# Patient Record
Sex: FEMALE | Race: WHITE | Employment: OTHER | URBAN - METROPOLITAN AREA
[De-identification: names, ages, dates, MRNs, and addresses within clinical notes are randomized per-mention and may not be internally consistent; named-entity substitution may affect disease eponyms.]

---

## 2017-06-26 ENCOUNTER — HOSPITAL ENCOUNTER (OUTPATIENT)
Dept: INTERVENTIONAL RADIOLOGY/VASCULAR | Age: 72
Discharge: HOME | End: 2017-06-26
Attending: ORTHOPAEDIC SURGERY | Admitting: ORTHOPAEDIC SURGERY
Payer: MEDICARE

## 2017-06-26 ENCOUNTER — ANESTHESIA EVENT (OUTPATIENT)
Dept: INTERVENTIONAL RADIOLOGY/VASCULAR | Age: 72
End: 2017-06-26
Payer: MEDICARE

## 2017-06-26 ENCOUNTER — ANESTHESIA (OUTPATIENT)
Dept: INTERVENTIONAL RADIOLOGY/VASCULAR | Age: 72
End: 2017-06-26
Payer: MEDICARE

## 2017-06-26 VITALS
HEIGHT: 64 IN | RESPIRATION RATE: 21 BRPM | BODY MASS INDEX: 23.05 KG/M2 | SYSTOLIC BLOOD PRESSURE: 134 MMHG | OXYGEN SATURATION: 96 % | HEART RATE: 57 BPM | DIASTOLIC BLOOD PRESSURE: 60 MMHG | WEIGHT: 135 LBS | TEMPERATURE: 97.9 F

## 2017-06-26 DIAGNOSIS — S32.020A COMPRESSION FRACTURE OF L2 LUMBAR VERTEBRA (HCC): ICD-10-CM

## 2017-06-26 DIAGNOSIS — M54.50 LOW BACK PAIN: ICD-10-CM

## 2017-06-26 PROCEDURE — 88307 TISSUE EXAM BY PATHOLOGIST: CPT | Performed by: ORTHOPAEDIC SURGERY

## 2017-06-26 PROCEDURE — 76060000033 HC ANESTHESIA 1 TO 1.5 HR: Performed by: NURSE ANESTHETIST, CERTIFIED REGISTERED

## 2017-06-26 PROCEDURE — 77030034843 HC TAMP SPN BN INFL EXP II KYPH -H

## 2017-06-26 PROCEDURE — 74011000250 HC RX REV CODE- 250: Performed by: RADIOLOGY

## 2017-06-26 PROCEDURE — 77030011218 HC DEV BIOP BN KYPH -C

## 2017-06-26 PROCEDURE — 74011250636 HC RX REV CODE- 250/636

## 2017-06-26 PROCEDURE — 74011250636 HC RX REV CODE- 250/636: Performed by: RADIOLOGY

## 2017-06-26 PROCEDURE — 74011636320 HC RX REV CODE- 636/320: Performed by: RADIOLOGY

## 2017-06-26 PROCEDURE — 77030003666 HC NDL SPINAL BD -A

## 2017-06-26 PROCEDURE — 88311 DECALCIFY TISSUE: CPT | Performed by: ORTHOPAEDIC SURGERY

## 2017-06-26 PROCEDURE — 77030034842 HC TAMP SPN BN INFL EXP II KYPH -I

## 2017-06-26 PROCEDURE — 77030030399

## 2017-06-26 PROCEDURE — 22514 PERQ VERTEBRAL AUGMENTATION: CPT

## 2017-06-26 PROCEDURE — 74011000250 HC RX REV CODE- 250

## 2017-06-26 PROCEDURE — C1713 ANCHOR/SCREW BN/BN,TIS/BN: HCPCS

## 2017-06-26 RX ORDER — SIMVASTATIN 10 MG/1
10 TABLET, FILM COATED ORAL
COMMUNITY
End: 2019-08-20 | Stop reason: ALTCHOICE

## 2017-06-26 RX ORDER — SODIUM CHLORIDE, SODIUM LACTATE, POTASSIUM CHLORIDE, CALCIUM CHLORIDE 600; 310; 30; 20 MG/100ML; MG/100ML; MG/100ML; MG/100ML
INJECTION, SOLUTION INTRAVENOUS
Status: DISCONTINUED | OUTPATIENT
Start: 2017-06-26 | End: 2017-06-26 | Stop reason: HOSPADM

## 2017-06-26 RX ORDER — FENTANYL CITRATE 50 UG/ML
INJECTION, SOLUTION INTRAMUSCULAR; INTRAVENOUS
Status: COMPLETED
Start: 2017-06-26 | End: 2017-06-26

## 2017-06-26 RX ORDER — KETOROLAC TROMETHAMINE 30 MG/ML
30 INJECTION, SOLUTION INTRAMUSCULAR; INTRAVENOUS
Status: DISCONTINUED | OUTPATIENT
Start: 2017-06-26 | End: 2017-06-26 | Stop reason: ALTCHOICE

## 2017-06-26 RX ORDER — CEFAZOLIN SODIUM IN 0.9 % NACL 2 G/50 ML
2 INTRAVENOUS SOLUTION, PIGGYBACK (ML) INTRAVENOUS ONCE
Status: COMPLETED | OUTPATIENT
Start: 2017-06-26 | End: 2017-06-26

## 2017-06-26 RX ORDER — MELOXICAM 15 MG/1
15 TABLET ORAL DAILY
COMMUNITY
End: 2019-07-26

## 2017-06-26 RX ORDER — PROPOFOL 10 MG/ML
INJECTION, EMULSION INTRAVENOUS AS NEEDED
Status: DISCONTINUED | OUTPATIENT
Start: 2017-06-26 | End: 2017-06-26 | Stop reason: HOSPADM

## 2017-06-26 RX ORDER — SODIUM CHLORIDE 0.9 % (FLUSH) 0.9 %
SYRINGE (ML) INJECTION
Status: DISCONTINUED
Start: 2017-06-26 | End: 2017-06-26 | Stop reason: ALTCHOICE

## 2017-06-26 RX ORDER — FENTANYL CITRATE 50 UG/ML
INJECTION, SOLUTION INTRAMUSCULAR; INTRAVENOUS AS NEEDED
Status: DISCONTINUED | OUTPATIENT
Start: 2017-06-26 | End: 2017-06-26 | Stop reason: HOSPADM

## 2017-06-26 RX ORDER — PROPOFOL 10 MG/ML
INJECTION, EMULSION INTRAVENOUS
Status: DISCONTINUED | OUTPATIENT
Start: 2017-06-26 | End: 2017-06-26 | Stop reason: HOSPADM

## 2017-06-26 RX ORDER — LIDOCAINE HYDROCHLORIDE 20 MG/ML
INJECTION, SOLUTION EPIDURAL; INFILTRATION; INTRACAUDAL; PERINEURAL AS NEEDED
Status: DISCONTINUED | OUTPATIENT
Start: 2017-06-26 | End: 2017-06-26 | Stop reason: HOSPADM

## 2017-06-26 RX ORDER — MIDAZOLAM HYDROCHLORIDE 1 MG/ML
INJECTION, SOLUTION INTRAMUSCULAR; INTRAVENOUS AS NEEDED
Status: DISCONTINUED | OUTPATIENT
Start: 2017-06-26 | End: 2017-06-26 | Stop reason: HOSPADM

## 2017-06-26 RX ORDER — FENTANYL CITRATE 50 UG/ML
100 INJECTION, SOLUTION INTRAMUSCULAR; INTRAVENOUS
Status: DISCONTINUED | OUTPATIENT
Start: 2017-06-26 | End: 2017-06-26 | Stop reason: ALTCHOICE

## 2017-06-26 RX ORDER — MIDAZOLAM HYDROCHLORIDE 1 MG/ML
INJECTION, SOLUTION INTRAMUSCULAR; INTRAVENOUS
Status: DISCONTINUED
Start: 2017-06-26 | End: 2017-06-26 | Stop reason: ALTCHOICE

## 2017-06-26 RX ORDER — LOSARTAN POTASSIUM 50 MG/1
100 TABLET ORAL DAILY
COMMUNITY
End: 2018-11-16

## 2017-06-26 RX ORDER — BUPIVACAINE HYDROCHLORIDE 5 MG/ML
30 INJECTION, SOLUTION EPIDURAL; INTRACAUDAL
Status: COMPLETED | OUTPATIENT
Start: 2017-06-26 | End: 2017-06-26

## 2017-06-26 RX ORDER — LIDOCAINE HYDROCHLORIDE 20 MG/ML
20 INJECTION, SOLUTION INFILTRATION; PERINEURAL
Status: COMPLETED | OUTPATIENT
Start: 2017-06-26 | End: 2017-06-26

## 2017-06-26 RX ORDER — DEXMEDETOMIDINE HYDROCHLORIDE 4 UG/ML
INJECTION, SOLUTION INTRAVENOUS AS NEEDED
Status: DISCONTINUED | OUTPATIENT
Start: 2017-06-26 | End: 2017-06-26 | Stop reason: HOSPADM

## 2017-06-26 RX ORDER — SERTRALINE HYDROCHLORIDE 50 MG/1
50 TABLET, FILM COATED ORAL DAILY
COMMUNITY
End: 2019-07-18

## 2017-06-26 RX ADMIN — PROPOFOL 50 MG: 10 INJECTION, EMULSION INTRAVENOUS at 11:11

## 2017-06-26 RX ADMIN — DEXMEDETOMIDINE HYDROCHLORIDE 4 MCG: 4 INJECTION, SOLUTION INTRAVENOUS at 10:49

## 2017-06-26 RX ADMIN — DEXMEDETOMIDINE HYDROCHLORIDE 4 MCG: 4 INJECTION, SOLUTION INTRAVENOUS at 11:00

## 2017-06-26 RX ADMIN — MIDAZOLAM HYDROCHLORIDE 2 MG: 1 INJECTION, SOLUTION INTRAMUSCULAR; INTRAVENOUS at 10:45

## 2017-06-26 RX ADMIN — MIDAZOLAM HYDROCHLORIDE 1 MG: 1 INJECTION, SOLUTION INTRAMUSCULAR; INTRAVENOUS at 11:11

## 2017-06-26 RX ADMIN — FENTANYL CITRATE 25 MCG: 50 INJECTION, SOLUTION INTRAMUSCULAR; INTRAVENOUS at 10:49

## 2017-06-26 RX ADMIN — FENTANYL CITRATE 50 MCG: 50 INJECTION, SOLUTION INTRAMUSCULAR; INTRAVENOUS at 12:42

## 2017-06-26 RX ADMIN — LIDOCAINE HYDROCHLORIDE 40 MG: 20 INJECTION, SOLUTION EPIDURAL; INFILTRATION; INTRACAUDAL; PERINEURAL at 10:49

## 2017-06-26 RX ADMIN — FENTANYL CITRATE 25 MCG: 50 INJECTION, SOLUTION INTRAMUSCULAR; INTRAVENOUS at 11:14

## 2017-06-26 RX ADMIN — BUPIVACAINE HYDROCHLORIDE 10 ML: 5 INJECTION, SOLUTION EPIDURAL; INTRACAUDAL at 11:12

## 2017-06-26 RX ADMIN — LIDOCAINE HYDROCHLORIDE 20 ML: 20 INJECTION, SOLUTION INFILTRATION; PERINEURAL at 11:11

## 2017-06-26 RX ADMIN — SODIUM CHLORIDE, SODIUM LACTATE, POTASSIUM CHLORIDE, CALCIUM CHLORIDE: 600; 310; 30; 20 INJECTION, SOLUTION INTRAVENOUS at 11:46

## 2017-06-26 RX ADMIN — FENTANYL CITRATE 25 MCG: 50 INJECTION, SOLUTION INTRAMUSCULAR; INTRAVENOUS at 11:45

## 2017-06-26 RX ADMIN — KETOROLAC TROMETHAMINE 30 MG: 30 INJECTION, SOLUTION INTRAMUSCULAR at 12:43

## 2017-06-26 RX ADMIN — DEXMEDETOMIDINE HYDROCHLORIDE 4 MCG: 4 INJECTION, SOLUTION INTRAVENOUS at 10:54

## 2017-06-26 RX ADMIN — IOHEXOL 15 ML: 240 INJECTION, SOLUTION INTRATHECAL; INTRAVASCULAR; INTRAVENOUS; ORAL at 11:50

## 2017-06-26 RX ADMIN — CEFAZOLIN 2 G: 1 INJECTION, POWDER, FOR SOLUTION INTRAMUSCULAR; INTRAVENOUS; PARENTERAL at 10:59

## 2017-06-26 RX ADMIN — PROPOFOL 50 MG: 10 INJECTION, EMULSION INTRAVENOUS at 10:49

## 2017-06-26 RX ADMIN — PROPOFOL 30 MCG/KG/MIN: 10 INJECTION, EMULSION INTRAVENOUS at 10:49

## 2017-06-26 RX ADMIN — SODIUM CHLORIDE, SODIUM LACTATE, POTASSIUM CHLORIDE, CALCIUM CHLORIDE: 600; 310; 30; 20 INJECTION, SOLUTION INTRAVENOUS at 10:44

## 2017-06-26 NOTE — ANESTHESIA PREPROCEDURE EVALUATION
Anesthetic History   No history of anesthetic complications            Review of Systems / Medical History  Patient summary reviewed, nursing notes reviewed and pertinent labs reviewed    Pulmonary          Smoker         Neuro/Psych   Within defined limits           Cardiovascular    Hypertension                   GI/Hepatic/Renal  Within defined limits              Endo/Other  Within defined limits           Other Findings              Physical Exam    Airway  Mallampati: II  TM Distance: > 6 cm  Neck ROM: normal range of motion   Mouth opening: Normal     Cardiovascular  Regular rate and rhythm,  S1 and S2 normal,  no murmur, click, rub, or gallop             Dental  No notable dental hx    Comments: Lower veneers center one is cracked   Pulmonary  Breath sounds clear to auscultation               Abdominal  GI exam deferred       Other Findings            Anesthetic Plan    ASA: 3  Anesthesia type: general          Induction: Intravenous  Anesthetic plan and risks discussed with: Patient

## 2017-06-26 NOTE — DISCHARGE INSTRUCTIONS
[address] Procedures/Radiology Department      Radiologist: Dr. Mendel Roch    Date:   June 26, 2017   Kyphoplasty Discharge Instructions    Restrict your activity the next 24 hours. Resume your previous diet and follow medication reconciliation form. You may take Tylenol, as directed on the label, for pain or discomfort. Avoid ibuprofen (Advil, Motrin) and aspirin as they may cause bleeding, or continue your prescription pain medications as directed. Avoid lifting anything heavier than 5 pounds. Avoid excessive bending and lifting for one week    Be sure to follow up with your physician, and let him know how you are progressing. If you have severe pain, numbness, tingling, or weakness in your legs go to the nearest emergency department, and tell them you have had a kyphoplasty.

## 2017-06-26 NOTE — IP AVS SNAPSHOT
Current Discharge Medication List  
  
ASK your doctor about these medications Dose & Instructions Dispensing Information Comments Morning Noon Evening Bedtime  
 amLODIPine 5 mg tablet Commonly known as:  Asya Ritter Your last dose was: Your next dose is:    
   
   
 Dose:  5 mg Take 5 mg by mouth nightly. Refills:  0  
     
   
   
   
  
 losartan 50 mg tablet Commonly known as:  COZAAR Your last dose was: Your next dose is:    
   
   
 Dose:  50 mg Take 50 mg by mouth daily. Refills:  0  
     
   
   
   
  
 meloxicam 15 mg tablet Commonly known as:  MOBIC Your last dose was: Your next dose is:    
   
   
 Dose:  15 mg Take 15 mg by mouth daily. Refills:  0  
     
   
   
   
  
 metoprolol tartrate 100 mg IR tablet Commonly known as:  LOPRESSOR Your last dose was: Your next dose is:    
   
   
 Dose:  100 mg Take 100 mg by mouth nightly. Refills:  0  
     
   
   
   
  
 oxyCODONE IR 5 mg immediate release tablet Commonly known as:  Faina Flowers Your last dose was: Your next dose is:    
   
   
 Dose:  5 mg Take 1 Tab by mouth every four (4) hours as needed. Max Daily Amount: 30 mg. Indications: PAIN Quantity:  40 Tab Refills:  0  
     
   
   
   
  
 sertraline 50 mg tablet Commonly known as:  ZOLOFT Your last dose was: Your next dose is:    
   
   
 Dose:  50 mg Take 50 mg by mouth daily. Refills:  0  
     
   
   
   
  
 simvastatin 10 mg tablet Commonly known as:  ZOCOR Your last dose was: Your next dose is:    
   
   
 Dose:  10 mg Take 10 mg by mouth nightly. Refills:  0

## 2017-06-26 NOTE — PROCEDURES
PROCEDURE:Kyphoplasty L1 and L2 vertebra. Biopsy L2 vertebra. INDICATION:severe refractory LBP. ANESTHESIA:MAC and local.  COMPLICATION:NONE. SPECIMENS REMOVED:core to pathology. BLOOD LOSS:NONE. /ASSISTANT:MARK Lucero RECOMMENDATIONS:none. CONSENT OBTAINED:YES.  NOTES:none.

## 2017-06-26 NOTE — ANESTHESIA POSTPROCEDURE EVALUATION
Post-Anesthesia Evaluation and Assessment    Patient: Vinita Gómez MRN: [id number]  SSN: [id number]    YOB: 1945  Age: 81 y.o. Sex: female       Cardiovascular Function/Vital Signs  Visit Vitals    /81 (BP 1 Location: Left arm, BP Patient Position: At rest)    Pulse 64    Temp 36.6 °C (97.9 °F)    Resp 20    Ht 5' 4\" (1.626 m)    Wt 61.2 kg (135 lb)    SpO2 99%    BMI 23.17 kg/m2       Patient is status post MAC anesthesia for * No procedures listed *. Nausea/Vomiting: None    Postoperative hydration reviewed and adequate. Pain:  Pain Scale 1: Numeric (0 - 10) (06/26/17 1325)  Pain Intensity 1: 4 (06/26/17 1325)   Managed    Neurological Status: At baseline    Mental Status and Level of Consciousness: Arousable    Pulmonary Status:   O2 Device: Room air (06/26/17 1325)   Adequate oxygenation and airway patent    Complications related to anesthesia: None    Post-anesthesia assessment completed.  No concerns    Signed By: Shane Christian MD     June 26, 2017

## 2017-06-26 NOTE — H&P
Radiology History and Physical    Patient: Leanna Roman 81 y.o. female     Chief Complaint: No chief complaint on file. History of Present Illness: Severe refractory LBP. History:    Past Medical History:   Diagnosis Date    Hypertension      History reviewed. No pertinent family history. Social History     Social History    Marital status:      Spouse name: N/A    Number of children: N/A    Years of education: N/A     Occupational History    Not on file. Social History Main Topics    Smoking status: Current Every Day Smoker     Packs/day: 0.50    Smokeless tobacco: Not on file    Alcohol use 3.6 oz/week     6 Glasses of wine per week    Drug use: Not on file    Sexual activity: Not on file     Other Topics Concern    Not on file     Social History Narrative       Allergies: No Known Allergies    Current Medications:  No current facility-administered medications for this encounter. Physical Exam:  Blood pressure 134/60, pulse (!) 57, temperature 97.9 °F (36.6 °C), resp. rate 21, height 5' 4\" (1.626 m), weight 61.2 kg (135 lb), SpO2 96 %. GENERAL: alert, cooperative, mild distress, appears stated age, LUNG: clear to auscultation bilaterally, HEART: regular rate and rhythm      Alerts:    Hospital Problems  Date Reviewed: 6/26/2017    None          Laboratory:    No results for input(s): HGB, HCT, WBC, PLT, INR, BUN, CREA, K, CRCLT, HGBEXT, HCTEXT, PLTEXT in the last 72 hours. No lab exists for component: PTT, PT, INREXT      Plan of Care/Planned Procedure:  Risks, benefits, and alternatives reviewed with patient and she agrees to proceed with the procedure.

## 2017-06-26 NOTE — IP AVS SNAPSHOT
[address] 
[phone] Patient: Opal Mckeon MRN: [id number] [id number] You are allergic to the following No active allergies Recent Documentation Height Weight BMI OB Status Smoking Status 1.626 m 61.2 kg 23.17 kg/m2 Hysterectomy Current Every Day Smoker Emergency Contacts Name Discharge Info Relation Home Work Mobile [place] DISCHARGE CAREGIVER [3] Friend [5] [phone] About your hospitalization You were admitted on:  June 26, 2017 You last received care in the:  [facility] RAD ANGIO IR You were discharged on:  June 26, 2017 Unit phone number:  [phone] Why you were hospitalized Your primary diagnosis was:  Not on File Providers Seen During Your Hospitalizations Provider Role Specialty Primary office phone Mari Hansen MD Attending Provider Orthopedic Surgery [phone] Your Primary Care Physician (PCP) Primary Care Physician Office Phone Office Fax Jona Dawson [phone] Follow-up Information None Current Discharge Medication List  
  
ASK your doctor about these medications Dose & Instructions Dispensing Information Comments Morning Noon Evening Bedtime  
 amLODIPine 5 mg tablet Commonly known as:  Karyle Neisha Your last dose was: Your next dose is:    
   
   
 Dose:  5 mg Take 5 mg by mouth nightly. Refills:  0  
     
   
   
   
  
 losartan 50 mg tablet Commonly known as:  COZAAR Your last dose was: Your next dose is:    
   
   
 Dose:  50 mg Take 50 mg by mouth daily. Refills:  0  
     
   
   
   
  
 meloxicam 15 mg tablet Commonly known as:  MOBIC Your last dose was: Your next dose is:    
   
   
 Dose:  15 mg Take 15 mg by mouth daily. Refills:  0 metoprolol tartrate 100 mg IR tablet Commonly known as:  LOPRESSOR Your last dose was: Your next dose is:    
   
   
 Dose:  100 mg Take 100 mg by mouth nightly. Refills:  0  
     
   
   
   
  
 oxyCODONE IR 5 mg immediate release tablet Commonly known as:  Frankie Méndez Your last dose was: Your next dose is:    
   
   
 Dose:  5 mg Take 1 Tab by mouth every four (4) hours as needed. Max Daily Amount: 30 mg. Indications: PAIN Quantity:  40 Tab Refills:  0  
     
   
   
   
  
 sertraline 50 mg tablet Commonly known as:  ZOLOFT Your last dose was: Your next dose is:    
   
   
 Dose:  50 mg Take 50 mg by mouth daily. Refills:  0  
     
   
   
   
  
 simvastatin 10 mg tablet Commonly known as:  ZOCOR Your last dose was: Your next dose is:    
   
   
 Dose:  10 mg Take 10 mg by mouth nightly. Refills:  0 Discharge Instructions None Discharge Orders None Introducing hospitals & HEALTH SERVICES! [facility] introduces [employer] patient portal. Now you can access parts of your medical record, email your doctor's office, and request medication refills online. 1. In your internet browser, go to [email or web address] 2. Click on the First Time User? Click Here link in the Sign In box. You will see the New Member Sign Up page. 3. Enter your [employer] Access Code exactly as it appears below. You will not need to use this code after youve completed the sign-up process. If you do not sign up before the expiration date, you must request a new code. · [employer] Access Code: [email or web address] Expires: 9/18/2017 11:48 AM 
 
4. Enter the last four digits of your Social Security Number (xxxx) and Date of Birth (mm/dd/yyyy) as indicated and click Submit. You will be taken to the next sign-up page. 5. Create a [employer] ID.  This will be your [employer] login ID and cannot be changed, so think of one that is secure and easy to remember. 6. Create a Buffer password. You can change your password at any time. 7. Enter your Password Reset Question and Answer. This can be used at a later time if you forget your password. 8. Enter your e-mail address. You will receive e-mail notification when new information is available in [address] 9. Click Sign Up. You can now view and download portions of your medical record. 10. Click the Download Summary menu link to download a portable copy of your medical information. If you have questions, please visit the Frequently Asked Questions section of the Buffer website. Remember, Buffer is NOT to be used for urgent needs. For medical emergencies, dial 911. Now available from your iPhone and Android! General Information Please provide this summary of care documentation to your next provider. Patient Signature:  ____________________________________________________________ Date:  ____________________________________________________________  
  
Jessa Cleveland Provider Signature:  ____________________________________________________________ Date:  ____________________________________________________________

## 2018-09-30 ENCOUNTER — HOSPITAL ENCOUNTER (EMERGENCY)
Age: 73
Discharge: HOME | End: 2018-09-30
Attending: EMERGENCY MEDICINE | Admitting: EMERGENCY MEDICINE
Payer: MEDICARE

## 2018-09-30 VITALS
TEMPERATURE: 98 F | SYSTOLIC BLOOD PRESSURE: 168 MMHG | OXYGEN SATURATION: 97 % | HEIGHT: 63 IN | WEIGHT: 138 LBS | HEART RATE: 58 BPM | BODY MASS INDEX: 24.45 KG/M2 | RESPIRATION RATE: 19 BRPM | DIASTOLIC BLOOD PRESSURE: 75 MMHG

## 2018-09-30 DIAGNOSIS — I10 ESSENTIAL HYPERTENSION: Primary | ICD-10-CM

## 2018-09-30 LAB
ALBUMIN SERPL-MCNC: 3.9 G/DL (ref 3.5–5)
ALBUMIN/GLOB SERPL: 1.1 {RATIO} (ref 1.1–2.2)
ALP SERPL-CCNC: 76 U/L (ref 45–117)
ALT SERPL-CCNC: 28 U/L (ref 12–78)
ANION GAP SERPL CALC-SCNC: 10 MMOL/L (ref 5–15)
AST SERPL-CCNC: 27 U/L (ref 15–37)
BASOPHILS # BLD: 0 K/UL (ref 0–0.1)
BASOPHILS NFR BLD: 0 % (ref 0–1)
BILIRUB SERPL-MCNC: 0.6 MG/DL (ref 0.2–1)
BUN SERPL-MCNC: 23 MG/DL (ref 6–20)
BUN/CREAT SERPL: 23 (ref 12–20)
CALCIUM SERPL-MCNC: 9 MG/DL (ref 8.5–10.1)
CHLORIDE SERPL-SCNC: 106 MMOL/L (ref 97–108)
CO2 SERPL-SCNC: 22 MMOL/L (ref 21–32)
COMMENT, HOLDF: NORMAL
CREAT SERPL-MCNC: 1 MG/DL (ref 0.55–1.02)
DIFFERENTIAL METHOD BLD: ABNORMAL
EOSINOPHIL # BLD: 0.1 K/UL (ref 0–0.4)
EOSINOPHIL NFR BLD: 2 % (ref 0–7)
ERYTHROCYTE [DISTWIDTH] IN BLOOD BY AUTOMATED COUNT: 12.3 % (ref 11.5–14.5)
GLOBULIN SER CALC-MCNC: 3.5 G/DL (ref 2–4)
GLUCOSE SERPL-MCNC: 126 MG/DL (ref 65–100)
HCT VFR BLD AUTO: 33.8 % (ref 35–47)
HGB BLD-MCNC: 11.8 G/DL (ref 11.5–16)
IMM GRANULOCYTES # BLD: 0 K/UL (ref 0–0.04)
IMM GRANULOCYTES NFR BLD AUTO: 0 % (ref 0–0.5)
LYMPHOCYTES # BLD: 2.1 K/UL (ref 0.8–3.5)
LYMPHOCYTES NFR BLD: 30 % (ref 12–49)
MCH RBC QN AUTO: 35.9 PG (ref 26–34)
MCHC RBC AUTO-ENTMCNC: 34.9 G/DL (ref 30–36.5)
MCV RBC AUTO: 102.7 FL (ref 80–99)
MONOCYTES # BLD: 0.6 K/UL (ref 0–1)
MONOCYTES NFR BLD: 8 % (ref 5–13)
NEUTS SEG # BLD: 4.2 K/UL (ref 1.8–8)
NEUTS SEG NFR BLD: 59 % (ref 32–75)
NRBC # BLD: 0 K/UL (ref 0–0.01)
NRBC BLD-RTO: 0 PER 100 WBC
PLATELET # BLD AUTO: 188 K/UL (ref 150–400)
PMV BLD AUTO: 9.4 FL (ref 8.9–12.9)
POTASSIUM SERPL-SCNC: 4.4 MMOL/L (ref 3.5–5.1)
PROT SERPL-MCNC: 7.4 G/DL (ref 6.4–8.2)
RBC # BLD AUTO: 3.29 M/UL (ref 3.8–5.2)
SAMPLES BEING HELD,HOLD: NORMAL
SODIUM SERPL-SCNC: 138 MMOL/L (ref 136–145)
WBC # BLD AUTO: 7.1 K/UL (ref 3.6–11)

## 2018-09-30 PROCEDURE — 99284 EMERGENCY DEPT VISIT MOD MDM: CPT

## 2018-09-30 PROCEDURE — 80053 COMPREHEN METABOLIC PANEL: CPT | Performed by: EMERGENCY MEDICINE

## 2018-09-30 PROCEDURE — 93005 ELECTROCARDIOGRAM TRACING: CPT

## 2018-09-30 PROCEDURE — 85025 COMPLETE CBC W/AUTO DIFF WBC: CPT | Performed by: EMERGENCY MEDICINE

## 2018-09-30 PROCEDURE — 36415 COLL VENOUS BLD VENIPUNCTURE: CPT | Performed by: EMERGENCY MEDICINE

## 2018-09-30 NOTE — ED TRIAGE NOTES
Patient presents from home with complaints of high blood pressure. Per EMS BP en route was 260/110.   Patient I having \"tremors\" which she has not had in the past.  Patient has been taking her BP medications

## 2018-09-30 NOTE — DISCHARGE INSTRUCTIONS

## 2018-09-30 NOTE — ED PROVIDER NOTES
HPI Comments: 72 y.o. female with past medical history significant for hypertension who presents from home via EMS with chief complaint of high blood pressure. Per EMS, pt's blood pressure en route was 260/110. Pt reports she has had episodes of high blood pressure over the last year. Pt reports her blood pressure is normally about 170, but sometimes increases. For example, pt reports her blood pressure this morning was 210/97 after waking up. Pt states she \"can just tell\" when her blood pressure and is high when she checks it. Pt reports she has a PCP and is currently taking medications. Pt is currently on Losartan 100 mg and Metoprolol 100 mg. Pt states she is frustrated that her blood pressure is not lower and that her medication regimens are not working. Furthermore, pt requests options for different physicians. Pt denies any fever or chills. There are no other acute medical concerns at this time. Social hx - Tobacco use: current smoker, Alcohol Use: yes    PCP: Milka Rawls MD    Note written by Adore Henry, as dictated by Raji Wade MD 4:10 PM.        The history is provided by the patient. No  was used. Past Medical History:   Diagnosis Date    Hypertension        Past Surgical History:   Procedure Laterality Date    HX APPENDECTOMY      HX BACK SURGERY      LUMBAR FUSION X2 SURGERIES    HX BACK SURGERY      CERVICAL X2    HX HYSTERECTOMY      HX TONSILLECTOMY           No family history on file. Social History     Social History    Marital status:      Spouse name: N/A    Number of children: N/A    Years of education: N/A     Occupational History    Not on file.      Social History Main Topics    Smoking status: Current Every Day Smoker     Packs/day: 0.50    Smokeless tobacco: Not on file    Alcohol use 3.6 oz/week     6 Glasses of wine per week    Drug use: Not on file    Sexual activity: Not on file     Other Topics Concern  Not on file     Social History Narrative         ALLERGIES: Review of patient's allergies indicates no known allergies. Review of Systems   All other systems reviewed and are negative. Vitals:    09/30/18 1529 09/30/18 1531 09/30/18 1545   BP: (!) 217/90 (!) 217/90 195/80   Pulse:  71 65   Resp:  16 24   Temp:  98 °F (36.7 °C)    SpO2:  99% 98%   Weight:  62.6 kg (138 lb)    Height:  5' 3\" (1.6 m)             Physical Exam   Constitutional: She is oriented to person, place, and time. She appears well-developed and well-nourished. No distress. Anxious. HENT:   Head: Normocephalic and atraumatic. Eyes: Conjunctivae are normal.   Neck: Neck supple. Cardiovascular: Normal rate, regular rhythm, normal heart sounds and intact distal pulses. Pulmonary/Chest: Effort normal and breath sounds normal. No stridor. No respiratory distress. Abdominal: She exhibits no distension. Musculoskeletal: Normal range of motion. Neurological: She is alert and oriented to person, place, and time. No cranial nerve deficit. Coordination normal.   Skin: Skin is warm and dry. Psychiatric: She has a normal mood and affect. Nursing note and vitals reviewed. Note written by Adore Romano, as dictated by Misha Cardoza MD 4:10 PM.    MDM     72 y.o. female presents with asymptomatic hypertension. No signs or symptoms of end-organ damage. She was nervous earlier and tremulous but this has resolved. Discussed outpatient follow up for medical management and return precautions discussed for new or concerning symptoms. ED Course       Procedures   ED EKG interpretation:  Rhythm: normal sinus rhythm; and regular . Rate (approx.): 67 bpm; Axis: normal; ST/T wave: normal  Note written by Netta Nixon, as dictated by Misha Cardoza MD 10:41 PM.      4:31 PM  Patient's results have been reviewed with them.   Patient and/or family have verbally conveyed their understanding and agreement of the patient's signs, symptoms, diagnosis, treatment and prognosis and additionally agree to follow up as recommended or return to the Emergency Room should their condition change prior to follow-up. Discharge instructions have also been provided to the patient with some educational information regarding their diagnosis as well a list of reasons why they would want to return to the ER prior to their follow-up appointment should their condition change.

## 2018-10-01 LAB
ATRIAL RATE: 67 BPM
CALCULATED P AXIS, ECG09: 86 DEGREES
CALCULATED R AXIS, ECG10: 51 DEGREES
CALCULATED T AXIS, ECG11: 57 DEGREES
DIAGNOSIS, 93000: NORMAL
P-R INTERVAL, ECG05: 148 MS
Q-T INTERVAL, ECG07: 392 MS
QRS DURATION, ECG06: 64 MS
QTC CALCULATION (BEZET), ECG08: 414 MS
VENTRICULAR RATE, ECG03: 67 BPM

## 2018-11-15 ENCOUNTER — TELEPHONE (OUTPATIENT)
Dept: CARDIOLOGY CLINIC | Age: 73
End: 2018-11-15

## 2018-11-15 NOTE — TELEPHONE ENCOUNTER
Faxed records request to [facility] office.     Future Appointments   Date Time Provider Andi Slater   11/16/2018 10:00 AM Alfredo Apple  [address]

## 2018-11-16 ENCOUNTER — OFFICE VISIT (OUTPATIENT)
Dept: CARDIOLOGY CLINIC | Age: 73
End: 2018-11-16

## 2018-11-16 VITALS
HEART RATE: 68 BPM | DIASTOLIC BLOOD PRESSURE: 90 MMHG | HEIGHT: 63 IN | RESPIRATION RATE: 16 BRPM | WEIGHT: 135 LBS | SYSTOLIC BLOOD PRESSURE: 140 MMHG | BODY MASS INDEX: 23.92 KG/M2 | OXYGEN SATURATION: 96 %

## 2018-11-16 DIAGNOSIS — E78.2 MIXED HYPERLIPIDEMIA: ICD-10-CM

## 2018-11-16 DIAGNOSIS — Z82.49 FAMILY HISTORY OF PREMATURE CAD: ICD-10-CM

## 2018-11-16 DIAGNOSIS — R60.0 BILATERAL LEG EDEMA: ICD-10-CM

## 2018-11-16 DIAGNOSIS — R06.02 SOB (SHORTNESS OF BREATH): ICD-10-CM

## 2018-11-16 DIAGNOSIS — I10 ESSENTIAL HYPERTENSION: Primary | ICD-10-CM

## 2018-11-16 RX ORDER — CLONIDINE HYDROCHLORIDE 0.1 MG/1
0.1 TABLET ORAL 2 TIMES DAILY
COMMUNITY
Start: 2018-10-17 | End: 2018-11-16 | Stop reason: SINTOL

## 2018-11-16 RX ORDER — TELMISARTAN 40 MG/1
40 TABLET ORAL DAILY
COMMUNITY
End: 2018-11-16 | Stop reason: SDUPTHER

## 2018-11-16 RX ORDER — TELMISARTAN 40 MG/1
40 TABLET ORAL DAILY
Qty: 90 TAB | Refills: 2 | Status: SHIPPED | OUTPATIENT
Start: 2018-11-16 | End: 2019-07-18

## 2018-11-16 RX ORDER — METOPROLOL SUCCINATE 100 MG/1
TABLET, EXTENDED RELEASE ORAL DAILY
COMMUNITY
End: 2019-07-26

## 2018-11-16 NOTE — PROGRESS NOTES
HISTORY OF PRESENT ILLNESS  Mark Venegas is a 72 y.o. female     SUMMARY:   Problem List  Date Reviewed: 11/15/2018          Codes Class Noted    Mixed hyperlipidemia ICD-10-CM: E78.2  ICD-9-CM: 272.2  11/16/2018        Essential hypertension ICD-10-CM: I10  ICD-9-CM: 401.9  11/16/2018        SOB (shortness of breath) ICD-10-CM: R06.02  ICD-9-CM: 786.05  11/16/2018        Bilateral leg edema ICD-10-CM: R60.0  ICD-9-CM: 782.3  11/16/2018        Family history of premature CAD ICD-10-CM: Z82.49  ICD-9-CM: V17.3  11/16/2018        HNP (herniated nucleus pulposus), cervical ICD-10-CM: M50.20  ICD-9-CM: 722.0  12/27/2016              Current Outpatient Medications on File Prior to Visit   Medication Sig    metoprolol succinate (TOPROL-XL) 100 mg tablet Take  by mouth daily.  simvastatin (ZOCOR) 10 mg tablet Take 10 mg by mouth nightly.  meloxicam (MOBIC) 15 mg tablet Take 15 mg by mouth daily.  sertraline (ZOLOFT) 50 mg tablet Take 50 mg by mouth daily. No current facility-administered medications on file prior to visit. CARDIOLOGY STUDIES TO DATE:  Fall of 2018, echo and stress test at [facility], details unknown      Chief Complaint   Patient presents with    Hypertension     HPI :  Ms. Neisha Santiago is a 72year-old referred by her primary care physician for evaluation of hypertension. She has had high blood pressure for years and has been on various medications with recent intolerance to Clonidine and Amlodipine, details unknown. She does not exercise, though she is active and she limits her salt intake. There is no history or symptoms of sleep apnea. She was on Hydrochlorothiazide in the past, but this caused orthostasis with a syncopal episode, so she was switched to just plain Losartan.   For the last several months, she has had intermittent chest pain and shortness of breath and this all led to her having an evaluation at [facility] with an echocardiogram and stress test, which by her history are normal, though we have not yet received those results. She has some dependent lower extremity edema. She has difficulty sleeping, which is getting worse and she has some mild generalized arthritis. She ended up in the ER at [facility] a month or so ago where she had a negative workup and a normal EKG. CARDIAC ROS:   negative for palpitations, orthopnea, paroxysmal nocturnal dyspnea, exertional chest pressure/discomfort, claudication    Family History   Problem Relation Age of Onset    Heart Attack Father 47       Past Medical History:   Diagnosis Date    Hypertension        GENERAL ROS:  A comprehensive review of systems was negative except for that written in the HPI. Visit Vitals  /90 (BP 1 Location: Left arm, BP Patient Position: Sitting)   Pulse 68   Resp 16   Ht 5' 3\" (1.6 m)   Wt 135 lb (61.2 kg)   SpO2 96%   BMI 23.91 kg/m²       Wt Readings from Last 3 Encounters:   11/16/18 135 lb (61.2 kg)   09/30/18 138 lb (62.6 kg)   06/26/17 135 lb (61.2 kg)            BP Readings from Last 3 Encounters:   11/16/18 140/90   09/30/18 168/75   06/26/17 134/60       PHYSICAL EXAM  General appearance: alert, cooperative, no distress, appears stated age  Neurologic: Alert and oriented X 3  Neck: supple, symmetrical, trachea midline, no adenopathy, no carotid bruit and no JVD  Lungs: clear to auscultation bilaterally  Heart: regular rate and rhythm, S1, S2 normal, no murmur, click, rub or gallop  Abdomen: soft, non-tender. Bowel sounds normal. No masses,  no organomegaly  Extremities: extremities normal, atraumatic, no cyanosis or edema  Pulses: 2+ and symmetric    No results found for: CHOL, CHOLX, CHLST, CHOLV, 730811, HDL, LDL, LDLC, DLDLP, TGLX, TRIGL, TRIGP, CHHD, CHHDX  ASSESSMENT  Ms. Fidencio Arthur appears to have essential hypertension, which recently has not been as well controlled as she would like.   It is interesting that she has a history of syncope and orthostasis with the addition of a diuretic, so I am wondering and I explained to her that we may come to a point where we cannot completely control her blood pressure when she is sitting or lying down, but we will be able to avoid recurrent syncope. For right now, I want to get the records of prior medications from her primary care physician, as well as her recent cardiac imaging results. We are going to switch her from Losartan to Micardis 40 mg and I want her to take that in the morning and her Toprol at night. We may need to add a calcium channel blocker, so I want to see a little bit more about what the problem with Amlodipine was going forward and at this point she needs no specific cardiac testing. current treatment plan is effective, no change in therapy  lab results and schedule of future lab studies reviewed with patient  reviewed diet, exercise and weight control    Encounter Diagnoses   Name Primary?  Essential hypertension Yes    Mixed hyperlipidemia     SOB (shortness of breath)     Bilateral leg edema     Family history of premature CAD      Orders Placed This Encounter    DISCONTD: cloNIDine HCl (CATAPRES) 0.1 mg tablet    metoprolol succinate (TOPROL-XL) 100 mg tablet    DISCONTD: telmisartan (MICARDIS) 40 mg tablet    telmisartan (MICARDIS) 40 mg tablet       Follow-up Disposition:  Return in about 4 weeks (around 12/14/2018).     Jose Alberto Baltazar MD  11/16/2018

## 2018-12-11 ENCOUNTER — TELEPHONE (OUTPATIENT)
Dept: CARDIOLOGY CLINIC | Age: 73
End: 2018-12-11

## 2018-12-11 ENCOUNTER — OFFICE VISIT (OUTPATIENT)
Dept: CARDIOLOGY CLINIC | Age: 73
End: 2018-12-11

## 2018-12-11 VITALS
WEIGHT: 141 LBS | OXYGEN SATURATION: 97 % | BODY MASS INDEX: 24.98 KG/M2 | HEIGHT: 63 IN | DIASTOLIC BLOOD PRESSURE: 88 MMHG | RESPIRATION RATE: 18 BRPM | SYSTOLIC BLOOD PRESSURE: 144 MMHG | HEART RATE: 57 BPM

## 2018-12-11 DIAGNOSIS — I10 ESSENTIAL HYPERTENSION: Primary | ICD-10-CM

## 2018-12-11 DIAGNOSIS — E78.2 MIXED HYPERLIPIDEMIA: ICD-10-CM

## 2018-12-11 DIAGNOSIS — Z82.49 FAMILY HISTORY OF PREMATURE CAD: ICD-10-CM

## 2018-12-11 RX ORDER — ZOLPIDEM TARTRATE 10 MG/1
TABLET ORAL
COMMUNITY

## 2018-12-11 NOTE — TELEPHONE ENCOUNTER
Called Dr. Estrada Left office. Requested last office note and cardiac imaging. They will fax records. Records received and given to Dr. Jannet Landon.

## 2018-12-11 NOTE — PROGRESS NOTES
HISTORY OF PRESENT ILLNESS  Lee Louis is a 72 y.o. female     SUMMARY:   Problem List  Date Reviewed: 12/11/2018          Codes Class Noted    Mixed hyperlipidemia ICD-10-CM: E78.2  ICD-9-CM: 272.2  11/16/2018        Essential hypertension ICD-10-CM: I10  ICD-9-CM: 401.9  11/16/2018        SOB (shortness of breath) ICD-10-CM: R06.02  ICD-9-CM: 786.05  11/16/2018        Bilateral leg edema ICD-10-CM: R60.0  ICD-9-CM: 782.3  11/16/2018        Family history of premature CAD ICD-10-CM: Z82.49  ICD-9-CM: V17.3  11/16/2018        HNP (herniated nucleus pulposus), cervical ICD-10-CM: M50.20  ICD-9-CM: 722.0  12/27/2016              Current Outpatient Medications on File Prior to Visit   Medication Sig    zolpidem (AMBIEN) 10 mg tablet Take  by mouth nightly as needed for Sleep.  metoprolol succinate (TOPROL-XL) 100 mg tablet Take  by mouth daily.  telmisartan (MICARDIS) 40 mg tablet Take 1 Tab by mouth daily.  simvastatin (ZOCOR) 10 mg tablet Take 10 mg by mouth nightly.  meloxicam (MOBIC) 15 mg tablet Take 15 mg by mouth daily.  sertraline (ZOLOFT) 50 mg tablet Take 50 mg by mouth daily. No current facility-administered medications on file prior to visit. CARDIOLOGY STUDIES TO DATE:  10/18 normal stress echo, did not reach target heart rate  10/18 normal lexiscan cardiolyte    Chief Complaint   Patient presents with    Hypertension     HPI :  Ms. Nedra Hill is doing well. When she was last here we made some changes in her medications and her blood pressure has been doing much better. She still gets symptomatic in terms of lightheadedness if it drops much below the level we recorded here today. She remains active without worrisome symptoms. No further chest pain like she had a few months ago.          CARDIAC ROS:   negative for dyspnea, palpitations, syncope, orthopnea, paroxysmal nocturnal dyspnea, exertional chest pressure/discomfort, claudication, lower extremity edema    Family History   Problem Relation Age of Onset    Heart Attack Father 47       Past Medical History:   Diagnosis Date    Hypertension        GENERAL ROS:  A comprehensive review of systems was negative except for that written in the HPI. Visit Vitals  /88 (BP 1 Location: Left arm, BP Patient Position: Sitting)   Pulse (!) 57   Resp 18   Ht 5' 3\" (1.6 m)   Wt 141 lb (64 kg)   SpO2 97%   BMI 24.98 kg/m²       Wt Readings from Last 3 Encounters:   12/11/18 141 lb (64 kg)   11/16/18 135 lb (61.2 kg)   09/30/18 138 lb (62.6 kg)            BP Readings from Last 3 Encounters:   12/11/18 144/88   11/16/18 140/90   09/30/18 168/75       PHYSICAL EXAM  General appearance: alert, cooperative, no distress, appears stated age  Neurologic: Alert and oriented X 3  Neck: supple, symmetrical, trachea midline, no adenopathy, no carotid bruit and no JVD  Lungs: clear to auscultation bilaterally  Heart: regular rate and rhythm, S1, S2 normal, no murmur, click, rub or gallop  Extremities: extremities normal, atraumatic, no cyanosis or edema      ASSESSMENT  Ms. Maryana Quinones is stable and asymptomatic and well compensated at this point and I do not think we should make any further changes at this time. I do not think we are going to ever end up with an ideal blood pressure in her given her symptoms of low blood pressure and we talked about that. current treatment plan is effective, no change in therapy  lab results and schedule of future lab studies reviewed with patient  reviewed diet, exercise and weight control    Encounter Diagnoses   Name Primary?  Essential hypertension Yes    Mixed hyperlipidemia     Family history of premature CAD      Orders Placed This Encounter    zolpidem (AMBIEN) 10 mg tablet       Follow-up Disposition:  Return in about 3 months (around 3/11/2019).     Elias Mcfadden MD  12/11/2018

## 2019-01-26 ENCOUNTER — HOSPITAL ENCOUNTER (EMERGENCY)
Age: 74
Discharge: HOME | End: 2019-01-26
Attending: STUDENT IN AN ORGANIZED HEALTH CARE EDUCATION/TRAINING PROGRAM
Payer: MEDICARE

## 2019-01-26 ENCOUNTER — APPOINTMENT (OUTPATIENT)
Dept: GENERAL RADIOLOGY | Age: 74
End: 2019-01-26
Attending: STUDENT IN AN ORGANIZED HEALTH CARE EDUCATION/TRAINING PROGRAM
Payer: MEDICARE

## 2019-01-26 VITALS
WEIGHT: 144.62 LBS | TEMPERATURE: 97.6 F | SYSTOLIC BLOOD PRESSURE: 206 MMHG | BODY MASS INDEX: 25.62 KG/M2 | DIASTOLIC BLOOD PRESSURE: 78 MMHG | OXYGEN SATURATION: 98 % | HEART RATE: 64 BPM | RESPIRATION RATE: 18 BRPM

## 2019-01-26 DIAGNOSIS — R03.0 ELEVATED BLOOD PRESSURE READING: ICD-10-CM
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
## 2019-01-26 LAB
ALBUMIN SERPL-MCNC: 4.2 G/DL (ref 3.5–5)
ALBUMIN/GLOB SERPL: 1.2 {RATIO} (ref 1.1–2.2)
ALP SERPL-CCNC: 73 U/L (ref 45–117)
ALT SERPL-CCNC: 32 U/L (ref 12–78)
ANION GAP SERPL CALC-SCNC: 14 MMOL/L (ref 5–15)
AST SERPL-CCNC: 31 U/L (ref 15–37)
BASOPHILS # BLD: 0 K/UL (ref 0–0.1)
BASOPHILS NFR BLD: 0 % (ref 0–1)
BILIRUB SERPL-MCNC: 0.5 MG/DL (ref 0.2–1)
BUN SERPL-MCNC: 26 MG/DL (ref 6–20)
BUN/CREAT SERPL: 22 (ref 12–20)
CALCIUM SERPL-MCNC: 9.2 MG/DL (ref 8.5–10.1)
CHLORIDE SERPL-SCNC: 102 MMOL/L (ref 97–108)
CO2 SERPL-SCNC: 21 MMOL/L (ref 21–32)
COMMENT, HOLDF: NORMAL
CREAT SERPL-MCNC: 1.2 MG/DL (ref 0.55–1.02)
DIFFERENTIAL METHOD BLD: ABNORMAL
EOSINOPHIL # BLD: 0.2 K/UL (ref 0–0.4)
EOSINOPHIL NFR BLD: 2 % (ref 0–7)
ERYTHROCYTE [DISTWIDTH] IN BLOOD BY AUTOMATED COUNT: 12.1 % (ref 11.5–14.5)
GLOBULIN SER CALC-MCNC: 3.4 G/DL (ref 2–4)
GLUCOSE SERPL-MCNC: 104 MG/DL (ref 65–100)
HCT VFR BLD AUTO: 32.3 % (ref 35–47)
HGB BLD-MCNC: 11.4 G/DL (ref 11.5–16)
IMM GRANULOCYTES # BLD AUTO: 0 K/UL (ref 0–0.04)
IMM GRANULOCYTES NFR BLD AUTO: 0 % (ref 0–0.5)
LYMPHOCYTES # BLD: 1.9 K/UL (ref 0.8–3.5)
LYMPHOCYTES NFR BLD: 21 % (ref 12–49)
MCH RBC QN AUTO: 34.9 PG (ref 26–34)
MCHC RBC AUTO-ENTMCNC: 35.3 G/DL (ref 30–36.5)
MCV RBC AUTO: 98.8 FL (ref 80–99)
MONOCYTES # BLD: 0.7 K/UL (ref 0–1)
MONOCYTES NFR BLD: 8 % (ref 5–13)
NEUTS SEG # BLD: 6.1 K/UL (ref 1.8–8)
NEUTS SEG NFR BLD: 68 % (ref 32–75)
NRBC # BLD: 0 K/UL (ref 0–0.01)
NRBC BLD-RTO: 0 PER 100 WBC
PLATELET # BLD AUTO: 177 K/UL (ref 150–400)
PMV BLD AUTO: 10 FL (ref 8.9–12.9)
POTASSIUM SERPL-SCNC: 4.2 MMOL/L (ref 3.5–5.1)
PROT SERPL-MCNC: 7.6 G/DL (ref 6.4–8.2)
RBC # BLD AUTO: 3.27 M/UL (ref 3.8–5.2)
SAMPLES BEING HELD,HOLD: NORMAL
SODIUM SERPL-SCNC: 137 MMOL/L (ref 136–145)
WBC # BLD AUTO: 8.9 K/UL (ref 3.6–11)

## 2019-01-26 PROCEDURE — 36415 COLL VENOUS BLD VENIPUNCTURE: CPT

## 2019-01-26 PROCEDURE — 73130 X-RAY EXAM OF HAND: CPT

## 2019-01-26 PROCEDURE — 74011250637 HC RX REV CODE- 250/637: Performed by: STUDENT IN AN ORGANIZED HEALTH CARE EDUCATION/TRAINING PROGRAM

## 2019-01-26 PROCEDURE — 85025 COMPLETE CBC W/AUTO DIFF WBC: CPT

## 2019-01-26 PROCEDURE — 80053 COMPREHEN METABOLIC PANEL: CPT

## 2019-01-26 PROCEDURE — 99283 EMERGENCY DEPT VISIT LOW MDM: CPT

## 2019-01-26 RX ORDER — AMOXICILLIN AND CLAVULANATE POTASSIUM 875; 125 MG/1; MG/1
TABLET, FILM COATED ORAL
Status: DISCONTINUED
Start: 2019-01-26 | End: 2019-01-26 | Stop reason: HOSPADM

## 2019-01-26 RX ORDER — HYDROCODONE BITARTRATE AND ACETAMINOPHEN 5; 325 MG/1; MG/1
1 TABLET ORAL
Qty: 8 TAB | Refills: 0 | Status: SHIPPED | OUTPATIENT
Start: 2019-01-26 | End: 2019-07-18

## 2019-01-26 RX ORDER — HYDROCODONE BITARTRATE AND ACETAMINOPHEN 5; 325 MG/1; MG/1
TABLET ORAL
Status: DISCONTINUED
Start: 2019-01-26 | End: 2019-01-26 | Stop reason: HOSPADM

## 2019-01-26 RX ORDER — AMOXICILLIN AND CLAVULANATE POTASSIUM 875; 125 MG/1; MG/1
1 TABLET, FILM COATED ORAL
Status: COMPLETED | OUTPATIENT
Start: 2019-01-26 | End: 2019-01-26

## 2019-01-26 RX ORDER — HYDROCODONE BITARTRATE AND ACETAMINOPHEN 5; 325 MG/1; MG/1
1 TABLET ORAL ONCE
Status: COMPLETED | OUTPATIENT
Start: 2019-01-26 | End: 2019-01-26

## 2019-01-26 RX ORDER — AMOXICILLIN AND CLAVULANATE POTASSIUM 875; 125 MG/1; MG/1
1 TABLET, FILM COATED ORAL 2 TIMES DAILY
Qty: 14 TAB | Refills: 0 | Status: SHIPPED | OUTPATIENT
Start: 2019-01-26 | End: 2019-02-02

## 2019-01-26 RX ADMIN — HYDROCODONE BITARTRATE AND ACETAMINOPHEN 1 TABLET: 5; 325 TABLET ORAL at 01:14

## 2019-01-26 RX ADMIN — AMOXICILLIN AND CLAVULANATE POTASSIUM 1 TABLET: 875; 125 TABLET, FILM COATED ORAL at 01:14

## 2019-01-26 NOTE — ED TRIAGE NOTES
Swelling of right wrist started this morning. Painful to move.   Daughter reports that she has a rescue kitten and is concerned about cat scratches

## 2019-01-26 NOTE — DISCHARGE INSTRUCTIONS
Patient Education        Animal Bites: Care Instructions  Your Care Instructions  After an animal bite, the biggest concern is infection. The chance of infection depends on the type of animal that bit you, where on your body you were bitten, and your general health. Many animal bites are not closed with stitches, because this can increase the chance of infection. Your bite may take as little as 7 days or as long as several months to heal, depending on how bad it is. Taking good care of your wound at home will help it heal and reduce your chance of infection. The doctor has checked you carefully, but problems can develop later. If you notice any problems or new symptoms, get medical treatment right away. Follow-up care is a key part of your treatment and safety. Be sure to make and go to all appointments, and call your doctor if you are having problems. It's also a good idea to know your test results and keep a list of the medicines you take. How can you care for yourself at home? · If your doctor told you how to care for your wound, follow your doctor's instructions. If you did not get instructions, follow this general advice:  ? After 24 to 48 hours, gently wash the wound with clean water 2 times a day. Do not scrub or soak the wound. Don't use hydrogen peroxide or alcohol, which can slow healing. ? You may cover the wound with a thin layer of petroleum jelly, such as Vaseline, and a nonstick bandage. ? Apply more petroleum jelly and replace the bandage as needed. · After you shower, gently dry the wound with a clean towel. · If your doctor has closed the wound, cover the bandage with a plastic bag before you take a shower. · A small amount of skin redness and swelling around the wound edges and the stitches or staples is normal. Your wound may itch or feel irritated. Do not scratch or rub the wound.   · Ask your doctor if you can take an over-the-counter pain medicine, such as acetaminophen (Tylenol), ibuprofen (Advil, Motrin), or naproxen (Aleve). Read and follow all instructions on the label. · Do not take two or more pain medicines at the same time unless the doctor told you to. Many pain medicines have acetaminophen, which is Tylenol. Too much acetaminophen (Tylenol) can be harmful. · If your bite puts you at risk for rabies, you will get a series of shots over the next few weeks to prevent rabies. Your doctor will tell you when to get the shots. It is very important that you get the full cycle of shots. Follow your doctor's instructions exactly. · You may need a tetanus shot if you have not received one in the last 5 years. · If your doctor prescribed antibiotics, take them as directed. Do not stop taking them just because you feel better. You need to take the full course of antibiotics. When should you call for help? Call your doctor now or seek immediate medical care if:    · The skin near the bite turns cold or pale or it changes color.     · You lose feeling in the area near the bite, or it feels numb or tingly.     · You have trouble moving a limb near the bite.     · You have symptoms of infection, such as:  ? Increased pain, swelling, warmth, or redness near the wound. ? Red streaks leading from the wound. ? Pus draining from the wound. ? A fever.     · Blood soaks through the bandage. Oozing small amounts of blood is normal.     · Your pain is getting worse.    Watch closely for changes in your health, and be sure to contact your doctor if you are not getting better as expected. Where can you learn more? Go to http://cody-akanksha.info/. Enter F942 in the search box to learn more about \"Animal Bites: Care Instructions. \"  Current as of: September 23, 2018  Content Version: 11.9  © 8557-8963 [employer]. Care instructions adapted under license by [employer] (which disclaims liability or warranty for this information).  If you have questions about a medical condition or this instruction, always ask your healthcare professional. [place] [address] any warranty or liability for your use of this information.

## 2019-01-26 NOTE — ED PROVIDER NOTES
73 yo F with PMH of HTN presents with CC of R hand pain. Started yesterday evening, progressively worse during night. Bit several times two days ago by her rescue kitten who is 7 mo old and vaccinated, per pt. No abnormal behavior of kitten. Pain in located on top of R hand where she was bit and it has become mildly swollen with warmth. Pt reports this is began to spread toward wrist and forearm. She denies f/c, n/v, redness. Pt's tetanus is UTD, per pt, last given 4 years ago. The history is provided by the patient. Past Medical History:   Diagnosis Date    Hypertension        Past Surgical History:   Procedure Laterality Date    HX APPENDECTOMY      HX BACK SURGERY      LUMBAR FUSION X2 SURGERIES    HX BACK SURGERY      CERVICAL X2    HX HYSTERECTOMY      HX TONSILLECTOMY           Family History:   Problem Relation Age of Onset    Heart Attack Father 47       Social History     Socioeconomic History    Marital status:      Spouse name: Not on file    Number of children: Not on file    Years of education: Not on file    Highest education level: Not on file   Social Needs    Financial resource strain: Not on file    Food insecurity - worry: Not on file    Food insecurity - inability: Not on file   [employer] needs - medical: Not on file   [facility] needs - non-medical: Not on file   Occupational History    Not on file   Tobacco Use    Smoking status: Former Smoker     Packs/day: 0.50    Smokeless tobacco: Never Used   Substance and Sexual Activity    Alcohol use: Yes     Alcohol/week: 3.6 oz     Types: 6 Glasses of wine per week    Drug use: Not on file    Sexual activity: Not on file   Other Topics Concern    Not on file   Social History Narrative    Not on file         ALLERGIES: Amlodipine and Clonidine    Review of Systems   Constitutional: Negative for chills and fever. Respiratory: Negative for cough and shortness of breath.     Cardiovascular: Negative for chest pain. Gastrointestinal: Negative for abdominal pain and vomiting. Musculoskeletal: Positive for joint swelling (R  hand). Skin: Positive for wound (cat bites over R hand and wrist). Negative for rash. Neurological: Negative for headaches. All other systems reviewed and are negative. Vitals:    01/26/19 0048   BP: (!) 206/78   Pulse: 64   Resp: 18   Temp: 97.6 °F (36.4 °C)   SpO2: 98%   Weight: 65.6 kg (144 lb 10 oz)            Physical Exam   Constitutional: She is oriented to person, place, and time. She appears well-developed. No distress. HENT:   Head: Normocephalic and atraumatic. Eyes: Conjunctivae and EOM are normal.   Neck: Normal range of motion. Neck supple. Cardiovascular: Normal rate, regular rhythm and normal heart sounds. Pulmonary/Chest: Effort normal and breath sounds normal. No respiratory distress. Abdominal: Soft. There is no tenderness. There is no guarding. Musculoskeletal: Normal range of motion. She exhibits no edema. Neurological: She is alert and oriented to person, place, and time. She exhibits normal muscle tone. Skin: Skin is warm and dry. No erythema. Several scatter small puncture wounds to R hand and wrist, no active bleeding, no obvious FBs, moderate TTP to area of mild swelling over dorsal R hand, no crepitus, no overlying erythema, no obvious abnormal warmth. MDM       Procedures      First dose of abx given here in ED. Wound re-check in 2 days with PCP or here. RTER sooner if symptoms worsen or change. Pt agrees with and understands this plan. All questions answered.

## 2019-05-22 ENCOUNTER — HOSPITAL ENCOUNTER (OUTPATIENT)
Dept: ULTRASOUND IMAGING | Age: 74
Discharge: HOME | End: 2019-05-22
Attending: INTERNAL MEDICINE
Payer: MEDICARE

## 2019-05-22 DIAGNOSIS — N18.2 CKD (CHRONIC KIDNEY DISEASE) STAGE 2, GFR 60-89 ML/MIN: ICD-10-CM

## 2019-05-22 DIAGNOSIS — I10 HTN, GOAL BELOW 130/80: ICD-10-CM

## 2019-05-22 DIAGNOSIS — E78.2 MIXED HYPERLIPIDEMIA: ICD-10-CM

## 2019-05-22 PROCEDURE — 76770 US EXAM ABDO BACK WALL COMP: CPT

## 2019-06-24 ENCOUNTER — APPOINTMENT (OUTPATIENT)
Dept: GENERAL RADIOLOGY | Age: 74
End: 2019-06-24
Attending: STUDENT IN AN ORGANIZED HEALTH CARE EDUCATION/TRAINING PROGRAM
Payer: MEDICARE

## 2019-06-24 ENCOUNTER — APPOINTMENT (OUTPATIENT)
Dept: CT IMAGING | Age: 74
End: 2019-06-24
Attending: STUDENT IN AN ORGANIZED HEALTH CARE EDUCATION/TRAINING PROGRAM
Payer: MEDICARE

## 2019-06-24 ENCOUNTER — HOSPITAL ENCOUNTER (EMERGENCY)
Age: 74
Discharge: HOME | End: 2019-06-24
Attending: STUDENT IN AN ORGANIZED HEALTH CARE EDUCATION/TRAINING PROGRAM
Payer: MEDICARE

## 2019-06-24 VITALS
RESPIRATION RATE: 17 BRPM | DIASTOLIC BLOOD PRESSURE: 78 MMHG | TEMPERATURE: 98 F | OXYGEN SATURATION: 98 % | SYSTOLIC BLOOD PRESSURE: 191 MMHG | WEIGHT: 148.15 LBS | HEIGHT: 63 IN | BODY MASS INDEX: 26.25 KG/M2 | HEART RATE: 49 BPM

## 2019-06-24 DIAGNOSIS — R06.02 SHORTNESS OF BREATH: ICD-10-CM

## 2019-06-24 DIAGNOSIS — I10 ESSENTIAL HYPERTENSION: Primary | ICD-10-CM

## 2019-06-24 LAB
ALBUMIN SERPL-MCNC: 3.9 G/DL (ref 3.5–5)
ALBUMIN/GLOB SERPL: 1.2 {RATIO} (ref 1.1–2.2)
ALP SERPL-CCNC: 58 U/L (ref 45–117)
ALT SERPL-CCNC: 21 U/L (ref 12–78)
ANION GAP SERPL CALC-SCNC: 11 MMOL/L (ref 5–15)
APPEARANCE UR: CLEAR
APTT PPP: 25.4 SEC (ref 22.1–32)
AST SERPL-CCNC: 20 U/L (ref 15–37)
ATRIAL RATE: 52 BPM
BACTERIA URNS QL MICRO: NEGATIVE /HPF
BASOPHILS # BLD: 0 K/UL (ref 0–0.1)
BASOPHILS NFR BLD: 1 % (ref 0–1)
BILIRUB SERPL-MCNC: 0.7 MG/DL (ref 0.2–1)
BILIRUB UR QL: NEGATIVE
BNP SERPL-MCNC: 1815 PG/ML (ref 0–125)
BUN SERPL-MCNC: 25 MG/DL (ref 6–20)
BUN/CREAT SERPL: 20 (ref 12–20)
CALCIUM SERPL-MCNC: 9 MG/DL (ref 8.5–10.1)
CALCULATED P AXIS, ECG09: 31 DEGREES
CALCULATED R AXIS, ECG10: 36 DEGREES
CALCULATED T AXIS, ECG11: 50 DEGREES
CHLORIDE SERPL-SCNC: 101 MMOL/L (ref 97–108)
CO2 SERPL-SCNC: 23 MMOL/L (ref 21–32)
COLOR UR: NORMAL
COMMENT, HOLDF: NORMAL
CREAT SERPL-MCNC: 1.23 MG/DL (ref 0.55–1.02)
DIAGNOSIS, 93000: NORMAL
DIFFERENTIAL METHOD BLD: ABNORMAL
EOSINOPHIL # BLD: 0.1 K/UL (ref 0–0.4)
EOSINOPHIL NFR BLD: 3 % (ref 0–7)
EPITH CASTS URNS QL MICRO: NORMAL /LPF
ERYTHROCYTE [DISTWIDTH] IN BLOOD BY AUTOMATED COUNT: 12.6 % (ref 11.5–14.5)
GLOBULIN SER CALC-MCNC: 3.2 G/DL (ref 2–4)
GLUCOSE SERPL-MCNC: 129 MG/DL (ref 65–100)
GLUCOSE UR STRIP.AUTO-MCNC: NEGATIVE MG/DL
HCT VFR BLD AUTO: 31.2 % (ref 35–47)
HGB BLD-MCNC: 11.1 G/DL (ref 11.5–16)
HGB UR QL STRIP: NEGATIVE
IMM GRANULOCYTES # BLD AUTO: 0 K/UL (ref 0–0.04)
IMM GRANULOCYTES NFR BLD AUTO: 0 % (ref 0–0.5)
INR PPP: 1 (ref 0.9–1.1)
KETONES UR QL STRIP.AUTO: NEGATIVE MG/DL
LEUKOCYTE ESTERASE UR QL STRIP.AUTO: NEGATIVE
LYMPHOCYTES # BLD: 1.3 K/UL (ref 0.8–3.5)
LYMPHOCYTES NFR BLD: 29 % (ref 12–49)
MCH RBC QN AUTO: 35.1 PG (ref 26–34)
MCHC RBC AUTO-ENTMCNC: 35.6 G/DL (ref 30–36.5)
MCV RBC AUTO: 98.7 FL (ref 80–99)
MONOCYTES # BLD: 0.4 K/UL (ref 0–1)
MONOCYTES NFR BLD: 9 % (ref 5–13)
NEUTS SEG # BLD: 2.6 K/UL (ref 1.8–8)
NEUTS SEG NFR BLD: 58 % (ref 32–75)
NITRITE UR QL STRIP.AUTO: NEGATIVE
NRBC # BLD: 0 K/UL (ref 0–0.01)
NRBC BLD-RTO: 0 PER 100 WBC
P-R INTERVAL, ECG05: 158 MS
PH UR STRIP: 6 [PH] (ref 5–8)
PLATELET # BLD AUTO: 144 K/UL (ref 150–400)
PMV BLD AUTO: 8.9 FL (ref 8.9–12.9)
POTASSIUM SERPL-SCNC: 4.2 MMOL/L (ref 3.5–5.1)
PROT SERPL-MCNC: 7.1 G/DL (ref 6.4–8.2)
PROT UR STRIP-MCNC: NEGATIVE MG/DL
PROTHROMBIN TIME: 9.8 SEC (ref 9–11.1)
Q-T INTERVAL, ECG07: 444 MS
QRS DURATION, ECG06: 68 MS
QTC CALCULATION (BEZET), ECG08: 412 MS
RBC # BLD AUTO: 3.16 M/UL (ref 3.8–5.2)
RBC #/AREA URNS HPF: NORMAL /HPF (ref 0–5)
SAMPLES BEING HELD,HOLD: NORMAL
SODIUM SERPL-SCNC: 135 MMOL/L (ref 136–145)
SP GR UR REFRACTOMETRY: 1.01 (ref 1–1.03)
THERAPEUTIC RANGE,PTTT: NORMAL SECS (ref 58–77)
TROPONIN I SERPL-MCNC: <0.05 NG/ML
UR CULT HOLD, URHOLD: NORMAL
UROBILINOGEN UR QL STRIP.AUTO: 0.2 EU/DL (ref 0.2–1)
VENTRICULAR RATE, ECG03: 52 BPM
WBC # BLD AUTO: 4.5 K/UL (ref 3.6–11)
WBC URNS QL MICRO: NORMAL /HPF (ref 0–4)

## 2019-06-24 PROCEDURE — 83880 ASSAY OF NATRIURETIC PEPTIDE: CPT

## 2019-06-24 PROCEDURE — 96376 TX/PRO/DX INJ SAME DRUG ADON: CPT

## 2019-06-24 PROCEDURE — 85025 COMPLETE CBC W/AUTO DIFF WBC: CPT

## 2019-06-24 PROCEDURE — 80053 COMPREHEN METABOLIC PANEL: CPT

## 2019-06-24 PROCEDURE — 36415 COLL VENOUS BLD VENIPUNCTURE: CPT

## 2019-06-24 PROCEDURE — 74011250636 HC RX REV CODE- 250/636: Performed by: STUDENT IN AN ORGANIZED HEALTH CARE EDUCATION/TRAINING PROGRAM

## 2019-06-24 PROCEDURE — 85730 THROMBOPLASTIN TIME PARTIAL: CPT

## 2019-06-24 PROCEDURE — 93005 ELECTROCARDIOGRAM TRACING: CPT

## 2019-06-24 PROCEDURE — 96374 THER/PROPH/DIAG INJ IV PUSH: CPT

## 2019-06-24 PROCEDURE — 70450 CT HEAD/BRAIN W/O DYE: CPT

## 2019-06-24 PROCEDURE — 84484 ASSAY OF TROPONIN QUANT: CPT

## 2019-06-24 PROCEDURE — 99285 EMERGENCY DEPT VISIT HI MDM: CPT

## 2019-06-24 PROCEDURE — 71045 X-RAY EXAM CHEST 1 VIEW: CPT

## 2019-06-24 PROCEDURE — 85610 PROTHROMBIN TIME: CPT

## 2019-06-24 PROCEDURE — 81001 URINALYSIS AUTO W/SCOPE: CPT

## 2019-06-24 PROCEDURE — 96375 TX/PRO/DX INJ NEW DRUG ADDON: CPT

## 2019-06-24 RX ORDER — HYDRALAZINE HYDROCHLORIDE 20 MG/ML
10 INJECTION INTRAMUSCULAR; INTRAVENOUS
Status: COMPLETED | OUTPATIENT
Start: 2019-06-24 | End: 2019-06-24

## 2019-06-24 RX ORDER — CARVEDILOL 6.25 MG/1
6.25 TABLET ORAL 2 TIMES DAILY WITH MEALS
COMMUNITY
End: 2019-07-18

## 2019-06-24 RX ORDER — LOSARTAN POTASSIUM 100 MG/1
100 TABLET ORAL DAILY
COMMUNITY
End: 2019-07-26

## 2019-06-24 RX ORDER — FUROSEMIDE 10 MG/ML
40 INJECTION INTRAMUSCULAR; INTRAVENOUS
Status: COMPLETED | OUTPATIENT
Start: 2019-06-24 | End: 2019-06-24

## 2019-06-24 RX ORDER — CLONIDINE HYDROCHLORIDE 0.1 MG/1
0.05 TABLET ORAL AS NEEDED
COMMUNITY
End: 2019-07-26

## 2019-06-24 RX ADMIN — HYDRALAZINE HYDROCHLORIDE 10 MG: 20 INJECTION INTRAMUSCULAR; INTRAVENOUS at 14:05

## 2019-06-24 RX ADMIN — FUROSEMIDE 40 MG: 10 INJECTION, SOLUTION INTRAMUSCULAR; INTRAVENOUS at 15:45

## 2019-06-24 RX ADMIN — HYDRALAZINE HYDROCHLORIDE 10 MG: 20 INJECTION INTRAMUSCULAR; INTRAVENOUS at 15:45

## 2019-06-24 NOTE — DISCHARGE INSTRUCTIONS
Patient Education        Acute High Blood Pressure: Care Instructions  Your Care Instructions    Acute high blood pressure is very high blood pressure. It's a serious problem. Very high blood pressure can damage your brain, heart, eyes, and kidneys. You may have been given medicines to lower your blood pressure. You may have gotten them as pills or through a needle in one of your veins. This is called an IV. And maybe you were given other medicines too. These can be needed when high blood pressure causes other problems. To keep your blood pressure at a lower level, you may need to make healthy lifestyle changes. And you will probably need to take medicines. Be sure to follow up with your doctor about your blood pressure and what you can do about it. Follow-up care is a key part of your treatment and safety. Be sure to make and go to all appointments, and call your doctor if you are having problems. It's also a good idea to know your test results and keep a list of the medicines you take. How can you care for yourself at home? · See your doctor as often as he or she recommends. This is to make sure your blood pressure is under control. You will probably go at least 2 times a year. But it may be more often at first.  · Take your blood pressure medicine exactly as prescribed. You may take one or more types. They include diuretics, beta-blockers, ACE inhibitors, calcium channel blockers, and angiotensin II receptor blockers. Call your doctor if you think you are having a problem with your medicine. · If you take blood pressure medicine, talk to your doctor before you take decongestants or anti-inflammatory medicine, such as ibuprofen. These can raise blood pressure. · Learn how to check your blood pressure at home. Check it often. · Ask your doctor if it's okay to drink alcohol. · Talk to your doctor about lifestyle changes that can help blood pressure. These include being active and not smoking.   When should you call for help? Call 911 anytime you think you may need emergency care. This may mean having symptoms that suggest that your blood pressure is causing a serious heart or blood vessel problem. Your blood pressure may be over 180/120.   For example, call 911 if:    · You have symptoms of a heart attack. These may include:  ? Chest pain or pressure, or a strange feeling in the chest.  ? Sweating. ? Shortness of breath. ? Nausea or vomiting. ? Pain, pressure, or a strange feeling in the back, neck, jaw, or upper belly or in one or both shoulders or arms. ? Lightheadedness or sudden weakness. ? A fast or irregular heartbeat.     · You have symptoms of a stroke. These may include:  ? Sudden numbness, tingling, weakness, or loss of movement in your face, arm, or leg, especially on only one side of your body. ? Sudden vision changes. ? Sudden trouble speaking. ? Sudden confusion or trouble understanding simple statements. ? Sudden problems with walking or balance. ? A sudden, severe headache that is different from past headaches.     · You have severe back or belly pain.    Do not wait until your blood pressure comes down on its own. Get help right away.   Call your doctor now or seek immediate care if:    · Your blood pressure is much higher than normal (such as 180/120 or higher), but you don't have symptoms.     · You think high blood pressure is causing symptoms, such as:  ? Severe headache.  ? Blurry vision.    Watch closely for changes in your health, and be sure to contact your doctor if:    · Your blood pressure measures higher than your doctor recommends at least 2 times. That means the top number is higher or the bottom number is higher, or both.     · You think you may be having side effects from your blood pressure medicine. Where can you learn more? Go to http://cody-akanksha.info/.   Enter F814 in the search box to learn more about \"Acute High Blood Pressure: Care Instructions. \"  Current as of: July 22, 2018  Content Version: 11.9  © 2038-7596 [employer]. Care instructions adapted under license by [employer] (which disclaims liability or warranty for this information). If you have questions about a medical condition or this instruction, always ask your healthcare professional. [address] any warranty or liability for your use of this information. Patient Education        Shortness of Breath: Care Instructions  Your Care Instructions  Shortness of breath has many causes. Sometimes conditions such as anxiety can lead to shortness of breath. Some people get mild shortness of breath when they exercise. Trouble breathing also can be a symptom of a serious problem, such as asthma, lung disease, emphysema, heart problems, and pneumonia. If your shortness of breath continues, you may need tests and treatment. Watch for any changes in your breathing and other symptoms. Follow-up care is a key part of your treatment and safety. Be sure to make and go to all appointments, and call your doctor if you are having problems. It's also a good idea to know your test results and keep a list of the medicines you take. How can you care for yourself at home? · Do not smoke or allow others to smoke around you. If you need help quitting, talk to your doctor about stop-smoking programs and medicines. These can increase your chances of quitting for good. · Get plenty of rest and sleep. · Take your medicines exactly as prescribed. Call your doctor if you think you are having a problem with your medicine. · Find healthy ways to deal with stress. ? Exercise daily. ? Get plenty of sleep. ? Eat regularly and well. When should you call for help? Call 911 anytime you think you may need emergency care. For example, call if:    · You have severe shortness of breath.     · You have symptoms of a heart attack. These may include:  ?  Chest pain or pressure, or a strange feeling in the chest.  ? Sweating. ? Shortness of breath. ? Nausea or vomiting. ? Pain, pressure, or a strange feeling in the back, neck, jaw, or upper belly or in one or both shoulders or arms. ? Lightheadedness or sudden weakness. ? A fast or irregular heartbeat. After you call 911, the  may tell you to chew 1 adult-strength or 2 to 4 low-dose aspirin. Wait for an ambulance. Do not try to drive yourself.    Call your doctor now or seek immediate medical care if:    · Your shortness of breath gets worse or you start to wheeze. Wheezing is a high-pitched sound when you breathe.     · You wake up at night out of breath or have to prop your head up on several pillows to breathe.     · You are short of breath after only light activity or while at rest.    Watch closely for changes in your health, and be sure to contact your doctor if:    · You do not get better over the next 1 to 2 days. Where can you learn more? Go to http://cody-akanksha.info/. Enter S780 in the search box to learn more about \"Shortness of Breath: Care Instructions. \"  Current as of: September 5, 2018  Content Version: 11.9  © 9716-5180 [employer]. Care instructions adapted under license by [employer] (which disclaims liability or warranty for this information). If you have questions about a medical condition or this instruction, always ask your healthcare professional. [place] [address] any warranty or liability for your use of this information.

## 2019-06-24 NOTE — ED PROVIDER NOTES
Hypertension    This is a chronic problem. The current episode started more than 1 week ago. The problem has been rapidly worsening (was >200 at home, very labile pressures with multiple medication adjustments, recently saw nephrology. ). Associated symptoms include chest pain, malaise/fatigue, headaches, dizziness, shortness of breath and nausea. Pertinent negatives include no palpitations, no confusion, no blurred vision and no vomiting. Associated agents: unknown. Risk factors include hypertension. Past Medical History:   Diagnosis Date    Hypertension        Past Surgical History:   Procedure Laterality Date    HX APPENDECTOMY      HX BACK SURGERY      LUMBAR FUSION X2 SURGERIES    HX BACK SURGERY      CERVICAL X2    HX HYSTERECTOMY      HX TONSILLECTOMY           Family History:   Problem Relation Age of Onset    Heart Attack Father 47       Social History     Socioeconomic History    Marital status:      Spouse name: Not on file    Number of children: Not on file    Years of education: Not on file    Highest education level: Not on file   Occupational History    Not on file   Social Needs    Financial resource strain: Not on file    Food insecurity:     Worry: Not on file     Inability: Not on file    Transportation needs:     Medical: Not on file     Non-medical: Not on file   Tobacco Use    Smoking status: Former Smoker     Packs/day: 0.50    Smokeless tobacco: Never Used   Substance and Sexual Activity    Alcohol use:  Yes     Alcohol/week: 3.6 oz     Types: 6 Glasses of wine per week    Drug use: Not on file    Sexual activity: Not on file   Lifestyle    Physical activity:     Days per week: Not on file     Minutes per session: Not on file    Stress: Not on file   Relationships    Social connections:     Talks on phone: Not on file     Gets together: Not on file     Attends Confucianism service: Not on file     Active member of club or organization: Not on file Attends meetings of clubs or organizations: Not on file     Relationship status: Not on file    Intimate partner violence:     Fear of current or ex partner: Not on file     Emotionally abused: Not on file     Physically abused: Not on file     Forced sexual activity: Not on file   Other Topics Concern    Not on file   Social History Narrative    Not on file         ALLERGIES: Amlodipine and Clonidine    Review of Systems   Constitutional: Positive for fatigue and malaise/fatigue. Negative for chills and fever. Eyes: Negative for blurred vision. Respiratory: Positive for shortness of breath. Negative for cough. Cardiovascular: Positive for chest pain. Negative for palpitations. Gastrointestinal: Positive for nausea. Negative for abdominal pain and vomiting. Genitourinary: Negative for dysuria and hematuria. Musculoskeletal: Positive for myalgias. Negative for back pain. Neurological: Positive for dizziness and headaches. Negative for syncope, speech difficulty and weakness. Psychiatric/Behavioral: Negative for confusion. All other systems reviewed and are negative. Vitals:    06/24/19 1343 06/24/19 1405 06/24/19 1422   BP: (!) 222/88 185/70 158/75   Pulse: (!) 55 (!) 52 (!) 55   Resp: 21     Temp: 98.4 °F (36.9 °C)     SpO2: 97%     Weight: 67.2 kg (148 lb 2.4 oz)     Height: 5' 3\" (1.6 m)              Physical Exam   Constitutional: She is oriented to person, place, and time. She appears well-developed. No distress. HENT:   Head: Normocephalic and atraumatic. Eyes: Conjunctivae and EOM are normal.   Neck: Normal range of motion. Neck supple. Cardiovascular: Regular rhythm and normal heart sounds. bradycardic   Pulmonary/Chest: Effort normal and breath sounds normal. She has no wheezes. She has no rales. Mild tachypnea     Abdominal: Soft. There is no tenderness. There is no guarding. Musculoskeletal: Normal range of motion. She exhibits no edema.    Neurological: She is alert and oriented to person, place, and time. She exhibits normal muscle tone. Skin: Skin is warm and dry. MDM       Procedures    EKG interpretation:   Rhythm: sinus bradycardia; and regular . Rate (approx.): 52; Axis: normal; Intervals: normal ; ST/T wave: normal - no STEMI; EKG documented and interpreted by Sultana Paredes MD, ED MD.      This patient presented with acute BP elevation. The patient is now resting comfortably and feels better, is alert, and is in no distress. The repeat examination is unremarkable and benign. The patient is neurologically intact, has a normal mental status, and is ambulatory in the ED. The electrocardiogram shows no signs of acute ischemia, and the history, exam, diagnostic testing and current condition do not suggest that this patient is having any end-organ dysfunction such as acute myocardial infarction, significant arrhythmia, unstable angina, a stroke/TIA, a significant vascular event, ARF, encephalopathy or other significant pathology that would warrant further testing, continued ED treatment, admission, or cardiology or other specialist consultation at this point. The vital signs have been stable. The patient's condition is stable and appropriate for discharge. The patient will pursue further outpatient evaluation with the primary care physician, other designated physician or cardiologist. The patient and/or caregivers have expressed a clear and thorough understanding and agree to follow up as instructed.

## 2019-06-24 NOTE — ED TRIAGE NOTES
TRIAGE NOTE: Patient arrived from home with c/o chest pain, high blood pressure, syncope and shortness of breath for days. Been seen by PCP and nephrology for BP medications.  +headache +left arm weakness

## 2019-07-18 ENCOUNTER — HOSPITAL ENCOUNTER (INPATIENT)
Age: 74
LOS: 7 days | Discharge: HOME | DRG: 253 | End: 2019-07-26
Attending: EMERGENCY MEDICINE | Admitting: HOSPITALIST
Payer: MEDICARE

## 2019-07-18 ENCOUNTER — APPOINTMENT (OUTPATIENT)
Dept: CT IMAGING | Age: 74
DRG: 253 | End: 2019-07-18
Attending: EMERGENCY MEDICINE
Payer: MEDICARE

## 2019-07-18 ENCOUNTER — APPOINTMENT (OUTPATIENT)
Dept: GENERAL RADIOLOGY | Age: 74
DRG: 253 | End: 2019-07-18
Attending: EMERGENCY MEDICINE
Payer: MEDICARE

## 2019-07-18 DIAGNOSIS — Z98.890 HISTORY OF STENT INSERTION OF RENAL ARTERY: ICD-10-CM

## 2019-07-18 DIAGNOSIS — I70.1 RENAL ARTERY ATHEROSCLEROSIS, BILATERAL (HCC): ICD-10-CM

## 2019-07-18 DIAGNOSIS — G44.1 OTHER VASCULAR HEADACHE: ICD-10-CM

## 2019-07-18 DIAGNOSIS — R07.9 ACUTE CHEST PAIN: ICD-10-CM

## 2019-07-18 DIAGNOSIS — I16.0 HYPERTENSIVE URGENCY: Primary | ICD-10-CM

## 2019-07-18 LAB
ALBUMIN SERPL-MCNC: 3.9 G/DL (ref 3.5–5)
ALBUMIN/GLOB SERPL: 1.2 {RATIO} (ref 1.1–2.2)
ALP SERPL-CCNC: 56 U/L (ref 45–117)
ALT SERPL-CCNC: 27 U/L (ref 12–78)
ANION GAP SERPL CALC-SCNC: 12 MMOL/L (ref 5–15)
AST SERPL-CCNC: 28 U/L (ref 15–37)
BASOPHILS # BLD: 0 K/UL (ref 0–0.1)
BASOPHILS NFR BLD: 1 % (ref 0–1)
BILIRUB SERPL-MCNC: 0.7 MG/DL (ref 0.2–1)
BNP SERPL-MCNC: 920 PG/ML (ref 0–125)
BUN SERPL-MCNC: 24 MG/DL (ref 6–20)
BUN/CREAT SERPL: 21 (ref 12–20)
CALCIUM SERPL-MCNC: 9 MG/DL (ref 8.5–10.1)
CHLORIDE SERPL-SCNC: 104 MMOL/L (ref 97–108)
CO2 SERPL-SCNC: 23 MMOL/L (ref 21–32)
COMMENT, HOLDF: NORMAL
CREAT SERPL-MCNC: 1.17 MG/DL (ref 0.55–1.02)
CRP SERPL-MCNC: <0.29 MG/DL
DIFFERENTIAL METHOD BLD: ABNORMAL
EOSINOPHIL # BLD: 0.2 K/UL (ref 0–0.4)
EOSINOPHIL NFR BLD: 4 % (ref 0–7)
ERYTHROCYTE [DISTWIDTH] IN BLOOD BY AUTOMATED COUNT: 12.8 % (ref 11.5–14.5)
ERYTHROCYTE [SEDIMENTATION RATE] IN BLOOD: 33 MM/HR (ref 0–30)
GLOBULIN SER CALC-MCNC: 3.2 G/DL (ref 2–4)
GLUCOSE SERPL-MCNC: 119 MG/DL (ref 65–100)
HCT VFR BLD AUTO: 30.7 % (ref 35–47)
HGB BLD-MCNC: 10.8 G/DL (ref 11.5–16)
IMM GRANULOCYTES # BLD AUTO: 0 K/UL (ref 0–0.04)
IMM GRANULOCYTES NFR BLD AUTO: 1 % (ref 0–0.5)
LYMPHOCYTES # BLD: 1.3 K/UL (ref 0.8–3.5)
LYMPHOCYTES NFR BLD: 34 % (ref 12–49)
MCH RBC QN AUTO: 34.6 PG (ref 26–34)
MCHC RBC AUTO-ENTMCNC: 35.2 G/DL (ref 30–36.5)
MCV RBC AUTO: 98.4 FL (ref 80–99)
MONOCYTES # BLD: 0.4 K/UL (ref 0–1)
MONOCYTES NFR BLD: 10 % (ref 5–13)
NEUTS SEG # BLD: 2.1 K/UL (ref 1.8–8)
NEUTS SEG NFR BLD: 50 % (ref 32–75)
NRBC # BLD: 0 K/UL (ref 0–0.01)
NRBC BLD-RTO: 0 PER 100 WBC
PLATELET # BLD AUTO: 163 K/UL (ref 150–400)
PMV BLD AUTO: 9.9 FL (ref 8.9–12.9)
POTASSIUM SERPL-SCNC: 5 MMOL/L (ref 3.5–5.1)
PROT SERPL-MCNC: 7.1 G/DL (ref 6.4–8.2)
RBC # BLD AUTO: 3.12 M/UL (ref 3.8–5.2)
SAMPLES BEING HELD,HOLD: NORMAL
SODIUM SERPL-SCNC: 139 MMOL/L (ref 136–145)
TROPONIN I SERPL-MCNC: <0.05 NG/ML
WBC # BLD AUTO: 4 K/UL (ref 3.6–11)

## 2019-07-18 PROCEDURE — 71046 X-RAY EXAM CHEST 2 VIEWS: CPT

## 2019-07-18 PROCEDURE — 99218 HC RM OBSERVATION: CPT

## 2019-07-18 PROCEDURE — 74011250636 HC RX REV CODE- 250/636: Performed by: EMERGENCY MEDICINE

## 2019-07-18 PROCEDURE — 84484 ASSAY OF TROPONIN QUANT: CPT

## 2019-07-18 PROCEDURE — 99285 EMERGENCY DEPT VISIT HI MDM: CPT

## 2019-07-18 PROCEDURE — 36415 COLL VENOUS BLD VENIPUNCTURE: CPT

## 2019-07-18 PROCEDURE — 74011250636 HC RX REV CODE- 250/636: Performed by: HOSPITALIST

## 2019-07-18 PROCEDURE — 86140 C-REACTIVE PROTEIN: CPT

## 2019-07-18 PROCEDURE — 70450 CT HEAD/BRAIN W/O DYE: CPT

## 2019-07-18 PROCEDURE — 85652 RBC SED RATE AUTOMATED: CPT

## 2019-07-18 PROCEDURE — 83880 ASSAY OF NATRIURETIC PEPTIDE: CPT

## 2019-07-18 PROCEDURE — 80053 COMPREHEN METABOLIC PANEL: CPT

## 2019-07-18 PROCEDURE — 74011250637 HC RX REV CODE- 250/637: Performed by: HOSPITALIST

## 2019-07-18 PROCEDURE — 85025 COMPLETE CBC W/AUTO DIFF WBC: CPT

## 2019-07-18 PROCEDURE — 96374 THER/PROPH/DIAG INJ IV PUSH: CPT

## 2019-07-18 PROCEDURE — 93005 ELECTROCARDIOGRAM TRACING: CPT

## 2019-07-18 RX ORDER — SODIUM CHLORIDE 0.9 % (FLUSH) 0.9 %
5-40 SYRINGE (ML) INJECTION AS NEEDED
Status: DISCONTINUED | OUTPATIENT
Start: 2019-07-18 | End: 2019-07-26 | Stop reason: HOSPADM

## 2019-07-18 RX ORDER — LABETALOL HCL 20 MG/4 ML
10 SYRINGE (ML) INTRAVENOUS
Status: DISCONTINUED | OUTPATIENT
Start: 2019-07-18 | End: 2019-07-24

## 2019-07-18 RX ORDER — HYDRALAZINE HYDROCHLORIDE 20 MG/ML
10 INJECTION INTRAMUSCULAR; INTRAVENOUS
Status: COMPLETED | OUTPATIENT
Start: 2019-07-18 | End: 2019-07-18

## 2019-07-18 RX ORDER — ACETAMINOPHEN 325 MG/1
650 TABLET ORAL
Status: DISCONTINUED | OUTPATIENT
Start: 2019-07-18 | End: 2019-07-26 | Stop reason: HOSPADM

## 2019-07-18 RX ORDER — SODIUM CHLORIDE 0.9 % (FLUSH) 0.9 %
5-40 SYRINGE (ML) INJECTION EVERY 8 HOURS
Status: DISCONTINUED | OUTPATIENT
Start: 2019-07-18 | End: 2019-07-26 | Stop reason: HOSPADM

## 2019-07-18 RX ORDER — ZOLPIDEM TARTRATE 5 MG/1
5 TABLET ORAL
Status: DISCONTINUED | OUTPATIENT
Start: 2019-07-18 | End: 2019-07-19

## 2019-07-18 RX ORDER — ONDANSETRON 2 MG/ML
4 INJECTION INTRAMUSCULAR; INTRAVENOUS
Status: DISCONTINUED | OUTPATIENT
Start: 2019-07-18 | End: 2019-07-26 | Stop reason: HOSPADM

## 2019-07-18 RX ORDER — SIMVASTATIN 10 MG/1
10 TABLET, FILM COATED ORAL
Status: DISCONTINUED | OUTPATIENT
Start: 2019-07-18 | End: 2019-07-26 | Stop reason: HOSPADM

## 2019-07-18 RX ORDER — ENOXAPARIN SODIUM 100 MG/ML
40 INJECTION SUBCUTANEOUS EVERY 24 HOURS
Status: DISCONTINUED | OUTPATIENT
Start: 2019-07-18 | End: 2019-07-25

## 2019-07-18 RX ORDER — AMLODIPINE BESYLATE 2.5 MG/1
2.5 TABLET ORAL DAILY
COMMUNITY
End: 2019-07-26

## 2019-07-18 RX ADMIN — ENOXAPARIN SODIUM 40 MG: 40 INJECTION SUBCUTANEOUS at 23:21

## 2019-07-18 RX ADMIN — ZOLPIDEM TARTRATE 5 MG: 5 TABLET ORAL at 23:21

## 2019-07-18 RX ADMIN — HYDRALAZINE HYDROCHLORIDE 10 MG: 20 INJECTION INTRAMUSCULAR; INTRAVENOUS at 17:31

## 2019-07-18 RX ADMIN — Medication 10 ML: at 23:24

## 2019-07-18 RX ADMIN — SIMVASTATIN 10 MG: 10 TABLET, FILM COATED ORAL at 23:20

## 2019-07-18 NOTE — ED NOTES
[facility] called. ETA one hour. Pt's family had brought them food, pt sitting up eating onion rings and a hamburger. Discussed the need to maintain a low sodium diet.

## 2019-07-18 NOTE — Clinical Note
Peripheral Lesion 1. balloon-expanding stent Lesion 1 Alexx = 13 psi, Duration = 39 sec. POST DILATATION WITH STENT BALLOON.

## 2019-07-18 NOTE — ED TRIAGE NOTES
Pt arrives to ED reporting high blood pressure and SOB x 3 months. Pt is trying to control BP without success. This morning, pt's systolic was 779 and she called her nephrologist, Dr Evette Castañeda who said to come to the ED. Pt took a clonidine at 2pm. Pt reports fluctuations with her BP and sometimes it drops as low as 90 systolic and she feels dizzy.

## 2019-07-18 NOTE — ED PROVIDER NOTES
53-year-old female with history of refractory hypertension, on for her antihypertensives with poor control presenting to the ED with multiple complaints. Patient reports her blood pressure has been elevated consistently last reading was 234/103. She is complaining of moderate generalized headache  Worse in the left temple with associated  Blurry vision She is also complaining of substernal chest tightness and heaviness. She reports compliance with her home medications. She has a nephrologist, Dr. Jeremy Rebolledo who has been working with her to achieve better BP control. She is a strong family history of uncontrolled hypertension. She has normal renal function. No history of coronary artery disease. She is having no fevers, no neck stiffness. No confusion or alteration of mental status. Past Medical History:   Diagnosis Date    Hypertension        Past Surgical History:   Procedure Laterality Date    HX APPENDECTOMY      HX BACK SURGERY      LUMBAR FUSION X2 SURGERIES    HX BACK SURGERY      CERVICAL X2    HX HYSTERECTOMY      HX TONSILLECTOMY           Family History:   Problem Relation Age of Onset    Heart Attack Father 47       Social History     Socioeconomic History    Marital status:      Spouse name: Not on file    Number of children: Not on file    Years of education: Not on file    Highest education level: Not on file   Occupational History    Not on file   Social Needs    Financial resource strain: Not on file    Food insecurity:     Worry: Not on file     Inability: Not on file    Transportation needs:     Medical: Not on file     Non-medical: Not on file   Tobacco Use    Smoking status: Former Smoker     Packs/day: 0.50    Smokeless tobacco: Never Used   Substance and Sexual Activity    Alcohol use:  Yes     Alcohol/week: 6.0 standard drinks     Types: 6 Glasses of wine per week    Drug use: Not on file    Sexual activity: Not on file   Lifestyle    Physical activity: Days per week: Not on file     Minutes per session: Not on file    Stress: Not on file   Relationships    Social connections:     Talks on phone: Not on file     Gets together: Not on file     Attends Confucianism service: Not on file     Active member of club or organization: Not on file     Attends meetings of clubs or organizations: Not on file     Relationship status: Not on file    Intimate partner violence:     Fear of current or ex partner: Not on file     Emotionally abused: Not on file     Physically abused: Not on file     Forced sexual activity: Not on file   Other Topics Concern    Not on file   Social History Narrative    Not on file         ALLERGIES: Amlodipine and Clonidine    Review of Systems    Vitals:    07/18/19 1645 07/18/19 1650 07/18/19 1731   BP: (!) 220/66 176/83 163/73   Pulse: (!) 59  (!) 52   Resp: 18     Temp: 98.1 °F (36.7 °C)     SpO2: 98%     Weight: 65 kg (143 lb 4.8 oz)              Physical Exam     MDM       Procedures      5:59 PM  Per nursing, they were called to the bedside as the patient felt presyncopal. This was 30 minutes after receiving hydralazine. Her initial blood pressure was systolic in the 096H. Her friend who was in the room with her flat at which point the patient felt slightly improved. Repeat blood pressure after the event was back up to the 723Z systolic. Patient still has a headache, but still complaining of chest discomfort and blurred vision. I attempted to reach her nephrologist at the request of the patient, Dr. Mer Scott of [facility]. There unfortunately close to the evening. We'll pursue admission to [address] [place] [address] for hypertensive urgency. No emergency reason for consult nephrology at this time. Hospitalist Corby for Admission  6:00 PM    ED Room Number: SER01/01  Patient Name and age:  Anastasia School 69 y.o.  female  Working Diagnosis:   1. Hypertensive urgency    2. Other vascular headache    3.  Acute chest pain      Readmission: no  Isolation Requirements:  no  Recommended Level of Care:  telemetry  Code Status:  Full Code  Other:  Labile BPs, refractor to medications, admit per her Nephrologist Dr. Jennifer Bianchi. Department:[facility] - [phone]      6:32 PM  Dr. Dillan Clarke accepts admission.       Signed By: Caity Dubon MD     July 18, 2019

## 2019-07-18 NOTE — ED NOTES
Notified by Ramon Leo RN, that she was called into room by pt's family for pt feeling like she was going to faint and not feeling well. Family had laid pt's head back, which helped the feeling. Bp was in 120s at the time. BP rechecked and pt repositioned for comfort, and bp in the 150s.

## 2019-07-19 ENCOUNTER — APPOINTMENT (OUTPATIENT)
Dept: NON INVASIVE DIAGNOSTICS | Age: 74
DRG: 253 | End: 2019-07-19
Attending: INTERNAL MEDICINE
Payer: MEDICARE

## 2019-07-19 ENCOUNTER — APPOINTMENT (OUTPATIENT)
Dept: CT IMAGING | Age: 74
DRG: 253 | End: 2019-07-19
Attending: INTERNAL MEDICINE
Payer: MEDICARE

## 2019-07-19 PROBLEM — I16.0 HYPERTENSIVE URGENCY: Status: ACTIVE | Noted: 2019-07-19

## 2019-07-19 LAB
ATRIAL RATE: 55 BPM
CALCULATED P AXIS, ECG09: 37 DEGREES
CALCULATED R AXIS, ECG10: 39 DEGREES
CALCULATED T AXIS, ECG11: 49 DEGREES
DIAGNOSIS, 93000: NORMAL
ECHO AO ROOT DIAM: 3.17 CM
ECHO AV PEAK GRADIENT: 6.9 MMHG
ECHO AV PEAK VELOCITY: 131.55 CM/S
ECHO LA MAJOR AXIS: 4.37 CM
ECHO LA TO AORTIC ROOT RATIO: 1.38
ECHO LV E' LATERAL VELOCITY: 8.74 CM/S
ECHO LV E' SEPTAL VELOCITY: 6.33 CM/S
ECHO LV INTERNAL DIMENSION DIASTOLIC: 4.78 CM (ref 3.9–5.3)
ECHO LV INTERNAL DIMENSION SYSTOLIC: 2.95 CM
ECHO LV IVSD: 0.79 CM (ref 0.6–0.9)
ECHO LV MASS 2D: 142.9 G (ref 67–162)
ECHO LV MASS INDEX 2D: 84.5 G/M2 (ref 43–95)
ECHO LV POSTERIOR WALL DIASTOLIC: 0.81 CM (ref 0.6–0.9)
ECHO MV A VELOCITY: 94.19 CM/S
ECHO MV AREA PHT: 3.9 CM2
ECHO MV E DECELERATION TIME (DT): 196.3 MS
ECHO MV E VELOCITY: 77.39 CM/S
ECHO MV E/A RATIO: 0.82
ECHO MV E/E' LATERAL: 8.85
ECHO MV E/E' RATIO (AVERAGED): 10.54
ECHO MV E/E' SEPTAL: 12.23
ECHO MV PRESSURE HALF TIME (PHT): 56.9 MS
ECHO PV MAX VELOCITY: 94.16 CM/S
ECHO PV PEAK GRADIENT: 3.5 MMHG
ECHO RV INTERNAL DIMENSION: 3.38 CM
ECHO TV REGURGITANT MAX VELOCITY: 307.47 CM/S
ECHO TV REGURGITANT PEAK GRADIENT: 37.8 MMHG
P-R INTERVAL, ECG05: 146 MS
Q-T INTERVAL, ECG07: 440 MS
QRS DURATION, ECG06: 68 MS
QTC CALCULATION (BEZET), ECG08: 420 MS
VENTRICULAR RATE, ECG03: 55 BPM

## 2019-07-19 PROCEDURE — 74011636320 HC RX REV CODE- 636/320: Performed by: RADIOLOGY

## 2019-07-19 PROCEDURE — 74011250637 HC RX REV CODE- 250/637: Performed by: HOSPITALIST

## 2019-07-19 PROCEDURE — 65660000001 HC RM ICU INTERMED STEPDOWN

## 2019-07-19 PROCEDURE — 74011250636 HC RX REV CODE- 250/636: Performed by: INTERNAL MEDICINE

## 2019-07-19 PROCEDURE — 74011250636 HC RX REV CODE- 250/636: Performed by: HOSPITALIST

## 2019-07-19 PROCEDURE — 74011000258 HC RX REV CODE- 258: Performed by: RADIOLOGY

## 2019-07-19 PROCEDURE — 0399T ECHO ADULT COMPLETE: CPT

## 2019-07-19 PROCEDURE — 83835 ASSAY OF METANEPHRINES: CPT

## 2019-07-19 PROCEDURE — 65270000029 HC RM PRIVATE

## 2019-07-19 PROCEDURE — 36415 COLL VENOUS BLD VENIPUNCTURE: CPT

## 2019-07-19 PROCEDURE — 99218 HC RM OBSERVATION: CPT

## 2019-07-19 PROCEDURE — 74175 CTA ABDOMEN W/CONTRAST: CPT

## 2019-07-19 PROCEDURE — 74011250637 HC RX REV CODE- 250/637: Performed by: INTERNAL MEDICINE

## 2019-07-19 PROCEDURE — 74011000250 HC RX REV CODE- 250: Performed by: INTERNAL MEDICINE

## 2019-07-19 PROCEDURE — 74011250637 HC RX REV CODE- 250/637: Performed by: NURSE PRACTITIONER

## 2019-07-19 RX ORDER — SODIUM CHLORIDE 9 MG/ML
75 INJECTION, SOLUTION INTRAVENOUS CONTINUOUS
Status: DISPENSED | OUTPATIENT
Start: 2019-07-19 | End: 2019-07-19

## 2019-07-19 RX ORDER — SODIUM CHLORIDE 0.9 % (FLUSH) 0.9 %
10 SYRINGE (ML) INJECTION
Status: COMPLETED | OUTPATIENT
Start: 2019-07-19 | End: 2019-07-19

## 2019-07-19 RX ORDER — ZOLPIDEM TARTRATE 5 MG/1
10 TABLET ORAL
Status: DISCONTINUED | OUTPATIENT
Start: 2019-07-19 | End: 2019-07-26 | Stop reason: HOSPADM

## 2019-07-19 RX ORDER — LOSARTAN POTASSIUM 50 MG/1
100 TABLET ORAL DAILY
Status: DISCONTINUED | OUTPATIENT
Start: 2019-07-19 | End: 2019-07-25

## 2019-07-19 RX ORDER — ALPRAZOLAM 0.25 MG/1
0.25 TABLET ORAL
Status: DISCONTINUED | OUTPATIENT
Start: 2019-07-19 | End: 2019-07-26 | Stop reason: HOSPADM

## 2019-07-19 RX ADMIN — Medication 10 ML: at 10:45

## 2019-07-19 RX ADMIN — LOSARTAN POTASSIUM 100 MG: 50 TABLET ORAL at 08:26

## 2019-07-19 RX ADMIN — ALPRAZOLAM 0.25 MG: 0.25 TABLET ORAL at 16:47

## 2019-07-19 RX ADMIN — IOPAMIDOL 100 ML: 755 INJECTION, SOLUTION INTRAVENOUS at 10:45

## 2019-07-19 RX ADMIN — ACETAMINOPHEN 650 MG: 325 TABLET ORAL at 08:26

## 2019-07-19 RX ADMIN — Medication 10 ML: at 21:06

## 2019-07-19 RX ADMIN — SODIUM CHLORIDE 75 ML/HR: 900 INJECTION, SOLUTION INTRAVENOUS at 09:26

## 2019-07-19 RX ADMIN — SODIUM CHLORIDE 100 ML: 900 INJECTION, SOLUTION INTRAVENOUS at 10:45

## 2019-07-19 RX ADMIN — LABETALOL 20 MG/4 ML (5 MG/ML) INTRAVENOUS SYRINGE 10 MG: at 08:27

## 2019-07-19 RX ADMIN — SODIUM CHLORIDE 2.5 MG/HR: 900 INJECTION, SOLUTION INTRAVENOUS at 18:59

## 2019-07-19 RX ADMIN — ALPRAZOLAM 0.25 MG: 0.25 TABLET ORAL at 10:04

## 2019-07-19 RX ADMIN — Medication 10 ML: at 07:32

## 2019-07-19 RX ADMIN — ZOLPIDEM TARTRATE 10 MG: 5 TABLET ORAL at 21:47

## 2019-07-19 RX ADMIN — SIMVASTATIN 10 MG: 10 TABLET, FILM COATED ORAL at 21:05

## 2019-07-19 NOTE — CONSULTS
[facility]   [address] [facility], [address] [place] [address], [facility]  Phone: [phone] NOTE     Patient: Bri June MRN: [id number]  PCP: Armida Schumacher MD   :     1945  Age:   69 y.o. Sex:  female      Referring physician: Troy Guerra MD  Reason for consultation: 69 y.o. female with Chest pain [E82.9] complicated by CASSY   Admission Date: 2019  4:38 PM  LOS: 0 days      ASSESSMENT and PLAN :   Malignant HTN   - essential vs secondary  - rebound HTN after abruptly stopping clonidine   - Normal TSH, Renin/ Bran ratio, No Proteinuria as out pt. 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 Subjective:   HPI: Bri June is a 69 y.o. Nohemy Rudolph woman who has been admitted to the hospital for uncontrolled HTN   She has been a pt of Dr Rico fritz last 3.5 yrs since she moved to [address] [place] [address] from [place], [place]   Over the last 2 years had labile and uncontrolled HTN and was referred to Dr Mary Hernandez in our practice recently   Amlodipine was added and clonidine was stopped 4-5 days ago and her she had rebound HTN and presented to ER   We were asked to see her for BP management and CKD   She is  and   of massive MI.  Father had difficult to control HTN and Heart disease   She had prior hx of smoking and dyslipidemia   She reports having a negative stress test a year ago   Denies any recreational drug use   Reports extreme fatigue, headacahes, tremors, palpitations, Nausea and jitteriness   She had splinter hemorrhages in eyes     Past Medical Hx:   Past Medical History:   Diagnosis Date    Hypertension         Past Surgical Hx:     Past Surgical History:   Procedure Laterality Date    HX APPENDECTOMY      HX BACK SURGERY      LUMBAR FUSION X2 SURGERIES    HX BACK SURGERY      CERVICAL X2    HX HYSTERECTOMY      HX TONSILLECTOMY           Allergies   Allergen Reactions    Amlodipine Other (comments)     intolerant    Clonidine Other (comments)     fatigue       Social Hx:  reports that she has quit smoking. She smoked 0.50 packs per day. She has never used smokeless tobacco. She reports that she drinks about 6.0 standard drinks of alcohol per week. Family History   Problem Relation Age of Onset    Heart Attack Father 47       Review of Systems:  A thorough twelve point review of system was performed today. Pertinent positives and negatives are mentioned in the HPI. The reminder of the ROS is negative and noncontributory. Objective:    Vitals:    Vitals:    07/19/19 0400 07/19/19 0500 07/19/19 0600 07/19/19 0743   BP: 173/65 165/72 178/65 199/76   Pulse: (!) 53 (!) 56 (!) 53 (!) 53   Resp:    16   Temp:    97.6 °F (36.4 °C)   SpO2:    100%   Weight:   66.1 kg (145 lb 11.6 oz)      I&O's:  07/18 0701 - 07/19 0700  In: 300 [P.O.:300]  Out: -   Visit Vitals  /76 (BP 1 Location: Left arm, BP Patient Position: At rest)   Pulse (!) 53   Temp 97.6 °F (36.4 °C)   Resp 16   Wt 66.1 kg (145 lb 11.6 oz)   SpO2 100%   Breastfeeding? No   BMI 25.81 kg/m²       Physical Exam:  General:  No apparent Distress  HEENT: PERRL,  No Pallor , No Icterus  Neck: Supple,no mass palpable  Lungs : CTA  CVS: RRR, S1 S2 normal, No murmur   Abdomen: Soft, NT, BS +  Extremities: Edema  Skin: No rash or lesions.   MS: No joint swelling, erythema, warmth  Neurologic: non focal, AAO x 3  Psych: normal affect  Laboratory Results:    Recent Labs     07/18/19  1658      K 5.0      CO2 23   *   BUN 24*   CREA 1.17* CA 9.0   ALB 3.9   SGOT 28   ALT 27     Recent Labs     07/18/19  1658   WBC 4.0   HGB 10.8*   HCT 30.7*        No results found for: SDES  No results found for: CULT  Recent Results (from the past 24 hour(s))   EKG, 12 LEAD, INITIAL    Collection Time: 07/18/19  4:49 PM   Result Value Ref Range    Ventricular Rate 55 BPM    Atrial Rate 55 BPM    P-R Interval 146 ms    QRS Duration 68 ms    Q-T Interval 440 ms    QTC Calculation (Bezet) 420 ms    Calculated P Axis 37 degrees    Calculated R Axis 39 degrees    Calculated T Axis 49 degrees    Diagnosis       Sinus bradycardia    When compared with ECG of 24-JUN-2019 13:41,  No significant change was found  Confirmed by Harlan Loredo M.D., Robert Barboza ([id number]) on 7/19/2019 5:34:53 AM     SAMPLES BEING HELD    Collection Time: 07/18/19  4:58 PM   Result Value Ref Range    SAMPLES BEING HELD 1RED 1BLUE 1LAV 1GRN     COMMENT        Add-on orders for these samples will be processed based on acceptable specimen integrity and analyte stability, which may vary by analyte. C REACTIVE PROTEIN, QT    Collection Time: 07/18/19  4:58 PM   Result Value Ref Range    C-Reactive protein <0.29 <0.60 mg/dL   CBC WITH AUTOMATED DIFF    Collection Time: 07/18/19  4:58 PM   Result Value Ref Range    WBC 4.0 3.6 - 11.0 K/uL    RBC 3.12 (L) 3.80 - 5.20 M/uL    HGB 10.8 (L) 11.5 - 16.0 g/dL    HCT 30.7 (L) 35.0 - 47.0 %    MCV 98.4 80.0 - 99.0 FL    MCH 34.6 (H) 26.0 - 34.0 PG    MCHC 35.2 30.0 - 36.5 g/dL    RDW 12.8 11.5 - 14.5 %    PLATELET 156 892 - 045 K/uL    MPV 9.9 8.9 - 12.9 FL    NRBC 0.0 0  WBC    ABSOLUTE NRBC 0.00 0.00 - 0.01 K/uL    NEUTROPHILS 50 32 - 75 %    LYMPHOCYTES 34 12 - 49 %    MONOCYTES 10 5 - 13 %    EOSINOPHILS 4 0 - 7 %    BASOPHILS 1 0 - 1 %    IMMATURE GRANULOCYTES 1 (H) 0.0 - 0.5 %    ABS. NEUTROPHILS 2.1 1.8 - 8.0 K/UL    ABS. LYMPHOCYTES 1.3 0.8 - 3.5 K/UL    ABS. MONOCYTES 0.4 0.0 - 1.0 K/UL    ABS. EOSINOPHILS 0.2 0.0 - 0.4 K/UL    ABS.  BASOPHILS 0.0 0.0 - 0.1 K/UL    ABS. IMM. GRANS. 0.0 0.00 - 0.04 K/UL    DF AUTOMATED     METABOLIC PANEL, COMPREHENSIVE    Collection Time: 07/18/19  4:58 PM   Result Value Ref Range    Sodium 139 136 - 145 mmol/L    Potassium 5.0 3.5 - 5.1 mmol/L    Chloride 104 97 - 108 mmol/L    CO2 23 21 - 32 mmol/L    Anion gap 12 5 - 15 mmol/L    Glucose 119 (H) 65 - 100 mg/dL    BUN 24 (H) 6 - 20 MG/DL    Creatinine 1.17 (H) 0.55 - 1.02 MG/DL    BUN/Creatinine ratio 21 (H) 12 - 20      GFR est AA 55 (L) >60 ml/min/1.73m2    GFR est non-AA 45 (L) >60 ml/min/1.73m2    Calcium 9.0 8.5 - 10.1 MG/DL    Bilirubin, total 0.7 0.2 - 1.0 MG/DL    ALT (SGPT) 27 12 - 78 U/L    AST (SGOT) 28 15 - 37 U/L    Alk. phosphatase 56 45 - 117 U/L    Protein, total 7.1 6.4 - 8.2 g/dL    Albumin 3.9 3.5 - 5.0 g/dL    Globulin 3.2 2.0 - 4.0 g/dL    A-G Ratio 1.2 1.1 - 2.2     NT-PRO BNP    Collection Time: 07/18/19  4:58 PM   Result Value Ref Range    NT pro- (H) 0 - 125 PG/ML   SED RATE (ESR)    Collection Time: 07/18/19  4:58 PM   Result Value Ref Range    Sed rate, automated 33 (H) 0 - 30 mm/hr   TROPONIN I    Collection Time: 07/18/19  4:58 PM   Result Value Ref Range    Troponin-I, Qt. <0.05 <0.05 ng/mL         Urine dipstick:   Lab Results   Component Value Date/Time    Color YELLOW/STRAW 06/24/2019 02:39 PM    Appearance CLEAR 06/24/2019 02:39 PM    Specific gravity 1.010 06/24/2019 02:39 PM    pH (UA) 6.0 06/24/2019 02:39 PM    Protein NEGATIVE  06/24/2019 02:39 PM    Glucose NEGATIVE  06/24/2019 02:39 PM    Ketone NEGATIVE  06/24/2019 02:39 PM    Bilirubin NEGATIVE  06/24/2019 02:39 PM    Urobilinogen 0.2 06/24/2019 02:39 PM    Nitrites NEGATIVE  06/24/2019 02:39 PM    Leukocyte Esterase NEGATIVE  06/24/2019 02:39 PM    Epithelial cells FEW 06/24/2019 02:39 PM    Bacteria NEGATIVE  06/24/2019 02:39 PM    WBC 0-4 06/24/2019 02:39 PM    RBC 0-5 06/24/2019 02:39 PM       I have reviewed the following:    All pertinent labs, microbiology data, radiology imaging for my assessment     Medications list Personally Reviewed   [x]      Yes     []               No       Medications:  Prior to Admission medications    Medication Sig Start Date End Date Taking? Authorizing Provider   amLODIPine (NORVASC) 2.5 mg tablet Take 2.5 mg by mouth daily. Yes Provider, Historical   losartan (COZAAR) 100 mg tablet Take 100 mg by mouth daily. Yes Other, MD Blair   cloNIDine HCl (CATAPRES) 0.1 mg tablet Take 0.05 mg by mouth as needed. Yes Other, MD Blair   zolpidem (AMBIEN) 10 mg tablet Take  by mouth nightly as needed for Sleep. Yes Provider, Historical   metoprolol succinate (TOPROL-XL) 100 mg tablet Take  by mouth daily. Yes Provider, Historical   simvastatin (ZOCOR) 10 mg tablet Take 10 mg by mouth nightly. Yes Provider, Historical   meloxicam (MOBIC) 15 mg tablet Take 15 mg by mouth daily. Yes Provider, Historical        Thank you for allowing us to participate in the care of this patient. We will follow patient. Please dont hesitate to call with any questions    Rosa Maria Brody MD  [facility] [address]  Phone - [phone]   Fax - [phone]  [email or web address]

## 2019-07-19 NOTE — PHYSICIAN ADVISORY
Letter of Status Determination:   Recommend hospitalization status upgraded from   OBSERVATION  to INPATIENT  Status     Pt Name:  Taylor Gold   MR#   72 [facility] # [id number] /  [id number]  Payor: Jak Wade / Plan: [address] / Product Type: Medicare /    ANAHI#  [id number]   Room and Hospital  456/01  @ [facility]   Hospitalization date  7/18/2019  4:38 PM   Current Attending Physician  Jesus Chand MD   Principal diagnosis  Chest pain [R07.9]     Clinicals  69 y.o. y.o  female hospitalized with above diagnosis   This pt is noted to have wide fluctuations of BP along with renal artery disease. Her care is complex while endocrine workup and other studies are in progress. On the basis of above clinical data, this patient's care in acute hospital care setting is expected to exceed two midnights. [facility] criteria   Does  NOT apply    STATUS DETERMINATION  This patient is at high risk of adverse events and deterioration based on documented clinical data, comorbid conditions and current acute care course. Ms. Taylor Gold is expected to meet Inpatient Admission status criteria in accordance with CMS regulation Section 43 .3. Specifically, due to medical necessity the patient's stay is expected to exceed Two Midnights. It is our recommendation that this patient's hospitalization status should be upgraded from  OBSERVATION to INPATIENT status. The final decision of the patient's hospitalization status depends on the attending physician's judgment.            Additional comments     Payor: Jak Wade / Plan: [address] / Product Type: Medicare /         Abdoulaye Laureano MD MPH [facility]   Cell: [phone]  Physician [address] [phone]        [phone]    .

## 2019-07-19 NOTE — PROGRESS NOTES
Pt seen and discuswed case with DR Tanja Wells and d/w Dr Supa Castano. Plan renal angiogram +/- intervention  Coming week     Bp now at 250/110   recommend start Cardene Drip at 5 mg hr and titrate.    Aim for just 20% drop in BP for the next 6-8 hrs   Then gradually bring down slowly

## 2019-07-19 NOTE — PROGRESS NOTES
Reason for Admission:  Patient presented with hypertensive urgency                    RRAT Score: 8                    Plan for utilizing home health:  TBD, none identified at this time- the patient denied any current home health services in place                        Current Advanced Directive/Advance Care Plan: Full Code, not on file                          Transition of Care Plan:  Home     The CM met with the patient at bedside in order to introduce the role of CM and assess for patient needs. The patient lives at home alone in private residence- verified demographics and insurance information. The patient endorses being independent with ADLs and mobility, the patient denied use of DME in the home. The patient denied difficulty affording or accessing medications. The patient endorses that her friend Georgette Goodrich will transport home upon discharge. Observation notice provided in writing to patient and/or caregiver as well as verbal explanation of the policy. Patients who are in outpatient status also receive the Observation notice. CM will follow for transitions of care. NASIM Velasco    Care Management Interventions  PCP Verified by CM: Yes(Patient verified PCP as Dr. Kathy Holloway )  Mode of Transport at Discharge:  Other (see comment)(Patient's friend Georgette Goodrich will provide transportation home upon discharge)  Transition of Care Consult (CM Consult): Discharge Planning  MyChart Signup: Yes  Discharge Durable Medical Equipment: No  Health Maintenance Reviewed: Yes  Physical Therapy Consult: No  Occupational Therapy Consult: No  Speech Therapy Consult: No  Current Support Network: Own Home, Lives Alone  Confirm Follow Up Transport: Friends  Plan discussed with Pt/Family/Caregiver: Yes  Discharge Location  Discharge Placement: Home

## 2019-07-19 NOTE — PROGRESS NOTES
Hospitalist Progress Note  Phani Samuel MD  Answering service: [phone] from in house phone  Cell: [phone]      Date of Service:  2019  NAME:  Naomi Winston  :  1945  MRN:  [id number]      Admission Summary:     A 69 y.o lady with HTN who presented [facility] the [facility] due to severely elevated blood pressure. She states that for the past 4 days her BP has been in the 230s/100s. She began experiencing headaches and chest discomfort today. She normally takes metoprolol and losartan at night and last took them on . She was also prescribed amlodipine 4 days ago and has been taking it every morning, and her clonidine was discontinued at the same time, but she took it this morning due to her persistently elevated BP. While in the ER, she was given 20 mg IV hydralazine and felt light-headed, her SBP was noted to be in the 90's. She currently feels better but is very fatigued. Her headache persists but is decreased in severity. No nausea or vomiting.        Interval history / Subjective:     Headache improving, no left side chest pain or palpitation      Assessment & Plan:     Hypertensive urgency   -BP elevated, and fluctuating, on losartan and prn labetalol, monitor BP  -norvasc and clonidine listed as allergy  -seen by Nephrologist and lab for VMA, metanephrines plasma, catocholamines urine, CTA abdomen and echo ordered  -CTA abdomen pelvis there are 2 renal arteries supplying both kidneys. Moderate to severe  stenosis of the main right renal artery and the accessory lower pole left renal Artery, accessory upper pole right renal artery and left upper pole renal artery are patent without significant stenosis.   -looks anxious, add xanax prn    Hx of hypercholesterolemia   -continue zocor    T11 age indeterminate compression deformity on CT    -had  hx of back surgery and fall   -stable    Code status: Full Code  DVT Ht Readings from Last 3 Encounters:  02/24/23 : 21.5\" (67 %, Z= 0.44)*  02/10/23 : 21\" (81 %, Z= 0.86)*  01/27/23 : 20\" (74 %, Z= 0.64)*    * Growth percentiles are based on WHO (Girls, 0-2 years) data. Wt Readings from Last 3 Encounters:  02/24/23 : 11 lb 8 oz (5.216 kg) (95 %, Z= 1.60)*  02/10/23 : 9 lb 10 oz (4.366 kg) (86 %, Z= 1.08)*  01/27/23 : 8 lb 1.6 oz (3.674 kg) (76 %, Z= 0.72)*    * Growth percentiles are based on WHO (Girls, 0-2 years) data. HC Readings from Last 3 Encounters:  02/24/23 : 15.25\" (97 %, Z= 1.84)*  02/10/23 : 15\" (99 %, Z= 2.31)*  01/27/23 : 15\" (>99 %, Z= 3.34)*    * Growth percentiles are based on WHO (Girls, 0-2 years) data. prophylaxis: lovenox    Care Plan discussed with: Patient/Family and Nurse  Disposition: TBD     Hospital Problems  Date Reviewed: 12/11/2018          Codes Class Noted POA    * (Principal) Chest pain ICD-10-CM: R07.9  ICD-9-CM: 786.50  7/18/2019 Unknown                Vital Signs:    Last 24hrs VS reviewed since prior progress note. Most recent are:  Visit Vitals  /65   Pulse (!) 53   Temp 98.7 °F (37.1 °C)   Resp 16   Wt 66.1 kg (145 lb 11.6 oz)   SpO2 99%   Breastfeeding? No   BMI 25.81 kg/m²         Intake/Output Summary (Last 24 hours) at 7/19/2019 0742  Last data filed at 7/18/2019 2318  Gross per 24 hour   Intake 300 ml   Output --   Net 300 ml        Physical Examination:             Constitutional:  No acute distress, cooperative, pleasant    ENT:  Oral mucous moist, oropharynx benign. Neck supple,    Resp:  CTA bilaterally. No wheezing/rhonchi/rales. No accessory muscle use   CV:  Regular rhythm, normal rate, no murmurs, gallops, rubs    GI:  Soft, non distended, non tender. normoactive bowel sounds, no hepatosplenomegaly     Musculoskeletal:  No edema    Neurologic:  Moves all extremities. AAOx3, CN II-XII reviewed     Skin:  Good turgor, no rashes or ulcers       Data Review:    Review and/or order of clinical lab test  Review and/or order of tests in the radiology section of CPT  Review and/or order of tests in the medicine section of CPT      Labs:     Recent Labs     07/18/19  1658   WBC 4.0   HGB 10.8*   HCT 30.7*        Recent Labs     07/18/19  1658      K 5.0      CO2 23   BUN 24*   CREA 1.17*   *   CA 9.0     Recent Labs     07/18/19  1658   SGOT 28   ALT 27   AP 56   TBILI 0.7   TP 7.1   ALB 3.9   GLOB 3.2     No results for input(s): INR, PTP, APTT in the last 72 hours. No lab exists for component: INREXT   No results for input(s): FE, TIBC, PSAT, FERR in the last 72 hours.    No results found for: FOL, RBCF   No results for input(s): PH, PCO2, PO2 in the last 72 hours.   Recent Labs     07/18/19  1658   TROIQ <0.05     No results found for: CHOL, CHOLX, CHLST, CHOLV, HDL, LDL, LDLC, DLDLP, TGLX, TRIGL, TRIGP, CHHD, CHHDX  Lab Results   Component Value Date/Time    Glucose (POC) 106 (H) 12/27/2016 12:24 PM     Lab Results   Component Value Date/Time    Color YELLOW/STRAW 06/24/2019 02:39 PM    Appearance CLEAR 06/24/2019 02:39 PM    Specific gravity 1.010 06/24/2019 02:39 PM    pH (UA) 6.0 06/24/2019 02:39 PM    Protein NEGATIVE  06/24/2019 02:39 PM    Glucose NEGATIVE  06/24/2019 02:39 PM    Ketone NEGATIVE  06/24/2019 02:39 PM    Bilirubin NEGATIVE  06/24/2019 02:39 PM    Urobilinogen 0.2 06/24/2019 02:39 PM    Nitrites NEGATIVE  06/24/2019 02:39 PM    Leukocyte Esterase NEGATIVE  06/24/2019 02:39 PM    Epithelial cells FEW 06/24/2019 02:39 PM    Bacteria NEGATIVE  06/24/2019 02:39 PM    WBC 0-4 06/24/2019 02:39 PM    RBC 0-5 06/24/2019 02:39 PM         Medications Reviewed:     Current Facility-Administered Medications   Medication Dose Route Frequency    sodium chloride (NS) flush 5-40 mL  5-40 mL IntraVENous Q8H    sodium chloride (NS) flush 5-40 mL  5-40 mL IntraVENous PRN    acetaminophen (TYLENOL) tablet 650 mg  650 mg Oral Q4H PRN    ondansetron (ZOFRAN) injection 4 mg  4 mg IntraVENous Q4H PRN    enoxaparin (LOVENOX) injection 40 mg  40 mg SubCUTAneous Q24H    labetalol (NORMODYNE;TRANDATE) 20 mg/4 mL (5 mg/mL) injection 10 mg  10 mg IntraVENous Q6H PRN    simvastatin (ZOCOR) tablet 10 mg  10 mg Oral QHS    zolpidem (AMBIEN) tablet 5 mg  5 mg Oral QHS PRN     ______________________________________________________________________  EXPECTED LENGTH OF STAY: - - -  ACTUAL LENGTH OF STAY:          0                 Conny Mohs, MD

## 2019-07-19 NOTE — PROGRESS NOTES
Patient admitted to stepdown unit from [facility] ED. Concerned her Nephrologist, Dr. Chas Blood should be contacted in the AM to notify hospital admission.

## 2019-07-19 NOTE — ED NOTES
TRANSFER - OUT REPORT:    Verbal report given to Gregorio(name) on Anastasia Lenz  being transferred to [facility](unit) for routine progression of care       Report consisted of patients Situation, Background, Assessment and   Recommendations(SBAR). Information from the following report(s) SBAR and ED Summary was reviewed with the receiving nurse. Lines:   Peripheral IV 07/18/19 Right Antecubital (Active)   Site Assessment Clean, dry, & intact 7/18/2019  5:03 PM   Phlebitis Assessment 0 7/18/2019  5:03 PM   Infiltration Assessment 0 7/18/2019  5:03 PM   Dressing Status Clean, dry, & intact 7/18/2019  5:03 PM   Dressing Type Transparent 7/18/2019  5:03 PM        Opportunity for questions and clarification was provided.       Patient transported with:   Monitor

## 2019-07-19 NOTE — ED NOTES
Called into room by tech for drop in pt's bp. Pressure in the 90s on monitor, pt states this is what happens at home, her bp will suddenly drop. Pt laid down flat by family member, awake and alert, but states she does not feel well. Manual bp confirms. MD notified.

## 2019-07-19 NOTE — H&P
HISTORY AND PHYSICAL      PCP: Valerie Hoffman MD  History source: the patient, EMR      CC: elevated blood pressure      HPI: 69 y.o lady with HTN who presented to the [facility] ED due to severely elevated blood pressure. She states that for the past 4 days her BP has been in the 230s/100s. She began experiencing headaches and chest discomfort today. She normally takes metoprolol and losartan at night and last took them on 7/17. She was also prescribed amlodipine 4 days ago and has been taking it every morning, and her clonidine was discontinued at the same time, but she took it this morning due to her persistently elevated BP. While in the ER, she was given 20 mg IV hydralazine and felt light-headed, her SBP was noted to be in the 90's. She currently feels better but is very fatigued. Her headache persists but is decreased in severity. No nausea or vomiting. PMH/PSH:  Past Medical History:   Diagnosis Date    Hypertension      Past Surgical History:   Procedure Laterality Date    HX APPENDECTOMY      HX BACK SURGERY      LUMBAR FUSION X2 SURGERIES    HX BACK SURGERY      CERVICAL X2    HX HYSTERECTOMY      HX TONSILLECTOMY         Home meds:   Prior to Admission medications    Medication Sig Start Date End Date Taking? Authorizing Provider   amLODIPine (NORVASC) 2.5 mg tablet Take 2.5 mg by mouth daily. Yes Provider, Historical   losartan (COZAAR) 100 mg tablet Take 100 mg by mouth daily. Yes Other, MD Blair   cloNIDine HCl (CATAPRES) 0.1 mg tablet Take 0.05 mg by mouth as needed. Yes Other, MD Blair   zolpidem (AMBIEN) 10 mg tablet Take  by mouth nightly as needed for Sleep. Yes Provider, Historical   metoprolol succinate (TOPROL-XL) 100 mg tablet Take  by mouth daily. Yes Provider, Historical   simvastatin (ZOCOR) 10 mg tablet Take 10 mg by mouth nightly. Yes Provider, Historical   meloxicam (MOBIC) 15 mg tablet Take 15 mg by mouth daily.    Yes Provider, Historical   clonidine has been discontinued      Allergies: Allergies   Allergen Reactions    Amlodipine Other (comments)     intolerant    Clonidine Other (comments)     fatigue       FH:  Family History   Problem Relation Age of Onset    Heart Attack Father 47       SH:  Social History     Tobacco Use    Smoking status: Former Smoker     Packs/day: 0.50    Smokeless tobacco: Never Used   Substance Use Topics    Alcohol use: Yes     Alcohol/week: 6.0 standard drinks     Types: 6 Glasses of wine per week       ROS: A comprehensive review of systems was negative except for that written in the HPI. PHYSICAL EXAM:  Visit Vitals  /51 (BP 1 Location: Left arm, BP Patient Position: Sitting)   Pulse 63   Temp 97.7 °F (36.5 °C)   Resp 18   Wt 66.1 kg (145 lb 11.6 oz)   SpO2 98%   Breastfeeding? No   BMI 25.81 kg/m²   BP during my assessment in the 140s/40s on two consecutive checks.       Gen: NAD, non-toxic  HEENT: anicteric sclerae, normal conjunctiva, oropharynx clear, MM dry  Neck: supple, trachea midline, no adenopathy  Heart: RRR, no MRG, no JVD, no peripheral edema  Lungs: CTA b/l, non-labored respirations  Abd: soft, NT, ND, BS+, no organomegaly  Extr: warm  Skin: dry, no rash  Neuro: CN II-XII grossly intact, normal speech, moves all extremities  Psych: normal mood, appropriate affect      Labs/Imaging:  Recent Results (from the past 24 hour(s))   EKG, 12 LEAD, INITIAL    Collection Time: 07/18/19  4:49 PM   Result Value Ref Range    Ventricular Rate 55 BPM    Atrial Rate 55 BPM    P-R Interval 146 ms    QRS Duration 68 ms    Q-T Interval 440 ms    QTC Calculation (Bezet) 420 ms    Calculated P Axis 37 degrees    Calculated R Axis 39 degrees    Calculated T Axis 49 degrees    Diagnosis       Sinus bradycardia  Otherwise normal ECG  When compared with ECG of 24-JUN-2019 13:41,  No significant change was found     SAMPLES BEING HELD    Collection Time: 07/18/19  4:58 PM   Result Value Ref Range    SAMPLES BEING HELD 1RED 1BLUE 1LAV 1GRN     COMMENT        Add-on orders for these samples will be processed based on acceptable specimen integrity and analyte stability, which may vary by analyte. C REACTIVE PROTEIN, QT    Collection Time: 07/18/19  4:58 PM   Result Value Ref Range    C-Reactive protein <0.29 <0.60 mg/dL   CBC WITH AUTOMATED DIFF    Collection Time: 07/18/19  4:58 PM   Result Value Ref Range    WBC 4.0 3.6 - 11.0 K/uL    RBC 3.12 (L) 3.80 - 5.20 M/uL    HGB 10.8 (L) 11.5 - 16.0 g/dL    HCT 30.7 (L) 35.0 - 47.0 %    MCV 98.4 80.0 - 99.0 FL    MCH 34.6 (H) 26.0 - 34.0 PG    MCHC 35.2 30.0 - 36.5 g/dL    RDW 12.8 11.5 - 14.5 %    PLATELET 975 704 - 403 K/uL    MPV 9.9 8.9 - 12.9 FL    NRBC 0.0 0  WBC    ABSOLUTE NRBC 0.00 0.00 - 0.01 K/uL    NEUTROPHILS 50 32 - 75 %    LYMPHOCYTES 34 12 - 49 %    MONOCYTES 10 5 - 13 %    EOSINOPHILS 4 0 - 7 %    BASOPHILS 1 0 - 1 %    IMMATURE GRANULOCYTES 1 (H) 0.0 - 0.5 %    ABS. NEUTROPHILS 2.1 1.8 - 8.0 K/UL    ABS. LYMPHOCYTES 1.3 0.8 - 3.5 K/UL    ABS. MONOCYTES 0.4 0.0 - 1.0 K/UL    ABS. EOSINOPHILS 0.2 0.0 - 0.4 K/UL    ABS. BASOPHILS 0.0 0.0 - 0.1 K/UL    ABS. IMM. GRANS. 0.0 0.00 - 0.04 K/UL    DF AUTOMATED     METABOLIC PANEL, COMPREHENSIVE    Collection Time: 07/18/19  4:58 PM   Result Value Ref Range    Sodium 139 136 - 145 mmol/L    Potassium 5.0 3.5 - 5.1 mmol/L    Chloride 104 97 - 108 mmol/L    CO2 23 21 - 32 mmol/L    Anion gap 12 5 - 15 mmol/L    Glucose 119 (H) 65 - 100 mg/dL    BUN 24 (H) 6 - 20 MG/DL    Creatinine 1.17 (H) 0.55 - 1.02 MG/DL    BUN/Creatinine ratio 21 (H) 12 - 20      GFR est AA 55 (L) >60 ml/min/1.73m2    GFR est non-AA 45 (L) >60 ml/min/1.73m2    Calcium 9.0 8.5 - 10.1 MG/DL    Bilirubin, total 0.7 0.2 - 1.0 MG/DL    ALT (SGPT) 27 12 - 78 U/L    AST (SGOT) 28 15 - 37 U/L    Alk.  phosphatase 56 45 - 117 U/L    Protein, total 7.1 6.4 - 8.2 g/dL    Albumin 3.9 3.5 - 5.0 g/dL    Globulin 3.2 2.0 - 4.0 g/dL    A-G Ratio 1.2 1.1 - 2.2     NT-PRO BNP Collection Time: 07/18/19  4:58 PM   Result Value Ref Range    NT pro- (H) 0 - 125 PG/ML   SED RATE (ESR)    Collection Time: 07/18/19  4:58 PM   Result Value Ref Range    Sed rate, automated 33 (H) 0 - 30 mm/hr   TROPONIN I    Collection Time: 07/18/19  4:58 PM   Result Value Ref Range    Troponin-I, Qt. <0.05 <0.05 ng/mL       Recent Labs     07/18/19  1658   WBC 4.0   HGB 10.8*   HCT 30.7*        Recent Labs     07/18/19  1658      K 5.0      CO2 23   BUN 24*   CREA 1.17*   *   CA 9.0     Recent Labs     07/18/19  1658   SGOT 28   ALT 27   AP 56   TBILI 0.7   TP 7.1   ALB 3.9   GLOB 3.2       Recent Labs     07/18/19  1658   TROIQ <0.05       No results for input(s): INR, PTP, APTT in the last 72 hours. No lab exists for component: INREXT     No results for input(s): PH, PCO2, PO2 in the last 72 hours. Xr Chest Pa Lat    Result Date: 7/18/2019  Impression: 1. Hyperinflated lungs, no acute cardiopulmonary disease 2. Age indeterminate T11 compression deformity     Ct Head Wo Cont    Result Date: 7/18/2019  IMPRESSION: No acute intracranial process           Assessment & Plan:     Hypertensive urgency: she is currently normotensive. She had a precipitous BP drop after IV hydralazine. Will hold tonight's metoprolol and losartan as she is normotensive. Avoid additional IV hydralazine. Will use PRN labetalol for pesistently elevated BP. Will consult her nephrologist Dr. Regina Buenrostro in the AM since he has been closely managing her BP.     DVT ppx: sq Lovenox  Code status: full  Disposition: home when ready    Signed By: Drew Law MD     July 18, 2019

## 2019-07-20 LAB
ALBUMIN SERPL-MCNC: 4 G/DL (ref 3.5–5)
ALBUMIN/GLOB SERPL: 1.3 {RATIO} (ref 1.1–2.2)
ALP SERPL-CCNC: 57 U/L (ref 45–117)
ALT SERPL-CCNC: 25 U/L (ref 12–78)
ANION GAP SERPL CALC-SCNC: 8 MMOL/L (ref 5–15)
AST SERPL-CCNC: 21 U/L (ref 15–37)
BILIRUB SERPL-MCNC: 0.6 MG/DL (ref 0.2–1)
BUN SERPL-MCNC: 16 MG/DL (ref 6–20)
BUN/CREAT SERPL: 17 (ref 12–20)
CALCIUM SERPL-MCNC: 9.2 MG/DL (ref 8.5–10.1)
CHLORIDE SERPL-SCNC: 108 MMOL/L (ref 97–108)
CO2 SERPL-SCNC: 24 MMOL/L (ref 21–32)
COMMENT, HOLDF: NORMAL
CREAT SERPL-MCNC: 0.96 MG/DL (ref 0.55–1.02)
GLOBULIN SER CALC-MCNC: 3 G/DL (ref 2–4)
GLUCOSE SERPL-MCNC: 105 MG/DL (ref 65–100)
POTASSIUM SERPL-SCNC: 3.5 MMOL/L (ref 3.5–5.1)
PROT SERPL-MCNC: 7 G/DL (ref 6.4–8.2)
SAMPLES BEING HELD,HOLD: NORMAL
SODIUM SERPL-SCNC: 140 MMOL/L (ref 136–145)

## 2019-07-20 PROCEDURE — 74011250637 HC RX REV CODE- 250/637: Performed by: HOSPITALIST

## 2019-07-20 PROCEDURE — 74011250636 HC RX REV CODE- 250/636: Performed by: HOSPITALIST

## 2019-07-20 PROCEDURE — 82530 CORTISOL FREE: CPT

## 2019-07-20 PROCEDURE — 83497 ASSAY OF 5-HIAA: CPT

## 2019-07-20 PROCEDURE — 83150 ASSAY OF HOMOVANILLIC ACID: CPT

## 2019-07-20 PROCEDURE — 81050 URINALYSIS VOLUME MEASURE: CPT

## 2019-07-20 PROCEDURE — 74011250637 HC RX REV CODE- 250/637: Performed by: NURSE PRACTITIONER

## 2019-07-20 PROCEDURE — 80053 COMPREHEN METABOLIC PANEL: CPT

## 2019-07-20 PROCEDURE — 74011250637 HC RX REV CODE- 250/637: Performed by: INTERNAL MEDICINE

## 2019-07-20 PROCEDURE — 84585 ASSAY OF URINE VMA: CPT

## 2019-07-20 PROCEDURE — 83835 ASSAY OF METANEPHRINES: CPT

## 2019-07-20 PROCEDURE — 65660000001 HC RM ICU INTERMED STEPDOWN

## 2019-07-20 PROCEDURE — 74011000250 HC RX REV CODE- 250: Performed by: INTERNAL MEDICINE

## 2019-07-20 PROCEDURE — 74011250636 HC RX REV CODE- 250/636: Performed by: INTERNAL MEDICINE

## 2019-07-20 PROCEDURE — 82384 ASSAY THREE CATECHOLAMINES: CPT

## 2019-07-20 RX ADMIN — Medication 10 ML: at 16:28

## 2019-07-20 RX ADMIN — ZOLPIDEM TARTRATE 10 MG: 5 TABLET ORAL at 21:43

## 2019-07-20 RX ADMIN — ENOXAPARIN SODIUM 40 MG: 40 INJECTION SUBCUTANEOUS at 00:20

## 2019-07-20 RX ADMIN — ACETAMINOPHEN 650 MG: 325 TABLET ORAL at 06:17

## 2019-07-20 RX ADMIN — Medication 10 ML: at 21:42

## 2019-07-20 RX ADMIN — ENOXAPARIN SODIUM 40 MG: 40 INJECTION SUBCUTANEOUS at 23:00

## 2019-07-20 RX ADMIN — ALPRAZOLAM 0.25 MG: 0.25 TABLET ORAL at 12:40

## 2019-07-20 RX ADMIN — SODIUM CHLORIDE 2.5 MG/HR: 900 INJECTION, SOLUTION INTRAVENOUS at 16:28

## 2019-07-20 RX ADMIN — SODIUM CHLORIDE 2.5 MG/HR: 900 INJECTION, SOLUTION INTRAVENOUS at 06:09

## 2019-07-20 RX ADMIN — Medication 10 ML: at 06:17

## 2019-07-20 RX ADMIN — ACETAMINOPHEN 650 MG: 325 TABLET ORAL at 12:38

## 2019-07-20 RX ADMIN — LOSARTAN POTASSIUM 100 MG: 50 TABLET ORAL at 08:31

## 2019-07-20 RX ADMIN — SIMVASTATIN 10 MG: 10 TABLET, FILM COATED ORAL at 21:38

## 2019-07-20 RX ADMIN — Medication 10 ML: at 07:16

## 2019-07-20 NOTE — PROGRESS NOTES
Nephrology Progress Note  Radha Carreno  Date of Admission : 7/18/2019    CC:  Follow up for malignant HTN       Assessment and Plan     Malignant HTN   - 2/2 b/l MARY  - 24 hour studies pending  - plan for angio and possible stent Monday  - keep cardene drip on for now  - cont losartan    Mild CKD  - baseline Cr 1.1-1.2 mg /dl   - 2.2 Hypertensive nephropathy      Dyslipidemia        Interval History:  Seen and examined. BP stable on cardene drip. Feeling better. Cr normal.  No cp, sob, n/v/d. CTA of ABD noted. B/l MARY    Current Medications: all current  Medications have been eviewed in EPIC  Review of Systems: Pertinent items are noted in HPI. Objective:  Vitals:    Vitals:    07/19/19 2334 07/20/19 0332 07/20/19 0341 07/20/19 0714   BP: 156/60 136/54  132/55   Pulse: 79 72  72   Resp: 16 16  18   Temp: 98 °F (36.7 °C) 98.2 °F (36.8 °C)  98.1 °F (36.7 °C)   SpO2: 99% 98%  96%   Weight:   65.7 kg (144 lb 13.5 oz)    Height:         Intake and Output:  07/20 0701 - 07/20 1900  In: 576.7 [P.O.:240; I.V.:336.7]  Out: 0   07/18 1901 - 07/20 0700  In: 1876.3 [P.O.:1190; I.V.:686.3]  Out: 2250 [Urine:2250]    Physical Examination:  General: NAD,Conversant   Neck:  Supple, no mass  Resp:  Lungs CTA B/L, no wheezing , normal respiratory effort  CV:  RRR,  no murmur or rub, no LE edema  GI:  Soft, NT, + Bowel sounds, no hepatosplenomegaly  Neurologic:  Non focal  Psych:             AAO x 3 appropriate affect   Skin:  No Rash  :  No beckham    []    High complexity decision making was performed  []    Patient is at high-risk of decompensation with multiple organ involvement    Lab Data Personally Reviewed: I have reviewed all the pertinent labs, microbiology data and radiology studies during assessment.     Recent Labs     07/20/19  0356 07/18/19  1658    139   K 3.5 5.0    104   CO2 24 23   * 119*   BUN 16 24*   CREA 0.96 1.17*   CA 9.2 9.0   ALB 4.0 3.9   SGOT 21 28   ALT 25 27     Recent Labs 07/18/19  1658   WBC 4.0   HGB 10.8*   HCT 30.7*        No results found for: SDES  No results found for: CULT  Recent Results (from the past 24 hour(s))   METABOLIC PANEL, COMPREHENSIVE    Collection Time: 07/20/19  3:56 AM   Result Value Ref Range    Sodium 140 136 - 145 mmol/L    Potassium 3.5 3.5 - 5.1 mmol/L    Chloride 108 97 - 108 mmol/L    CO2 24 21 - 32 mmol/L    Anion gap 8 5 - 15 mmol/L    Glucose 105 (H) 65 - 100 mg/dL    BUN 16 6 - 20 MG/DL    Creatinine 0.96 0.55 - 1.02 MG/DL    BUN/Creatinine ratio 17 12 - 20      GFR est AA >60 >60 ml/min/1.73m2    GFR est non-AA 57 (L) >60 ml/min/1.73m2    Calcium 9.2 8.5 - 10.1 MG/DL    Bilirubin, total 0.6 0.2 - 1.0 MG/DL    ALT (SGPT) 25 12 - 78 U/L    AST (SGOT) 21 15 - 37 U/L    Alk. phosphatase 57 45 - 117 U/L    Protein, total 7.0 6.4 - 8.2 g/dL    Albumin 4.0 3.5 - 5.0 g/dL    Globulin 3.0 2.0 - 4.0 g/dL    A-G Ratio 1.3 1.1 - 2.2     SAMPLES BEING HELD    Collection Time: 07/20/19  4:04 AM   Result Value Ref Range    SAMPLES BEING HELD 1LAV     COMMENT        Add-on orders for these samples will be processed based on acceptable specimen integrity and analyte stability, which may vary by analyte. Khushi Segundo MD  [address] [place] [address]  Phone - [phone]   Fax - [phone]  [email or web address]

## 2019-07-20 NOTE — PROGRESS NOTES
Cardiology Progress Note    Patient seen for refractory HTN with symptoms suggestive of flash pulmonary edema. CT suggestive of potential b/l MARY. MARY likely driving the pathophysiology. Likelyto benefit from renal stenting if severe MARY confirmed. Plan for renal angiogram and potential stenting next week. Will ideally wait 96 hour to reduce chances of BENNIE as patient got large dye load today. BP control per nephrology for now. Full consult note to follow.

## 2019-07-20 NOTE — PROGRESS NOTES
Bedside and Verbal shift change report given to Devan Burt (oncoming nurse) by Kenya Arboleda (offgoing nurse). Report included the following information SBAR, Kardex, Procedure Summary, Intake/Output, MAR, Accordion, Med Rec Status and Cardiac Rhythm NSR . Problem: Hypertension  Goal: *Blood pressure within specified parameters  Outcome: Progressing Towards Goal     Problem: Falls - Risk of  Goal: *Absence of Falls  Description  Document Kushal Faith Fall Risk and appropriate interventions in the flowsheet. Outcome: Progressing Towards Goal  Note:   Fall Risk Interventions:         Medication Interventions: Evaluate medications/consider consulting pharmacy, Patient to call before getting OOB, Teach patient to arise slowly    1000 24 hour urine has will complete at 0400.        1930 Bedside and Verbal shift change report given to Melinda Baez (oncoming nurse) by Devan Burt (offgoing nurse). Report included the following information SBAR, Kardex, Procedure Summary, Intake/Output, MAR, Accordion, Med Rec Status and Cardiac Rhythm NSR .

## 2019-07-20 NOTE — PROGRESS NOTES
24hr urine collection restarted at 0400 7/20   Cardene continues: to switch IV sites at noon today.   NS bolus complete early this morning post dye yesterday

## 2019-07-20 NOTE — PROGRESS NOTES
Hospitalist Progress Note  Catia Daily MD  Answering service: [phone] from in house phone  Cell:       Date of Service:  2019  NAME:  Lance Fernandez  :  1945  MRN:  [id number]      Admission Summary:     A 69 y.o lady with HTN who presented to the [facility] ED due to severely elevated blood pressure. She states that for the past 4 days her BP has been in the 230s/100s. She began experiencing headaches and chest discomfort today. She normally takes metoprolol and losartan at night and last took them on . She was also prescribed amlodipine 4 days ago and has been taking it every morning, and her clonidine was discontinued at the same time, but she took it this morning due to her persistently elevated BP. While in the ER, she was given 20 mg IV hydralazine and felt light-headed, her SBP was noted to be in the 90's. She currently feels better but is very fatigued. Her headache persists but is decreased in severity. No nausea or vomiting.        Interval history / Subjective:     She said she feels better, no headache or chest pain     Assessment & Plan:     Hypertensive urgency   -BP was as high as 250/110 on  and started on cardene gtt, on losartan, BP improved and at goal, continue monitoring BP  -norvasc and clonidine listed as allergy  -VMA, metanephrines plasma, catocholamines urine result pending  -CTA abdomen pelvis  there are 2 renal arteries supplying both kidneys. Moderate to severe stenosis of the main right renal artery and the accessory lower pole left renal Artery, accessory upper pole right renal artery and left upper pole renal artery are patent without significant stenosis.   -Cardiologist consulted and plan for renal angiogram and potential stenting next week  -nephrology on board    Hx of hypercholesterolemia   -continue zocor    T11 age indeterminate compression deformity on CT -had  hx of back surgery and fall   -stable    Code status: Full Code  DVT prophylaxis: lovenox    Care Plan discussed with: Patient/Family and Nurse  Disposition: TBD     Hospital Problems  Date Reviewed: 12/11/2018          Codes Class Noted POA    Hypertensive urgency ICD-10-CM: I16.0  ICD-9-CM: 401.9  7/19/2019 Unknown        * (Principal) Chest pain ICD-10-CM: R07.9  ICD-9-CM: 786.50  7/18/2019 Unknown                Vital Signs:    Last 24hrs VS reviewed since prior progress note. Most recent are:  Visit Vitals  /55 (BP 1 Location: Right arm, BP Patient Position: At rest;Lying left side)   Pulse 72   Temp 98.1 °F (36.7 °C)   Resp 18   Ht 5' 3\" (1.6 m)   Wt 65.7 kg (144 lb 13.5 oz)   SpO2 96%   Breastfeeding? No   BMI 25.66 kg/m²         Intake/Output Summary (Last 24 hours) at 7/20/2019 0840  Last data filed at 7/19/2019 2334  Gross per 24 hour   Intake 1576.25 ml   Output 1500 ml   Net 76.25 ml        Physical Examination:             Constitutional:  No acute distress, cooperative, pleasant    ENT:  Oral mucous moist, oropharynx benign. Neck supple,    Resp:  CTA bilaterally. No wheezing/rhonchi/rales. No accessory muscle use   CV:  Regular rhythm, normal rate, no murmurs, gallops, rubs    GI:  Soft, non distended, non tender. normoactive bowel sounds, no hepatosplenomegaly     Musculoskeletal:  No edema    Neurologic:  Moves all extremities.   AAOx3, CN II-XII reviewed     Skin:  Good turgor, no rashes or ulcers       Data Review:    Review and/or order of clinical lab test  Review and/or order of tests in the radiology section of CPT  Review and/or order of tests in the medicine section of CPT      Labs:     Recent Labs     07/18/19  1658   WBC 4.0   HGB 10.8*   HCT 30.7*        Recent Labs     07/20/19  0356 07/18/19  1658    139   K 3.5 5.0    104   CO2 24 23   BUN 16 24*   CREA 0.96 1.17*   * 119*   CA 9.2 9.0     Recent Labs     07/20/19  0356 07/18/19  1658   SGOT 21 28   ALT 25 27   AP 57 56   TBILI 0.6 0.7   TP 7.0 7.1   ALB 4.0 3.9   GLOB 3.0 3.2     No results for input(s): INR, PTP, APTT in the last 72 hours. No lab exists for component: INREXT, INREXT   No results for input(s): FE, TIBC, PSAT, FERR in the last 72 hours. No results found for: FOL, RBCF   No results for input(s): PH, PCO2, PO2 in the last 72 hours.   Recent Labs     07/18/19  1658   TROIQ <0.05     No results found for: CHOL, CHOLX, CHLST, CHOLV, HDL, LDL, LDLC, DLDLP, TGLX, TRIGL, TRIGP, CHHD, CHHDX  Lab Results   Component Value Date/Time    Glucose (POC) 106 (H) 12/27/2016 12:24 PM     Lab Results   Component Value Date/Time    Color YELLOW/STRAW 06/24/2019 02:39 PM    Appearance CLEAR 06/24/2019 02:39 PM    Specific gravity 1.010 06/24/2019 02:39 PM    pH (UA) 6.0 06/24/2019 02:39 PM    Protein NEGATIVE  06/24/2019 02:39 PM    Glucose NEGATIVE  06/24/2019 02:39 PM    Ketone NEGATIVE  06/24/2019 02:39 PM    Bilirubin NEGATIVE  06/24/2019 02:39 PM    Urobilinogen 0.2 06/24/2019 02:39 PM    Nitrites NEGATIVE  06/24/2019 02:39 PM    Leukocyte Esterase NEGATIVE  06/24/2019 02:39 PM    Epithelial cells FEW 06/24/2019 02:39 PM    Bacteria NEGATIVE  06/24/2019 02:39 PM    WBC 0-4 06/24/2019 02:39 PM    RBC 0-5 06/24/2019 02:39 PM         Medications Reviewed:     Current Facility-Administered Medications   Medication Dose Route Frequency    losartan (COZAAR) tablet 100 mg  100 mg Oral DAILY    ALPRAZolam (XANAX) tablet 0.25 mg  0.25 mg Oral QID PRN    niCARdipine (CARDENE) 25 mg in 0.9% sodium chloride 250 mL infusion  0-15 mg/hr IntraVENous TITRATE    zolpidem (AMBIEN) tablet 10 mg  10 mg Oral QHS PRN    sodium chloride (NS) flush 5-40 mL  5-40 mL IntraVENous Q8H    sodium chloride (NS) flush 5-40 mL  5-40 mL IntraVENous PRN    acetaminophen (TYLENOL) tablet 650 mg  650 mg Oral Q4H PRN    ondansetron (ZOFRAN) injection 4 mg  4 mg IntraVENous Q4H PRN    enoxaparin (LOVENOX) injection 40 mg  40 mg SubCUTAneous Q24H    labetalol (NORMODYNE;TRANDATE) 20 mg/4 mL (5 mg/mL) injection 10 mg  10 mg IntraVENous Q6H PRN    simvastatin (ZOCOR) tablet 10 mg  10 mg Oral QHS     ______________________________________________________________________  EXPECTED LENGTH OF STAY: - - -  ACTUAL LENGTH OF STAY:          1                 Conny Mohs, MD

## 2019-07-20 NOTE — CDMP QUERY
#1      Pt admitted with HTN .  Pt noted to have \"flash pulmonary edema\"  Documented by cardiology  If possible, please document in the progress notes and d/c summary if you are evaluating and / or treating any of the following:    Acute Pulmonary Edema (cardiac)        Acute Pulmonary Edema (non-cardiac)        Pulmonary Edema w/CHF  Other, please specify  Clinically unable to determine    The medical record reflects the following:    Risk Factors: HTN    Clinical Indicators:   Cardiology consult  Patient seen for refractory HTN with symptoms suggestive of flash pulmonary edema       NT pro-BNP: 920 (H)    Treatment: cardiology consult, trandate  IV, cardene IV, DC IV fluids      Thank you,         [facility]

## 2019-07-20 NOTE — PROGRESS NOTES
1930 Bedside and Verbal shift change report given to Ruddy Jeans (oncoming nurse) by Renetta Marrero (offgoing nurse). Report included the following information SBAR, Kardex, Intake/Output, MAR, Cardiac Rhythm NSR and Alarm Parameters . 2130 spoke to on call NP to have patient's Ambien increased to 10mg, upon the request of the patient, as this is the amount she takes at home. 2330 Bedside and Verbal shift change report given to Darian Isidro RN (oncoming nurse) by Yaima Granda RN (offgoing nurse). Report included the following information SBAR, Kardex, MAR, Med Rec Status, Cardiac Rhythm NSR and Alarm Parameters .

## 2019-07-21 LAB
ALBUMIN SERPL-MCNC: 3.4 G/DL (ref 3.5–5)
ALBUMIN/GLOB SERPL: 1.2 {RATIO} (ref 1.1–2.2)
ALP SERPL-CCNC: 46 U/L (ref 45–117)
ALT SERPL-CCNC: 28 U/L (ref 12–78)
ANION GAP SERPL CALC-SCNC: 9 MMOL/L (ref 5–15)
AST SERPL-CCNC: ABNORMAL U/L (ref 15–37)
BILIRUB SERPL-MCNC: 0.5 MG/DL (ref 0.2–1)
BUN SERPL-MCNC: 15 MG/DL (ref 6–20)
BUN/CREAT SERPL: 17 (ref 12–20)
CALCIUM SERPL-MCNC: 8.5 MG/DL (ref 8.5–10.1)
CHLORIDE SERPL-SCNC: 114 MMOL/L (ref 97–108)
CO2 SERPL-SCNC: 18 MMOL/L (ref 21–32)
CREAT SERPL-MCNC: 0.86 MG/DL (ref 0.55–1.02)
GLOBULIN SER CALC-MCNC: 2.9 G/DL (ref 2–4)
GLUCOSE SERPL-MCNC: 105 MG/DL (ref 65–100)
POTASSIUM SERPL-SCNC: 3.4 MMOL/L (ref 3.5–5.1)
POTASSIUM SERPL-SCNC: ABNORMAL MMOL/L (ref 3.5–5.1)
PROT SERPL-MCNC: 6.3 G/DL (ref 6.4–8.2)
SODIUM SERPL-SCNC: 141 MMOL/L (ref 136–145)

## 2019-07-21 PROCEDURE — 74011250637 HC RX REV CODE- 250/637: Performed by: INTERNAL MEDICINE

## 2019-07-21 PROCEDURE — 74011250636 HC RX REV CODE- 250/636: Performed by: INTERNAL MEDICINE

## 2019-07-21 PROCEDURE — 74011250637 HC RX REV CODE- 250/637: Performed by: HOSPITALIST

## 2019-07-21 PROCEDURE — 36415 COLL VENOUS BLD VENIPUNCTURE: CPT

## 2019-07-21 PROCEDURE — 80053 COMPREHEN METABOLIC PANEL: CPT

## 2019-07-21 PROCEDURE — 74011000258 HC RX REV CODE- 258: Performed by: INTERNAL MEDICINE

## 2019-07-21 PROCEDURE — 65660000001 HC RM ICU INTERMED STEPDOWN

## 2019-07-21 PROCEDURE — 84132 ASSAY OF SERUM POTASSIUM: CPT

## 2019-07-21 PROCEDURE — 74011250637 HC RX REV CODE- 250/637: Performed by: NURSE PRACTITIONER

## 2019-07-21 PROCEDURE — 74011250636 HC RX REV CODE- 250/636: Performed by: HOSPITALIST

## 2019-07-21 PROCEDURE — 74011000250 HC RX REV CODE- 250: Performed by: INTERNAL MEDICINE

## 2019-07-21 RX ORDER — TRAMADOL HYDROCHLORIDE 50 MG/1
50 TABLET ORAL
Status: DISCONTINUED | OUTPATIENT
Start: 2019-07-21 | End: 2019-07-26 | Stop reason: HOSPADM

## 2019-07-21 RX ADMIN — TRAMADOL HYDROCHLORIDE 50 MG: 50 TABLET, FILM COATED ORAL at 16:48

## 2019-07-21 RX ADMIN — Medication 10 ML: at 04:27

## 2019-07-21 RX ADMIN — Medication 10 ML: at 16:48

## 2019-07-21 RX ADMIN — SODIUM CHLORIDE 5 MG/HR: 900 INJECTION, SOLUTION INTRAVENOUS at 06:53

## 2019-07-21 RX ADMIN — SODIUM CHLORIDE 5 MG/HR: 900 INJECTION, SOLUTION INTRAVENOUS at 01:59

## 2019-07-21 RX ADMIN — SODIUM CHLORIDE 7.5 MG/HR: 900 INJECTION, SOLUTION INTRAVENOUS at 12:06

## 2019-07-21 RX ADMIN — SIMVASTATIN 10 MG: 10 TABLET, FILM COATED ORAL at 21:40

## 2019-07-21 RX ADMIN — ACETAMINOPHEN 650 MG: 325 TABLET ORAL at 09:46

## 2019-07-21 RX ADMIN — ALPRAZOLAM 0.25 MG: 0.25 TABLET ORAL at 10:58

## 2019-07-21 RX ADMIN — SODIUM CHLORIDE 7.5 MG/HR: 900 INJECTION, SOLUTION INTRAVENOUS at 15:56

## 2019-07-21 RX ADMIN — PSYLLIUM HUSK 1 PACKET: 3.4 POWDER ORAL at 21:40

## 2019-07-21 RX ADMIN — SODIUM CHLORIDE 7.5 MG/HR: 900 INJECTION, SOLUTION INTRAVENOUS at 19:25

## 2019-07-21 RX ADMIN — ENOXAPARIN SODIUM 40 MG: 40 INJECTION SUBCUTANEOUS at 22:35

## 2019-07-21 RX ADMIN — ZOLPIDEM TARTRATE 10 MG: 5 TABLET ORAL at 21:40

## 2019-07-21 RX ADMIN — Medication 10 ML: at 21:40

## 2019-07-21 RX ADMIN — SODIUM CHLORIDE 7.5 MG/HR: 900 INJECTION, SOLUTION INTRAVENOUS at 22:35

## 2019-07-21 RX ADMIN — LOSARTAN POTASSIUM 100 MG: 50 TABLET ORAL at 08:02

## 2019-07-21 NOTE — PROGRESS NOTES
Hospitalist Progress Note  Delano Brito MD  Answering service: [phone] from in house phone  Cell: [phone]      Date of Service:  2019  NAME:  Daniel White  :  1945  MRN:  [id number]      Admission Summary:     A 69 y.o lady with HTN who presented to the short [facility] ED due to severely elevated blood pressure. She states that for the past 4 days her BP has been in the 230s/100s. She began experiencing headaches and chest discomfort today. She normally takes metoprolol and losartan at night and last took them on . She was also prescribed amlodipine 4 days ago and has been taking it every morning, and her clonidine was discontinued at the same time, but she took it this morning due to her persistently elevated BP. While in the ER, she was given 20 mg IV hydralazine and felt light-headed, her SBP was noted to be in the 90's. She currently feels better but is very fatigued. Her headache persists but is decreased in severity. No nausea or vomiting.        Interval history / Subjective:     She has still headache /10, no chest pain or shortness of breaht     Assessment & Plan:     Hypertensive urgency likely related to bilateral MARY  -BP was as high as 250/110 on  and started on cardene gtt, on losartan, BP improved and at goal, continue monitoring BP  -norvasc and clonidine listed as allergy  -VMA, metanephrines plasma, catocholamines urine result pending  -CTA abdomen pelvis  there are 2 renal arteries supplying both kidneys. Moderate to severe stenosis of the main right renal artery and the accessory lower pole left renal Artery, accessory upper pole right renal artery and left upper pole renal artery are patent without significant stenosis.   -Cardiologist consulted and plan for renal angiogram and potential stenting   -nephrology on board    Hx of hypercholesterolemia   -continue zocor    T11 age indeterminate compression deformity on CT    -had  hx of back surgery and fall   -stable    Code status: Full Code  DVT prophylaxis: lovenox    Care Plan discussed with: Patient/Family and Nurse  Disposition: TBD     Hospital Problems  Date Reviewed: 12/11/2018          Codes Class Noted POA    Hypertensive urgency ICD-10-CM: I16.0  ICD-9-CM: 401.9  7/19/2019 Unknown        * (Principal) Chest pain ICD-10-CM: R07.9  ICD-9-CM: 786.50  7/18/2019 Unknown                Vital Signs:    Last 24hrs VS reviewed since prior progress note. Most recent are:  Visit Vitals  /69 (BP 1 Location: Right arm, BP Patient Position: At rest)   Pulse 89   Temp 97.6 °F (36.4 °C)   Resp 16   Ht 5' 3\" (1.6 m)   Wt 65.6 kg (144 lb 10 oz)   SpO2 96%   Breastfeeding? No   BMI 25.62 kg/m²         Intake/Output Summary (Last 24 hours) at 7/21/2019 1040  Last data filed at 7/21/2019 2620  Gross per 24 hour   Intake 1501.67 ml   Output 2100 ml   Net -598.33 ml        Physical Examination:             Constitutional:  No acute distress, cooperative, pleasant    ENT:  Oral mucous moist, oropharynx benign. Neck supple,    Resp:  CTA bilaterally. No wheezing/rhonchi/rales. No accessory muscle use   CV:  Regular rhythm, normal rate, no murmurs, gallops, rubs    GI:  Soft, non distended, non tender. normoactive bowel sounds, no hepatosplenomegaly     Musculoskeletal:  No edema    Neurologic:  Moves all extremities.   AAOx3, CN II-XII reviewed     Skin:  Good turgor, no rashes or ulcers       Data Review:    Review and/or order of clinical lab test  Review and/or order of tests in the radiology section of CPT  Review and/or order of tests in the medicine section of CPT      Labs:     Recent Labs     07/18/19  1658   WBC 4.0   HGB 10.8*   HCT 30.7*        Recent Labs     07/21/19  0701 07/21/19  0412 07/20/19  0356 07/18/19  1658   NA  --  141 140 139   K 3.4* HEMOLYZED,RECOLLECT REQUESTED 3.5 5.0   CL  --  114* 108 104   CO2  --  18* 24 23 BUN  --  15 16 24*   CREA  --  0.86 0.96 1.17*   GLU  --  105* 105* 119*   CA  --  8.5 9.2 9.0     Recent Labs     07/21/19  0412 07/20/19  0356 07/18/19  1658   SGOT HEMOLYZED,RECOLLECT REQUESTED 21 28   ALT 28 25 27   AP 46 57 56   TBILI 0.5 0.6 0.7   TP 6.3* 7.0 7.1   ALB 3.4* 4.0 3.9   GLOB 2.9 3.0 3.2     No results for input(s): INR, PTP, APTT in the last 72 hours. No lab exists for component: INREXT, INREXT   No results for input(s): FE, TIBC, PSAT, FERR in the last 72 hours. No results found for: FOL, RBCF   No results for input(s): PH, PCO2, PO2 in the last 72 hours.   Recent Labs     07/18/19  1658   TROIQ <0.05     No results found for: CHOL, CHOLX, CHLST, CHOLV, HDL, LDL, LDLC, DLDLP, TGLX, TRIGL, TRIGP, CHHD, CHHDX  Lab Results   Component Value Date/Time    Glucose (POC) 106 (H) 12/27/2016 12:24 PM     Lab Results   Component Value Date/Time    Color YELLOW/STRAW 06/24/2019 02:39 PM    Appearance CLEAR 06/24/2019 02:39 PM    Specific gravity 1.010 06/24/2019 02:39 PM    pH (UA) 6.0 06/24/2019 02:39 PM    Protein NEGATIVE  06/24/2019 02:39 PM    Glucose NEGATIVE  06/24/2019 02:39 PM    Ketone NEGATIVE  06/24/2019 02:39 PM    Bilirubin NEGATIVE  06/24/2019 02:39 PM    Urobilinogen 0.2 06/24/2019 02:39 PM    Nitrites NEGATIVE  06/24/2019 02:39 PM    Leukocyte Esterase NEGATIVE  06/24/2019 02:39 PM    Epithelial cells FEW 06/24/2019 02:39 PM    Bacteria NEGATIVE  06/24/2019 02:39 PM    WBC 0-4 06/24/2019 02:39 PM    RBC 0-5 06/24/2019 02:39 PM         Medications Reviewed:     Current Facility-Administered Medications   Medication Dose Route Frequency    losartan (COZAAR) tablet 100 mg  100 mg Oral DAILY    ALPRAZolam (XANAX) tablet 0.25 mg  0.25 mg Oral QID PRN    niCARdipine (CARDENE) 25 mg in 0.9% sodium chloride 250 mL infusion  0-15 mg/hr IntraVENous TITRATE    zolpidem (AMBIEN) tablet 10 mg  10 mg Oral QHS PRN    sodium chloride (NS) flush 5-40 mL  5-40 mL IntraVENous Q8H    sodium chloride (NS) flush 5-40 mL  5-40 mL IntraVENous PRN    acetaminophen (TYLENOL) tablet 650 mg  650 mg Oral Q4H PRN    ondansetron (ZOFRAN) injection 4 mg  4 mg IntraVENous Q4H PRN    enoxaparin (LOVENOX) injection 40 mg  40 mg SubCUTAneous Q24H    labetalol (NORMODYNE;TRANDATE) 20 mg/4 mL (5 mg/mL) injection 10 mg  10 mg IntraVENous Q6H PRN    simvastatin (ZOCOR) tablet 10 mg  10 mg Oral QHS     ______________________________________________________________________  EXPECTED LENGTH OF STAY: 2d 4h  ACTUAL LENGTH OF STAY:          2                 Rosalinda Rivas MD

## 2019-07-21 NOTE — PROGRESS NOTES
Nephrology Progress Note  Radha Carreno  Date of Admission : 7/18/2019    CC:  Follow up for malignant HTN       Assessment and Plan     Malignant HTN   - 2/2 b/l MARY  - 24 hour studies pending  - plan for angio and possible stent Monday  - keep cardene drip on for now   - cont losartan    Mild CKD  - baseline Cr 1.1-1.2 mg /dl   - 2.2 Hypertensive nephropathy      Dyslipidemia        Interval History:  Seen and examined. BP stable, a few low readings. On losartan only now. C/o HA. No cp, sob, n/v/d. Current Medications: all current  Medications have been eviewed in EPIC  Review of Systems: Pertinent items are noted in HPI. Objective:  Vitals:    Vitals:    07/21/19 0201 07/21/19 0328 07/21/19 0333 07/21/19 0757   BP: 139/55 139/62  161/69   Pulse:  98  89   Resp:  16  16   Temp:  97.9 °F (36.6 °C)  97.6 °F (36.4 °C)   SpO2:  97%  96%   Weight:   65.6 kg (144 lb 10 oz)    Height:         Intake and Output:  07/21 0701 - 07/21 1900  In: 832.9 [P.O.:240; I.V.:592.9]  Out: 300 [Urine:300]  07/19 1901 - 07/21 0700  In: 1385.4 [P.O.:860; I.V.:525.4]  Out: 2750 [Urine:2750]    Physical Examination:  General: NAD,Conversant   Neck:  Supple, no mass  Resp:  Lungs CTA B/L, no wheezing , normal respiratory effort  CV:  RRR,  no murmur or rub, no LE edema  GI:  Soft, NT, + Bowel sounds, no hepatosplenomegaly  Neurologic:  Non focal  Psych:             AAO x 3 appropriate affect   Skin:  No Rash  :  No beckham    []    High complexity decision making was performed  []    Patient is at high-risk of decompensation with multiple organ involvement    Lab Data Personally Reviewed: I have reviewed all the pertinent labs, microbiology data and radiology studies during assessment.     Recent Labs     07/21/19  0701 07/21/19  0412 07/20/19  0356 07/18/19  1658   NA  --  141 140 139   K 3.4* HEMOLYZED,RECOLLECT REQUESTED 3.5 5.0   CL  --  114* 108 104   CO2  --  18* 24 23   GLU  --  105* 105* 119*   BUN  --  15 16 24*   CREA --  0.86 0.96 1.17*   CA  --  8.5 9.2 9.0   ALB  --  3.4* 4.0 3.9   SGOT  --  HEMOLYZED,RECOLLECT REQUESTED 21 28   ALT  --  28 25 27     Recent Labs     07/18/19  1658   WBC 4.0   HGB 10.8*   HCT 30.7*        No results found for: SDES  No results found for: CULT  Recent Results (from the past 24 hour(s))   METABOLIC PANEL, COMPREHENSIVE    Collection Time: 07/21/19  4:12 AM   Result Value Ref Range    Sodium 141 136 - 145 mmol/L    Potassium HEMOLYZED,RECOLLECT REQUESTED 3.5 - 5.1 mmol/L    Chloride 114 (H) 97 - 108 mmol/L    CO2 18 (L) 21 - 32 mmol/L    Anion gap 9 5 - 15 mmol/L    Glucose 105 (H) 65 - 100 mg/dL    BUN 15 6 - 20 MG/DL    Creatinine 0.86 0.55 - 1.02 MG/DL    BUN/Creatinine ratio 17 12 - 20      GFR est AA >60 >60 ml/min/1.73m2    GFR est non-AA >60 >60 ml/min/1.73m2    Calcium 8.5 8.5 - 10.1 MG/DL    Bilirubin, total 0.5 0.2 - 1.0 MG/DL    ALT (SGPT) 28 12 - 78 U/L    AST (SGOT) HEMOLYZED,RECOLLECT REQUESTED 15 - 37 U/L    Alk. phosphatase 46 45 - 117 U/L    Protein, total 6.3 (L) 6.4 - 8.2 g/dL    Albumin 3.4 (L) 3.5 - 5.0 g/dL    Globulin 2.9 2.0 - 4.0 g/dL    A-G Ratio 1.2 1.1 - 2.2     POTASSIUM    Collection Time: 07/21/19  7:01 AM   Result Value Ref Range    Potassium 3.4 (L) 3.5 - 5.1 mmol/L                   Cinthya Choi MD  [address]  Phone - [phone]   Fax - [phone]  [email or web address]. [email or web address]

## 2019-07-21 NOTE — PROGRESS NOTES
1930 Bedside and Verbal shift change report given to Vlad Johnson (oncoming nurse) by Jarrod Trujillo RN (offgoing nurse). Report included the following information SBAR, Kardex, MAR, Cardiac Rhythm NSR and Alarm Parameters . 0011 pt's BP was assessed 174/73, per order pt's Cardene drip increased by 2.5. Will continue to monitor patient. 0730 Bedside and Verbal shift change report given to Jarrod Trujillo RN (oncoming nurse) by Jarvis Emery RN (offgoing nurse). Report included the following information SBAR, Kardex, MAR, Cardiac Rhythm NSR and Alarm Parameters .

## 2019-07-21 NOTE — CONSULTS
[facility] [address]: Darrel Shown  [phone]          Cardiology Consult/Progress Note      Requesting/referring provider: Dr. Eitan Farmer  Reason for Consult: Uncontrolled hypertension    HPI: Anastasia Lenz, a 69y.o. year-old who presents for evaluation of uncontrolled hypertension. Mrs. Kaley Wills has history of extremely difficult to control hypertension and was admitted to the hospital with malignant hypertension. This was associated with evidence of mild endorgan damage in the form of creatinine increased, chest tightness. For the past few months she has had more difficulty controlling high blood pressure. She has been compliant with multiple medication that she is been prescribed. Additionally she reports episodes when she would acutely become short of breath in association with elevated blood pressures. These episodes appear to be consistent with flash pulmonary edema. During current hospitalization she required IV calcium channel blockers to control her blood pressure in addition to the oral antihypertensive that she has been on. Further evaluation due to concern regarding renal artery stenosis in the form of a CT angiogram demonstrated renal artery stenosis. Investigations  ECG: Sinus bradycardia, nonspecific ST-T changes. Echocardiogram: Normal LV systolic function mild to moderate LV hypertrophy consistent with hypertensive heart disease. CT abdomen with angiogram and runoff: 2 renal arteries are present bilaterally. Right major renal artery has at least moderate to severe ostial stenosis. Accessory right renal artery is free of significant disease. Left renal arteries are equal in size. CT scan has been reported to demonstrate disease in superior renal artery although I cannot personally confirm it. Both arteries on the left appear to be too small to provide durable results with renal artery stenting. Assessment/Plan:  1.   Hypotension likely secondary to renal artery stenosis with current presentation with malignant hypertension  2. Atherosclerotic vascular disease  3. Episode suggestive of flash pulmonary edema related to uncontrolled hypertension  4. Renal artery stenosis bilateral.  5.  Remote history of tobacco abuse    Mrs. Alyssa Joyce was seen in consultation regarding uncontrolled hypertension with underlying renal artery stenosis. Moreover clinically she reports symptomatic shortness of breath which is episodic and sudden suggestive of recurrent flash pulmonary edema. These appear to be driven by renal artery stenosis which appears to be bilateral although she has excess renal arteries on both sides and significant disease only impacts one branch on either side. She is likely to benefit from renal artery stenting given her symptoms of recurrent pulmonary edema and manifestation of refractory hypertension. After evaluation significant I suggest proceeding further with further evaluation of renal arteries with invasive angiography and if deemed necessary she may benefit from revascularization. Since she got contrast load today with at least 100 cc of iodinated contrast I recommend at least waiting 96 hours to prevent contrast nephropathy. Hence we will proceed with above testing towards early to mid next week. Past medical history:  She  has a past medical history of Hypertension. She also has no past medical history of Adverse effect of anesthesia. Patient reports no dyspnea/PND/Orthpnea/CP. He reports no cough/fever/focal neurological deficits/abdominal pain. All other systems negative except as above. PE  Vitals:    07/21/19 0757 07/21/19 1136 07/21/19 1206 07/21/19 1515   BP: 161/69 174/70 155/70 154/59   Pulse: 89 88 77 90   Resp: 16 18  20   Temp: 97.6 °F (36.4 °C)  98.2 °F (36.8 °C) 98.2 °F (36.8 °C)   SpO2: 96%   98%   Weight:       Height:        Body mass index is 25.62 kg/m². General:    Alert, cooperative, no distress. Psychiatric:    Normal Mood and affect    Eye/ENT:      Pupils equal, No asymmetry, Conjunctival pink. Able to hear voice at normal amplitude   Lungs:      Visibly symmetric chest expansion, No palpable tenderness. Clear to auscultation bilaterally. Heart[de-identified]    Regular rate and rhythm, S1, S2 normal, no murmur, click, rub or gallop. No JVD, Normal palpable peripheral pulses. No cyanosis   Abdomen:     Soft, non-tender. Bowel sounds normal. No masses,  No      organomegaly. No renal bruit   Extremities:   Extremities normal, atraumatic, no edema. Neurologic:   CN II-XII grossly intact.  No gross focal deficits           Recent Labs:  No results found for: CHOL, CHOLX, CHLST, CHOLV, 281827, HDL, LDL, LDLC, DLDLP, TGLX, TRIGL, TRIGP, CHHD, [facility]  Lab Results   Component Value Date/Time    Creatinine (POC) 0.9 10/20/2016 10:16 AM    Creatinine 0.86 07/21/2019 04:12 AM     Lab Results   Component Value Date/Time    BUN 15 07/21/2019 04:12 AM     Lab Results   Component Value Date/Time    Potassium 3.4 (L) 07/21/2019 07:01 AM     No results found for: HBA1C, HGBE8, TUH4UOSY  Lab Results   Component Value Date/Time    HGB 10.8 (L) 07/18/2019 04:58 PM     Lab Results   Component Value Date/Time    PLATELET 646 94/73/3248 04:58 PM       Reviewed:  Past Medical History:   Diagnosis Date    Hypertension      Social History     Tobacco Use   Smoking Status Former Smoker    Packs/day: 0.50   Smokeless Tobacco Never Used     Social History     Substance and Sexual Activity   Alcohol Use Yes    Alcohol/week: 6.0 standard drinks    Types: 6 Glasses of wine per week     Allergies   Allergen Reactions    Amlodipine Other (comments)     intolerant    Clonidine Other (comments)     fatigue     Family History   Problem Relation Age of Onset    Heart Attack Father 47        Current Facility-Administered Medications   Medication Dose Route Frequency    traMADol (ULTRAM) tablet 50 mg  50 mg Oral Q6H PRN    psyllium husk-aspartame (METAMUCIL FIBER) packet 1 Packet  1 Packet Oral BID    losartan (COZAAR) tablet 100 mg  100 mg Oral DAILY    ALPRAZolam (XANAX) tablet 0.25 mg  0.25 mg Oral QID PRN    niCARdipine (CARDENE) 25 mg in 0.9% sodium chloride 250 mL infusion  0-15 mg/hr IntraVENous TITRATE    zolpidem (AMBIEN) tablet 10 mg  10 mg Oral QHS PRN    sodium chloride (NS) flush 5-40 mL  5-40 mL IntraVENous Q8H    sodium chloride (NS) flush 5-40 mL  5-40 mL IntraVENous PRN    acetaminophen (TYLENOL) tablet 650 mg  650 mg Oral Q4H PRN    ondansetron (ZOFRAN) injection 4 mg  4 mg IntraVENous Q4H PRN    enoxaparin (LOVENOX) injection 40 mg  40 mg SubCUTAneous Q24H    labetalol (NORMODYNE;TRANDATE) 20 mg/4 mL (5 mg/mL) injection 10 mg  10 mg IntraVENous Q6H PRN    simvastatin (ZOCOR) tablet 10 mg  10 mg Oral QHS       Addy Corbin MD07/21/19 There are other unrelated non-urgent complaints, but due to the busy schedule and the amount of time I've already spent with her, time does not permit me to address these routine issues at today's visit. I've requested another appointment to review these additional issues.     [employer] [facility] [place]  [address], [address], [address] [place] [address]

## 2019-07-21 NOTE — PROGRESS NOTES
Bedside and Verbal shift change report given to Bianca Veronica (oncoming nurse) by Derian Rice (offgoing nurse). Report included the following information SBAR, Kardex, Procedure Summary, Intake/Output, MAR, Accordion, Recent Results, Med Rec Status and Cardiac Rhythm NSR . Problem: Falls - Risk of  Goal: *Absence of Falls  Description  Document Delilah Payment Fall Risk and appropriate interventions in the flowsheet. Outcome: Progressing Towards Goal  Note:   Fall Risk Interventions:       Medication Interventions: Evaluate medications/consider consulting pharmacy, Patient to call before getting OOB, Teach patient to arise slowly     Problem: Hypertension  Goal: *Blood pressure within specified parameters  Outcome: Progressing Towards Goal       1138 Patient called out, not feeling well with feeling anxious, medicated with Tylenol then Xanax. On follow up patient still not feeling well and pressure is 794 systolic. Increased to Cardene 7.5, called Dr. Andree Rebolledo and he will all Ultram and keep on Cardene 7.5m    1857 Order for Metamucil at patient request given approved by Dr. Andree Rebolledo. 1912 Order placed to adjust Cardene to concentrated to decrease the amount of medication refills. 1930 Bedside and Verbal shift change report given to Hawa(oncoming nurse) by Bianca Martin (offgoing nurse). Report included the following information SBAR, Kardex, Procedure Summary, Intake/Output, MAR, Accordion, Recent Results, Med Rec Status and Cardiac Rhythm NSR .

## 2019-07-22 ENCOUNTER — APPOINTMENT (OUTPATIENT)
Dept: CT IMAGING | Age: 74
DRG: 253 | End: 2019-07-22
Attending: NURSE PRACTITIONER
Payer: MEDICARE

## 2019-07-22 LAB
ALBUMIN SERPL-MCNC: 4 G/DL (ref 3.5–5)
ALBUMIN/GLOB SERPL: 1.3 {RATIO} (ref 1.1–2.2)
ALP SERPL-CCNC: 59 U/L (ref 45–117)
ALT SERPL-CCNC: 44 U/L (ref 12–78)
ANION GAP SERPL CALC-SCNC: 6 MMOL/L (ref 5–15)
AST SERPL-CCNC: 39 U/L (ref 15–37)
BILIRUB SERPL-MCNC: 0.5 MG/DL (ref 0.2–1)
BUN SERPL-MCNC: 17 MG/DL (ref 6–20)
BUN/CREAT SERPL: 15 (ref 12–20)
CALCIUM SERPL-MCNC: 9.2 MG/DL (ref 8.5–10.1)
CHLORIDE SERPL-SCNC: 112 MMOL/L (ref 97–108)
CO2 SERPL-SCNC: 22 MMOL/L (ref 21–32)
CREAT SERPL-MCNC: 1.11 MG/DL (ref 0.55–1.02)
GLOBULIN SER CALC-MCNC: 3.2 G/DL (ref 2–4)
GLUCOSE SERPL-MCNC: 121 MG/DL (ref 65–100)
POTASSIUM SERPL-SCNC: 3.7 MMOL/L (ref 3.5–5.1)
PROT SERPL-MCNC: 7.2 G/DL (ref 6.4–8.2)
SODIUM SERPL-SCNC: 140 MMOL/L (ref 136–145)

## 2019-07-22 PROCEDURE — 74011250637 HC RX REV CODE- 250/637: Performed by: HOSPITALIST

## 2019-07-22 PROCEDURE — 74011250636 HC RX REV CODE- 250/636: Performed by: HOSPITALIST

## 2019-07-22 PROCEDURE — 74011250636 HC RX REV CODE- 250/636: Performed by: INTERNAL MEDICINE

## 2019-07-22 PROCEDURE — 74011250637 HC RX REV CODE- 250/637: Performed by: NURSE PRACTITIONER

## 2019-07-22 PROCEDURE — 65660000001 HC RM ICU INTERMED STEPDOWN

## 2019-07-22 PROCEDURE — 74011250637 HC RX REV CODE- 250/637: Performed by: INTERNAL MEDICINE

## 2019-07-22 PROCEDURE — 74011000250 HC RX REV CODE- 250: Performed by: INTERNAL MEDICINE

## 2019-07-22 PROCEDURE — 80053 COMPREHEN METABOLIC PANEL: CPT

## 2019-07-22 PROCEDURE — 70450 CT HEAD/BRAIN W/O DYE: CPT

## 2019-07-22 PROCEDURE — 36415 COLL VENOUS BLD VENIPUNCTURE: CPT

## 2019-07-22 PROCEDURE — 74011000258 HC RX REV CODE- 258: Performed by: INTERNAL MEDICINE

## 2019-07-22 RX ORDER — CARVEDILOL 3.12 MG/1
3.12 TABLET ORAL 2 TIMES DAILY WITH MEALS
Status: DISCONTINUED | OUTPATIENT
Start: 2019-07-22 | End: 2019-07-24 | Stop reason: SDUPTHER

## 2019-07-22 RX ORDER — PRAZOSIN HYDROCHLORIDE 1 MG/1
2 CAPSULE ORAL 3 TIMES DAILY
Status: DISCONTINUED | OUTPATIENT
Start: 2019-07-22 | End: 2019-07-25

## 2019-07-22 RX ADMIN — ACETAMINOPHEN 650 MG: 325 TABLET ORAL at 04:27

## 2019-07-22 RX ADMIN — PRAZOSIN HYDROCHLORIDE 2 MG: 1 CAPSULE ORAL at 21:40

## 2019-07-22 RX ADMIN — PRAZOSIN HYDROCHLORIDE 2 MG: 1 CAPSULE ORAL at 17:04

## 2019-07-22 RX ADMIN — LOSARTAN POTASSIUM 100 MG: 50 TABLET ORAL at 08:58

## 2019-07-22 RX ADMIN — Medication 10 ML: at 21:43

## 2019-07-22 RX ADMIN — ZOLPIDEM TARTRATE 10 MG: 5 TABLET ORAL at 21:40

## 2019-07-22 RX ADMIN — ONDANSETRON 4 MG: 2 INJECTION INTRAMUSCULAR; INTRAVENOUS at 03:40

## 2019-07-22 RX ADMIN — TRAMADOL HYDROCHLORIDE 50 MG: 50 TABLET, FILM COATED ORAL at 18:00

## 2019-07-22 RX ADMIN — SODIUM CHLORIDE 5 MG/HR: 900 INJECTION, SOLUTION INTRAVENOUS at 17:55

## 2019-07-22 RX ADMIN — SODIUM CHLORIDE 15 MG/HR: 900 INJECTION, SOLUTION INTRAVENOUS at 06:20

## 2019-07-22 RX ADMIN — SODIUM CHLORIDE 5 MG/HR: 900 INJECTION, SOLUTION INTRAVENOUS at 12:44

## 2019-07-22 RX ADMIN — TRAMADOL HYDROCHLORIDE 50 MG: 50 TABLET, FILM COATED ORAL at 12:46

## 2019-07-22 RX ADMIN — PSYLLIUM HUSK 1 PACKET: 3.4 POWDER ORAL at 08:58

## 2019-07-22 RX ADMIN — PSYLLIUM HUSK 1 PACKET: 3.4 POWDER ORAL at 17:05

## 2019-07-22 RX ADMIN — CARVEDILOL 3.12 MG: 3.12 TABLET, FILM COATED ORAL at 17:04

## 2019-07-22 RX ADMIN — Medication 10 ML: at 03:40

## 2019-07-22 RX ADMIN — ENOXAPARIN SODIUM 40 MG: 40 INJECTION SUBCUTANEOUS at 23:00

## 2019-07-22 RX ADMIN — SIMVASTATIN 10 MG: 10 TABLET, FILM COATED ORAL at 21:40

## 2019-07-22 NOTE — PROGRESS NOTES
0730: Bedside shift change report given to Jones lAbarado (oncoming nurse) by Faina Thomas RN (offgoing nurse). Report included the following information SBAR and Kardex. 1900: patients blood pressure doing better. Trying to wean off cardene. Will continue to monitor. 1930: patients blood pressure in the 120's. Stopping Cardene drip. Continue to monitor. 1930: Bedside shift change report given to [address] (oncoming nurse) by Jose Martin Tony RN (offgoing nurse). Report included the following information SBAR and Kardex.

## 2019-07-22 NOTE — PROGRESS NOTES
Hospitalist Progress Note  Deep Dowling MD  Answering service: [phone] from in house phone  Cell: [phone]      Date of Service:  2019  NAME:  Daren Sharif  :  1945  MRN:  [id number]      Admission Summary:     A 69 y.o lady with HTN who presented to the short [facility] ED due to severely elevated blood pressure. She states that for the past 4 days her BP has been in the 230s/100s. She began experiencing headaches and chest discomfort today. She normally takes metoprolol and losartan at night and last took them on . She was also prescribed amlodipine 4 days ago and has been taking it every morning, and her clonidine was discontinued at the same time, but she took it this morning due to her persistently elevated BP. While in the ER, she was given 20 mg IV hydralazine and felt light-headed, her SBP was noted to be in the 90's. She currently feels better but is very fatigued. Her headache persists but is decreased in severity. No nausea or vomiting.        Interval history / Subjective:     headache 4/10, no chest pain or shortness of breath     Assessment & Plan:     Hypertensive urgency likely related to bilateral MARY  -BP was as high as 250/110 on  and started on cardene gtt, on losartan, BP improved and at goal, continue monitoring BP  -norvasc and clonidine listed as allergy  -24 hrs urine VMA, metanephrines plasma, catocholamines urine result pending  -CTA abdomen pelvis  there are 2 renal arteries supplying both kidneys. Moderate to severe stenosis of the main right renal artery and the accessory lower pole left renal Artery, accessory upper pole right renal artery and left upper pole renal artery are patent without significant stenosis.   -Cardiologist consulted and plan for renal angiogram and potential stenting on   -nephrology on board    Hx of hypercholesterolemia   -continue zocor    T11 age indeterminate compression deformity on CT    -had  hx of back surgery and fall   -stable    Headache multifactorial including high blood pressure  -CT head negative    Code status: Full Code  DVT prophylaxis: lovenox    Care Plan discussed with: Patient/Family and Nurse  Disposition: TBD     Hospital Problems  Date Reviewed: 12/11/2018          Codes Class Noted POA    Hypertensive urgency ICD-10-CM: I16.0  ICD-9-CM: 401.9  7/19/2019 Unknown        * (Principal) Chest pain ICD-10-CM: R07.9  ICD-9-CM: 786.50  7/18/2019 Unknown                Vital Signs:    Last 24hrs VS reviewed since prior progress note. Most recent are:  Visit Vitals  /49 (BP 1 Location: Left arm, BP Patient Position: Sitting)   Pulse 85   Temp 97.6 °F (36.4 °C)   Resp 18   Ht 5' 3\" (1.6 m)   Wt 66.2 kg (145 lb 15.1 oz)   SpO2 97%   Breastfeeding? No   BMI 25.85 kg/m²         Intake/Output Summary (Last 24 hours) at 7/22/2019 0944  Last data filed at 7/21/2019 2017  Gross per 24 hour   Intake 1613.75 ml   Output --   Net 1613.75 ml        Physical Examination:             Constitutional:  No acute distress, cooperative, pleasant    ENT:  Oral mucous moist, oropharynx benign. Neck supple,    Resp:  CTA bilaterally. No wheezing/rhonchi/rales. No accessory muscle use   CV:  Regular rhythm, normal rate, no murmurs, gallops, rubs    GI:  Soft, non distended, non tender. normoactive bowel sounds, no hepatosplenomegaly     Musculoskeletal:  No edema    Neurologic:  Moves all extremities. AAOx3, CN II-XII reviewed     Skin:  Good turgor, no rashes or ulcers       Data Review:    Review and/or order of clinical lab test  Review and/or order of tests in the radiology section of CPT  Review and/or order of tests in the medicine section of CPT      Labs:     No results for input(s): WBC, HGB, HCT, PLT, HGBEXT, HCTEXT, PLTEXT, HGBEXT, HCTEXT, PLTEXT in the last 72 hours.   Recent Labs     07/22/19  0425 07/21/19  0701 07/21/19  0412 07/20/19  0356   NA 140  --  141 140   K 3.7 3.4* HEMOLYZED,RECOLLECT REQUESTED 3.5   *  --  114* 108   CO2 22  --  18* 24   BUN 17  --  15 16   CREA 1.11*  --  0.86 0.96   *  --  105* 105*   CA 9.2  --  8.5 9.2     Recent Labs     07/22/19  0425 07/21/19  0412 07/20/19  0356   SGOT 39* HEMOLYZED,RECOLLECT REQUESTED 21   ALT 44 28 25   AP 59 46 57   TBILI 0.5 0.5 0.6   TP 7.2 6.3* 7.0   ALB 4.0 3.4* 4.0   GLOB 3.2 2.9 3.0     No results for input(s): INR, PTP, APTT in the last 72 hours. No lab exists for component: INREXT, INREXT   No results for input(s): FE, TIBC, PSAT, FERR in the last 72 hours. No results found for: FOL, RBCF   No results for input(s): PH, PCO2, PO2 in the last 72 hours. No results for input(s): CPK, CKNDX, TROIQ in the last 72 hours.     No lab exists for component: CPKMB  No results found for: CHOL, CHOLX, CHLST, CHOLV, HDL, LDL, LDLC, DLDLP, TGLX, TRIGL, TRIGP, CHHD, CHHDX  Lab Results   Component Value Date/Time    Glucose (POC) 106 (H) 12/27/2016 12:24 PM     Lab Results   Component Value Date/Time    Color YELLOW/STRAW 06/24/2019 02:39 PM    Appearance CLEAR 06/24/2019 02:39 PM    Specific gravity 1.010 06/24/2019 02:39 PM    pH (UA) 6.0 06/24/2019 02:39 PM    Protein NEGATIVE  06/24/2019 02:39 PM    Glucose NEGATIVE  06/24/2019 02:39 PM    Ketone NEGATIVE  06/24/2019 02:39 PM    Bilirubin NEGATIVE  06/24/2019 02:39 PM    Urobilinogen 0.2 06/24/2019 02:39 PM    Nitrites NEGATIVE  06/24/2019 02:39 PM    Leukocyte Esterase NEGATIVE  06/24/2019 02:39 PM    Epithelial cells FEW 06/24/2019 02:39 PM    Bacteria NEGATIVE  06/24/2019 02:39 PM    WBC 0-4 06/24/2019 02:39 PM    RBC 0-5 06/24/2019 02:39 PM         Medications Reviewed:     Current Facility-Administered Medications   Medication Dose Route Frequency    traMADol (ULTRAM) tablet 50 mg  50 mg Oral Q6H PRN    psyllium husk-aspartame (METAMUCIL FIBER) packet 1 Packet  1 Packet Oral BID    niCARdipine (CARDENE) 50 mg in 0.9% sodium chloride 100 mL infusion  0-15 mg/hr IntraVENous TITRATE    losartan (COZAAR) tablet 100 mg  100 mg Oral DAILY    ALPRAZolam (XANAX) tablet 0.25 mg  0.25 mg Oral QID PRN    zolpidem (AMBIEN) tablet 10 mg  10 mg Oral QHS PRN    sodium chloride (NS) flush 5-40 mL  5-40 mL IntraVENous Q8H    sodium chloride (NS) flush 5-40 mL  5-40 mL IntraVENous PRN    acetaminophen (TYLENOL) tablet 650 mg  650 mg Oral Q4H PRN    ondansetron (ZOFRAN) injection 4 mg  4 mg IntraVENous Q4H PRN    enoxaparin (LOVENOX) injection 40 mg  40 mg SubCUTAneous Q24H    labetalol (NORMODYNE;TRANDATE) 20 mg/4 mL (5 mg/mL) injection 10 mg  10 mg IntraVENous Q6H PRN    simvastatin (ZOCOR) tablet 10 mg  10 mg Oral QHS     ______________________________________________________________________  EXPECTED LENGTH OF STAY: 2d 4h  ACTUAL LENGTH OF STAY:          3                 Nicol Grijalva MD

## 2019-07-22 NOTE — PROGRESS NOTES
Transition of Care Plan     Renal angioplasty and possible stenting 7/24/19  scheduled     Patient inpatient 7/18/19     Lives alone   Cm will follow for discharge needs.

## 2019-07-22 NOTE — CONSULTS
[address]: Katlyn Welch  [phone]          Cardiology Consult/Progress Note      Subjective: Anxious but no symptoms. Creatinine slight bump    Plan update: Hopefully MARY of right main renal artery on Wednesday. Requesting/referring provider: Dr. Abdi Arzola  Reason for Consult: Uncontrolled hypertension    HPI: Taylor Gold, a 69y.o. year-old who presents for evaluation of uncontrolled hypertension. Mrs. Spring Clement has history of extremely difficult to control hypertension and was admitted to the hospital with malignant hypertension. This was associated with evidence of mild endorgan damage in the form of creatinine increased, chest tightness. For the past few months she has had more difficulty controlling high blood pressure. She has been compliant with multiple medication that she is been prescribed. Additionally she reports episodes when she would acutely become short of breath in association with elevated blood pressures. These episodes appear to be consistent with flash pulmonary edema. During current hospitalization she required IV calcium channel blockers to control her blood pressure in addition to the oral antihypertensive that she has been on. Further evaluation due to concern regarding renal artery stenosis in the form of a CT angiogram demonstrated renal artery stenosis. Investigations  ECG: Sinus bradycardia, nonspecific ST-T changes. Echocardiogram: Normal LV systolic function mild to moderate LV hypertrophy consistent with hypertensive heart disease. CT abdomen with angiogram and runoff: 2 renal arteries are present bilaterally. Right major renal artery has at least moderate to severe ostial stenosis. Accessory right renal artery is free of significant disease. Left renal arteries are equal in size. CT scan has been reported to demonstrate disease in superior renal artery although I cannot personally confirm it.   Both arteries on the left appear to be too small to provide durable results with renal artery stenting. Assessment/Plan:  1. Hypotension likely secondary to renal artery stenosis with current presentation with malignant hypertension  2. Atherosclerotic vascular disease  3. Episode suggestive of flash pulmonary edema related to uncontrolled hypertension  4. Renal artery stenosis bilateral.  5.  Remote history of tobacco abuse    Mrs. Spring Clement was seen in consultation regarding uncontrolled hypertension with underlying renal artery stenosis. Moreover clinically she reports symptomatic shortness of breath which is episodic and sudden suggestive of recurrent flash pulmonary edema. These appear to be driven by renal artery stenosis which appears to be bilateral although she has excess renal arteries on both sides and significant disease only impacts one branch on either side. She is likely to benefit from renal artery stenting given her symptoms of recurrent pulmonary edema and manifestation of refractory hypertension. After evaluation significant I suggest proceeding further with further evaluation of renal arteries with invasive angiography and if deemed necessary she may benefit from revascularization. Since she got contrast load today with at least 100 cc of iodinated contrast I recommend at least waiting 96 hours to prevent contrast nephropathy. Hence we will proceed with above testing towards early to mid next week. Past medical history:  She  has a past medical history of Hypertension. She also has no past medical history of Adverse effect of anesthesia. Patient reports no dyspnea/PND/Orthpnea/CP. He reports no cough/fever/focal neurological deficits/abdominal pain. All other systems negative except as above.      PE  Vitals:    07/22/19 0345 07/22/19 0547 07/22/19 0818 07/22/19 1133   BP: 145/73  132/49 142/62   Pulse: (!) 112  85 86   Resp: 20  18 18   Temp: 97.7 °F (36.5 °C)  97.6 °F (36.4 °C) 97.6 °F (36.4 °C)   SpO2: 97%  97% 100%   Weight:  145 lb 15.1 oz (66.2 kg)     Height:        Body mass index is 25.85 kg/m². General:    Alert, cooperative, no distress. Psychiatric:    Normal Mood and affect    Eye/ENT:      Pupils equal, No asymmetry, Conjunctival pink. Able to hear voice at normal amplitude   Lungs:      Visibly symmetric chest expansion, No palpable tenderness. Clear to auscultation bilaterally. Heart[de-identified]    Regular rate and rhythm, S1, S2 normal, no murmur, click, rub or gallop. No JVD, Normal palpable peripheral pulses. No cyanosis   Abdomen:     Soft, non-tender. Bowel sounds normal. No masses,  No      organomegaly. No renal bruit   Extremities:   Extremities normal, atraumatic, no edema. Neurologic:   CN II-XII grossly intact.  No gross focal deficits           Recent Labs:  No results found for: CHOL, CHOLX, CHLST, CHOLV, 745699, HDL, LDL, LDLC, DLDLP, TGLX, TRIGL, TRIGP, CHHD, CHHDX  Lab Results   Component Value Date/Time    Creatinine (POC) 0.9 10/20/2016 10:16 AM    Creatinine 1.11 (H) 07/22/2019 04:25 AM     Lab Results   Component Value Date/Time    BUN 17 07/22/2019 04:25 AM     Lab Results   Component Value Date/Time    Potassium 3.7 07/22/2019 04:25 AM     No results found for: HBA1C, HGBE8, ZDB8HIXK, ZIC0PZUZ  Lab Results   Component Value Date/Time    HGB 10.8 (L) 07/18/2019 04:58 PM     Lab Results   Component Value Date/Time    PLATELET 332 89/08/2960 04:58 PM       Reviewed:  Past Medical History:   Diagnosis Date    Hypertension      Social History     Tobacco Use   Smoking Status Former Smoker    Packs/day: 0.50   Smokeless Tobacco Never Used     Social History     Substance and Sexual Activity   Alcohol Use Yes    Alcohol/week: 6.0 standard drinks    Types: 6 Glasses of wine per week     Allergies   Allergen Reactions    Amlodipine Other (comments)     intolerant    Clonidine Other (comments)     fatigue     Family History   Problem Relation Age of Onset    Heart Attack Father 47        Current Facility-Administered Medications   Medication Dose Route Frequency    niCARdipine (CARDENE) 25 mg in 0.9% sodium chloride 250 mL infusion  0-15 mg/hr IntraVENous TITRATE    prazosin (MINIPRESS) capsule 2 mg  2 mg Oral TID    carvedilol (COREG) tablet 3.125 mg  3.125 mg Oral BID WITH MEALS    traMADol (ULTRAM) tablet 50 mg  50 mg Oral Q6H PRN    psyllium husk-aspartame (METAMUCIL FIBER) packet 1 Packet  1 Packet Oral BID    losartan (COZAAR) tablet 100 mg  100 mg Oral DAILY    ALPRAZolam (XANAX) tablet 0.25 mg  0.25 mg Oral QID PRN    zolpidem (AMBIEN) tablet 10 mg  10 mg Oral QHS PRN    sodium chloride (NS) flush 5-40 mL  5-40 mL IntraVENous Q8H    sodium chloride (NS) flush 5-40 mL  5-40 mL IntraVENous PRN    acetaminophen (TYLENOL) tablet 650 mg  650 mg Oral Q4H PRN    ondansetron (ZOFRAN) injection 4 mg  4 mg IntraVENous Q4H PRN    enoxaparin (LOVENOX) injection 40 mg  40 mg SubCUTAneous Q24H    labetalol (NORMODYNE;TRANDATE) 20 mg/4 mL (5 mg/mL) injection 10 mg  10 mg IntraVENous Q6H PRN    simvastatin (ZOCOR) tablet 10 mg  10 mg Oral QHS       Addy Corbin MD07/22/19 There are other unrelated non-urgent complaints, but due to the busy schedule and the amount of time I've already spent with her, time does not permit me to address these routine issues at today's visit. I've requested another appointment to review these additional issues.     [facility] [place]  [address], [address] [facility], [address] [place] [address]

## 2019-07-22 NOTE — PROGRESS NOTES
07/22/19 0345   Vital Signs   Temp 97.7 °F (36.5 °C)   Temp Source Oral   Pulse (Heart Rate) (!) 112   Heart Rate Source Monitor   Cardiac Rhythm Sinus Tach   Resp Rate 20   O2 Sat (%) 97 %   Level of Consciousness Alert   /73   MAP (Calculated) 97   BP 1 Method Automatic   BP 1 Location Left arm   BP Patient Position At rest   MEWS Score 3   Alarms Set and Audible Cardiac alarms   Box Number 6   Electrodes Replaced No   Pain 1   Pain Scale 1 Numeric (0 - 10)   Pain Intensity 1 7   Patient Stated Pain Goal 0   Pain Onset 1 since admission   Pain Location 1 Head   Pain Description 1 Aching   Pain Intervention(s) 1 Other (comment)   Oxygen Therapy   O2 Device Room air   Patient Observation   Special Appliances/Equipment Bed (comment)   Repositioned Head of bed elevated (degrees)   Patient Turned Turns self   Observations assessments and rounds   Ambulate No (Comment)   Activity In bed;Resting quietly   Mode of Transportation Wheelchair; Ambulatory     MEWS of 3 due to elevated HR. Pt vomiting a few minutes before VS obtained. HR down to 80 at 0504.

## 2019-07-22 NOTE — PROGRESS NOTES
Eladio NP Progress note    Name: Davy Gibson  YOB: 1945  MRN: [id number]  Admission Date: 7/18/2019  4:38 PM    Date of service: 7/22/2019 3:57 AM    Subjective:   Pt w/ continued HA and now N/V. Objective:   RN reports pt on cardene gtt and has had a HA for a few days but hasn't wanted to take any medication for it. Pt had episode of nausea and vomiting just now. RN reports slight difference in pupil size (one 4mm, other 5mm) and she is unsure if this is new. Pt states this is not new and that she has had \"eye hemorrhages\" in the past. No other neuro changes. BP moderately controlled on cardene gtt at this time. Pt received IV zofran.      Assessment & Plan:     N/V and possible slight difference in pupil sizes  -Stat head CT to r/o hemorrhage  -Continue PRN zofran  -Encourage pt to take tylenol for HA once nausea has improved       Sharyle Se, NP  [phone] or Tiger[employer]

## 2019-07-22 NOTE — PROGRESS NOTES
1930: Bedside shift change report received by Mart Macias (offgoing nurse). Report included the following information SBAR, Kardex, Procedure Summary, Intake/Output, MAR, Recent Results and Cardiac Rhythm SR .     0350: Called to bedside due to patient vomiting. Pt being assisted to toilet by CNA. Medicated with zofran. When medicating patient, noticed pt's L pupil slightly larger than R (L eye ~5 mm, R eye ~3). Assisted pt back to bed. VS and assessment as documented. Pt A&O x 4; no other neurological deficits noted. Pt c/o headache. Pt had complained about HA previously this shift but has declined pain medication, stating that she does not like to take medication. Pt's headache unchanged from admission. Paged and notified Meenu Fontanez NP. Orders received for head CT. Pt taken down to head CT with monitor and accompanied by two RNs. Pt agreed to taking tylenol upon arrival back to unit. 0800: Bedside shift change report given to nAne (oncoming nurse). Report included the following information SBAR, Kardex, Procedure Summary, Intake/Output, MAR, Recent Results and Cardiac Rhythm SR. Pupils have returned to baseline, 3 mm bilaterally. ----------------  Care Plan 2022  Problem: Falls - Risk of  Goal: *Absence of Falls  Description  Document [facility] Fall Risk and appropriate interventions in the flowsheet. Outcome: Progressing Towards Goal  Note:   Fall Risk Interventions:            Medication Interventions: Evaluate medications/consider consulting pharmacy, Teach patient to arise slowly                   Problem: Hypertension  Goal: *Blood pressure within specified parameters  Outcome: Progressing Towards Goal     Problem: Pressure Injury - Risk of  Goal: *Prevention of pressure injury  Description  Document Dawson Scale and appropriate interventions in the flowsheet.   Outcome: Progressing Towards Goal  Note:   Pressure Injury Interventions:                 Mobility Interventions: Chair cushion, HOB 30 degrees or less    Nutrition Interventions: Document food/fluid/supplement intake, Offer support with meals,snacks and hydration

## 2019-07-22 NOTE — PROGRESS NOTES
Nephrology Progress Note  Ky Shafer  Date of Admission : 7/18/2019    CC:  Follow up for malignant HTN       Assessment and Plan     Malignant renovascular HTN   - proceed w/ renal angioplasty prior to d/c   - continue losartan   - added Low dose coreg and Minipress  - wean cardene gtt     Mild CKD  - baseline Cr 1.1-1.2 mg /dl   - 2.2 Hypertensive nephropathy      Dyslipidemia        Interval History:  Seen and examined. C/o HA. No cp, sob, n/v/d. Current Medications: all current  Medications have been eviewed in EPIC  Review of Systems: Pertinent items are noted in HPI. Objective:  Vitals:    Vitals:    07/22/19 0345 07/22/19 0547 07/22/19 0818 07/22/19 1133   BP: 145/73  132/49 142/62   Pulse: (!) 112  85 86   Resp: 20 18 18   Temp: 97.7 °F (36.5 °C)  97.6 °F (36.4 °C) 97.6 °F (36.4 °C)   SpO2: 97%  97% 100%   Weight:  66.2 kg (145 lb 15.1 oz)     Height:         Intake and Output:  07/22 0701 - 07/22 1900  In: 587.8 [P.O.:240; I.V.:347.8]  Out: -   07/20 1901 - 07/22 0700  In: 2206.7 [P.O.:780; I.V.:1426.7]  Out: 1300 [Urine:1300]    Physical Examination:  General: NAD,Conversant   Neck:  Supple, no mass  Resp:  Lungs CTA B/L, no wheezing , normal respiratory effort  CV:  RRR,  no murmur or rub, no LE edema  GI:  Soft, NT, + Bowel sounds, no hepatosplenomegaly  Neurologic:  Non focal  Psych:             AAO x 3 appropriate affect   Skin:  No Rash  :  No beckham    []    High complexity decision making was performed  []    Patient is at high-risk of decompensation with multiple organ involvement    Lab Data Personally Reviewed: I have reviewed all the pertinent labs, microbiology data and radiology studies during assessment.     Recent Labs     07/22/19  0425 07/21/19  0701 07/21/19  0412 07/20/19  0356     --  141 140   K 3.7 3.4* HEMOLYZED,RECOLLECT REQUESTED 3.5   *  --  114* 108   CO2 22  --  18* 24   *  --  105* 105*   BUN 17  --  15 16   CREA 1.11*  --  0.86 0.96   CA 9.2 --  8.5 9.2   ALB 4.0  --  3.4* 4.0   SGOT 39*  --  HEMOLYZED,RECOLLECT REQUESTED 21   ALT 44  --  28 25     No results for input(s): WBC, HGB, HCT, PLT, HGBEXT, HCTEXT, PLTEXT, HGBEXT, HCTEXT, PLTEXT in the last 72 hours. No results found for: SDES  No results found for: CULT  Recent Results (from the past 24 hour(s))   METABOLIC PANEL, COMPREHENSIVE    Collection Time: 07/22/19  4:25 AM   Result Value Ref Range    Sodium 140 136 - 145 mmol/L    Potassium 3.7 3.5 - 5.1 mmol/L    Chloride 112 (H) 97 - 108 mmol/L    CO2 22 21 - 32 mmol/L    Anion gap 6 5 - 15 mmol/L    Glucose 121 (H) 65 - 100 mg/dL    BUN 17 6 - 20 MG/DL    Creatinine 1.11 (H) 0.55 - 1.02 MG/DL    BUN/Creatinine ratio 15 12 - 20      GFR est AA 59 (L) >60 ml/min/1.73m2    GFR est non-AA 48 (L) >60 ml/min/1.73m2    Calcium 9.2 8.5 - 10.1 MG/DL    Bilirubin, total 0.5 0.2 - 1.0 MG/DL    ALT (SGPT) 44 12 - 78 U/L    AST (SGOT) 39 (H) 15 - 37 U/L    Alk. phosphatase 59 45 - 117 U/L    Protein, total 7.2 6.4 - 8.2 g/dL    Albumin 4.0 3.5 - 5.0 g/dL    Globulin 3.2 2.0 - 4.0 g/dL    A-G Ratio 1.3 1.1 - 2.2                     Luzmaria Lindo MD  [address]  Phone - [phone]   Fax - [phone]  www. [email or web address]

## 2019-07-23 LAB
5OH-INDOLEACETATE 24H UR-MCNC: 1.2 MG/L
5OH-INDOLEACETATE 24H UR-MRATE: 1.8 MG/24 HR (ref 0–14.9)
ALBUMIN SERPL-MCNC: 3.6 G/DL (ref 3.5–5)
ALBUMIN/GLOB SERPL: 1.2 {RATIO} (ref 1.1–2.2)
ALP SERPL-CCNC: 58 U/L (ref 45–117)
ALT SERPL-CCNC: 48 U/L (ref 12–78)
ANION GAP SERPL CALC-SCNC: 8 MMOL/L (ref 5–15)
AST SERPL-CCNC: 40 U/L (ref 15–37)
ATRIAL RATE: 104 BPM
BILIRUB SERPL-MCNC: 0.4 MG/DL (ref 0.2–1)
BUN SERPL-MCNC: 21 MG/DL (ref 6–20)
BUN/CREAT SERPL: 20 (ref 12–20)
CALCIUM SERPL-MCNC: 9 MG/DL (ref 8.5–10.1)
CALCULATED P AXIS, ECG09: 65 DEGREES
CALCULATED R AXIS, ECG10: 58 DEGREES
CALCULATED T AXIS, ECG11: 28 DEGREES
CHLORIDE SERPL-SCNC: 108 MMOL/L (ref 97–108)
CO2 SERPL-SCNC: 21 MMOL/L (ref 21–32)
COLLECT DURATION TIME UR: 24 HR
CORTIS F 24H UR-MRATE: 8 UG/24 HR (ref 6–42)
CORTIS F UR-MCNC: 5 UG/L
CREAT SERPL-MCNC: 1.04 MG/DL (ref 0.55–1.02)
DIAGNOSIS, 93000: NORMAL
ERYTHROCYTE [DISTWIDTH] IN BLOOD BY AUTOMATED COUNT: 12.6 % (ref 11.5–14.5)
GLOBULIN SER CALC-MCNC: 2.9 G/DL (ref 2–4)
GLUCOSE SERPL-MCNC: 112 MG/DL (ref 65–100)
HCT VFR BLD AUTO: 29.3 % (ref 35–47)
HGB BLD-MCNC: 9.4 G/DL (ref 11.5–16)
MCH RBC QN AUTO: 34.7 PG (ref 26–34)
MCHC RBC AUTO-ENTMCNC: 32.1 G/DL (ref 30–36.5)
MCV RBC AUTO: 108.1 FL (ref 80–99)
NRBC # BLD: 0 K/UL (ref 0–0.01)
NRBC BLD-RTO: 0 PER 100 WBC
P-R INTERVAL, ECG05: 140 MS
PLATELET # BLD AUTO: 117 K/UL (ref 150–400)
PMV BLD AUTO: 9.1 FL (ref 8.9–12.9)
POTASSIUM SERPL-SCNC: 3.9 MMOL/L (ref 3.5–5.1)
PROT SERPL-MCNC: 6.5 G/DL (ref 6.4–8.2)
Q-T INTERVAL, ECG07: 362 MS
QRS DURATION, ECG06: 70 MS
QTC CALCULATION (BEZET), ECG08: 476 MS
RBC # BLD AUTO: 2.71 M/UL (ref 3.8–5.2)
SODIUM SERPL-SCNC: 137 MMOL/L (ref 136–145)
SPECIMEN VOL ?TM UR: 1500 ML
TROPONIN I SERPL-MCNC: <0.05 NG/ML
TROPONIN I SERPL-MCNC: <0.05 NG/ML
VENTRICULAR RATE, ECG03: 104 BPM
VMA 24H UR-MRATE: 4.1 MG/24 HR (ref 0–7.5)
VMA UR-MCNC: 2.7 MG/L
WBC # BLD AUTO: 4.3 K/UL (ref 3.6–11)

## 2019-07-23 PROCEDURE — 74011250637 HC RX REV CODE- 250/637: Performed by: HOSPITALIST

## 2019-07-23 PROCEDURE — 93005 ELECTROCARDIOGRAM TRACING: CPT

## 2019-07-23 PROCEDURE — 74011250637 HC RX REV CODE- 250/637: Performed by: NURSE PRACTITIONER

## 2019-07-23 PROCEDURE — 65660000001 HC RM ICU INTERMED STEPDOWN

## 2019-07-23 PROCEDURE — 84484 ASSAY OF TROPONIN QUANT: CPT

## 2019-07-23 PROCEDURE — 74011250637 HC RX REV CODE- 250/637: Performed by: INTERNAL MEDICINE

## 2019-07-23 PROCEDURE — 74011250636 HC RX REV CODE- 250/636: Performed by: HOSPITALIST

## 2019-07-23 PROCEDURE — 74011250637 HC RX REV CODE- 250/637

## 2019-07-23 PROCEDURE — 85027 COMPLETE CBC AUTOMATED: CPT

## 2019-07-23 PROCEDURE — 36415 COLL VENOUS BLD VENIPUNCTURE: CPT

## 2019-07-23 PROCEDURE — 80053 COMPREHEN METABOLIC PANEL: CPT

## 2019-07-23 RX ORDER — NITROGLYCERIN 0.4 MG/1
TABLET SUBLINGUAL
Status: COMPLETED
Start: 2019-07-23 | End: 2019-07-23

## 2019-07-23 RX ORDER — NITROGLYCERIN 0.4 MG/1
0.4 TABLET SUBLINGUAL
Status: ACTIVE | OUTPATIENT
Start: 2019-07-23 | End: 2019-07-23

## 2019-07-23 RX ADMIN — TRAMADOL HYDROCHLORIDE 50 MG: 50 TABLET, FILM COATED ORAL at 18:17

## 2019-07-23 RX ADMIN — LABETALOL 20 MG/4 ML (5 MG/ML) INTRAVENOUS SYRINGE 10 MG: at 18:14

## 2019-07-23 RX ADMIN — Medication 10 ML: at 19:50

## 2019-07-23 RX ADMIN — PRAZOSIN HYDROCHLORIDE 2 MG: 1 CAPSULE ORAL at 23:23

## 2019-07-23 RX ADMIN — LOSARTAN POTASSIUM 100 MG: 50 TABLET ORAL at 09:19

## 2019-07-23 RX ADMIN — CARVEDILOL 3.12 MG: 3.12 TABLET, FILM COATED ORAL at 17:19

## 2019-07-23 RX ADMIN — CARVEDILOL 3.12 MG: 3.12 TABLET, FILM COATED ORAL at 09:19

## 2019-07-23 RX ADMIN — NITROGLYCERIN 0.4 MG: 0.4 TABLET, ORALLY DISINTEGRATING SUBLINGUAL at 10:00

## 2019-07-23 RX ADMIN — PRAZOSIN HYDROCHLORIDE 2 MG: 1 CAPSULE ORAL at 17:19

## 2019-07-23 RX ADMIN — PRAZOSIN HYDROCHLORIDE 2 MG: 1 CAPSULE ORAL at 09:19

## 2019-07-23 RX ADMIN — ZOLPIDEM TARTRATE 10 MG: 5 TABLET ORAL at 19:50

## 2019-07-23 RX ADMIN — SIMVASTATIN 10 MG: 10 TABLET, FILM COATED ORAL at 23:23

## 2019-07-23 RX ADMIN — PSYLLIUM HUSK 1 PACKET: 3.4 POWDER ORAL at 09:19

## 2019-07-23 RX ADMIN — Medication 10 ML: at 03:46

## 2019-07-23 NOTE — PROGRESS NOTES
The CM participated in [address] reports the patient is independent, no mobility concerns at this time. The patient is to receive a renal angioplasty. CM will continue to follow for transitions of care, no Case Management needs identified at this time.  NASIM Petersen

## 2019-07-23 NOTE — PROGRESS NOTES
Spiritual Care Assessment/Progress Note  [facility]      NAME: Hilda Dunn      MRN: [id number]  AGE: 69 y.o. SEX: female  Mu-ism Affiliation: Baptist   Language: English     7/23/2019     Total Time (in minutes): 10     Spiritual Assessment begun in [address] [place] [address] CV SERVICES UNIT through conversation with:         [x]Patient        [] Family    [] Friend(s)        Reason for Consult: Initial/Spiritual assessment, patient floor, Initial visit     Spiritual beliefs: (Please include comment if needed)     [] Identifies with a orlin tradition:         [] Supported by a orlin community:            [] Claims no spiritual orientation:           [] Seeking spiritual identity:                [] Adheres to an individual form of spirituality:           [x] Not able to assess:                           Identified resources for coping:      [] Prayer                               [] Music                  [] Guided Imagery     [] Family/friends                 [] Pet visits     [] Devotional reading                         [x] Unknown     [] Other:                                               Interventions offered during this visit: (See comments for more details)    Patient Interventions: Initial visit, Initial/Spiritual assessment, patient floor           Plan of Care:     [x] Support spiritual and/or cultural needs    [] Support AMD and/or advance care planning process      [] Support grieving process   [] Coordinate Rites and/or Rituals    [] Coordination with community clergy   [] No spiritual needs identified at this time   [] Detailed Plan of Care below (See Comments)  [] Make referral to Music Therapy  [] Make referral to Pet Therapy     [] Make referral to Addiction services  [] Make referral to [facility]  [] Make referral to Spiritual Care Partner  [] No future visits requested        [x] Follow up visits as needed     Comments:  made initial visit to patients room for spiritual assessment.  Patient was sleeping when  arrived. Spiritual Care will follow Viola Ibarra  Pager: [phone] as needed.

## 2019-07-23 NOTE — PROGRESS NOTES
Hospitalist Progress Note  Reinaldo Ramirez MD  Answering service: [phone] from in house phone  Cell: [phone]      Date of Service:  2019  NAME:  Bri June  :  1945  MRN:  [id number]      Admission Summary:     A 69 y.o lady with HTN who presented to the short [facility] ED due to severely elevated blood pressure. She states that for the past 4 days her BP has been in the 230s/100s. She began experiencing headaches and chest discomfort today. She normally takes metoprolol and losartan at night and last took them on . She was also prescribed amlodipine 4 days ago and has been taking it every morning, and her clonidine was discontinued at the same time, but she took it this morning due to her persistently elevated BP. While in the ER, she was given 20 mg IV hydralazine and felt light-headed, her SBP was noted to be in the 90's. She currently feels better but is very fatigued. Her headache persists but is decreased in severity. No nausea or vomiting.        Interval history / Subjective:     Pt seen for FU of CC: MARY, chest pain  First encounter  Patient seen and examined by the bedside  Labs, images and notes reviewed  Patient had episode of chest pain today, started at rest, left lateral lower chest under the left breast, with associated SOB and right jaw discomfort, also noted to have burning pain of the throat, non pleuritic, EKG done shows ST depressions in the inferolateral leads, CP resolved with 1 SL NTG. trp ordered. Night was event less, slept well. Discussed with nursing staff, no acute issues overnight, orders reviewed. Assessment & Plan:     Hypertensive urgency likely related to bilateral MARY  - BP improved  -norvasc and clonidine listed as allergy  -VMA, metanephrines plasma, catocholamines urine result pending  -CTA abdomen pelvis  there are 2 renal arteries supplying both kidneys.  Moderate to severe stenosis of the main right renal artery and the accessory lower pole left renal Artery, accessory upper pole right renal artery and left upper pole renal artery are patent without significant stenosis. -Cardiologist consulted and plan for renal angiogram and potential right renal stenting on 7/24  -nephrology on board, notes reviewed  - cont Losartan, Coreg and Parazosin    - Chest pain and jaw discomfort, typical, not POA  Resolved with NTG. Likely has hypertensive heart disease, high risk for CAD given long standing HTN and smoking history  EKG reviewed by myself shows inferolateral ST depressions  Trp pending  Cards already on board, Dr. Santo Serum to eval patient today. NTG prn chest pain  Echo 7/19 shows normal LVEF    Hx of hypercholesterolemia   -continue zocor    T11 age indeterminate compression deformity on CT    -had  hx of back surgery and fall   -stable    Anemia: likely AOCD, cont to monitor    Code status: Full Code  DVT prophylaxis: lovenox    Care Plan discussed with: Patient/Family and Nurse  Disposition: TBD     Hospital Problems  Date Reviewed: 12/11/2018          Codes Class Noted POA    Hypertensive urgency ICD-10-CM: I16.0  ICD-9-CM: 401.9  7/19/2019 Unknown        * (Principal) Chest pain ICD-10-CM: R07.9  ICD-9-CM: 786.50  7/18/2019 Unknown                Vital Signs:    Last 24hrs VS reviewed since prior progress note. Most recent are:  Visit Vitals  /55   Pulse 92   Temp 97.8 °F (36.6 °C)   Resp 16   Ht 5' 3\" (1.6 m)   Wt 67.6 kg (149 lb 0.5 oz)   SpO2 98%   Breastfeeding? No   BMI 26.40 kg/m²         Intake/Output Summary (Last 24 hours) at 7/23/2019 1045  Last data filed at 7/22/2019 1614  Gross per 24 hour   Intake 508 ml   Output --   Net 508 ml        Physical Examination:             Constitutional:  No acute distress, cooperative, pleasant    ENT:  Oral mucous moist, oropharynx benign. Neck supple,    Resp:  CTA bilaterally. No wheezing/rhonchi/rales.  No accessory muscle use   CV:  Regular rhythm, normal rate, no murmurs, gallops, rubs    GI:  Soft, non distended, non tender. normoactive bowel sounds, no hepatosplenomegaly     Musculoskeletal:  No edema    Neurologic:  Moves all extremities. AAOx3            Data Review:    Review and/or order of clinical lab test  Review and/or order of tests in the radiology section of CPT  Review and/or order of tests in the medicine section of CPT      Labs:     Recent Labs     07/23/19 0011   WBC 4.3   HGB 9.4*   HCT 29.3*   *     Recent Labs     07/23/19  0011 07/22/19  0425 07/21/19  0701 07/21/19  0412    140  --  141   K 3.9 3.7 3.4* HEMOLYZED,RECOLLECT REQUESTED    112*  --  114*   CO2 21 22  --  18*   BUN 21* 17  --  15   CREA 1.04* 1.11*  --  0.86   * 121*  --  105*   CA 9.0 9.2  --  8.5     Recent Labs     07/23/19 0011 07/22/19  0425 07/21/19 0412   SGOT 40* 39* HEMOLYZED,RECOLLECT REQUESTED   ALT 48 44 28   AP 58 59 46   TBILI 0.4 0.5 0.5   TP 6.5 7.2 6.3*   ALB 3.6 4.0 3.4*   GLOB 2.9 3.2 2.9     No results for input(s): INR, PTP, APTT in the last 72 hours. No lab exists for component: INREXT, INREXT   No results for input(s): FE, TIBC, PSAT, FERR in the last 72 hours. No results found for: FOL, RBCF   No results for input(s): PH, PCO2, PO2 in the last 72 hours. No results for input(s): CPK, CKNDX, TROIQ in the last 72 hours.     No lab exists for component: CPKMB  No results found for: CHOL, CHOLX, CHLST, CHOLV, HDL, LDL, LDLC, DLDLP, TGLX, TRIGL, TRIGP, CHHD, CHHDX  Lab Results   Component Value Date/Time    Glucose (POC) 106 (H) 12/27/2016 12:24 PM     Lab Results   Component Value Date/Time    Color YELLOW/STRAW 06/24/2019 02:39 PM    Appearance CLEAR 06/24/2019 02:39 PM    Specific gravity 1.010 06/24/2019 02:39 PM    pH (UA) 6.0 06/24/2019 02:39 PM    Protein NEGATIVE  06/24/2019 02:39 PM    Glucose NEGATIVE  06/24/2019 02:39 PM    Ketone NEGATIVE  06/24/2019 02:39 PM    Bilirubin NEGATIVE 06/24/2019 02:39 PM    Urobilinogen 0.2 06/24/2019 02:39 PM    Nitrites NEGATIVE  06/24/2019 02:39 PM    Leukocyte Esterase NEGATIVE  06/24/2019 02:39 PM    Epithelial cells FEW 06/24/2019 02:39 PM    Bacteria NEGATIVE  06/24/2019 02:39 PM    WBC 0-4 06/24/2019 02:39 PM    RBC 0-5 06/24/2019 02:39 PM         Medications Reviewed:     Current Facility-Administered Medications   Medication Dose Route Frequency    nitroglycerin (NITROSTAT) tablet 0.4 mg  0.4 mg SubLINGual NOW    prazosin (MINIPRESS) capsule 2 mg  2 mg Oral TID    carvedilol (COREG) tablet 3.125 mg  3.125 mg Oral BID WITH MEALS    traMADol (ULTRAM) tablet 50 mg  50 mg Oral Q6H PRN    psyllium husk-aspartame (METAMUCIL FIBER) packet 1 Packet  1 Packet Oral BID    losartan (COZAAR) tablet 100 mg  100 mg Oral DAILY    ALPRAZolam (XANAX) tablet 0.25 mg  0.25 mg Oral QID PRN    zolpidem (AMBIEN) tablet 10 mg  10 mg Oral QHS PRN    sodium chloride (NS) flush 5-40 mL  5-40 mL IntraVENous Q8H    sodium chloride (NS) flush 5-40 mL  5-40 mL IntraVENous PRN    acetaminophen (TYLENOL) tablet 650 mg  650 mg Oral Q4H PRN    ondansetron (ZOFRAN) injection 4 mg  4 mg IntraVENous Q4H PRN    enoxaparin (LOVENOX) injection 40 mg  40 mg SubCUTAneous Q24H    labetalol (NORMODYNE;TRANDATE) 20 mg/4 mL (5 mg/mL) injection 10 mg  10 mg IntraVENous Q6H PRN    simvastatin (ZOCOR) tablet 10 mg  10 mg Oral QHS     ______________________________________________________________________  EXPECTED LENGTH OF STAY: 2d 4h  ACTUAL LENGTH OF STAY:          4                 Clark Mijares MD

## 2019-07-23 NOTE — PROGRESS NOTES
Nephrology Progress Note  Gurpreet Driver  Date of Admission : 7/18/2019    CC:  Follow up for malignant HTN       Assessment and Plan     Malignant renovascular HTN   - CTA : Moderate to severe MARY involving dominant R renal artery and accessory Left Renal artery   - Planned for R renal angioplasty on Wednesday   - off cardene gtt  - continue Losartan , coreg and Prazosin     Mild CKD  - baseline Cr 1.1-1.2 mg /dl   - 2/2 Hypertensive nephropathy      Dyslipidemia        Interval History:  Seen and examined. No complaints. Off cardene gtt for ~ 12 hrs now    No cp, sob, n/v/d. Current Medications: all current  Medications have been eviewed in EPIC  Review of Systems: Pertinent items are noted in HPI. Objective:  Vitals:    Vitals:    07/22/19 1937 07/22/19 2345 07/23/19 0338 07/23/19 0536   BP: 126/62 135/58 118/53    Pulse: 83 79 87    Resp: 18 18 16    Temp: 98.1 °F (36.7 °C)  97.7 °F (36.5 °C)    SpO2: 98% 97% 95%    Weight:    67.6 kg (149 lb 0.5 oz)   Height:         Intake and Output:  No intake/output data recorded. 07/21 1901 - 07/23 0700  In: 1463.3 [P.O.:440; I.V.:1023.3]  Out: -     Physical Examination:  General: NAD,Conversant   Neck:  Supple, no mass  Resp:  Lungs CTA B/L, no wheezing , normal respiratory effort  CV:  RRR,  no murmur or rub, no LE edema  GI:  Soft, NT, + Bowel sounds, no hepatosplenomegaly  Neurologic:  Non focal  Psych:             AAO x 3 appropriate affect   Skin:  No Rash  :  No beckham    []    High complexity decision making was performed  []    Patient is at high-risk of decompensation with multiple organ involvement    Lab Data Personally Reviewed: I have reviewed all the pertinent labs, microbiology data and radiology studies during assessment.     Recent Labs     07/23/19  0011 07/22/19  0425 07/21/19  0701 07/21/19  0412    140  --  141   K 3.9 3.7 3.4* HEMOLYZED,RECOLLECT REQUESTED    112*  --  114*   CO2 21 22  --  18*   * 121*  --  105*   BUN 21* 17  --  15   CREA 1.04* 1.11*  --  0.86   CA 9.0 9.2  --  8.5   ALB 3.6 4.0  --  3.4*   SGOT 40* 39*  --  HEMOLYZED,RECOLLECT REQUESTED   ALT 48 44  --  28     Recent Labs     07/23/19  0011   WBC 4.3   HGB 9.4*   HCT 29.3*   *     No results found for: SDES  No results found for: CULT  Recent Results (from the past 24 hour(s))   METABOLIC PANEL, COMPREHENSIVE    Collection Time: 07/23/19 12:11 AM   Result Value Ref Range    Sodium 137 136 - 145 mmol/L    Potassium 3.9 3.5 - 5.1 mmol/L    Chloride 108 97 - 108 mmol/L    CO2 21 21 - 32 mmol/L    Anion gap 8 5 - 15 mmol/L    Glucose 112 (H) 65 - 100 mg/dL    BUN 21 (H) 6 - 20 MG/DL    Creatinine 1.04 (H) 0.55 - 1.02 MG/DL    BUN/Creatinine ratio 20 12 - 20      GFR est AA >60 >60 ml/min/1.73m2    GFR est non-AA 52 (L) >60 ml/min/1.73m2    Calcium 9.0 8.5 - 10.1 MG/DL    Bilirubin, total 0.4 0.2 - 1.0 MG/DL    ALT (SGPT) 48 12 - 78 U/L    AST (SGOT) 40 (H) 15 - 37 U/L    Alk. phosphatase 58 45 - 117 U/L    Protein, total 6.5 6.4 - 8.2 g/dL    Albumin 3.6 3.5 - 5.0 g/dL    Globulin 2.9 2.0 - 4.0 g/dL    A-G Ratio 1.2 1.1 - 2.2     CBC W/O DIFF    Collection Time: 07/23/19 12:11 AM   Result Value Ref Range    WBC 4.3 3.6 - 11.0 K/uL    RBC 2.71 (L) 3.80 - 5.20 M/uL    HGB 9.4 (L) 11.5 - 16.0 g/dL    HCT 29.3 (L) 35.0 - 47.0 %    .1 (H) 80.0 - 99.0 FL    MCH 34.7 (H) 26.0 - 34.0 PG    MCHC 32.1 30.0 - 36.5 g/dL    RDW 12.6 11.5 - 14.5 %    PLATELET 393 (L) 169 - 400 K/uL    MPV 9.1 8.9 - 12.9 FL    NRBC 0.0 0  WBC    ABSOLUTE NRBC 0.00 0.00 - 0.01 K/uL                   Vera Gurrola MD  [facility]   [id number] [place] [address]  Phone - [phone]   Fax - [phone]  [email or web address]

## 2019-07-23 NOTE — PROGRESS NOTES
1930 Bedside and Verbal shift change report given to Rishi Potter (oncoming nurse) by Justina Lanza RN (offgoing nurse). Report included the following information SBAR, Kardex, MAR, Cardiac Rhythm NSR and Alarm Parameters . 0730 Bedside and Verbal shift change report given to Justina Lanza RN (oncoming nurse) by Magdalena Cadet RN (offgoing nurse). Report included the following information SBAR, Kardex, MAR, Cardiac Rhythm NSR and Alarm Parameters .

## 2019-07-23 NOTE — PROGRESS NOTES
0730: Bedside shift change report given to Dede Lino RN (oncoming nurse) by Yaron Rose RN (offgoing nurse). Report included the following information SBAR and Kardex. 0900: patient complaining of severe chest pain radiating to back and jaw. EKG done. Pritesh Peterson NP at nurses station and notified of chest pain. Trop sent. EKG looked at by NP. Hospitalist and cardiologist notified. Nitro given and patient feeling much better with medication. Vitals stable. Continue to monitor      1930: Bedside shift change report given to Vlad Gaytan RN (oncoming nurse) by Yaron Rose RN (offgoing nurse). Report included the following information SBAR and Kardex.

## 2019-07-23 NOTE — PROGRESS NOTES
[facility] [address]: Brian Silva  [phone]          Cardiology Consult/Progress Note      Subjective: Anxious but no symptoms. Creatinine slight bump. Had chest pain this AM with negative serial troponins afterwards. Troponins negative    Creatinine stable at 1.0    Plan update: Hopefully MARY of right main renal artery on tomorrow. Chest pain this AM may be related to HTN or may be non cardiac. Suggested patient that if chest pain is recurrent, we may need ischemia work up at some point of time. I gave the option of coronary angio at the same time as renal angio but at the risk of increased dye load. She will rather not do coronary angio at this time and assess response of HTN to renal intervention. Requesting/referring provider: Dr. Delicia Ruiz  Reason for Consult: Uncontrolled hypertension    HPI: Stephen Faith, a 69y.o. year-old who presents for evaluation of uncontrolled hypertension. Mrs. Arleen Gonzalez has history of extremely difficult to control hypertension and was admitted to the hospital with malignant hypertension. This was associated with evidence of mild endorgan damage in the form of creatinine increased, chest tightness. For the past few months she has had more difficulty controlling high blood pressure. She has been compliant with multiple medication that she is been prescribed. Additionally she reports episodes when she would acutely become short of breath in association with elevated blood pressures. These episodes appear to be consistent with flash pulmonary edema. During current hospitalization she required IV calcium channel blockers to control her blood pressure in addition to the oral antihypertensive that she has been on. Further evaluation due to concern regarding renal artery stenosis in the form of a CT angiogram demonstrated renal artery stenosis. Investigations  ECG: Sinus bradycardia, nonspecific ST-T changes.   Echocardiogram: Normal LV systolic function mild to moderate LV hypertrophy consistent with hypertensive heart disease. CT abdomen with angiogram and runoff: 2 renal arteries are present bilaterally. Right major renal artery has at least moderate to severe ostial stenosis. Accessory right renal artery is free of significant disease. Left renal arteries are equal in size. CT scan has been reported to demonstrate disease in superior renal artery although I cannot personally confirm it. Both arteries on the left appear to be too small to provide durable results with renal artery stenting. Assessment/Plan:  1. Hypotension likely secondary to renal artery stenosis with current presentation with malignant hypertension  2. Atherosclerotic vascular disease  3. Episode suggestive of flash pulmonary edema related to uncontrolled hypertension  4. Renal artery stenosis bilateral.  5.  Remote history of tobacco abuse    Mrs. Alyssa Joyce was seen in consultation regarding uncontrolled hypertension with underlying renal artery stenosis. Moreover clinically she reports symptomatic shortness of breath which is episodic and sudden suggestive of recurrent flash pulmonary edema. These appear to be driven by renal artery stenosis which appears to be bilateral although she has excess renal arteries on both sides and significant disease only impacts one branch on either side. She is likely to benefit from renal artery stenting given her symptoms of recurrent pulmonary edema and manifestation of refractory hypertension. After evaluation significant I suggest proceeding further with further evaluation of renal arteries with invasive angiography and if deemed necessary she may benefit from revascularization. Since she got contrast load today with at least 100 cc of iodinated contrast I recommend at least waiting 96 hours to prevent contrast nephropathy. Hence we will proceed with above testing towards early to mid next week.           Past medical history:  She  has a past medical history of Hypertension. She also has no past medical history of Adverse effect of anesthesia. Patient reports no dyspnea/PND/Orthpnea/CP. He reports no cough/fever/focal neurological deficits/abdominal pain. All other systems negative except as above. PE  Vitals:    07/23/19 0807 07/23/19 0958 07/23/19 1132 07/23/19 1527   BP: 150/57 107/55 144/58 155/60   Pulse: 88 92 92 86   Resp: 16  18 18   Temp: 97.8 °F (36.6 °C)  98 °F (36.7 °C) 98.1 °F (36.7 °C)   SpO2: 98%  98% 98%   Weight:       Height:        Body mass index is 26.4 kg/m². General:    Alert, cooperative, no distress. Psychiatric:    Normal Mood and affect    Eye/ENT:      Pupils equal, No asymmetry, Conjunctival pink. Able to hear voice at normal amplitude   Lungs:      Visibly symmetric chest expansion, No palpable tenderness. Clear to auscultation bilaterally. Heart[de-identified]    Regular rate and rhythm, S1, S2 normal, no murmur, click, rub or gallop. No JVD, Normal palpable peripheral pulses. No cyanosis   Abdomen:     Soft, non-tender. Bowel sounds normal. No masses,  No      organomegaly. No renal bruit   Extremities:   Extremities normal, atraumatic, no edema. Neurologic:   CN II-XII grossly intact.  No gross focal deficits           Recent Labs:  No results found for: CHOL, CHOLX, CHLST, CHOLV, 841280, HDL, LDL, LDLC, DLDLP, TGLX, TRIGL, TRIGP, CHHD, CHHDX  Lab Results   Component Value Date/Time    Creatinine (POC) 0.9 10/20/2016 10:16 AM    Creatinine 1.04 (H) 07/23/2019 12:11 AM     Lab Results   Component Value Date/Time    BUN 21 (H) 07/23/2019 12:11 AM     Lab Results   Component Value Date/Time    Potassium 3.9 07/23/2019 12:11 AM     No results found for: HBA1C, HGBE8, UAQ7NRVR, PZT1STRZ  Lab Results   Component Value Date/Time    HGB 9.4 (L) 07/23/2019 12:11 AM     Lab Results   Component Value Date/Time    PLATELET 048 (L) 35/45/8523 12:11 AM       Reviewed:  Past Medical History:   Diagnosis Date    Hypertension      Social History     Tobacco Use   Smoking Status Former Smoker    Packs/day: 0.50   Smokeless Tobacco Never Used     Social History     Substance and Sexual Activity   Alcohol Use Yes    Alcohol/week: 6.0 standard drinks    Types: 6 Glasses of wine per week     Allergies   Allergen Reactions    Amlodipine Other (comments)     intolerant    Clonidine Other (comments)     fatigue     Family History   Problem Relation Age of Onset    Heart Attack Father 47        Current Facility-Administered Medications   Medication Dose Route Frequency    nitroglycerin (NITROSTAT) tablet 0.4 mg  0.4 mg SubLINGual NOW    prazosin (MINIPRESS) capsule 2 mg  2 mg Oral TID    carvedilol (COREG) tablet 3.125 mg  3.125 mg Oral BID WITH MEALS    traMADol (ULTRAM) tablet 50 mg  50 mg Oral Q6H PRN    psyllium husk-aspartame (METAMUCIL FIBER) packet 1 Packet  1 Packet Oral BID    losartan (COZAAR) tablet 100 mg  100 mg Oral DAILY    ALPRAZolam (XANAX) tablet 0.25 mg  0.25 mg Oral QID PRN    zolpidem (AMBIEN) tablet 10 mg  10 mg Oral QHS PRN    sodium chloride (NS) flush 5-40 mL  5-40 mL IntraVENous Q8H    sodium chloride (NS) flush 5-40 mL  5-40 mL IntraVENous PRN    acetaminophen (TYLENOL) tablet 650 mg  650 mg Oral Q4H PRN    ondansetron (ZOFRAN) injection 4 mg  4 mg IntraVENous Q4H PRN    enoxaparin (LOVENOX) injection 40 mg  40 mg SubCUTAneous Q24H    labetalol (NORMODYNE;TRANDATE) 20 mg/4 mL (5 mg/mL) injection 10 mg  10 mg IntraVENous Q6H PRN    simvastatin (ZOCOR) tablet 10 mg  10 mg Oral QHS       Addy Corbin MD07/23/19 There are other unrelated non-urgent complaints, but due to the busy schedule and the amount of time I've already spent with her, time does not permit me to address these routine issues at today's visit. I've requested another appointment to review these additional issues.     [facility] [place]  [address], [address] [place] [address] Avenue

## 2019-07-23 NOTE — PROGRESS NOTES
Asked to see pt for active chest pain. Pt stated she had sudden onset of lower sternal chest pain which radiated to left lower rib/under left breast area. Initially described as sharp and then described as burning pain to throat. Pain radiated to back. Also c/o onset of right jaw pain. Chest pain was not pleuritic. EKG obtained with NSR with ST/T wave abnormality in inferior leads. Pt was hypertensive with . Pt was given 1 SL nitro. And stat troponin ordered. Chest pain relieved completely after 1 nitro. SBP to 107 Dr. Roa Sensor paged by nursing and I notified Dr. Kimmie Mcnally of events. Will follow up troponin.

## 2019-07-24 LAB
ACT BLD: 257 SECS (ref 79–138)
ANION GAP SERPL CALC-SCNC: 9 MMOL/L (ref 5–15)
BASOPHILS # BLD: 0 K/UL (ref 0–0.1)
BASOPHILS NFR BLD: 0 % (ref 0–1)
BUN SERPL-MCNC: 22 MG/DL (ref 6–20)
BUN/CREAT SERPL: 21 (ref 12–20)
CALCIUM SERPL-MCNC: 9.1 MG/DL (ref 8.5–10.1)
CHLORIDE SERPL-SCNC: 110 MMOL/L (ref 97–108)
CO2 SERPL-SCNC: 21 MMOL/L (ref 21–32)
COLLECT DURATION TIME UR: 24 HR
CREAT SERPL-MCNC: 1.07 MG/DL (ref 0.55–1.02)
DIFFERENTIAL METHOD BLD: ABNORMAL
DOPAMINE 24H UR-MRATE: 203 UG/24 HR (ref 0–510)
DOPAMINE UR-MCNC: 135 UG/L
EOSINOPHIL # BLD: 0.2 K/UL (ref 0–0.4)
EOSINOPHIL NFR BLD: 4 % (ref 0–7)
EPINEPH 24H UR-MRATE: 2 UG/24 HR (ref 0–20)
EPINEPH UR-MCNC: 1 UG/L
ERYTHROCYTE [DISTWIDTH] IN BLOOD BY AUTOMATED COUNT: 12.3 % (ref 11.5–14.5)
FOLATE SERPL-MCNC: 10.9 NG/ML (ref 5–21)
GLUCOSE SERPL-MCNC: 129 MG/DL (ref 65–100)
HCT VFR BLD AUTO: 28.6 % (ref 35–47)
HGB BLD-MCNC: 9.4 G/DL (ref 11.5–16)
HVA 24H UR-MRATE: 3 MG/24 HR (ref 0–10)
HVA UR-MCNC: 2 MG/L
IMM GRANULOCYTES # BLD AUTO: 0 K/UL (ref 0–0.04)
IMM GRANULOCYTES NFR BLD AUTO: 1 % (ref 0–0.5)
LYMPHOCYTES # BLD: 1.4 K/UL (ref 0.8–3.5)
LYMPHOCYTES NFR BLD: 33 % (ref 12–49)
MCH RBC QN AUTO: 34.6 PG (ref 26–34)
MCHC RBC AUTO-ENTMCNC: 32.9 G/DL (ref 30–36.5)
MCV RBC AUTO: 105.1 FL (ref 80–99)
METANEPH 24H UR-MRATE: 99 UG/24 HR (ref 45–290)
METANEPH FREE SERPL-MCNC: 40 PG/ML (ref 0–62)
METANEPHS 24H UR-MCNC: 66 UG/L
MONOCYTES # BLD: 0.4 K/UL (ref 0–1)
MONOCYTES NFR BLD: 10 % (ref 5–13)
NEUTS SEG # BLD: 2.2 K/UL (ref 1.8–8)
NEUTS SEG NFR BLD: 52 % (ref 32–75)
NOREPINEPH 24H UR-MRATE: 51 UG/24 HR (ref 0–135)
NOREPINEPH UR-MCNC: 34 UG/L
NORMETANEPHRINE 24H UR-MCNC: 345 UG/L
NORMETANEPHRINE 24H UR-MRATE: 518 UG/24 HR (ref 82–500)
NORMETANEPHRINE SERPL-MCNC: 166 PG/ML (ref 0–145)
NRBC # BLD: 0 K/UL (ref 0–0.01)
NRBC BLD-RTO: 0 PER 100 WBC
PLATELET # BLD AUTO: 119 K/UL (ref 150–400)
PMV BLD AUTO: 9.4 FL (ref 8.9–12.9)
POTASSIUM SERPL-SCNC: 3.9 MMOL/L (ref 3.5–5.1)
RBC # BLD AUTO: 2.72 M/UL (ref 3.8–5.2)
SODIUM SERPL-SCNC: 140 MMOL/L (ref 136–145)
SPECIMEN VOL ?TM UR: 1500 ML
TROPONIN I SERPL-MCNC: <0.05 NG/ML
VIT B12 SERPL-MCNC: 558 PG/ML (ref 193–986)
WBC # BLD AUTO: 4.2 K/UL (ref 3.6–11)

## 2019-07-24 PROCEDURE — 85347 COAGULATION TIME ACTIVATED: CPT

## 2019-07-24 PROCEDURE — C1894 INTRO/SHEATH, NON-LASER: HCPCS | Performed by: INTERNAL MEDICINE

## 2019-07-24 PROCEDURE — 74011000258 HC RX REV CODE- 258: Performed by: INTERNAL MEDICINE

## 2019-07-24 PROCEDURE — 74011000250 HC RX REV CODE- 250: Performed by: INTERNAL MEDICINE

## 2019-07-24 PROCEDURE — 77030010221 HC SPLNT WR POS TELE -B: Performed by: INTERNAL MEDICINE

## 2019-07-24 PROCEDURE — C1769 GUIDE WIRE: HCPCS | Performed by: INTERNAL MEDICINE

## 2019-07-24 PROCEDURE — 77030013744: Performed by: INTERNAL MEDICINE

## 2019-07-24 PROCEDURE — 36252 INS CATH REN ART 1ST BILAT: CPT | Performed by: INTERNAL MEDICINE

## 2019-07-24 PROCEDURE — 74011250637 HC RX REV CODE- 250/637: Performed by: HOSPITALIST

## 2019-07-24 PROCEDURE — 36415 COLL VENOUS BLD VENIPUNCTURE: CPT

## 2019-07-24 PROCEDURE — 77030019569 HC BND COMPR RAD TERU -B: Performed by: INTERNAL MEDICINE

## 2019-07-24 PROCEDURE — B4181ZZ FLUOROSCOPY OF BILATERAL RENAL ARTERIES USING LOW OSMOLAR CONTRAST: ICD-10-PCS | Performed by: INTERNAL MEDICINE

## 2019-07-24 PROCEDURE — 74011250636 HC RX REV CODE- 250/636

## 2019-07-24 PROCEDURE — 77030013715 HC INFL SYS MRTM -B: Performed by: INTERNAL MEDICINE

## 2019-07-24 PROCEDURE — 74011250636 HC RX REV CODE- 250/636: Performed by: HOSPITALIST

## 2019-07-24 PROCEDURE — 74011250636 HC RX REV CODE- 250/636: Performed by: INTERNAL MEDICINE

## 2019-07-24 PROCEDURE — 82607 VITAMIN B-12: CPT

## 2019-07-24 PROCEDURE — C1887 CATHETER, GUIDING: HCPCS | Performed by: INTERNAL MEDICINE

## 2019-07-24 PROCEDURE — 37236 OPEN/PERQ PLACE STENT 1ST: CPT | Performed by: INTERNAL MEDICINE

## 2019-07-24 PROCEDURE — C2625 STENT, NON-COR, TEM W/DEL SY: HCPCS | Performed by: INTERNAL MEDICINE

## 2019-07-24 PROCEDURE — 80048 BASIC METABOLIC PNL TOTAL CA: CPT

## 2019-07-24 PROCEDURE — 74011636320 HC RX REV CODE- 636/320: Performed by: INTERNAL MEDICINE

## 2019-07-24 PROCEDURE — 82746 ASSAY OF FOLIC ACID SERUM: CPT

## 2019-07-24 PROCEDURE — 04793DZ DILATION OF RIGHT RENAL ARTERY WITH INTRALUMINAL DEVICE, PERCUTANEOUS APPROACH: ICD-10-PCS | Performed by: INTERNAL MEDICINE

## 2019-07-24 PROCEDURE — 85025 COMPLETE CBC W/AUTO DIFF WBC: CPT

## 2019-07-24 PROCEDURE — 74011250637 HC RX REV CODE- 250/637: Performed by: INTERNAL MEDICINE

## 2019-07-24 PROCEDURE — 99153 MOD SED SAME PHYS/QHP EA: CPT | Performed by: INTERNAL MEDICINE

## 2019-07-24 PROCEDURE — 99152 MOD SED SAME PHYS/QHP 5/>YRS: CPT | Performed by: INTERNAL MEDICINE

## 2019-07-24 PROCEDURE — 74011250637 HC RX REV CODE- 250/637: Performed by: NURSE PRACTITIONER

## 2019-07-24 PROCEDURE — 77030004532 HC CATH ANGI DX IMP BSC -A: Performed by: INTERNAL MEDICINE

## 2019-07-24 PROCEDURE — 84484 ASSAY OF TROPONIN QUANT: CPT

## 2019-07-24 PROCEDURE — 77030012468 HC VLV BLEEDBK CNTRL ABBT -B: Performed by: INTERNAL MEDICINE

## 2019-07-24 PROCEDURE — C1725 CATH, TRANSLUMIN NON-LASER: HCPCS | Performed by: INTERNAL MEDICINE

## 2019-07-24 PROCEDURE — 65660000001 HC RM ICU INTERMED STEPDOWN

## 2019-07-24 DEVICE — RX HERCULINK ELITE RENAL AND BILIARY STENT SYSTEM 5.0 MM X 15 MM X 135 CM
Type: IMPLANTABLE DEVICE | Status: FUNCTIONAL
Brand: HERCULINK ELITE

## 2019-07-24 RX ORDER — CARVEDILOL 3.12 MG/1
3.12 TABLET ORAL 2 TIMES DAILY WITH MEALS
Status: DISCONTINUED | OUTPATIENT
Start: 2019-07-24 | End: 2019-07-25

## 2019-07-24 RX ORDER — FENTANYL CITRATE 50 UG/ML
INJECTION, SOLUTION INTRAMUSCULAR; INTRAVENOUS AS NEEDED
Status: DISCONTINUED | OUTPATIENT
Start: 2019-07-24 | End: 2019-07-24 | Stop reason: HOSPADM

## 2019-07-24 RX ORDER — HEPARIN SODIUM 200 [USP'U]/100ML
INJECTION, SOLUTION INTRAVENOUS
Status: COMPLETED | OUTPATIENT
Start: 2019-07-24 | End: 2019-07-24

## 2019-07-24 RX ORDER — CLOPIDOGREL BISULFATE 75 MG/1
75 TABLET ORAL DAILY
Status: DISCONTINUED | OUTPATIENT
Start: 2019-07-24 | End: 2019-07-26 | Stop reason: HOSPADM

## 2019-07-24 RX ORDER — GUAIFENESIN 100 MG/5ML
81 LIQUID (ML) ORAL DAILY
Status: DISCONTINUED | OUTPATIENT
Start: 2019-07-24 | End: 2019-07-26 | Stop reason: HOSPADM

## 2019-07-24 RX ORDER — SODIUM CHLORIDE 0.9 % (FLUSH) 0.9 %
5-40 SYRINGE (ML) INJECTION EVERY 8 HOURS
Status: DISCONTINUED | OUTPATIENT
Start: 2019-07-24 | End: 2019-07-24

## 2019-07-24 RX ORDER — SODIUM CHLORIDE 9 MG/ML
1000 INJECTION, SOLUTION INTRAVENOUS CONTINUOUS
Status: DISPENSED | OUTPATIENT
Start: 2019-07-24 | End: 2019-07-24

## 2019-07-24 RX ORDER — SODIUM CHLORIDE 0.9 % (FLUSH) 0.9 %
5-40 SYRINGE (ML) INJECTION AS NEEDED
Status: DISCONTINUED | OUTPATIENT
Start: 2019-07-24 | End: 2019-07-26 | Stop reason: HOSPADM

## 2019-07-24 RX ORDER — MIDAZOLAM HYDROCHLORIDE 1 MG/ML
INJECTION, SOLUTION INTRAMUSCULAR; INTRAVENOUS AS NEEDED
Status: DISCONTINUED | OUTPATIENT
Start: 2019-07-24 | End: 2019-07-24 | Stop reason: HOSPADM

## 2019-07-24 RX ORDER — CLOPIDOGREL 300 MG/1
TABLET, FILM COATED ORAL AS NEEDED
Status: DISCONTINUED | OUTPATIENT
Start: 2019-07-24 | End: 2019-07-24 | Stop reason: HOSPADM

## 2019-07-24 RX ORDER — LIDOCAINE HYDROCHLORIDE 10 MG/ML
INJECTION INFILTRATION; PERINEURAL AS NEEDED
Status: DISCONTINUED | OUTPATIENT
Start: 2019-07-24 | End: 2019-07-24 | Stop reason: HOSPADM

## 2019-07-24 RX ORDER — NITROGLYCERIN 20 MG/100ML
0-200 INJECTION INTRAVENOUS
Status: DISCONTINUED | OUTPATIENT
Start: 2019-07-24 | End: 2019-07-24

## 2019-07-24 RX ORDER — HEPARIN SODIUM 1000 [USP'U]/ML
INJECTION, SOLUTION INTRAVENOUS; SUBCUTANEOUS AS NEEDED
Status: DISCONTINUED | OUTPATIENT
Start: 2019-07-24 | End: 2019-07-24 | Stop reason: HOSPADM

## 2019-07-24 RX ORDER — LABETALOL HCL 20 MG/4 ML
20 SYRINGE (ML) INTRAVENOUS
Status: DISCONTINUED | OUTPATIENT
Start: 2019-07-24 | End: 2019-07-26 | Stop reason: HOSPADM

## 2019-07-24 RX ORDER — FENTANYL CITRATE 50 UG/ML
50 INJECTION, SOLUTION INTRAMUSCULAR; INTRAVENOUS ONCE
Status: COMPLETED | OUTPATIENT
Start: 2019-07-24 | End: 2019-07-24

## 2019-07-24 RX ADMIN — PSYLLIUM HUSK 1 PACKET: 3.4 POWDER ORAL at 19:16

## 2019-07-24 RX ADMIN — Medication 10 ML: at 15:27

## 2019-07-24 RX ADMIN — ACETAMINOPHEN 650 MG: 325 TABLET ORAL at 17:07

## 2019-07-24 RX ADMIN — ZOLPIDEM TARTRATE 10 MG: 5 TABLET ORAL at 21:28

## 2019-07-24 RX ADMIN — CARVEDILOL 3.12 MG: 3.12 TABLET, FILM COATED ORAL at 19:17

## 2019-07-24 RX ADMIN — SODIUM CHLORIDE 500 ML: 900 INJECTION, SOLUTION INTRAVENOUS at 13:22

## 2019-07-24 RX ADMIN — SIMVASTATIN 10 MG: 10 TABLET, FILM COATED ORAL at 21:28

## 2019-07-24 RX ADMIN — ENOXAPARIN SODIUM 40 MG: 40 INJECTION SUBCUTANEOUS at 23:22

## 2019-07-24 RX ADMIN — NITROGLYCERIN 10 MCG/MIN: 20 INJECTION INTRAVENOUS at 09:28

## 2019-07-24 RX ADMIN — SODIUM CHLORIDE 1000 ML: 900 INJECTION, SOLUTION INTRAVENOUS at 16:10

## 2019-07-24 RX ADMIN — Medication 10 ML: at 22:04

## 2019-07-24 RX ADMIN — Medication 10 ML: at 21:28

## 2019-07-24 RX ADMIN — ACETAMINOPHEN 650 MG: 325 TABLET ORAL at 21:27

## 2019-07-24 RX ADMIN — ACETAMINOPHEN 650 MG: 325 TABLET ORAL at 13:21

## 2019-07-24 RX ADMIN — LABETALOL HYDROCHLORIDE 0.5 MG/MIN: 5 INJECTION, SOLUTION INTRAVENOUS at 22:00

## 2019-07-24 RX ADMIN — FENTANYL CITRATE 50 MCG: 50 INJECTION, SOLUTION INTRAMUSCULAR; INTRAVENOUS at 09:02

## 2019-07-24 RX ADMIN — ASPIRIN 81 MG 81 MG: 81 TABLET ORAL at 13:24

## 2019-07-24 RX ADMIN — ALPRAZOLAM 0.25 MG: 0.25 TABLET ORAL at 13:34

## 2019-07-24 RX ADMIN — Medication 10 ML: at 06:02

## 2019-07-24 NOTE — PROGRESS NOTES
2cc of air let out of TR band; no bleeding; no hematoma; TR band removed and site area cleaned with chloraprepp and 4 x 4 with Tegaderm aoplied.

## 2019-07-24 NOTE — PROGRESS NOTES
Eladio NP Progress note    Name: Pasquale Calle  YOB: 1945  MRN: [id number]  Admission Date: 7/18/2019  4:38 PM    Date of service: 7/23/2019 11:29 PM    Overnight Update:        Complaint:  Due for stenting in AM, has lovenox ordered  Subjective:  RN questioning if Lovenox should be given as pt is d/t have stenting in AM. Pt getting 40 lovenox q24. Plt 117, and creat 1.04  Plan: Will go ahead and hold tonight's dose of lovenox.      Jay Rachel NP  [phone] or TigerText

## 2019-07-24 NOTE — PROGRESS NOTES
TRANSFER - IN REPORT:    Verbal report received from Will on Pasquale Calle  being received from procedural area for routine progression of care. Report consisted of patients Situation, Background, Assessment and Recommendations(SBAR). Information from the following report(s) Procedure Summary, MAR and Recent Results was reviewed with the receiving clinician. Opportunity for questions and clarification was provided. Assessment completed upon patients arrival to [address] [place] [address] and care assumed. Cardiac Cath Lab Recovery Arrival Note:    Pasquale Calle arrived to [facility] recovery area. Patient procedure= LHC. Patient on cardiac monitor, non-invasive blood pressure, SPO2 monitor. On  or O2 @ 2 lpm via NC.  IV  of NS on pump at 125 ml/hr. Patient status doing well without problems. Patient is A&Ox 3. Patient reports noc/o's. PROCEDURE SITE CHECK:    Procedure site:without any bleeding and hematoma, no pain/discomfort reported at procedure site. No change in patient status. Continue to monitor patient and status.

## 2019-07-24 NOTE — PROCEDURES
Renal angiogram invasive/MARY    Diagnostic imaging. Non selective and selective renal artery angiography. 1)B/L main and accessory renal areries   2)Right main renal artery 70% stenosis   3)Left lower renal artery 40-50% stenosis   4)Both superior/accessory renal arteries free os significant disease       Intervention  1)Stenting of right main renal artery with 5 by 15 mm Herculing stent with 0% residual stenosis.       Complications: None  `    Contrast used 100 cc

## 2019-07-24 NOTE — PROGRESS NOTES
1930: Bedside shift change report received by Sherlye Osorio (offgoing nurse). Report included the following information SBAR, Kardex, Procedure Summary, Intake/Output, MAR, Recent Results and Cardiac Rhythm NSR .     5584: TRANSFER - OUT REPORT:    Verbal report given to Elise(name) on Radha Carreno  being transferred to cath lab(unit) for ordered procedure       Report consisted of patients Situation, Background, Assessment and   Recommendations(SBAR). Information from the following report(s) SBAR, Kardex, Procedure Summary, Intake/Output, MAR, Recent Results and Cardiac Rhythm SR was reviewed with the receiving nurse. Lines:   Peripheral IV 07/20/19 Right Arm (Active)   Site Assessment Clean, dry, & intact 7/24/2019  3:22 AM   Phlebitis Assessment 0 7/24/2019  3:22 AM   Infiltration Assessment 0 7/24/2019  3:22 AM   Dressing Status Clean, dry, & intact 7/24/2019  3:22 AM   Dressing Type Tape;Transparent 7/24/2019  3:22 AM   Hub Color/Line Status Pink;Capped 7/24/2019  3:22 AM   Action Taken Open ports on tubing capped 7/24/2019  3:22 AM   Alcohol Cap Used Yes 7/24/2019  3:22 AM       Peripheral IV 07/22/19 Left Antecubital (Active)   Site Assessment Clean, dry, & intact 7/24/2019  3:22 AM   Phlebitis Assessment 0 7/24/2019  3:22 AM   Infiltration Assessment 0 7/24/2019  3:22 AM   Dressing Status Clean, dry, & intact 7/24/2019  3:22 AM   Dressing Type Tape;Transparent 7/24/2019  3:22 AM   Hub Color/Line Status Pink;Capped 7/24/2019  3:22 AM   Action Taken Open ports on tubing capped 7/24/2019  3:22 AM   Alcohol Cap Used Yes 7/24/2019  3:22 AM        Opportunity for questions and clarification was provided. 0730: Bedside and Verbal shift change report given to Estela (oncoming nurse).  Report included the following information SBAR, Kardex, Procedure Summary, Intake/Output, MAR, Recent Results and Cardiac Rhythm SR.       ----------------  Care Plan 2002  Problem: Hypertension  Goal: *Blood pressure within specified parameters  Outcome: Progressing Towards Goal     Problem: Falls - Risk of  Goal: *Absence of Falls  Description  Document Celestino Rehman Fall Risk and appropriate interventions in the flowsheet. Outcome: Progressing Towards Goal  Note:   Fall Risk Interventions:  Mobility Interventions: Communicate number of staff needed for ambulation/transfer         Medication Interventions: Evaluate medications/consider consulting pharmacy, Patient to call before getting OOB         History of Falls Interventions: Door open when patient unattended         Problem: Pressure Injury - Risk of  Goal: *Prevention of pressure injury  Description  Document Dawson Scale and appropriate interventions in the flowsheet.   Outcome: Progressing Towards Goal  Note:   Pressure Injury Interventions:                 Mobility Interventions: HOB 30 degrees or less, Pressure redistribution bed/mattress (bed type), PT/OT evaluation    Nutrition Interventions: Document food/fluid/supplement intake

## 2019-07-24 NOTE — PROGRESS NOTES
Cardiac Cath Lab Procedure Area Arrival Note:    Emery Barnett arrived to Cardiac Cath Lab, Procedure Area. Patient identifiers verified with NAME and DATE OF BIRTH. Procedure verified with patient. Consent forms verified. Allergies verified. Patient informed of procedure and plan of care. Questions answered with review. Patient voiced understanding of procedure and plan of care. Patient on cardiac monitor, non-invasive blood pressure, SPO2 monitor. On room air then placed on O2 @ 2 lpm via NC.  IV of normal saline on pump at 25 ml/hr. Patient status doing well without problems. Patient is A&Ox 4. Patient reports no pain. Patient medicated during procedure with orders obtained and verified by Dr. Kashif Lopez. Refer to patients Cardiac Cath Lab PROCEDURE REPORT for vital signs, assessment, status, and response during procedure, printed at end of case. Printed report on chart or scanned into chart.

## 2019-07-24 NOTE — PROGRESS NOTES
TRANSFER - OUT REPORT:    Verbal report given to Eric Baron (name) on James Hoang  being transferred to cath lab recovery room (unit) for routine progression of care       Report consisted of patients Situation, Background, Assessment and   Recommendations(SBAR). Information from the following report(s) SBAR, Procedure Summary and MAR was reviewed with the receiving nurse. Lines:   Peripheral IV 07/20/19 Right Arm (Active)   Site Assessment Clean, dry, & intact 7/24/2019  3:22 AM   Phlebitis Assessment 0 7/24/2019  3:22 AM   Infiltration Assessment 0 7/24/2019  3:22 AM   Dressing Status Clean, dry, & intact 7/24/2019  3:22 AM   Dressing Type Tape;Transparent 7/24/2019  3:22 AM   Hub Color/Line Status Pink;Capped 7/24/2019  3:22 AM   Action Taken Open ports on tubing capped 7/24/2019  3:22 AM   Alcohol Cap Used Yes 7/24/2019  3:22 AM       Peripheral IV 07/22/19 Left Antecubital (Active)   Site Assessment Clean, dry, & intact 7/24/2019  3:22 AM   Phlebitis Assessment 0 7/24/2019  3:22 AM   Infiltration Assessment 0 7/24/2019  3:22 AM   Dressing Status Clean, dry, & intact 7/24/2019  3:22 AM   Dressing Type Tape;Transparent 7/24/2019  3:22 AM   Hub Color/Line Status Pink;Capped 7/24/2019  3:22 AM   Action Taken Open ports on tubing capped 7/24/2019  3:22 AM   Alcohol Cap Used Yes 7/24/2019  3:22 AM        Opportunity for questions and clarification was provided.       Patient transported with:   Bloxy

## 2019-07-24 NOTE — PROGRESS NOTES
Nephrology Progress Note  Yanet Tse  Date of Admission : 7/18/2019    CC:  Follow up for malignant HTN       Assessment and Plan     Malignant renovascular HTN   - CTA : Moderate to severe MARY involving dominant R renal artery and accessory Left Renal artery   - Planned for R renal angioplasty today  - hold all BP meds till post renal angioplasty today- RN to call me post procedure to adjust BP meds     L forearm cellulitis   - 2/2 IV infiltration   - watch for now     Mild CKD  - baseline Cr 1.1-1.2 mg /dl   - 2/2 Hypertensive nephropathy      Dyslipidemia        Interval History:  Seen and examined. C/o pain in left forearm. . Off cardene gtt ,. Not needed any PRN BP meds    No cp, sob, n/v/d. Current Medications: all current  Medications have been eviewed in EPIC  Review of Systems: Pertinent items are noted in HPI. Objective:  Vitals:    Vitals:    07/23/19 1858 07/23/19 2322 07/24/19 0322 07/24/19 0326   BP: 146/62 161/56 132/52    Pulse: 83 86 81    Resp: 18 18 18    Temp: 98.1 °F (36.7 °C) 98.2 °F (36.8 °C) 98.3 °F (36.8 °C)    SpO2: 98% 94% 98%    Weight:    67.6 kg (149 lb 0.5 oz)   Height:         Intake and Output:  No intake/output data recorded. 07/22 1901 - 07/24 0700  In: 5 [P.O.:720]  Out: -     Physical Examination:  General: NAD,Conversant   Neck:  Supple, no mass  Resp:  Lungs CTA B/L, no wheezing , normal respiratory effort  CV:  RRR,  no murmur or rub, no LE edema  GI:  Soft, NT, + Bowel sounds, no hepatosplenomegaly  Neurologic:  Non focal  Psych:             AAO x 3 appropriate affect   Skin:  No Rash  :  No beckham    []    High complexity decision making was performed  []    Patient is at high-risk of decompensation with multiple organ involvement    Lab Data Personally Reviewed: I have reviewed all the pertinent labs, microbiology data and radiology studies during assessment.     Recent Labs     07/24/19  0341 07/23/19  0011 07/22/19  0425    137 140   K 3.9 3.9 3.7   CL 110* 108 112*   CO2 21 21 22   * 112* 121*   BUN 22* 21* 17   CREA 1.07* 1.04* 1.11*   CA 9.1 9.0 9.2   ALB  --  3.6 4.0   SGOT  --  40* 39*   ALT  --  48 44     Recent Labs     07/24/19  0341 07/23/19  0011   WBC 4.2 4.3   HGB 9.4* 9.4*   HCT 28.6* 29.3*   * 117*     No results found for: SDES  No results found for: CULT  Recent Results (from the past 24 hour(s))   EKG, 12 LEAD, INITIAL    Collection Time: 07/23/19  9:27 AM   Result Value Ref Range    Ventricular Rate 104 BPM    Atrial Rate 104 BPM    P-R Interval 140 ms    QRS Duration 70 ms    Q-T Interval 362 ms    QTC Calculation (Bezet) 476 ms    Calculated P Axis 65 degrees    Calculated R Axis 58 degrees    Calculated T Axis 28 degrees    Diagnosis       Sinus tachycardia  ST & T wave abnormality, consider inferolateral ischemia  When compared with ECG of 18-JUL-2019 16:49,  Vent. rate has increased BY  49 BPM  T wave inversion now evident in Inferior leads  QT has lengthened  Confirmed by Luh Calloway MD ([id number]) on 7/23/2019 11:22:09 AM     TROPONIN I    Collection Time: 07/23/19  9:42 AM   Result Value Ref Range    Troponin-I, Qt. <0.05 <0.05 ng/mL   TROPONIN I    Collection Time: 07/23/19  3:30 PM   Result Value Ref Range    Troponin-I, Qt. <0.05 <0.05 ng/mL   CBC WITH AUTOMATED DIFF    Collection Time: 07/24/19  3:41 AM   Result Value Ref Range    WBC 4.2 3.6 - 11.0 K/uL    RBC 2.72 (L) 3.80 - 5.20 M/uL    HGB 9.4 (L) 11.5 - 16.0 g/dL    HCT 28.6 (L) 35.0 - 47.0 %    .1 (H) 80.0 - 99.0 FL    MCH 34.6 (H) 26.0 - 34.0 PG    MCHC 32.9 30.0 - 36.5 g/dL    RDW 12.3 11.5 - 14.5 %    PLATELET 197 (L) 656 - 400 K/uL    MPV 9.4 8.9 - 12.9 FL    NRBC 0.0 0  WBC    ABSOLUTE NRBC 0.00 0.00 - 0.01 K/uL    NEUTROPHILS 52 32 - 75 %    LYMPHOCYTES 33 12 - 49 %    MONOCYTES 10 5 - 13 %    EOSINOPHILS 4 0 - 7 %    BASOPHILS 0 0 - 1 %    IMMATURE GRANULOCYTES 1 (H) 0.0 - 0.5 %    ABS. NEUTROPHILS 2.2 1.8 - 8.0 K/UL    ABS.  LYMPHOCYTES 1.4 0.8 - 3.5 K/UL    ABS. MONOCYTES 0.4 0.0 - 1.0 K/UL    ABS. EOSINOPHILS 0.2 0.0 - 0.4 K/UL    ABS. BASOPHILS 0.0 0.0 - 0.1 K/UL    ABS. IMM. GRANS. 0.0 0.00 - 0.04 K/UL    DF AUTOMATED     METABOLIC PANEL, BASIC    Collection Time: 07/24/19  3:41 AM   Result Value Ref Range    Sodium 140 136 - 145 mmol/L    Potassium 3.9 3.5 - 5.1 mmol/L    Chloride 110 (H) 97 - 108 mmol/L    CO2 21 21 - 32 mmol/L    Anion gap 9 5 - 15 mmol/L    Glucose 129 (H) 65 - 100 mg/dL    BUN 22 (H) 6 - 20 MG/DL    Creatinine 1.07 (H) 0.55 - 1.02 MG/DL    BUN/Creatinine ratio 21 (H) 12 - 20      GFR est AA >60 >60 ml/min/1.73m2    GFR est non-AA 50 (L) >60 ml/min/1.73m2    Calcium 9.1 8.5 - 10.1 MG/DL   TROPONIN I    Collection Time: 07/24/19  3:41 AM   Result Value Ref Range    Troponin-I, Qt. <0.05 <0.05 ng/mL                   Luzmaria Lindo MD  [address]  Phone - [phone]   Fax - [phone]  www. [employer]

## 2019-07-24 NOTE — PROGRESS NOTES
Hospitalist Progress Note  Taryn Erickson MD  Answering service: [phone] from in house phone  Cell: [phone]      Date of Service:  2019  NAME:  Gia Acuna  :  1945  MRN:  [id number]      Admission Summary:     A 69 y.o lady with HTN who presented to the [facility] ED due to severely elevated blood pressure. She states that for the past 4 days her BP has been in the 230s/100s. She began experiencing headaches and chest discomfort today. She normally takes metoprolol and losartan at night and last took them on . She was also prescribed amlodipine 4 days ago and has been taking it every morning, and her clonidine was discontinued at the same time, but she took it this morning due to her persistently elevated BP. While in the ER, she was given 20 mg IV hydralazine and felt light-headed, her SBP was noted to be in the 90's. She currently feels better but is very fatigued. Her headache persists but is decreased in severity. No nausea or vomiting.        Interval history / Subjective:     Pt seen for FU of CC: MARY, chest pain  Patient seen and examined by the bedside, Labs, images and notes reviewed  Had right renal angio and stenting, patient was seen post procedural, doing better, no recurrence of chest pain. Anxious that her BP did not improve right away after the stenting. counseling done in detail. Patient is anxious and wants to perform ADLs and be independent, as high BP was restricting her acitivities  No abdominal pain, fevers, chills. No N/V/D  Discussed with nursing staff, no acute issues overnight, orders reviewed. 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  Moderate to severe stenosis of the main right renal artery and the accessory lower pole left renal Artery, accessory upper pole right renal artery and left upper pole renal artery are patent without significant stenosis. - cards on board, Dr. Zay Mckeon did renal angio and right main renal artery stenting with 5 x 15 mm Herculing stent. -nephrology on board, notes reviewed  - holding Losartan, Coreg and Parazosin until cleared by Nephro to resume. - currently on NTG gtt for BP control    - Chest pain and jaw discomfort, typical, not POA  Resolved with NTG. Likely has hypertensive heart disease, high risk for CAD given long standing HTN and smoking history  EKG reviewed by myself shows inferolateral ST depressions  Trp neg  Cards already on board, Dr. Brian Hermosillo DW patient, no coronary angio done with renal angio as there is risk of BENNIE. Plans for cardiac work up later. Echo 7/19 shows normal LVEF    Hx of hypercholesterolemia   -continue zocor    T11 age indeterminate compression deformity on CT    -had  hx of back surgery and fall   -stable    Anemia: likely AOCD, cont to monitor    Code status: Full Code  DVT prophylaxis: lovenox    Care Plan discussed with: Patient/Family and Nurse  Disposition: TBD     Hospital Problems  Date Reviewed: 12/11/2018          Codes Class Noted POA    Hypertensive urgency ICD-10-CM: I16.0  ICD-9-CM: 401.9  7/19/2019 Unknown        * (Principal) Chest pain ICD-10-CM: R07.9  ICD-9-CM: 786.50  7/18/2019 Unknown                Vital Signs:    Last 24hrs VS reviewed since prior progress note. Most recent are:  Visit Vitals  /69 (BP 1 Location: Right arm, BP Patient Position: Sitting)   Pulse 85   Temp 97.5 °F (36.4 °C)   Resp 20   Ht 5' 3\" (1.6 m)   Wt 67.6 kg (149 lb 0.5 oz)   SpO2 97%   Breastfeeding?  No   BMI 26.40 kg/m²         Intake/Output Summary (Last 24 hours) at 7/24/2019 1529  Last data filed at 7/24/2019 1034  Gross per 24 hour   Intake 240 ml   Output 450 ml   Net -210 ml        Physical Examination: Constitutional:  No acute distress, cooperative, pleasant    ENT:  Oral mucous moist, oropharynx benign. Neck supple,    Resp:  CTA bilaterally. No wheezing/rhonchi/rales. No accessory muscle use   CV:  Regular rhythm, normal rate, no murmurs, gallops, rubs    GI:  Soft, non distended, non tender. normoactive bowel sounds, no hepatosplenomegaly     Musculoskeletal:  No edema    Neurologic:  Moves all extremities. AAOx3            Data Review:    Review and/or order of clinical lab test  Review and/or order of tests in the radiology section of CPT  Review and/or order of tests in the medicine section of CPT      Labs:     Recent Labs     07/24/19 0341 07/23/19  0011   WBC 4.2 4.3   HGB 9.4* 9.4*   HCT 28.6* 29.3*   * 117*     Recent Labs     07/24/19  0341 07/23/19  0011 07/22/19  0425    137 140   K 3.9 3.9 3.7   * 108 112*   CO2 21 21 22   BUN 22* 21* 17   CREA 1.07* 1.04* 1.11*   * 112* 121*   CA 9.1 9.0 9.2     Recent Labs     07/23/19  0011 07/22/19  0425   SGOT 40* 39*   ALT 48 44   AP 58 59   TBILI 0.4 0.5   TP 6.5 7.2   ALB 3.6 4.0   GLOB 2.9 3.2     No results for input(s): INR, PTP, APTT in the last 72 hours. No lab exists for component: INREXT, INREXT   No results for input(s): FE, TIBC, PSAT, FERR in the last 72 hours. Lab Results   Component Value Date/Time    Folate 10.9 07/24/2019 03:41 AM      No results for input(s): PH, PCO2, PO2 in the last 72 hours.   Recent Labs     07/24/19  0341 07/23/19  1530 07/23/19  0942   TROIQ <0.05 <0.05 <0.05     No results found for: CHOL, CHOLX, CHLST, CHOLV, HDL, LDL, LDLC, DLDLP, TGLX, TRIGL, TRIGP, CHHD, CHHDX  Lab Results   Component Value Date/Time    Glucose (POC) 106 (H) 12/27/2016 12:24 PM     Lab Results   Component Value Date/Time    Color YELLOW/STRAW 06/24/2019 02:39 PM    Appearance CLEAR 06/24/2019 02:39 PM    Specific gravity 1.010 06/24/2019 02:39 PM    pH (UA) 6.0 06/24/2019 02:39 PM    Protein NEGATIVE 06/24/2019 02:39 PM    Glucose NEGATIVE  06/24/2019 02:39 PM    Ketone NEGATIVE  06/24/2019 02:39 PM    Bilirubin NEGATIVE  06/24/2019 02:39 PM    Urobilinogen 0.2 06/24/2019 02:39 PM    Nitrites NEGATIVE  06/24/2019 02:39 PM    Leukocyte Esterase NEGATIVE  06/24/2019 02:39 PM    Epithelial cells FEW 06/24/2019 02:39 PM    Bacteria NEGATIVE  06/24/2019 02:39 PM    WBC 0-4 06/24/2019 02:39 PM    RBC 0-5 06/24/2019 02:39 PM         Medications Reviewed:     Current Facility-Administered Medications   Medication Dose Route Frequency    0.9% sodium chloride infusion 1,000 mL  1,000 mL IntraVENous CONTINUOUS    sodium chloride (NS) flush 5-40 mL  5-40 mL IntraVENous Q8H    sodium chloride (NS) flush 5-40 mL  5-40 mL IntraVENous PRN    aspirin chewable tablet 81 mg  81 mg Oral DAILY    clopidogrel (PLAVIX) tablet 75 mg  75 mg Oral DAILY    nitroglycerin (Tridil) 200 mcg/ml infusion  0-200 mcg/min IntraVENous TITRATE    [Held by provider] prazosin (MINIPRESS) capsule 2 mg  2 mg Oral TID    [Held by provider] carvedilol (COREG) tablet 3.125 mg  3.125 mg Oral BID WITH MEALS    traMADol (ULTRAM) tablet 50 mg  50 mg Oral Q6H PRN    psyllium husk-aspartame (METAMUCIL FIBER) packet 1 Packet  1 Packet Oral BID    [Held by provider] losartan (COZAAR) tablet 100 mg  100 mg Oral DAILY    ALPRAZolam (XANAX) tablet 0.25 mg  0.25 mg Oral QID PRN    zolpidem (AMBIEN) tablet 10 mg  10 mg Oral QHS PRN    sodium chloride (NS) flush 5-40 mL  5-40 mL IntraVENous Q8H    sodium chloride (NS) flush 5-40 mL  5-40 mL IntraVENous PRN    acetaminophen (TYLENOL) tablet 650 mg  650 mg Oral Q4H PRN    ondansetron (ZOFRAN) injection 4 mg  4 mg IntraVENous Q4H PRN    enoxaparin (LOVENOX) injection 40 mg  40 mg SubCUTAneous Q24H    labetalol (NORMODYNE;TRANDATE) 20 mg/4 mL (5 mg/mL) injection 10 mg  10 mg IntraVENous Q6H PRN    simvastatin (ZOCOR) tablet 10 mg  10 mg Oral QHS ______________________________________________________________________  EXPECTED LENGTH OF STAY: 2d 4h  ACTUAL LENGTH OF STAY:          5                 Aransas Larger, MD

## 2019-07-24 NOTE — PROGRESS NOTES
200: TRANSFER - IN REPORT:    Verbal report received from [facility] (name) on Naomi Winston  being received from [facility] (unit) for ordered procedure      Report consisted of patients Situation, Background, Assessment and   Recommendations(SBAR). Information from the following report(s) SBAR was reviewed with the receiving nurse. Opportunity for questions and clarification was provided. Assessment completed upon patients arrival to unit and care assumed.

## 2019-07-24 NOTE — PROGRESS NOTES
TRANSFER - OUT REPORT:    Verbal report given to John Davenport on Stephen Faith being transferred to [address] [place] [address] for routine progression of care       Report consisted of patients Situation, Background, Assessment and   Recommendations(SBAR). Information from the following report(s) SBAR was reviewed with the receiving nurse. Opportunity for questions and clarification was provided.

## 2019-07-25 LAB
ANION GAP SERPL CALC-SCNC: 9 MMOL/L (ref 5–15)
BASOPHILS # BLD: 0 K/UL (ref 0–0.1)
BASOPHILS NFR BLD: 0 % (ref 0–1)
BUN SERPL-MCNC: 21 MG/DL (ref 6–20)
BUN/CREAT SERPL: 13 (ref 12–20)
CALCIUM SERPL-MCNC: 9 MG/DL (ref 8.5–10.1)
CHLORIDE SERPL-SCNC: 108 MMOL/L (ref 97–108)
CO2 SERPL-SCNC: 20 MMOL/L (ref 21–32)
CREAT SERPL-MCNC: 1.62 MG/DL (ref 0.55–1.02)
DIFFERENTIAL METHOD BLD: ABNORMAL
EOSINOPHIL # BLD: 0.1 K/UL (ref 0–0.4)
EOSINOPHIL NFR BLD: 3 % (ref 0–7)
ERYTHROCYTE [DISTWIDTH] IN BLOOD BY AUTOMATED COUNT: 12.3 % (ref 11.5–14.5)
GLUCOSE SERPL-MCNC: 118 MG/DL (ref 65–100)
HCT VFR BLD AUTO: 28.3 % (ref 35–47)
HGB BLD-MCNC: 9.3 G/DL (ref 11.5–16)
IMM GRANULOCYTES # BLD AUTO: 0 K/UL (ref 0–0.04)
IMM GRANULOCYTES NFR BLD AUTO: 0 % (ref 0–0.5)
LYMPHOCYTES # BLD: 1.1 K/UL (ref 0.8–3.5)
LYMPHOCYTES NFR BLD: 24 % (ref 12–49)
MCH RBC QN AUTO: 34.4 PG (ref 26–34)
MCHC RBC AUTO-ENTMCNC: 32.9 G/DL (ref 30–36.5)
MCV RBC AUTO: 104.8 FL (ref 80–99)
MONOCYTES # BLD: 0.4 K/UL (ref 0–1)
MONOCYTES NFR BLD: 8 % (ref 5–13)
NEUTS SEG # BLD: 2.8 K/UL (ref 1.8–8)
NEUTS SEG NFR BLD: 65 % (ref 32–75)
NRBC # BLD: 0 K/UL (ref 0–0.01)
NRBC BLD-RTO: 0 PER 100 WBC
PLATELET # BLD AUTO: 109 K/UL (ref 150–400)
PMV BLD AUTO: 9.2 FL (ref 8.9–12.9)
POTASSIUM SERPL-SCNC: 3.9 MMOL/L (ref 3.5–5.1)
RBC # BLD AUTO: 2.7 M/UL (ref 3.8–5.2)
SODIUM SERPL-SCNC: 137 MMOL/L (ref 136–145)
WBC # BLD AUTO: 4.4 K/UL (ref 3.6–11)

## 2019-07-25 PROCEDURE — 65660000001 HC RM ICU INTERMED STEPDOWN

## 2019-07-25 PROCEDURE — 74011250637 HC RX REV CODE- 250/637: Performed by: HOSPITALIST

## 2019-07-25 PROCEDURE — 74011250637 HC RX REV CODE- 250/637: Performed by: NURSE PRACTITIONER

## 2019-07-25 PROCEDURE — 74011000258 HC RX REV CODE- 258: Performed by: INTERNAL MEDICINE

## 2019-07-25 PROCEDURE — 36415 COLL VENOUS BLD VENIPUNCTURE: CPT

## 2019-07-25 PROCEDURE — 74011250636 HC RX REV CODE- 250/636: Performed by: INTERNAL MEDICINE

## 2019-07-25 PROCEDURE — 74011250637 HC RX REV CODE- 250/637: Performed by: INTERNAL MEDICINE

## 2019-07-25 PROCEDURE — 85025 COMPLETE CBC W/AUTO DIFF WBC: CPT

## 2019-07-25 PROCEDURE — 74011000250 HC RX REV CODE- 250: Performed by: INTERNAL MEDICINE

## 2019-07-25 PROCEDURE — 74011250636 HC RX REV CODE- 250/636: Performed by: HOSPITALIST

## 2019-07-25 PROCEDURE — 80048 BASIC METABOLIC PNL TOTAL CA: CPT

## 2019-07-25 RX ORDER — CARVEDILOL 6.25 MG/1
6.25 TABLET ORAL 2 TIMES DAILY
Status: COMPLETED | OUTPATIENT
Start: 2019-07-25 | End: 2019-07-25

## 2019-07-25 RX ORDER — ENOXAPARIN SODIUM 100 MG/ML
30 INJECTION SUBCUTANEOUS EVERY 24 HOURS
Status: DISCONTINUED | OUTPATIENT
Start: 2019-07-25 | End: 2019-07-26 | Stop reason: HOSPADM

## 2019-07-25 RX ORDER — SODIUM CHLORIDE 9 MG/ML
75 INJECTION, SOLUTION INTRAVENOUS CONTINUOUS
Status: DISCONTINUED | OUTPATIENT
Start: 2019-07-25 | End: 2019-07-25

## 2019-07-25 RX ORDER — CEFDINIR 300 MG/1
300 CAPSULE ORAL EVERY 24 HOURS
Status: DISCONTINUED | OUTPATIENT
Start: 2019-07-25 | End: 2019-07-26

## 2019-07-25 RX ORDER — CARVEDILOL 12.5 MG/1
12.5 TABLET ORAL 2 TIMES DAILY WITH MEALS
Status: DISCONTINUED | OUTPATIENT
Start: 2019-07-25 | End: 2019-07-26 | Stop reason: HOSPADM

## 2019-07-25 RX ORDER — CARVEDILOL 6.25 MG/1
6.25 TABLET ORAL 2 TIMES DAILY WITH MEALS
Status: DISCONTINUED | OUTPATIENT
Start: 2019-07-25 | End: 2019-07-25

## 2019-07-25 RX ORDER — NIFEDIPINE 60 MG/1
60 TABLET, EXTENDED RELEASE ORAL DAILY
Status: DISCONTINUED | OUTPATIENT
Start: 2019-07-25 | End: 2019-07-26

## 2019-07-25 RX ADMIN — LABETALOL HYDROCHLORIDE 0.5 MG/MIN: 5 INJECTION, SOLUTION INTRAVENOUS at 07:27

## 2019-07-25 RX ADMIN — ALPRAZOLAM 0.25 MG: 0.25 TABLET ORAL at 10:17

## 2019-07-25 RX ADMIN — SODIUM CHLORIDE 75 ML/HR: 900 INJECTION, SOLUTION INTRAVENOUS at 10:23

## 2019-07-25 RX ADMIN — ENOXAPARIN SODIUM 30 MG: 30 INJECTION SUBCUTANEOUS at 23:54

## 2019-07-25 RX ADMIN — ZOLPIDEM TARTRATE 10 MG: 5 TABLET ORAL at 21:43

## 2019-07-25 RX ADMIN — ASPIRIN 81 MG 81 MG: 81 TABLET ORAL at 10:07

## 2019-07-25 RX ADMIN — Medication 10 ML: at 04:17

## 2019-07-25 RX ADMIN — CARVEDILOL 6.25 MG: 6.25 TABLET, FILM COATED ORAL at 11:57

## 2019-07-25 RX ADMIN — CARVEDILOL 12.5 MG: 12.5 TABLET, FILM COATED ORAL at 17:11

## 2019-07-25 RX ADMIN — Medication 10 ML: at 21:49

## 2019-07-25 RX ADMIN — SODIUM CHLORIDE 75 ML/HR: 900 INJECTION, SOLUTION INTRAVENOUS at 10:07

## 2019-07-25 RX ADMIN — ACETAMINOPHEN 650 MG: 325 TABLET ORAL at 10:17

## 2019-07-25 RX ADMIN — PSYLLIUM HUSK 1 PACKET: 3.4 POWDER ORAL at 10:07

## 2019-07-25 RX ADMIN — SIMVASTATIN 10 MG: 10 TABLET, FILM COATED ORAL at 21:44

## 2019-07-25 RX ADMIN — NIFEDIPINE 60 MG: 60 TABLET, FILM COATED, EXTENDED RELEASE ORAL at 10:00

## 2019-07-25 RX ADMIN — PSYLLIUM HUSK 1 PACKET: 3.4 POWDER ORAL at 17:11

## 2019-07-25 RX ADMIN — ALPRAZOLAM 0.25 MG: 0.25 TABLET ORAL at 04:17

## 2019-07-25 RX ADMIN — CLOPIDOGREL BISULFATE 75 MG: 75 TABLET ORAL at 10:07

## 2019-07-25 RX ADMIN — ACETAMINOPHEN 650 MG: 325 TABLET ORAL at 17:17

## 2019-07-25 RX ADMIN — Medication 10 ML: at 17:11

## 2019-07-25 RX ADMIN — CARVEDILOL 6.25 MG: 6.25 TABLET, FILM COATED ORAL at 10:17

## 2019-07-25 RX ADMIN — CEFDINIR 300 MG: 300 CAPSULE ORAL at 11:57

## 2019-07-25 NOTE — PROGRESS NOTES
Day #1 of Cefdinir  Indication:  SSTI  Current regimen:  300 mg every 12 hours  Abx regimen: Cefdinir monotherapy  Recent Labs     19  0242 19  0341 19  0011   WBC 4.4 4.2 4.3   CREA 1.62* 1.07* 1.04*   BUN 21* 22* 21*     Est CrCl: 29 ml/min; UO: 0.3 ml/kg/hr  Temp (24hrs), Av.2 °F (36.8 °C), Min:97.5 °F (36.4 °C), Max:99.5 °F (37.5 °C)    Cultures: None    Plan: Change to cefdinir 300 mg PO every 24 hours  per [facility] P&T Committee Protocol with respect to renal function. Pharmacy will continue to monitor patient daily and will make dosage adjustments based upon changing renal function.

## 2019-07-25 NOTE — PROGRESS NOTES
[facility] [place]: Shawn Nett  [phone]          Cardiology Consult/Progress Note      Subjective: Anxious but no symptoms. Creatinine bump noted. Headache last night requiring change of Nitroglycerine to Labetalol drip. Had transient pain in right flank which resolved post op. Troponins negative    Creatinine stable at 1.0    Plan update:S/P MARY of right main renal artery. Still needing labetelol drip. Did not receive usual oral anti HTN yesterday. Will be better able to assess need for anti HTN today. D/C IV fluids. Will transition to orals from Labetalol drip. Creatinine elevate today?? BENNIE. Requesting/referring provider: Dr. Daisy Molina  Reason for Consult: Uncontrolled hypertension    HPI: Gurpreet Neisha, a 69y.o. year-old who presents for evaluation of uncontrolled hypertension. Mrs. Marleen Garcia has history of extremely difficult to control hypertension and was admitted to the hospital with malignant hypertension. For the past few months she has had more difficulty controlling high blood pressure. She has been compliant with multiple medication that she is been prescribed. Additionally she reports episodes when she would acutely become short of breath in association with elevated blood pressures. These episodes appear to be consistent with flash pulmonary edema. During current hospitalization she required IV calcium channel blockers to control her blood pressure in addition to the oral antihypertensive that she has been on. Further evaluation due to concern regarding renal artery stenosis in the form of a CT angiogram demonstrated renal artery stenosis. Investigations  ECG: Sinus bradycardia, nonspecific ST-T changes. Echocardiogram: Normal LV systolic function mild to moderate LV hypertrophy consistent with hypertensive heart disease. CT abdomen with angiogram and runoff: 2 renal arteries are present bilaterally.   Right major renal artery has at least moderate to severe ostial stenosis. Accessory right renal artery is free of significant disease. Left renal arteries are equal in size. CT scan has been reported to demonstrate disease in superior renal artery although I cannot personally confirm it. Both arteries on the left appear to be too small to provide durable results with renal artery stenting. Assessment/Plan:  1. Hypotension likely secondary to renal artery stenosis with current presentation with malignant hypertension  2. Atherosclerotic vascular disease  3. Episode suggestive of flash pulmonary edema related to uncontrolled hypertension  4. Renal artery stenosis bilateral.  5.  Remote history of tobacco abuse    Mrs. Srini Alvarez was seen in consultation regarding uncontrolled hypertension with underlying renal artery stenosis. Moreover clinically she reports symptomatic shortness of breath which is episodic and sudden suggestive of recurrent flash pulmonary edema. These appear to be driven by renal artery stenosis which appears to be bilateral although she has excess renal arteries on both sides and significant disease only impacts one branch on either side. She is likely to benefit from renal artery stenting given her symptoms of recurrent pulmonary edema and manifestation of refractory hypertension. After evaluation significant I suggest proceeding further with further evaluation of renal arteries with invasive angiography and if deemed necessary she may benefit from revascularization. Since she got contrast load today with at least 100 cc of iodinated contrast I recommend at least waiting 96 hours to prevent contrast nephropathy. Hence we will proceed with above testing towards early to mid next week. Past medical history:  She  has a past medical history of Hypertension. She also has no past medical history of Adverse effect of anesthesia. Patient reports no dyspnea/PND/Orthpnea/CP.  He reports no cough/fever/focal neurological deficits/abdominal pain. All other systems negative except as above. PE  Vitals:    07/24/19 2128 07/24/19 2315 07/25/19 0311 07/25/19 0733   BP: (!) 165/98 130/73 159/69 131/61   Pulse: 88 68 70 75   Resp:  16 18 18   Temp:  98.1 °F (36.7 °C) 98 °F (36.7 °C) 99.5 °F (37.5 °C)   SpO2:  98% 98% 96%   Weight:       Height:        Body mass index is 26.4 kg/m². General:    Alert, cooperative, no distress. Psychiatric:    Normal Mood and affect    Eye/ENT:      Pupils equal, No asymmetry, Conjunctival pink. Able to hear voice at normal amplitude   Lungs:      Visibly symmetric chest expansion, No palpable tenderness. Clear to auscultation bilaterally. Heart[de-identified]    Regular rate and rhythm, S1, S2 normal, no murmur, click, rub or gallop. No JVD, Normal palpable peripheral pulses. No cyanosis   Abdomen:     Soft, non-tender. Bowel sounds normal. No masses,  No      organomegaly. No renal bruit   Extremities:   Extremities normal, atraumatic, no edema. Neurologic:   CN II-XII grossly intact.  No gross focal deficits           Recent Labs:  No results found for: CHOL, CHOLX, CHLST, CHOLV, 371048, HDL, LDL, LDLC, DLDLP, TGLX, TRIGL, TRIGP, CHHD, CHHDX  Lab Results   Component Value Date/Time    Creatinine (POC) 0.9 10/20/2016 10:16 AM    Creatinine 1.62 (H) 07/25/2019 02:42 AM     Lab Results   Component Value Date/Time    BUN 21 (H) 07/25/2019 02:42 AM     Lab Results   Component Value Date/Time    Potassium 3.9 07/25/2019 02:42 AM     No results found for: HBA1C, HGBE8, UUO3JHBK, HEH4JZXC  Lab Results   Component Value Date/Time    HGB 9.3 (L) 07/25/2019 02:42 AM     Lab Results   Component Value Date/Time    PLATELET 082 (L) 24/83/7968 02:42 AM       Reviewed:  Past Medical History:   Diagnosis Date    Hypertension      Social History     Tobacco Use   Smoking Status Former Smoker    Packs/day: 0.50   Smokeless Tobacco Never Used     Social History     Substance and Sexual Activity   Alcohol Use Yes    Alcohol/week: 6.0 standard drinks    Types: 6 Glasses of wine per week     Allergies   Allergen Reactions    Amlodipine Other (comments)     intolerant    Clonidine Other (comments)     fatigue     Family History   Problem Relation Age of Onset    Heart Attack Father 47        Current Facility-Administered Medications   Medication Dose Route Frequency    carvedilol (COREG) tablet 6.25 mg  6.25 mg Oral BID WITH MEALS    NIFEdipine ER (PROCARDIA XL) tablet 60 mg  60 mg Oral DAILY    0.9% sodium chloride infusion  75 mL/hr IntraVENous CONTINUOUS    cefdinir (OMNICEF) capsule 300 mg  300 mg Oral Q24H    sodium chloride (NS) flush 5-40 mL  5-40 mL IntraVENous PRN    aspirin chewable tablet 81 mg  81 mg Oral DAILY    clopidogrel (PLAVIX) tablet 75 mg  75 mg Oral DAILY    labetalol (NORMODYNE;TRANDATE) 20 mg/4 mL (5 mg/mL) injection 20 mg  20 mg IntraVENous Q4H PRN    labetalol (NORMODYNE;TRANDATE) 300 mg in 0.9% sodium chloride 150 mL (2 mg / 1 mL) infusion  0.5-2 mg/min IntraVENous TITRATE    traMADol (ULTRAM) tablet 50 mg  50 mg Oral Q6H PRN    psyllium husk-aspartame (METAMUCIL FIBER) packet 1 Packet  1 Packet Oral BID    ALPRAZolam (XANAX) tablet 0.25 mg  0.25 mg Oral QID PRN    zolpidem (AMBIEN) tablet 10 mg  10 mg Oral QHS PRN    sodium chloride (NS) flush 5-40 mL  5-40 mL IntraVENous Q8H    sodium chloride (NS) flush 5-40 mL  5-40 mL IntraVENous PRN    acetaminophen (TYLENOL) tablet 650 mg  650 mg Oral Q4H PRN    ondansetron (ZOFRAN) injection 4 mg  4 mg IntraVENous Q4H PRN    enoxaparin (LOVENOX) injection 40 mg  40 mg SubCUTAneous Q24H    simvastatin (ZOCOR) tablet 10 mg  10 mg Oral QHS       Addy Corbin MD07/25/19 There are other unrelated non-urgent complaints, but due to the busy schedule and the amount of time I've already spent with her, time does not permit me to address these routine issues at today's visit.  I've requested another appointment to review these additional issues.     [facility] [place]  [address], [address]

## 2019-07-25 NOTE — PROGRESS NOTES
1930: Bedside shift change report received by Estela (offgoing nurse). Report included the following information SBAR, Kardex, Procedure Summary, Intake/Output, MAR, Recent Results and Cardiac Rhythm SR .     2134: Pt called out c/o severe headache. Medicated with tylenol. BP as documented. Paged and notified Dr. Charlie Adame, on call for Dr. Marylin Chow. MD verbalized understanding. MD to call back with orders after speaking with interventionalist.     2148: Received call back from Dr. Charlie Adame. Orders received, repeated back and verified to discontinue nitro gtt and to start labatalol gtt, start at 1 mg/min and titrate to SBP <160.     0730: Bedside shift change report given to Estela (oncoming nurse). Report included the following information SBAR, Kardex, Procedure Summary, Intake/Output, MAR, Recent Results and Cardiac Rhythm SR.       ----------------  Care Plan 2028  Problem: Falls - Risk of  Goal: *Absence of Falls  Description  Document Adriane Smith Fall Risk and appropriate interventions in the flowsheet. Outcome: Progressing Towards Goal  Note:   Fall Risk Interventions:  Mobility Interventions: Communicate number of staff needed for ambulation/transfer         Medication Interventions: Evaluate medications/consider consulting pharmacy, Patient to call before getting OOB, Teach patient to arise slowly         History of Falls Interventions: Door open when patient unattended         Problem: Hypertension  Goal: *Blood pressure within specified parameters  Outcome: Progressing Towards Goal  Note:   See flow sheets       Problem: Pressure Injury - Risk of  Goal: *Prevention of pressure injury  Description  Document Dawson Scale and appropriate interventions in the flowsheet.   Outcome: Progressing Towards Goal  Note:   Pressure Injury Interventions:                 Mobility Interventions: HOB 30 degrees or less, Pressure redistribution bed/mattress (bed type), PT/OT evaluation    Nutrition Interventions: Document food/fluid/supplement intake

## 2019-07-25 NOTE — PROGRESS NOTES
1530 Bedside and Verbal shift change report given to ELISHA Choi (oncoming nurse) by Juan Antonio Shoemaker (offgoing nurse). Report included the following information SBAR, Kardex, Intake/Output, MAR and Cardiac Rhythm NSR.   1930 Bedside and Verbal shift change report given to Jean [id number] (oncoming nurse) by Koko Marquez (offgoing nurse). Report included the following information SBAR, Kardex, Intake/Output, MAR and Cardiac Rhythm NSR.

## 2019-07-25 NOTE — PROGRESS NOTES
Hospitalist Progress Note  José Araujo MD  Answering service: [phone] from in house phone  Cell: [phone]      Date of Service:  2019  NAME:  Khang Morgan  :  1945  MRN:  [id number]      Admission Summary:     A 69 y.o lady with HTN who presented to the [facility] ED due to severely elevated blood pressure. She states that for the past 4 days her BP has been in the 230s/100s. She began experiencing headaches and chest discomfort today. She normally takes metoprolol and losartan at night and last took them on . She was also prescribed amlodipine 4 days ago and has been taking it every morning, and her clonidine was discontinued at the same time, but she took it this morning due to her persistently elevated BP. While in the ER, she was given 20 mg IV hydralazine and felt light-headed, her SBP was noted to be in the 90's. She currently feels better but is very fatigued. Her headache persists but is decreased in severity. No nausea or vomiting.        Interval history / Subjective:     Pt seen for FU of CC: AMRY, chest pain  Patient seen and examined by the bedside, Labs, images and notes reviewed  Pt feels better, had headache overnight due to NTG gtt, resolved now on Labetalol gtt. No N/V/D, BP readings are improving  Discussed with nursing staff, no acute issues overnight, orders reviewed. 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 Moderate to severe stenosis of the main right renal artery and the accessory lower pole left renal Artery, accessory upper pole right renal artery and left upper pole renal artery are patent without significant stenosis.   - cards on board, Dr. Mendoza Body did renal angio and right main renal artery stenting with 5 x 15 mm Herculing stent. Cont ASA, Plavix  -nephrology on board, notes reviewed  -Nephro resumed patient on Procardia and Coreg.  - off NTG gtt due to headache, currently being weaned off labetalol.    - Chest pain and jaw discomfort, typical, not POA  Resolved with NTG. Likely has hypertensive heart disease, high risk for CAD given long standing HTN and smoking history  EKG reviewed by myself shows inferolateral ST depressions  Trp neg  Cards already on board, Dr. Jose Martin Perez DW patient, no coronary angio done with renal angio as there is risk of BENNIE. Plans for cardiac work up later. Echo 7/19 shows normal LVEF    - CASSY, likely hypertensive  Watch for BENNIE  Repeat labs in am  Nephro on board    Hx of hypercholesterolemia   -continue zocor    T11 age indeterminate compression deformity on CT    -had  hx of back surgery and fall   -stable    Left forearm cellulitis: worsening pain  Risk of cellulitis, will start on omnicef x 5 days    Anemia: likely AOCD, cont to monitor    Code status: Full Code  DVT prophylaxis: lovenox    Care Plan discussed with: Patient/Family and Nurse  Disposition: TBD     Hospital Problems  Date Reviewed: 12/11/2018          Codes Class Noted POA    Hypertensive urgency ICD-10-CM: I16.0  ICD-9-CM: 401.9  7/19/2019 Unknown        * (Principal) Chest pain ICD-10-CM: R07.9  ICD-9-CM: 786.50  7/18/2019 Unknown                Vital Signs:    Last 24hrs VS reviewed since prior progress note. Most recent are:  Visit Vitals  /61 (BP 1 Location: Left arm, BP Patient Position: At rest)   Pulse 75   Temp 99.5 °F (37.5 °C)   Resp 18   Ht 5' 3\" (1.6 m)   Wt 67.6 kg (149 lb 0.5 oz)   SpO2 96%   Breastfeeding?  No   BMI 26.40 kg/m²         Intake/Output Summary (Last 24 hours) at 7/25/2019 0958  Last data filed at 7/25/2019 0311  Gross per 24 hour   Intake 438.48 ml   Output 450 ml   Net -11.52 ml        Physical Examination:             Constitutional:  No acute distress, cooperative, pleasant    ENT:  Oral mucous moist, oropharynx benign. Neck supple,    Resp:  CTA bilaterally. No wheezing/rhonchi/rales. No accessory muscle use   CV:  Regular rhythm, normal rate, no murmurs, gallops, rubs    GI:  Soft, non distended, non tender. normoactive bowel sounds, no hepatosplenomegaly     Musculoskeletal:  No edema, left form arm cellulitic area. Neurologic:  Moves all extremities. AAOx3            Data Review:    Review and/or order of clinical lab test  Review and/or order of tests in the radiology section of CPT  Review and/or order of tests in the medicine section of CPT      Labs:     Recent Labs     07/25/19  0242 07/24/19  0341   WBC 4.4 4.2   HGB 9.3* 9.4*   HCT 28.3* 28.6*   * 119*     Recent Labs     07/25/19  0242 07/24/19  0341 07/23/19  0011    140 137   K 3.9 3.9 3.9    110* 108   CO2 20* 21 21   BUN 21* 22* 21*   CREA 1.62* 1.07* 1.04*   * 129* 112*   CA 9.0 9.1 9.0     Recent Labs     07/23/19  0011   SGOT 40*   ALT 48   AP 58   TBILI 0.4   TP 6.5   ALB 3.6   GLOB 2.9     No results for input(s): INR, PTP, APTT in the last 72 hours. No lab exists for component: INREXT, INREXT   No results for input(s): FE, TIBC, PSAT, FERR in the last 72 hours. Lab Results   Component Value Date/Time    Folate 10.9 07/24/2019 03:41 AM      No results for input(s): PH, PCO2, PO2 in the last 72 hours.   Recent Labs     07/24/19  0341 07/23/19  1530 07/23/19  0942   TROIQ <0.05 <0.05 <0.05     No results found for: CHOL, CHOLX, CHLST, CHOLV, HDL, LDL, LDLC, DLDLP, TGLX, TRIGL, TRIGP, CHHD, CHHDX  Lab Results   Component Value Date/Time    Glucose (POC) 106 (H) 12/27/2016 12:24 PM     Lab Results   Component Value Date/Time    Color YELLOW/STRAW 06/24/2019 02:39 PM    Appearance CLEAR 06/24/2019 02:39 PM    Specific gravity 1.010 06/24/2019 02:39 PM    pH (UA) 6.0 06/24/2019 02:39 PM    Protein NEGATIVE  06/24/2019 02:39 PM    Glucose NEGATIVE  06/24/2019 02:39 PM    Ketone NEGATIVE  06/24/2019 02:39 PM    Bilirubin NEGATIVE  06/24/2019 02:39 PM    Urobilinogen 0.2 06/24/2019 02:39 PM    Nitrites NEGATIVE  06/24/2019 02:39 PM    Leukocyte Esterase NEGATIVE  06/24/2019 02:39 PM    Epithelial cells FEW 06/24/2019 02:39 PM    Bacteria NEGATIVE  06/24/2019 02:39 PM    WBC 0-4 06/24/2019 02:39 PM    RBC 0-5 06/24/2019 02:39 PM         Medications Reviewed:     Current Facility-Administered Medications   Medication Dose Route Frequency    carvedilol (COREG) tablet 6.25 mg  6.25 mg Oral BID WITH MEALS    NIFEdipine ER (PROCARDIA XL) tablet 60 mg  60 mg Oral DAILY    0.9% sodium chloride infusion  75 mL/hr IntraVENous CONTINUOUS    cefdinir (OMNICEF) capsule 300 mg  300 mg Oral Q12H    sodium chloride (NS) flush 5-40 mL  5-40 mL IntraVENous PRN    aspirin chewable tablet 81 mg  81 mg Oral DAILY    clopidogrel (PLAVIX) tablet 75 mg  75 mg Oral DAILY    labetalol (NORMODYNE;TRANDATE) 20 mg/4 mL (5 mg/mL) injection 20 mg  20 mg IntraVENous Q4H PRN    labetalol (NORMODYNE;TRANDATE) 300 mg in 0.9% sodium chloride 150 mL (2 mg / 1 mL) infusion  0.5-2 mg/min IntraVENous TITRATE    traMADol (ULTRAM) tablet 50 mg  50 mg Oral Q6H PRN    psyllium husk-aspartame (METAMUCIL FIBER) packet 1 Packet  1 Packet Oral BID    ALPRAZolam (XANAX) tablet 0.25 mg  0.25 mg Oral QID PRN    zolpidem (AMBIEN) tablet 10 mg  10 mg Oral QHS PRN    sodium chloride (NS) flush 5-40 mL  5-40 mL IntraVENous Q8H    sodium chloride (NS) flush 5-40 mL  5-40 mL IntraVENous PRN    acetaminophen (TYLENOL) tablet 650 mg  650 mg Oral Q4H PRN    ondansetron (ZOFRAN) injection 4 mg  4 mg IntraVENous Q4H PRN    enoxaparin (LOVENOX) injection 40 mg  40 mg SubCUTAneous Q24H    simvastatin (ZOCOR) tablet 10 mg  10 mg Oral QHS     ______________________________________________________________________  EXPECTED LENGTH OF STAY: 2d 4h  ACTUAL LENGTH OF STAY:          6                 Libertad Chang MD

## 2019-07-26 ENCOUNTER — TELEPHONE (OUTPATIENT)
Dept: CARDIOLOGY CLINIC | Age: 74
End: 2019-07-26

## 2019-07-26 VITALS
DIASTOLIC BLOOD PRESSURE: 51 MMHG | RESPIRATION RATE: 18 BRPM | BODY MASS INDEX: 26.48 KG/M2 | OXYGEN SATURATION: 97 % | TEMPERATURE: 98.7 F | HEIGHT: 63 IN | HEART RATE: 86 BPM | WEIGHT: 149.47 LBS | SYSTOLIC BLOOD PRESSURE: 145 MMHG

## 2019-07-26 DIAGNOSIS — I70.1 RENAL ARTERY ATHEROSCLEROSIS, BILATERAL (HCC): Primary | ICD-10-CM

## 2019-07-26 PROBLEM — I16.0 HYPERTENSIVE URGENCY: Status: RESOLVED | Noted: 2019-07-19 | Resolved: 2019-07-26

## 2019-07-26 PROBLEM — R07.9 CHEST PAIN: Status: RESOLVED | Noted: 2019-07-18 | Resolved: 2019-07-26

## 2019-07-26 LAB
ANION GAP SERPL CALC-SCNC: 8 MMOL/L (ref 5–15)
BASOPHILS # BLD: 0 K/UL (ref 0–0.1)
BASOPHILS NFR BLD: 0 % (ref 0–1)
BUN SERPL-MCNC: 16 MG/DL (ref 6–20)
BUN/CREAT SERPL: 11 (ref 12–20)
CALCIUM SERPL-MCNC: 8.2 MG/DL (ref 8.5–10.1)
CHLORIDE SERPL-SCNC: 111 MMOL/L (ref 97–108)
CO2 SERPL-SCNC: 20 MMOL/L (ref 21–32)
CREAT SERPL-MCNC: 1.41 MG/DL (ref 0.55–1.02)
DIFFERENTIAL METHOD BLD: ABNORMAL
EOSINOPHIL # BLD: 0.2 K/UL (ref 0–0.4)
EOSINOPHIL NFR BLD: 3 % (ref 0–7)
ERYTHROCYTE [DISTWIDTH] IN BLOOD BY AUTOMATED COUNT: 12.1 % (ref 11.5–14.5)
GLUCOSE SERPL-MCNC: 103 MG/DL (ref 65–100)
HCT VFR BLD AUTO: 25.8 % (ref 35–47)
HGB BLD-MCNC: 8.7 G/DL (ref 11.5–16)
IMM GRANULOCYTES # BLD AUTO: 0 K/UL (ref 0–0.04)
IMM GRANULOCYTES NFR BLD AUTO: 0 % (ref 0–0.5)
LYMPHOCYTES # BLD: 1.2 K/UL (ref 0.8–3.5)
LYMPHOCYTES NFR BLD: 25 % (ref 12–49)
MCH RBC QN AUTO: 34.9 PG (ref 26–34)
MCHC RBC AUTO-ENTMCNC: 33.7 G/DL (ref 30–36.5)
MCV RBC AUTO: 103.6 FL (ref 80–99)
MONOCYTES # BLD: 0.5 K/UL (ref 0–1)
MONOCYTES NFR BLD: 11 % (ref 5–13)
NEUTS SEG # BLD: 2.9 K/UL (ref 1.8–8)
NEUTS SEG NFR BLD: 61 % (ref 32–75)
NRBC # BLD: 0 K/UL (ref 0–0.01)
NRBC BLD-RTO: 0 PER 100 WBC
PLATELET # BLD AUTO: 126 K/UL (ref 150–400)
PMV BLD AUTO: 9.5 FL (ref 8.9–12.9)
POTASSIUM SERPL-SCNC: 3.6 MMOL/L (ref 3.5–5.1)
RBC # BLD AUTO: 2.49 M/UL (ref 3.8–5.2)
SODIUM SERPL-SCNC: 139 MMOL/L (ref 136–145)
WBC # BLD AUTO: 4.7 K/UL (ref 3.6–11)

## 2019-07-26 PROCEDURE — 74011250637 HC RX REV CODE- 250/637: Performed by: INTERNAL MEDICINE

## 2019-07-26 PROCEDURE — 36415 COLL VENOUS BLD VENIPUNCTURE: CPT

## 2019-07-26 PROCEDURE — 80048 BASIC METABOLIC PNL TOTAL CA: CPT

## 2019-07-26 PROCEDURE — 85025 COMPLETE CBC W/AUTO DIFF WBC: CPT

## 2019-07-26 PROCEDURE — 74011250637 HC RX REV CODE- 250/637: Performed by: HOSPITALIST

## 2019-07-26 RX ORDER — CARVEDILOL 12.5 MG/1
12.5 TABLET ORAL 2 TIMES DAILY WITH MEALS
Qty: 60 TAB | Refills: 2 | Status: SHIPPED | OUTPATIENT
Start: 2019-07-26 | End: 2019-10-24

## 2019-07-26 RX ORDER — CEFDINIR 300 MG/1
600 CAPSULE ORAL EVERY 24 HOURS
Qty: 8 CAP | Refills: 0 | Status: SHIPPED | OUTPATIENT
Start: 2019-07-26 | End: 2019-07-30

## 2019-07-26 RX ORDER — NIFEDIPINE 90 MG/1
90 TABLET, EXTENDED RELEASE ORAL DAILY
Qty: 30 TAB | Refills: 2 | Status: SHIPPED | OUTPATIENT
Start: 2019-07-27 | End: 2019-09-26 | Stop reason: SDUPTHER

## 2019-07-26 RX ORDER — CLOPIDOGREL BISULFATE 75 MG/1
75 TABLET ORAL DAILY
Qty: 30 TAB | Refills: 2 | Status: SHIPPED | OUTPATIENT
Start: 2019-07-27 | End: 2019-09-26 | Stop reason: SDUPTHER

## 2019-07-26 RX ORDER — CEFDINIR 300 MG/1
600 CAPSULE ORAL EVERY 24 HOURS
Status: DISCONTINUED | OUTPATIENT
Start: 2019-07-26 | End: 2019-07-26 | Stop reason: HOSPADM

## 2019-07-26 RX ORDER — GUAIFENESIN 100 MG/5ML
81 LIQUID (ML) ORAL DAILY
Qty: 30 TAB | Refills: 0 | Status: SHIPPED | OUTPATIENT
Start: 2019-07-27 | End: 2019-08-26

## 2019-07-26 RX ADMIN — CARVEDILOL 12.5 MG: 12.5 TABLET, FILM COATED ORAL at 08:20

## 2019-07-26 RX ADMIN — PSYLLIUM HUSK 1 PACKET: 3.4 POWDER ORAL at 08:20

## 2019-07-26 RX ADMIN — NIFEDIPINE 90 MG: 60 TABLET, FILM COATED, EXTENDED RELEASE ORAL at 08:20

## 2019-07-26 RX ADMIN — CEFDINIR 600 MG: 300 CAPSULE ORAL at 09:30

## 2019-07-26 RX ADMIN — CLOPIDOGREL BISULFATE 75 MG: 75 TABLET ORAL at 08:20

## 2019-07-26 RX ADMIN — TRAMADOL HYDROCHLORIDE 50 MG: 50 TABLET, FILM COATED ORAL at 08:20

## 2019-07-26 RX ADMIN — ASPIRIN 81 MG 81 MG: 81 TABLET ORAL at 08:20

## 2019-07-26 NOTE — PROGRESS NOTES
Cr better  BP overall stable  Increased Nifedipine to 90 mg   Prepare for d/c today   Will see her later this morning     Chris Muñiz MD  [facility]  Office :[phone]  Fax: [phone]

## 2019-07-26 NOTE — WOUND CARE
Wound Consult: consulted for left forearm. Patient's nurse [address] [facility] [facility] notes patient had Cardine running in that arm via IV. Patient reports she feels the IV was the irritating factor for resultant redness that is now fading pink, slightly warm, no drainage, minimal induration remains at site. Patient going home on po antibiotics for 5 days per MD.  No topical therapy indicated at this time.   Luis Rangel RN,CWCN

## 2019-07-26 NOTE — TELEPHONE ENCOUNTER
----- Message from Darvin Salinas MD sent at 7/26/2019 12:11 PM EDT -----  Follow up with me in 4 weeks with renal US doppler

## 2019-07-26 NOTE — PROGRESS NOTES
Nephrology Progress Note  Radha Carreno  Date of Admission : 7/18/2019    CC:  Follow up for malignant HTN       Assessment and Plan     Malignant renovascular HTN   - CTA : Moderate to severe MARY involving dominant R renal artery and accessory Left Renal artery   - S/P Right main renal artery angioplasty - 7/24  - d/c home on coreg 12.5 mg BID and Nifedipine XL 90 mg daily   - she will call Dr Chas Blood over w/e if any BP issues     CASSY :  - presumed contrast nephropathy   - Not suspecting atheroemboli post angioplasty   - stopped IVF     L forearm cellulitis   - 2/2 IV infiltration   - watch for now     Mild CKD  - baseline Cr 1.1-1.2 mg /dl   - 2/2 Hypertensive nephropathy      Dyslipidemia        Interval History:  Seen and examined. BP overall better    No cp, sob, n/v/d. Current Medications: all current  Medications have been eviewed in EPIC  Review of Systems: Pertinent items are noted in HPI. Objective:  Vitals:    Vitals:    07/25/19 1915 07/25/19 2313 07/26/19 0359 07/26/19 0828   BP: 138/49 149/57 165/61 172/70   Pulse: 71 74 83 86   Resp: 18 18 18 18   Temp: 97.8 °F (36.6 °C) 98.5 °F (36.9 °C) 98.6 °F (37 °C) 98.7 °F (37.1 °C)   SpO2: 95% 97% 97%    Weight:   67.8 kg (149 lb 7.6 oz)    Height:         Intake and Output:  No intake/output data recorded. 07/24 1901 - 07/26 0700  In: 1184.7 [P.O.:600;  I.V.:584.7]  Out: -     Physical Examination:  General: NAD,Conversant   Neck:  Supple, no mass  Resp:  Lungs CTA B/L, no wheezing , normal respiratory effort  CV:  RRR,  no murmur or rub, no LE edema  GI:  Soft, NT, + Bowel sounds, no hepatosplenomegaly  Neurologic:  Non focal  Psych:             AAO x 3 appropriate affect   Skin:  No Rash  :  No beckham    []    High complexity decision making was performed  []    Patient is at high-risk of decompensation with multiple organ involvement    Lab Data Personally Reviewed: I have reviewed all the pertinent labs, microbiology data and radiology studies during assessment. Recent Labs     07/26/19  0418 07/25/19  0242 07/24/19  0341    137 140   K 3.6 3.9 3.9   * 108 110*   CO2 20* 20* 21   * 118* 129*   BUN 16 21* 22*   CREA 1.41* 1.62* 1.07*   CA 8.2* 9.0 9.1     Recent Labs     07/26/19  0424 07/25/19  0242 07/24/19  0341   WBC 4.7 4.4 4.2   HGB 8.7* 9.3* 9.4*   HCT 25.8* 28.3* 28.6*   * 109* 119*     No results found for: SDES  No results found for: CULT  Recent Results (from the past 24 hour(s))   METABOLIC PANEL, BASIC    Collection Time: 07/26/19  4:18 AM   Result Value Ref Range    Sodium 139 136 - 145 mmol/L    Potassium 3.6 3.5 - 5.1 mmol/L    Chloride 111 (H) 97 - 108 mmol/L    CO2 20 (L) 21 - 32 mmol/L    Anion gap 8 5 - 15 mmol/L    Glucose 103 (H) 65 - 100 mg/dL    BUN 16 6 - 20 MG/DL    Creatinine 1.41 (H) 0.55 - 1.02 MG/DL    BUN/Creatinine ratio 11 (L) 12 - 20      GFR est AA 44 (L) >60 ml/min/1.73m2    GFR est non-AA 37 (L) >60 ml/min/1.73m2    Calcium 8.2 (L) 8.5 - 10.1 MG/DL   CBC WITH AUTOMATED DIFF    Collection Time: 07/26/19  4:24 AM   Result Value Ref Range    WBC 4.7 3.6 - 11.0 K/uL    RBC 2.49 (L) 3.80 - 5.20 M/uL    HGB 8.7 (L) 11.5 - 16.0 g/dL    HCT 25.8 (L) 35.0 - 47.0 %    .6 (H) 80.0 - 99.0 FL    MCH 34.9 (H) 26.0 - 34.0 PG    MCHC 33.7 30.0 - 36.5 g/dL    RDW 12.1 11.5 - 14.5 %    PLATELET 289 (L) 687 - 400 K/uL    MPV 9.5 8.9 - 12.9 FL    NRBC 0.0 0  WBC    ABSOLUTE NRBC 0.00 0.00 - 0.01 K/uL    NEUTROPHILS 61 32 - 75 %    LYMPHOCYTES 25 12 - 49 %    MONOCYTES 11 5 - 13 %    EOSINOPHILS 3 0 - 7 %    BASOPHILS 0 0 - 1 %    IMMATURE GRANULOCYTES 0 0.0 - 0.5 %    ABS. NEUTROPHILS 2.9 1.8 - 8.0 K/UL    ABS. LYMPHOCYTES 1.2 0.8 - 3.5 K/UL    ABS. MONOCYTES 0.5 0.0 - 1.0 K/UL    ABS. EOSINOPHILS 0.2 0.0 - 0.4 K/UL    ABS. BASOPHILS 0.0 0.0 - 0.1 K/UL    ABS. IMM.  GRANS. 0.0 0.00 - 0.04 K/UL    DF AUTOMATED                     Rick Vernon MD  [facility]   [address], [place] [place] [address]  Phone - [phone]   Fax - [phone]  [email or web address]

## 2019-07-26 NOTE — PROGRESS NOTES
0730: Bedside shift change report given to Jones Albarado (oncoming nurse) by Zheng Gibbons (offgoing nurse). Report included the following information SBAR, Kardex and Cardiac Rhythm NSR. Patients left arm still red and tender from cardene drip that was DC'd a few days ago. Wound care consulted. PO antibiotics given. 1130: I have reviewed discharge instructions with the patient. The patient verbalized understanding. Discharge Medication List as of 7/26/2019  9:53 AM      START taking these medications    Details   aspirin 81 mg chewable tablet Take 1 Tab by mouth daily for 30 days. , Print, Disp-30 Tab, R-0      carvedilol (COREG) 12.5 mg tablet Take 1 Tab by mouth two (2) times daily (with meals) for 90 days. , Print, Disp-60 Tab, R-2      cefdinir (OMNICEF) 300 mg capsule Take 2 Caps by mouth every twenty-four (24) hours for 4 days. , Print, Disp-8 Cap, R-0      clopidogrel (PLAVIX) 75 mg tab Take 1 Tab by mouth daily for 90 days. , Print, Disp-30 Tab, R-2      NIFEdipine ER (PROCARDIA XL) 90 mg ER tablet Take 1 Tab by mouth daily for 90 days. , Print, Disp-30 Tab, R-2         CONTINUE these medications which have NOT CHANGED    Details   zolpidem (AMBIEN) 10 mg tablet Take  by mouth nightly as needed for Sleep., Historical Med      simvastatin (ZOCOR) 10 mg tablet Take 10 mg by mouth nightly., Historical Med         STOP taking these medications       amLODIPine (NORVASC) 2.5 mg tablet Comments:   Reason for Stopping:         losartan (COZAAR) 100 mg tablet Comments:   Reason for Stopping:         cloNIDine HCl (CATAPRES) 0.1 mg tablet Comments:   Reason for Stopping:         metoprolol succinate (TOPROL-XL) 100 mg tablet Comments:   Reason for Stopping:         meloxicam (MOBIC) 15 mg tablet Comments:   Reason for Stopping:

## 2019-07-26 NOTE — PROGRESS NOTES
1945: Report received from [id number] [facility]    2150: Pt expressed concerns about extravasation of right arm and right radial access site. Pt concerned if swelling from right radial site was d/t procedure or extravasation of PIV in forearm. Pt denies loss/decline of sensation in arm/fingers. Right radial sight show no signs of hematoma but demonstrates diffuse swelling/ non-pitting edema around puncture site. Denies pain with palpation. Right dorsal forearm extravasation site appears red and firm immediately surrounding previous PIV insertion site approximately an inch on each side of the course of the vein and about 3 inches up the vein. Tissue outside of this site is soft does not elict pain upon palpation. Pt denies increase in size or increase in baseline but states pain with palpation. Pt able to flex/extend forearm without c/o pain. Will continue to apply cool/cold compresses. Offered to alternate with warm compress but denied at this time. Will continue to monitor. Will request clinical leadership/manager to follow/up with patient's concerns that she was not informed about radial access arm swelling that may ocur post procedure and that request to discontinue painful IV was delayed. 0745: Bedside and Verbal shift change report given to Glendy Suresh RN (oncoming nurse) by Dominguez Mendoza RN (offgoing nurse). Report included the following information SBAR, Kardex, ED Summary, Procedure Summary, Intake/Output, MAR, Recent Results, Med Rec Status and Cardiac Rhythm NSR.

## 2019-07-26 NOTE — DISCHARGE INSTRUCTIONS
Discharge Instructions       PATIENT ID: Ky Shafer  MRN: [id number]   YOB: 1945    DATE OF ADMISSION: 7/18/2019  4:38 PM    DATE OF DISCHARGE: 7/26/2019    PRIMARY CARE PROVIDER: Yoav Staton MD     ATTENDING PHYSICIAN: Nicolasa Sandifer, MD  DISCHARGING PROVIDER: Clark Mijares MD    To contact this individual call [phone] and ask the  to page. If unavailable ask to be transferred the Adult Hospitalist Department. DISCHARGE DIAGNOSES     Malignant hypertension: see discharge med rec, please call/FU with Dr. Evette Castañeda for further management of HTN. Bilateral MARY s/p right main renal artery stent  Chest pain: resolved, needs further work up as outpatient  Left fore-arm cellulitis    CONSULTATIONS: IP CONSULT TO HOSPITALIST  IP CONSULT TO NEPHROLOGY  IP CONSULT TO CARDIOLOGY    PROCEDURES/SURGERIES: Procedure(s):  ANGIOGRAPHY RENAL BILAT  Pta Renal Visceral Artery  Angioplasty Peripheral Artery    PENDING TEST RESULTS:   At the time of discharge the following test results are still pending: n/a    FOLLOW UP APPOINTMENTS:   Follow-up Information     Follow up With Specialties Details Why Contact Info    Yoav Staton MD Family Practice Schedule an appointment as soon as possible for a visit in 1 week for follow Up [address] [address]      Andrew Barrera MD Nephrology Schedule an appointment as soon as possible for a visit for post-hospitalization follow up, with in 7 days, for follow Up. call Dr. Sun Molina if questions about the BP issues [address] [facility] [address]  [facility]      Doris Serna MD Cardiology Schedule an appointment as soon as possible for a visit for follow Up [address] [facility] [address] [facility]  [phone]             ADDITIONAL CARE RECOMMENDATIONS:   · It is important that you take the medication exactly as they are prescribed.    · Keep your medication in the bottles provided by the pharmacist and keep a list of the medication names, dosages, and times to be taken in your wallet. · Do not take other medications without consulting your doctor. · No drinking alcohol or driving car or operating machinery if you are on narcotic pain medications. Donot take sedating mediations if you are sleepy or confused. · Fall Precautions  · Keep Well Hydrated  · Report to your medical provider if you feel you have  developed allergies to medications  · Follow up with your PCP or Consultant for medication adjustments and refills  · Monitor for signs of fevers,chills,bleeding,chest pain and seek medical attention if you do so. · Tell your doctor all the prescription, herbal, or over-the-counter medicines you take. Do not take any new ones unless you talk to your doctor first.  · Do not take anti-inflammatory medicines. These include ibuprofen (Advil, Motrin) and naproxen (Aleve). You can use acetaminophen (Tylenol) for pain. · Do not take two or more pain medicines at the same time unless the doctor told you to. Many pain medicines have acetaminophen, which is Tylenol. Too much acetaminophen (Tylenol) can be harmful. · Tell all doctors and others who work with your health care that you have kidney disease. · Wear medical alert jewelry that lists your health problem. You can buy this at most drugstores. DIET: Cardiac Diet    ACTIVITY: Ambulate in house    WOUND CARE: n/a    EQUIPMENT needed: n/a      DISCHARGE MEDICATIONS:   See Medication Reconciliation Form    · It is important that you take the medication exactly as they are prescribed. · Keep your medication in the bottles provided by the pharmacist and keep a list of the medication names, dosages, and times to be taken in your wallet. · Do not take other medications without consulting your doctor. NOTIFY YOUR PHYSICIAN FOR ANY OF THE FOLLOWING:   Fever over 101 degrees for 24 hours.    Chest pain, shortness of breath, fever, chills, nausea, vomiting, diarrhea, change in mentation, falling, weakness, bleeding. Severe pain or pain not relieved by medications. Or, any other signs or symptoms that you may have questions about. DISPOSITION:  x  Home With:   OT  PT  HH  RN       SNF/Inpatient Rehab/LTAC    Independent/assisted living    Hospice    Other:         Information obtained by :   I understand that if any problems occur once I am at home I am to contact my physician. I understand and acknowledge receipt of the instructions indicated above.                                                                                                                                              [phone] or R.N.'s Signature                                                                  Date/Time                                                                                                                                              Patient or Representative Signature                                                          Date/Time          Signed:   Anum Araiza MD  7/26/2019  9:32 AM

## 2019-07-26 NOTE — PROGRESS NOTES
Day #2 of cefdinir  Indication:  SSTI  Current regimen:  300mg PO Q24h  Abx regimen: cefdinir  Recent Labs     19  0424 19  0418 19  0242 19  0341   WBC 4.7  --  4.4 4.2   CREA  --  1.41* 1.62* 1.07*   BUN  --  16 21* 22*     Est CrCl: ~33 ml/min  Temp (24hrs), Av.4 °F (36.9 °C), Min:97.8 °F (36.6 °C), Max:98.8 °F (37.1 °C)    Cultures: none    Plan: Change to cefdinir 600 mg PO Q24h per renal dose adjustment policy.

## 2019-07-26 NOTE — PROGRESS NOTES
CM met with patient at bedside to discuss discharge today. The patient is happy to be discharging, endorses having transportation home. The patient signed IM letter- signed copy placed on chart. The patient denied having any concerns for transition home, no Case Management needs identified.  NASIM Bass

## 2019-07-26 NOTE — DISCHARGE SUMMARY
Inpatient hospitalist discharge summary                Brief Overview    PATIENT ID: Pasquale Calle    MRN: [id number]     YOB: 1945    Admitting Provider: Ayan Chicas MD    Discharging Provider: Lena Munguia MD   To contact this individual call [phone] and ask the  to page. If unavailable ask to be transferred the Adult Hospitalist Department. 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 Moderate to severe stenosis of the main right renal artery and the accessory lower pole left renal Artery, accessory upper pole right renal artery and left upper pole renal artery are patent without significant stenosis. - cards on board, Dr. Angelique Samuel did renal angio and right main renal artery stenting with 5 x 15 mm Herculing stent. Cont ASA, Plavix  -nephrology on board, notes reviewed  -Nephro resumed patient on Procardia and Coreg. DW Dr. Tanja Wells, this morning's elevated BP read was due to dosing intervals, repeat BP check after meds is improved at 149/51. Pt feels better.     - Chest pain and jaw discomfort, typical, not POA  Resolved with NTG.  Likely has hypertensive heart disease, high risk for CAD given long standing HTN and smoking history  EKG reviewed by myself shows inferolateral ST depressions  Trp neg  Cards already on board, Dr. Stuart LOVE patient, no coronary angio done with renal angio as there is risk of BENNIE. Plans for cardiac work up later. Echo 7/19 shows normal LVEF     - CASSY, likely hypertensive  improving     Hx of hypercholesterolemia   -continue zocor     T11 age indeterminate compression deformity on CT    -had  hx of back surgery and fall   -stable     Left forearm cellulitis: worsening pain   omnicef x 5 days     Anemia: likely AOCD, cont to monitor    On the date of discharge, diagnostic face to face encounter was performed. Patient was hemodynamically stable, offering no new complaints. Denies any shortness of breath at rest, no fevers or chills, no diarrhea or constipation. Patient is agreeable for discharge. Patient understood and verbalized the understanding of the discharge plan. Patient was advised to seek medical help/ care or return to ED, if symptoms recur, worsen or new symptoms develop. Discharge Disposition:  Home or Self Care    Discharge activity:  Ambulate in house    Code status at discharge:  Full Code     Active issues requiring follow up:  HTN  Chest pain  BL MARY    Outpatient follow up: Follow-up Information     Follow up With Specialties Details Why Contact Info    Violet Pittman MD [facility] Schedule an appointment as soon as possible for a visit in 1 week for follow Up [address] [facility]  [address] [place] [id number]      Adarsh Navarrete MD Nephrology Schedule an appointment as soon as possible for a visit for post-hospitalization follow up, with in 7 days, for follow Up. call Dr. Alesia Patricia if questions about the BP issues [phone]  [facility] [address]  [facility] [address] [phone]      Ridge Neff MD Cardiology Schedule an appointment as soon as possible for a visit for follow Up [address] [facility] [address] [facility] [phone] Test results pending upon discharge:  n/a    Operative procedures performed:  Procedure(s):  ANGIOGRAPHY RENAL BILAT  Pta Renal Visceral Artery  Angioplasty Peripheral Artery    Treatments: IV hydration and Coreg, Procardia, NTG and labetalol GTT    Consults:  IP CONSULT TO HOSPITALIST  IP CONSULT TO NEPHROLOGY  IP CONSULT TO CARDIOLOGY    Procedures:    Procedure(s):  ANGIOGRAPHY RENAL BILAT  Pta Renal Visceral Artery  Angioplasty Peripheral Artery    Diet:  DIET CARDIAC    Pertinent test results:  Xr Chest Pa Lat    Result Date: 7/18/2019  INDICATION:  CP, severe HTN Exam: Chest 2 views. Comparison: June 24, 2019 and June 26, 2017. FINDINGS: Lung volumes remain hyperinflated. . Cardiomediastinal silhouette is normal. Pulmonary vasculature is not engorged. No focal parenchymal opacities, effusions, or pneumothorax. Bones are osteopenic. Post kyphoplasty's of L1 and L2. The T12 compression deformity is chronic. The T11 compression deformities age-indeterminate. Impression: 1. Hyperinflated lungs, no acute cardiopulmonary disease 2. Age indeterminate T11 compression deformity     Ct Head Wo Cont    Result Date: 7/22/2019  EXAM:  CT HEAD WITHOUT CONTRAST INDICATION: Hemorrhage. COMPARISON: 6/18/2019. CONTRAST: None. TECHNIQUE: Unenhanced CT of the head was performed using 5 mm images. Brain and bone windows were generated. Sagittal and coronal reformations were generated. CT dose reduction was achieved through use of a standardized protocol tailored for this examination and automatic exposure control for dose modulation. Adaptive statistical iterative reconstruction (ASIR) was utilized for this examination. FINDINGS: The ventricles and sulci are normal in size, shape and configuration and midline. There is atherosclerotic calcification of the carotid arteries. There is no significant white matter disease. There is no intracranial hemorrhage.   There is no extra-axial collection, mass, mass effect or midline shift.  The basilar cisterns are open. No acute infarct is identified. The bone windows demonstrate no abnormalities. The visualized portions of the paranasal sinuses and mastoid air cells are clear. IMPRESSION: No hemorrhage or other acute intracranial abnormality and no change. Ct Head Wo Cont    Result Date: 7/18/2019  EXAM: CT HEAD WO CONT Clinical history: Evaluate for intracranial hemorrhage INDICATION: Eval for hemorrhage COMPARISON: None. CONTRAST: None. TECHNIQUE: Unenhanced CT of the head was performed using 5 mm images. Brain and bone windows were generated. CT dose reduction was achieved through use of a standardized protocol tailored for this examination and automatic exposure control for dose modulation. FINDINGS: The ventricles and sulci are normal in size, shape and configuration and midline. Minimal scattered hypodensities in the cerebral white matter. . There is no intracranial hemorrhage, extra-axial collection, mass, mass effect or midline shift. The basilar cisterns are open. No acute infarct is identified. The bone windows demonstrate no abnormalities. The visualized portions of the paranasal sinuses and mastoid air cells are clear. IMPRESSION: No acute intracranial process     Cta Abd    Result Date: 7/19/2019  INDICATION: Hypertension. Evaluate for renal artery stenosis. COMPARISON:  Renal ultrasound May 22, 2019  TECHNIQUE:   After the rapid bolus administration of 100 cc of Isovue-370, then thin section helical images were obtained through the abdomen. 3D image postprocessing and maximum intensity projections were performed. CT dose reduction was achieved through use of a standardized protocol tailored for this examination and automatic exposure control for dose modulation. Adaptive statistical iterative reconstruction (ASIR) was utilized. FINDINGS: VISUALIZED THORAX: Calcified granuloma left lung base. Lungs otherwise clear.  Heart size is normal. No pericardial or pleural effusion. LIVER: No mass or biliary dilatation. GALLBLADDER: No calcified gallstone SPLEEN: Unremarkable PANCREAS: No mass or ductal dilatation. ADRENALS: Unremarkable. KIDNEYS/URETERS: Symmetric size and enhancement of the kidneys. There are 2 main renal arteries and 2 accessory renal arteries. The accessory renal artery on the right is small and supplies the upper pole. The accessory renal artery on the left is equivalent caliber to the main renal artery. There is moderate to severe stenosis of the left lower pole renal artery and of the main right renal artery. PERITONEUM: No abdominal lymphadenopathy or ascites. STOMACH: Unremarkable. SMALL BOWEL: Visualized portion of the small bowel is normal. COLON: Visualized portion of the colon is normal. BONES: Prior L1 and L2 kyphoplasty. Posterior spinal fusion at L5-S1. ADDITIONAL COMMENTS: N/A     IMPRESSION: 1. There are 2 renal arteries supplying both kidneys. Moderate to severe stenosis of the main right renal artery and the accessory lower pole left renal artery. 2.  Accessory upper pole right renal artery and left upper pole renal artery are patent without significant stenosis.       Recent Results (from the past 336 hour(s))   EKG, 12 LEAD, INITIAL    Collection Time: 07/18/19  4:49 PM   Result Value Ref Range    Ventricular Rate 55 BPM    Atrial Rate 55 BPM    P-R Interval 146 ms    QRS Duration 68 ms    Q-T Interval 440 ms    QTC Calculation (Bezet) 420 ms    Calculated P Axis 37 degrees    Calculated R Axis 39 degrees    Calculated T Axis 49 degrees    Diagnosis       Sinus bradycardia    When compared with ECG of 24-JUN-2019 13:41,  No significant change was found  Confirmed by Chloe Lanza M.D., Read Clause ([id number]) on 7/19/2019 5:34:53 AM     SAMPLES BEING HELD    Collection Time: 07/18/19  4:58 PM   Result Value Ref Range    SAMPLES BEING HELD 1RED 1BLUE 1LAV 1GRN     COMMENT        Add-on orders for these samples will be processed based on acceptable specimen integrity and analyte stability, which may vary by analyte. C REACTIVE PROTEIN, QT    Collection Time: 07/18/19  4:58 PM   Result Value Ref Range    C-Reactive protein <0.29 <0.60 mg/dL   CBC WITH AUTOMATED DIFF    Collection Time: 07/18/19  4:58 PM   Result Value Ref Range    WBC 4.0 3.6 - 11.0 K/uL    RBC 3.12 (L) 3.80 - 5.20 M/uL    HGB 10.8 (L) 11.5 - 16.0 g/dL    HCT 30.7 (L) 35.0 - 47.0 %    MCV 98.4 80.0 - 99.0 FL    MCH 34.6 (H) 26.0 - 34.0 PG    MCHC 35.2 30.0 - 36.5 g/dL    RDW 12.8 11.5 - 14.5 %    PLATELET 642 293 - 485 K/uL    MPV 9.9 8.9 - 12.9 FL    NRBC 0.0 0  WBC    ABSOLUTE NRBC 0.00 0.00 - 0.01 K/uL    NEUTROPHILS 50 32 - 75 %    LYMPHOCYTES 34 12 - 49 %    MONOCYTES 10 5 - 13 %    EOSINOPHILS 4 0 - 7 %    BASOPHILS 1 0 - 1 %    IMMATURE GRANULOCYTES 1 (H) 0.0 - 0.5 %    ABS. NEUTROPHILS 2.1 1.8 - 8.0 K/UL    ABS. LYMPHOCYTES 1.3 0.8 - 3.5 K/UL    ABS. MONOCYTES 0.4 0.0 - 1.0 K/UL    ABS. EOSINOPHILS 0.2 0.0 - 0.4 K/UL    ABS. BASOPHILS 0.0 0.0 - 0.1 K/UL    ABS. IMM. GRANS. 0.0 0.00 - 0.04 K/UL    DF AUTOMATED     METABOLIC PANEL, COMPREHENSIVE    Collection Time: 07/18/19  4:58 PM   Result Value Ref Range    Sodium 139 136 - 145 mmol/L    Potassium 5.0 3.5 - 5.1 mmol/L    Chloride 104 97 - 108 mmol/L    CO2 23 21 - 32 mmol/L    Anion gap 12 5 - 15 mmol/L    Glucose 119 (H) 65 - 100 mg/dL    BUN 24 (H) 6 - 20 MG/DL    Creatinine 1.17 (H) 0.55 - 1.02 MG/DL    BUN/Creatinine ratio 21 (H) 12 - 20      GFR est AA 55 (L) >60 ml/min/1.73m2    GFR est non-AA 45 (L) >60 ml/min/1.73m2    Calcium 9.0 8.5 - 10.1 MG/DL    Bilirubin, total 0.7 0.2 - 1.0 MG/DL    ALT (SGPT) 27 12 - 78 U/L    AST (SGOT) 28 15 - 37 U/L    Alk.  phosphatase 56 45 - 117 U/L    Protein, total 7.1 6.4 - 8.2 g/dL    Albumin 3.9 3.5 - 5.0 g/dL    Globulin 3.2 2.0 - 4.0 g/dL    A-G Ratio 1.2 1.1 - 2.2     NT-PRO BNP    Collection Time: 07/18/19  4:58 PM   Result Value Ref Range    NT pro- (H) 0 - 125 PG/ML   SED RATE (ESR)    Collection Time: 07/18/19  4:58 PM   Result Value Ref Range    Sed rate, automated 33 (H) 0 - 30 mm/hr   TROPONIN I    Collection Time: 07/18/19  4:58 PM   Result Value Ref Range    Troponin-I, Qt. <0.05 <0.05 ng/mL   ECHO ADULT COMPLETE    Collection Time: 07/19/19  9:06 AM   Result Value Ref Range    LV E' Lateral Velocity 8.74 cm/s    LV E' Septal Velocity 6.33 cm/s    Ao Root D 3.17 cm    Aortic Valve Systolic Peak Velocity 478.49 cm/s    AoV PG 6.9 mmHg    LVIDd 4.78 3.9 - 5.3 cm    LVPWd 0.81 0.6 - 0.9 cm    LVIDs 2.95 cm    IVSd 0.79 0.6 - 0.9 cm    MVA (PHT) 3.9 cm2    MV A Clyde 94.19 cm/s    MV E Clyde 77.39 cm/s    MV E/A 0.82     Left Atrium to Aortic Root Ratio 1.38     RVIDd 3.38 cm    LV Mass .9 67 - 162 g    LV Mass AL Index 84.5 43 - 95 g/m2    E/E' lateral 8.85     E/E' septal 12.23     E/E' ratio (averaged) 10.54     Mitral Valve E Wave Deceleration Time 196.3 ms    Mitral Valve Pressure Half-time 56.9 ms    Left Atrium Major Axis 4.37 cm    Triscuspid Valve Regurgitation Peak Gradient 37.8 mmHg    Pulmonic Valve Max Velocity 94.16 cm/s    TR Max Velocity 307.47 cm/s    PV peak gradient 3.5 mmHg   METANEPHRINES, PLASMA    Collection Time: 07/19/19  3:48 PM   Result Value Ref Range    Normetanephrine, free 166 (H) 0 - 145 pg/mL    Metanephrine, free 40 0 - 62 pg/mL   METABOLIC PANEL, COMPREHENSIVE    Collection Time: 07/20/19  3:56 AM   Result Value Ref Range    Sodium 140 136 - 145 mmol/L    Potassium 3.5 3.5 - 5.1 mmol/L    Chloride 108 97 - 108 mmol/L    CO2 24 21 - 32 mmol/L    Anion gap 8 5 - 15 mmol/L    Glucose 105 (H) 65 - 100 mg/dL    BUN 16 6 - 20 MG/DL    Creatinine 0.96 0.55 - 1.02 MG/DL    BUN/Creatinine ratio 17 12 - 20      GFR est AA >60 >60 ml/min/1.73m2    GFR est non-AA 57 (L) >60 ml/min/1.73m2    Calcium 9.2 8.5 - 10.1 MG/DL    Bilirubin, total 0.6 0.2 - 1.0 MG/DL    ALT (SGPT) 25 12 - 78 U/L    AST (SGOT) 21 15 - 37 U/L    Alk.  phosphatase 57 45 - 117 U/L Protein, total 7.0 6.4 - 8.2 g/dL    Albumin 4.0 3.5 - 5.0 g/dL    Globulin 3.0 2.0 - 4.0 g/dL    A-G Ratio 1.3 1.1 - 2.2     METANEPHRINES FRACTIONATED, URINE 24 HR    Collection Time: 07/20/19  4:00 AM   Result Value Ref Range    Total volume 1,500 mL    Period of collection 24 hr    Normetanephrine urine 345 Undefined ug/L    Normetanephrine urine, 24 hr 518 (H) 82 - 500 ug/24 hr    Metanephrine urine 66 Undefined ug/L    Metanephrine urine, 24 hr 99 45 - 290 ug/24 hr   CORTISOL, URINE FREE 24 HR    Collection Time: 07/20/19  4:00 AM   Result Value Ref Range    Total volume 1,500 mL    Period of collection 24 hr    Cortisol free, ug/L 5 Undefined ug/L    Cortisol free, ug/24 hr 8 6 - 42 RL/52 hr   HOMOVANILLIC ACID, UR, 24 HR    Collection Time: 07/20/19  4:00 AM   Result Value Ref Range    Total volume 1,500 mL    Period of collection 24 hr    HVA, urine 2.0 Undefined mg/L    HVA, urine, 24 hr 3.0 0.0 - 10.0 mg/24 hr   VANILLYLMANDELIC ACID, UR, 24 HR    Collection Time: 07/20/19  4:00 AM   Result Value Ref Range    Total volume 1,500 mL    Period of collection 24 hr    VMA, urine 2.7 Undefined mg/L    VMA, urine, 24 hr 4.1 0.0 - 7.5 mg/24 hr   CATECHOLAMINES, UR    Collection Time: 07/20/19  4:00 AM   Result Value Ref Range    Total volume 1,500 mL    Period of collection 24 hr    Epinephrine, urine 1 Undefined ug/L    Epinephrine, urine, 24 hr 2 0 - 20 ug/24 hr    Norepinephrine, urine 34 Undefined ug/L    Norepinephrine urine, 24 hr 51 0 - 135 ug/24 hr    Dopamine, urine 135 Undefined ug/L    Dopamine, urine, 24 hr 203 0 - 510 ug/24 hr   5-HIAA, UR    Collection Time: 07/20/19  4:00 AM   Result Value Ref Range    Total volume 1,500 mL    Period of collection 24 hr    5-HIAA mg/L 1.2 Undefined mg/L    5-HIAA mg/24 hr 1.8 0.0 - 14.9 mg/24 hr   SAMPLES BEING HELD    Collection Time: 07/20/19  4:04 AM   Result Value Ref Range    SAMPLES BEING HELD 1LAV     COMMENT        Add-on orders for these samples will be processed based on acceptable specimen integrity and analyte stability, which may vary by analyte. METABOLIC PANEL, COMPREHENSIVE    Collection Time: 07/21/19  4:12 AM   Result Value Ref Range    Sodium 141 136 - 145 mmol/L    Potassium HEMOLYZED,RECOLLECT REQUESTED 3.5 - 5.1 mmol/L    Chloride 114 (H) 97 - 108 mmol/L    CO2 18 (L) 21 - 32 mmol/L    Anion gap 9 5 - 15 mmol/L    Glucose 105 (H) 65 - 100 mg/dL    BUN 15 6 - 20 MG/DL    Creatinine 0.86 0.55 - 1.02 MG/DL    BUN/Creatinine ratio 17 12 - 20      GFR est AA >60 >60 ml/min/1.73m2    GFR est non-AA >60 >60 ml/min/1.73m2    Calcium 8.5 8.5 - 10.1 MG/DL    Bilirubin, total 0.5 0.2 - 1.0 MG/DL    ALT (SGPT) 28 12 - 78 U/L    AST (SGOT) HEMOLYZED,RECOLLECT REQUESTED 15 - 37 U/L    Alk. phosphatase 46 45 - 117 U/L    Protein, total 6.3 (L) 6.4 - 8.2 g/dL    Albumin 3.4 (L) 3.5 - 5.0 g/dL    Globulin 2.9 2.0 - 4.0 g/dL    A-G Ratio 1.2 1.1 - 2.2     POTASSIUM    Collection Time: 07/21/19  7:01 AM   Result Value Ref Range    Potassium 3.4 (L) 3.5 - 5.1 mmol/L   METABOLIC PANEL, COMPREHENSIVE    Collection Time: 07/22/19  4:25 AM   Result Value Ref Range    Sodium 140 136 - 145 mmol/L    Potassium 3.7 3.5 - 5.1 mmol/L    Chloride 112 (H) 97 - 108 mmol/L    CO2 22 21 - 32 mmol/L    Anion gap 6 5 - 15 mmol/L    Glucose 121 (H) 65 - 100 mg/dL    BUN 17 6 - 20 MG/DL    Creatinine 1.11 (H) 0.55 - 1.02 MG/DL    BUN/Creatinine ratio 15 12 - 20      GFR est AA 59 (L) >60 ml/min/1.73m2    GFR est non-AA 48 (L) >60 ml/min/1.73m2    Calcium 9.2 8.5 - 10.1 MG/DL    Bilirubin, total 0.5 0.2 - 1.0 MG/DL    ALT (SGPT) 44 12 - 78 U/L    AST (SGOT) 39 (H) 15 - 37 U/L    Alk.  phosphatase 59 45 - 117 U/L    Protein, total 7.2 6.4 - 8.2 g/dL    Albumin 4.0 3.5 - 5.0 g/dL    Globulin 3.2 2.0 - 4.0 g/dL    A-G Ratio 1.3 1.1 - 2.2     METABOLIC PANEL, COMPREHENSIVE    Collection Time: 07/23/19 12:11 AM   Result Value Ref Range    Sodium 137 136 - 145 mmol/L    Potassium 3.9 3.5 - 5.1 mmol/L    Chloride 108 97 - 108 mmol/L    CO2 21 21 - 32 mmol/L    Anion gap 8 5 - 15 mmol/L    Glucose 112 (H) 65 - 100 mg/dL    BUN 21 (H) 6 - 20 MG/DL    Creatinine 1.04 (H) 0.55 - 1.02 MG/DL    BUN/Creatinine ratio 20 12 - 20      GFR est AA >60 >60 ml/min/1.73m2    GFR est non-AA 52 (L) >60 ml/min/1.73m2    Calcium 9.0 8.5 - 10.1 MG/DL    Bilirubin, total 0.4 0.2 - 1.0 MG/DL    ALT (SGPT) 48 12 - 78 U/L    AST (SGOT) 40 (H) 15 - 37 U/L    Alk. phosphatase 58 45 - 117 U/L    Protein, total 6.5 6.4 - 8.2 g/dL    Albumin 3.6 3.5 - 5.0 g/dL    Globulin 2.9 2.0 - 4.0 g/dL    A-G Ratio 1.2 1.1 - 2.2     CBC W/O DIFF    Collection Time: 07/23/19 12:11 AM   Result Value Ref Range    WBC 4.3 3.6 - 11.0 K/uL    RBC 2.71 (L) 3.80 - 5.20 M/uL    HGB 9.4 (L) 11.5 - 16.0 g/dL    HCT 29.3 (L) 35.0 - 47.0 %    .1 (H) 80.0 - 99.0 FL    MCH 34.7 (H) 26.0 - 34.0 PG    MCHC 32.1 30.0 - 36.5 g/dL    RDW 12.6 11.5 - 14.5 %    PLATELET 536 (L) 293 - 400 K/uL    MPV 9.1 8.9 - 12.9 FL    NRBC 0.0 0  WBC    ABSOLUTE NRBC 0.00 0.00 - 0.01 K/uL   EKG, 12 LEAD, INITIAL    Collection Time: 07/23/19  9:27 AM   Result Value Ref Range    Ventricular Rate 104 BPM    Atrial Rate 104 BPM    P-R Interval 140 ms    QRS Duration 70 ms    Q-T Interval 362 ms    QTC Calculation (Bezet) 476 ms    Calculated P Axis 65 degrees    Calculated R Axis 58 degrees    Calculated T Axis 28 degrees    Diagnosis       Sinus tachycardia  ST & T wave abnormality, consider inferolateral ischemia  When compared with ECG of 18-JUL-2019 16:49,  Vent.  rate has increased BY  49 BPM  T wave inversion now evident in Inferior leads  QT has lengthened  Confirmed by Candi Ramos MD ([id number]) on 7/23/2019 11:22:09 AM     TROPONIN I    Collection Time: 07/23/19  9:42 AM   Result Value Ref Range    Troponin-I, Qt. <0.05 <0.05 ng/mL   TROPONIN I    Collection Time: 07/23/19  3:30 PM   Result Value Ref Range    Troponin-I, Qt. <0.05 <0.05 ng/mL   CBC WITH AUTOMATED DIFF    Collection Time: 07/24/19  3:41 AM   Result Value Ref Range    WBC 4.2 3.6 - 11.0 K/uL    RBC 2.72 (L) 3.80 - 5.20 M/uL    HGB 9.4 (L) 11.5 - 16.0 g/dL    HCT 28.6 (L) 35.0 - 47.0 %    .1 (H) 80.0 - 99.0 FL    MCH 34.6 (H) 26.0 - 34.0 PG    MCHC 32.9 30.0 - 36.5 g/dL    RDW 12.3 11.5 - 14.5 %    PLATELET 186 (L) 472 - 400 K/uL    MPV 9.4 8.9 - 12.9 FL    NRBC 0.0 0  WBC    ABSOLUTE NRBC 0.00 0.00 - 0.01 K/uL    NEUTROPHILS 52 32 - 75 %    LYMPHOCYTES 33 12 - 49 %    MONOCYTES 10 5 - 13 %    EOSINOPHILS 4 0 - 7 %    BASOPHILS 0 0 - 1 %    IMMATURE GRANULOCYTES 1 (H) 0.0 - 0.5 %    ABS. NEUTROPHILS 2.2 1.8 - 8.0 K/UL    ABS. LYMPHOCYTES 1.4 0.8 - 3.5 K/UL    ABS. MONOCYTES 0.4 0.0 - 1.0 K/UL    ABS. EOSINOPHILS 0.2 0.0 - 0.4 K/UL    ABS. BASOPHILS 0.0 0.0 - 0.1 K/UL    ABS. IMM.  GRANS. 0.0 0.00 - 0.04 K/UL    DF AUTOMATED     METABOLIC PANEL, BASIC    Collection Time: 07/24/19  3:41 AM   Result Value Ref Range    Sodium 140 136 - 145 mmol/L    Potassium 3.9 3.5 - 5.1 mmol/L    Chloride 110 (H) 97 - 108 mmol/L    CO2 21 21 - 32 mmol/L    Anion gap 9 5 - 15 mmol/L    Glucose 129 (H) 65 - 100 mg/dL    BUN 22 (H) 6 - 20 MG/DL    Creatinine 1.07 (H) 0.55 - 1.02 MG/DL    BUN/Creatinine ratio 21 (H) 12 - 20      GFR est AA >60 >60 ml/min/1.73m2    GFR est non-AA 50 (L) >60 ml/min/1.73m2    Calcium 9.1 8.5 - 10.1 MG/DL   TROPONIN I    Collection Time: 07/24/19  3:41 AM   Result Value Ref Range    Troponin-I, Qt. <0.05 <0.05 ng/mL   VITAMIN B12    Collection Time: 07/24/19  3:41 AM   Result Value Ref Range    Vitamin B12 558 193 - 986 pg/mL   FOLATE    Collection Time: 07/24/19  3:41 AM   Result Value Ref Range    Folate 10.9 5.0 - 21.0 ng/mL   POC ACTIVATED CLOTTING TIME    Collection Time: 07/24/19  8:00 AM   Result Value Ref Range    Activated Clotting Time (POC) 257 (H) 79 - 138 SECS   CBC WITH AUTOMATED DIFF    Collection Time: 07/25/19  2:42 AM   Result Value Ref Range    WBC 4.4 3.6 - 11.0 K/uL RBC 2.70 (L) 3.80 - 5.20 M/uL    HGB 9.3 (L) 11.5 - 16.0 g/dL    HCT 28.3 (L) 35.0 - 47.0 %    .8 (H) 80.0 - 99.0 FL    MCH 34.4 (H) 26.0 - 34.0 PG    MCHC 32.9 30.0 - 36.5 g/dL    RDW 12.3 11.5 - 14.5 %    PLATELET 498 (L) 771 - 400 K/uL    MPV 9.2 8.9 - 12.9 FL    NRBC 0.0 0  WBC    ABSOLUTE NRBC 0.00 0.00 - 0.01 K/uL    NEUTROPHILS 65 32 - 75 %    LYMPHOCYTES 24 12 - 49 %    MONOCYTES 8 5 - 13 %    EOSINOPHILS 3 0 - 7 %    BASOPHILS 0 0 - 1 %    IMMATURE GRANULOCYTES 0 0.0 - 0.5 %    ABS. NEUTROPHILS 2.8 1.8 - 8.0 K/UL    ABS. LYMPHOCYTES 1.1 0.8 - 3.5 K/UL    ABS. MONOCYTES 0.4 0.0 - 1.0 K/UL    ABS. EOSINOPHILS 0.1 0.0 - 0.4 K/UL    ABS. BASOPHILS 0.0 0.0 - 0.1 K/UL    ABS. IMM.  GRANS. 0.0 0.00 - 0.04 K/UL    DF AUTOMATED     METABOLIC PANEL, BASIC    Collection Time: 07/25/19  2:42 AM   Result Value Ref Range    Sodium 137 136 - 145 mmol/L    Potassium 3.9 3.5 - 5.1 mmol/L    Chloride 108 97 - 108 mmol/L    CO2 20 (L) 21 - 32 mmol/L    Anion gap 9 5 - 15 mmol/L    Glucose 118 (H) 65 - 100 mg/dL    BUN 21 (H) 6 - 20 MG/DL    Creatinine 1.62 (H) 0.55 - 1.02 MG/DL    BUN/Creatinine ratio 13 12 - 20      GFR est AA 38 (L) >60 ml/min/1.73m2    GFR est non-AA 31 (L) >60 ml/min/1.73m2    Calcium 9.0 8.5 - 93.2 MG/DL   METABOLIC PANEL, BASIC    Collection Time: 07/26/19  4:18 AM   Result Value Ref Range    Sodium 139 136 - 145 mmol/L    Potassium 3.6 3.5 - 5.1 mmol/L    Chloride 111 (H) 97 - 108 mmol/L    CO2 20 (L) 21 - 32 mmol/L    Anion gap 8 5 - 15 mmol/L    Glucose 103 (H) 65 - 100 mg/dL    BUN 16 6 - 20 MG/DL    Creatinine 1.41 (H) 0.55 - 1.02 MG/DL    BUN/Creatinine ratio 11 (L) 12 - 20      GFR est AA 44 (L) >60 ml/min/1.73m2    GFR est non-AA 37 (L) >60 ml/min/1.73m2    Calcium 8.2 (L) 8.5 - 10.1 MG/DL   CBC WITH AUTOMATED DIFF    Collection Time: 07/26/19  4:24 AM   Result Value Ref Range    WBC 4.7 3.6 - 11.0 K/uL    RBC 2.49 (L) 3.80 - 5.20 M/uL    HGB 8.7 (L) 11.5 - 16.0 g/dL    HCT 25.8 (L) 35.0 - 47.0 %    .6 (H) 80.0 - 99.0 FL    MCH 34.9 (H) 26.0 - 34.0 PG    MCHC 33.7 30.0 - 36.5 g/dL    RDW 12.1 11.5 - 14.5 %    PLATELET 031 (L) 409 - 400 K/uL    MPV 9.5 8.9 - 12.9 FL    NRBC 0.0 0  WBC    ABSOLUTE NRBC 0.00 0.00 - 0.01 K/uL    NEUTROPHILS 61 32 - 75 %    LYMPHOCYTES 25 12 - 49 %    MONOCYTES 11 5 - 13 %    EOSINOPHILS 3 0 - 7 %    BASOPHILS 0 0 - 1 %    IMMATURE GRANULOCYTES 0 0.0 - 0.5 %    ABS. NEUTROPHILS 2.9 1.8 - 8.0 K/UL    ABS. LYMPHOCYTES 1.2 0.8 - 3.5 K/UL    ABS. MONOCYTES 0.5 0.0 - 1.0 K/UL    ABS. EOSINOPHILS 0.2 0.0 - 0.4 K/UL    ABS. BASOPHILS 0.0 0.0 - 0.1 K/UL    ABS. IMM. GRANS. 0.0 0.00 - 0.04 K/UL    DF AUTOMATED             Physical Exam on Discharge:    Discharge condition: good    Vital signs:   Patient Vitals for the past 12 hrs:   Temp Pulse Resp BP SpO2   07/26/19 0828 98.7 °F (37.1 °C) 86 18 172/70 --   07/26/19 0359 98.6 °F (37 °C) 83 18 165/61 97 %   07/25/19 2313 98.5 °F (36.9 °C) 74 18 149/57 97 %       General appearance: alert, cooperative, no distress, appears stated age  Lungs: clear to auscultation bilaterally    Current Discharge Medication List      START taking these medications    Details   aspirin 81 mg chewable tablet Take 1 Tab by mouth daily for 30 days. Qty: 30 Tab, Refills: 0      carvedilol (COREG) 12.5 mg tablet Take 1 Tab by mouth two (2) times daily (with meals) for 90 days. Qty: 60 Tab, Refills: 2      cefdinir (OMNICEF) 300 mg capsule Take 2 Caps by mouth every twenty-four (24) hours for 4 days. Qty: 8 Cap, Refills: 0      clopidogrel (PLAVIX) 75 mg tab Take 1 Tab by mouth daily for 90 days. Qty: 30 Tab, Refills: 2      NIFEdipine ER (PROCARDIA XL) 90 mg ER tablet Take 1 Tab by mouth daily for 90 days.   Qty: 30 Tab, Refills: 2         CONTINUE these medications which have NOT CHANGED    Details   zolpidem (AMBIEN) 10 mg tablet Take  by mouth nightly as needed for Sleep.      simvastatin (ZOCOR) 10 mg tablet Take 10 mg by mouth nightly. STOP taking these medications       amLODIPine (NORVASC) 2.5 mg tablet Comments:   Reason for Stopping:         losartan (COZAAR) 100 mg tablet Comments:   Reason for Stopping:         cloNIDine HCl (CATAPRES) 0.1 mg tablet Comments:   Reason for Stopping:         metoprolol succinate (TOPROL-XL) 100 mg tablet Comments:   Reason for Stopping:         meloxicam (MOBIC) 15 mg tablet Comments:   Reason for Stopping:                  Total time spent on discharge planning, counseling and co-ordination of care:   38 minutes    Lucie Nicolas MD  07/26/19  9:33 AM

## 2019-08-20 ENCOUNTER — OFFICE VISIT (OUTPATIENT)
Dept: CARDIOLOGY CLINIC | Age: 74
End: 2019-08-20

## 2019-08-20 VITALS
OXYGEN SATURATION: 98 % | BODY MASS INDEX: 26.05 KG/M2 | HEIGHT: 63 IN | DIASTOLIC BLOOD PRESSURE: 90 MMHG | WEIGHT: 147 LBS | HEART RATE: 77 BPM | SYSTOLIC BLOOD PRESSURE: 136 MMHG

## 2019-08-20 DIAGNOSIS — I25.10 ATHEROSCLEROTIC CARDIOVASCULAR DISEASE: ICD-10-CM

## 2019-08-20 DIAGNOSIS — I15.0 RENOVASCULAR HYPERTENSION: Primary | ICD-10-CM

## 2019-08-20 DIAGNOSIS — E78.2 MIXED HYPERLIPIDEMIA: ICD-10-CM

## 2019-08-20 DIAGNOSIS — I70.1 RENAL ARTERY STENOSIS (HCC): ICD-10-CM

## 2019-08-20 DIAGNOSIS — R06.02 SOB (SHORTNESS OF BREATH): ICD-10-CM

## 2019-08-20 PROBLEM — J81.0 FLASH PULMONARY EDEMA (HCC): Status: ACTIVE | Noted: 2019-08-20

## 2019-08-20 RX ORDER — ATORVASTATIN CALCIUM 40 MG/1
40 TABLET, FILM COATED ORAL DAILY
Qty: 30 TAB | Refills: 11 | Status: SHIPPED | OUTPATIENT
Start: 2019-08-20 | End: 2019-10-15

## 2019-08-20 RX ORDER — MELOXICAM 15 MG/1
15 TABLET ORAL DAILY
COMMUNITY

## 2019-08-20 RX ORDER — SERTRALINE HYDROCHLORIDE 100 MG/1
100 TABLET, FILM COATED ORAL
COMMUNITY

## 2019-08-20 NOTE — LETTER
8/20/19 Patient: Diana Jensen YOB: 1945 Date of Visit: 8/20/2019 Kimmy Valencia MD 
[address] [id number] VIA Facsimile: [phone] Dear Kimmy Valencia MD, Thank you for referring Ms. Diana Jensen to [facility] for evaluation. My notes for this consultation are attached. If you have questions, please do not hesitate to call me. I look forward to following your patient along with you.  
 
 
Sincerely, 
 
Regulo Maya MD

## 2019-08-20 NOTE — PATIENT INSTRUCTIONS
[facility] address:  [address], [place]    Procedure:Left heart cath    Your procedure is scheduled for 9/23/19 @ 8 am . You need to arrive about 2 hours prior to procedure, so please arrive by 6:30 am to [address] from the [address] parking courtyard entrance. Once inside, go to the left to outpatient registration. Bring your insurance information and list of current medications. You may not have anything to eat or drink after midnight, except for sips of water to take medications. Medication instructions:none    *Have labs drawn prior to procedure (a couple days prior if possible.)  *You will need someone to drive you home after the procedure. Plan to be at [facility] a total of 6-8 hours. *Arrange for a responsible adult to help you at home for at least 24 hours,  *Wear comfortable clothing. Leave jewelry, money, and other valuables at home. You may wear dentures, eyeglasses, and/or hearing aids. *Bring an overnight bag with you (just incase you need to spend the night.)    Post Procedure Instructions:  *No driving for 24 hours post procedure. *No heavy lifting (over 10 lbs) or strenuous activity for 48 hours. *No tub baths, swimming, hot tubs, or spas for 1 week. The band aid over cath site may be removed the day after procedure and site washed gently with soap and water. *The site may appear bruised/ discolored for a couple of weeks. A small knot may be present. You may experience tenderness or soreness in groin area. This may be relieved with the use of Tylenol. *If there is any visible blood at the site, hold pressure for 20 minutes. *Call the office if you should notice numbness, tingling, coldness, or loss of feeling in the area. Call the office if you have a fever within 2-3 days after procedure. Remember, when you begin lifting things, use proper body mechanics and bend with your knees, centering the weight on your legs.      Please call with any questions: [phone].  You may also contact the cath lab directly at [phone]

## 2019-08-20 NOTE — PROGRESS NOTES
[place]: Shannon Pachecoas  [phone]          Cardiology Consult/Progress Note      Requesting/referring provider: Dr. Janene Marie  Reason for Consult: Uncontrolled hypertension    HPI: Christine Bonds, a 69y.o. year-old who was recently seen for evaluation of uncontrolled hypertension. Mrs. Estrada Greer has history of extremely difficult to control hypertension and was admitted to the hospital with malignant hypertension. This was associated with evidence of mild endorgan damage in the form of creatinine increased, chest tightness. She also reported progressive shortness of breath over the previous few months with intermittent episodes of orthopnea PND consistent with flash pulmonary edema. During recent hospitalization she required IV calcium channel blockers to control her blood pressure in addition to the oral antihypertensive that she has been on. As a part of further evaluation she had a CT angiogram which demonstrated renal artery stenosis. Subsequently she underwent invasive renal angiogram which demonstrated 70 to 75% stenosis in main right renal artery in its proximal segment. She subsequently underwent stenting of right renal artery with a 5 x 15 mm Herculink bare-metal stent with no residual stenosis. She has moderate stenosis and a relatively small sized left renal artery. There is an accessory left renal artery free of significant disease along with an accessory right renal artery free of significant disease. She was recently seen by Dr. Amauri Armstrong who recommended the patient to undergo blood work to reevaluate her creatinine, potassium. She is still to have blood drawn for it. Since undergoing the procedure she has done very well. Her blood pressures are now much better controlled and she is only on 2 antihypertensives. Occasionally she was running low blood pressures at night due to which her carvedilol was changed to once a day. Additionally she is on nifedipine XL 90 mg daily. Currently the blood pressures are under excellent control. Patient continues to report some shortness of breath. While this is improved compared to prior to procedure it is still more than what she used to have about a year ago. Most of these symptoms are noted during exertion. She does not report any orthopnea PND. Pedal edema is noted bilaterally. Assessment/Plan:  1. Hypertension likely secondary to renal artery stenosis significantly improved following renal artery stenting to proximal main right renal artery. 2.  Atherosclerotic vascular disease  3. Prior episode suggestive of flash pulmonary edema related to uncontrolled hypertension now improved  4. Renal artery stenosis bilateral.  5.  Remote history of tobacco abuse  6. Shortness of breath without orthopnea PND. 7.  Hyperlipidemia  8. Hypertensive heart disease with moderate LVH and likely chronic diastolic heart failure. Mrs. Marleen Garcia was seen in his evaluated for what was deemed to be renovascular hypertension. She had documented renal artery stenosis for which she underwent stenting to her right proximal main renal artery with excellent results. Her antihypertensive need has reduced from 5-6 antihypertensive agents due to antihypertensive agents. Blood pressure is not under much better control. She is following with Dr. Selvin Mcneill with nephrology. I am still awaiting her most recent renal function assessment and she is planned to undergo blood work done today. Due to her persistent symptoms of dyspnea and pedal edema and known hypertensive heart disease with moderate left ventricular hypertrophy she may have symptoms secondary to diastolic heart failure. Hence assuming her renal function is stable I would recommend reinitiation of oral diuretics to assess the response. Notably she has atherosclerotic vascular disease and may have coronary atherosclerotic disease as well. If her symptoms do not resolve with oral diuretics I would recommend proceeding with a coronary angiography to rule out obstructive coronary artery disease. Due to high risk of future atherosclerotic vascular events I have also switched her from simvastatin 10 mg daily to atorvastatin 40 mg daily to provide her benefit of high intensity LDL reduction. From a renal artery stenting standpoint she will require Doppler surveillance annually. We plan to see her back in 6 months. In the interim we will schedule her for cardiac catheterization assuming her symptoms of dyspnea on exertion do not resolve with diuretics. Past medical history:  She  has a past medical history of Hypertension. She also has no past medical history of Adverse effect of anesthesia. Patient reports no PND/Orthpnea/CP. He reports no cough/fever/focal neurological deficits/abdominal pain. All other systems negative except as above. PE  Vitals:    08/20/19 0951   BP: 136/90   Pulse: 77   SpO2: 98%   Weight: 147 lb (66.7 kg)   Height: 5' 3\" (1.6 m)    Body mass index is 26.04 kg/m². General:    Alert, cooperative, no distress. Psychiatric:    Normal Mood and affect    Eye/ENT:      Pupils equal, No asymmetry, Conjunctival pink. Able to hear voice at normal amplitude   Lungs:      Visibly symmetric chest expansion, No palpable tenderness. Few basal crackles on auscultation bilaterally. Heart[de-identified]    Regular rate and rhythm, S1, S2 normal, no murmur, click, rub or gallop. No JVD, Normal palpable peripheral pulses. No cyanosis   Abdomen:     Soft, non-tender. Bowel sounds normal. No masses,  No      organomegaly. No renal bruit. Extremities:   Extremities normal, atraumatic, mild bilateral edema. Neurologic:   CN II-XII grossly intact.  No gross focal deficits           Recent Labs:  No results found for: CHOL, CHOLX, CHLST, CHOLV, 736432, HDL, LDL, LDLC, DLDLP, TGLX, TRIGL, TRIGP, CHHD, CHHDX  Lab Results   Component Value Date/Time    Creatinine (POC) 0.9 10/20/2016 10:16 AM    Creatinine 1.41 (H) 07/26/2019 04:18 AM     Lab Results   Component Value Date/Time    BUN 16 07/26/2019 04:18 AM     Lab Results   Component Value Date/Time    Potassium 3.6 07/26/2019 04:18 AM     No results found for: HBA1C, HGBE8, VEJ6UQXQ, ZDC4UUKX  Lab Results   Component Value Date/Time    HGB 8.7 (L) 07/26/2019 04:24 AM     Lab Results   Component Value Date/Time    PLATELET 779 (L) 74/13/6972 04:24 AM       Reviewed:  Past Medical History:   Diagnosis Date    Hypertension      Social History     Tobacco Use   Smoking Status Former Smoker    Packs/day: 0.50   Smokeless Tobacco Never Used     Social History     Substance and Sexual Activity   Alcohol Use Yes    Alcohol/week: 6.0 standard drinks    Types: 6 Glasses of wine per week     Allergies   Allergen Reactions    Amlodipine Other (comments)     intolerant    Clonidine Other (comments)     fatigue     Family History   Problem Relation Age of Onset    Heart Attack Father 47        Current Outpatient Medications   Medication Sig    meloxicam (MOBIC) 15 mg tablet Take 15 mg by mouth daily.  sertraline (ZOLOFT) 25 mg tablet Take  by mouth daily.  aspirin 81 mg chewable tablet Take 1 Tab by mouth daily for 30 days.  carvedilol (COREG) 12.5 mg tablet Take 1 Tab by mouth two (2) times daily (with meals) for 90 days. (Patient taking differently: Take 12.5 mg by mouth daily.)    clopidogrel (PLAVIX) 75 mg tab Take 1 Tab by mouth daily for 90 days.  NIFEdipine ER (PROCARDIA XL) 90 mg ER tablet Take 1 Tab by mouth daily for 90 days.  zolpidem (AMBIEN) 10 mg tablet Take  by mouth nightly as needed for Sleep. No current facility-administered medications for this visit.         Sherrie Birmingham MD08/20/19 There are other unrelated non-urgent complaints, but due to the busy schedule and the amount of time I've already spent with her, time does not permit me to address these routine issues at today's visit. I've requested another appointment to review these additional issues.     [facility] [place]  [address], [address]

## 2019-08-26 ENCOUNTER — TELEPHONE (OUTPATIENT)
Dept: CARDIOLOGY CLINIC | Age: 74
End: 2019-08-26

## 2019-08-26 RX ORDER — FUROSEMIDE 20 MG/1
20 TABLET ORAL DAILY
Qty: 30 TAB | Refills: 5 | Status: SHIPPED | OUTPATIENT
Start: 2019-08-26 | End: 2019-11-27 | Stop reason: SDUPTHER

## 2019-08-26 NOTE — TELEPHONE ENCOUNTER
MD Braydon Retana RN   Caller: Unspecified (Today,  9:55 AM)             Call her to initiate on Lasix 20 mg PO daily.

## 2019-08-26 NOTE — TELEPHONE ENCOUNTER
Returned call to patient. Two patient indentifiers verified. Pt was informed about adding in medications. Pt verbalized understanding and denies any further questions at this time. Requested Prescriptions     Signed Prescriptions Disp Refills    furosemide (LASIX) 20 mg tablet 30 Tab 5     Sig: Take 1 Tab by mouth daily.      Authorizing Provider: Magaly Guzman     Ordering User: Tarsha Vicente       Last office visit 8/20/19    Per Dr. Patric Diggs   Date Time Provider [facility]   2/18/2020  9:40 AM Andra Zamorano  [address]

## 2019-08-26 NOTE — TELEPHONE ENCOUNTER
Patient states that she was asked to have lab work done prior to getting medication for her hypertension. Patient states that she had her lab work done and would like to know if we have received the results and what the next step is, thanks.      Phone: [phone]

## 2019-09-04 ENCOUNTER — TELEPHONE (OUTPATIENT)
Dept: CARDIOLOGY CLINIC | Age: 74
End: 2019-09-04

## 2019-09-04 NOTE — TELEPHONE ENCOUNTER
Pt called in with c/o of increased SOB for past week and half. Pt stated that the lasix has helped with the swelling but has not helped with the SOB. Pt stated that it has increasingly gotten worse. Pt stated that she is fatigue as well. Pt was informed that I will let MD know.  Pt verbalized understanding

## 2019-09-05 ENCOUNTER — TELEPHONE (OUTPATIENT)
Dept: CARDIOLOGY CLINIC | Age: 74
End: 2019-09-05

## 2019-09-05 DIAGNOSIS — Z01.818 PRE-OP TESTING: ICD-10-CM

## 2019-09-05 DIAGNOSIS — Z82.49 FAMILY HISTORY OF PREMATURE CAD: Primary | ICD-10-CM

## 2019-09-05 RX ORDER — SODIUM CHLORIDE 9 MG/ML
75 INJECTION, SOLUTION INTRAVENOUS CONTINUOUS
Status: CANCELLED | OUTPATIENT
Start: 2019-09-05

## 2019-09-05 RX ORDER — SODIUM CHLORIDE 0.9 % (FLUSH) 0.9 %
5-40 SYRINGE (ML) INJECTION EVERY 8 HOURS
Status: CANCELLED | OUTPATIENT
Start: 2019-09-05

## 2019-09-05 NOTE — TELEPHONE ENCOUNTER
Returned call to patient. Two patient indentifiers verified. Pt cath was moved up to next week 9/12/19 @ 8 am. Pt verbalized understanding and denies any further questions at this time.

## 2019-09-05 NOTE — TELEPHONE ENCOUNTER
Patient would like to know what labs she has to have done prior to her cath next week.    Phone: [phone]

## 2019-09-05 NOTE — TELEPHONE ENCOUNTER
Returned call to patient. Two patient indentifiers verified. Pt was informed that I will order labs and she wanted faxed to her. Faxed over lab slip. Pt verbalized understanding and denies any further questions at this time.

## 2019-09-05 NOTE — TELEPHONE ENCOUNTER
Jewels Clark MD  You 12 hours ago (7:23 PM)      Disregard previous message Echo done in July so not needed. Cath scheduled for Sep 23rd. Can try to move it to an earlier date.

## 2019-09-10 ENCOUNTER — HOSPITAL ENCOUNTER (OUTPATIENT)
Dept: LAB | Age: 74
Discharge: HOME | End: 2019-09-10
Payer: MEDICARE

## 2019-09-10 PROCEDURE — 80048 BASIC METABOLIC PNL TOTAL CA: CPT

## 2019-09-10 PROCEDURE — 85027 COMPLETE CBC AUTOMATED: CPT

## 2019-09-11 LAB
BUN SERPL-MCNC: 18 MG/DL (ref 8–27)
BUN/CREAT SERPL: 13 (ref 12–28)
CALCIUM SERPL-MCNC: 9.2 MG/DL (ref 8.7–10.3)
CHLORIDE SERPL-SCNC: 106 MMOL/L (ref 96–106)
CO2 SERPL-SCNC: 18 MMOL/L (ref 20–29)
CREAT SERPL-MCNC: 1.37 MG/DL (ref 0.57–1)
ERYTHROCYTE [DISTWIDTH] IN BLOOD BY AUTOMATED COUNT: 14.1 % (ref 12.3–15.4)
GLUCOSE SERPL-MCNC: 138 MG/DL (ref 65–99)
HCT VFR BLD AUTO: 27.6 % (ref 34–46.6)
HGB BLD-MCNC: 9.3 G/DL (ref 11.1–15.9)
INTERPRETATION: NORMAL
MCH RBC QN AUTO: 35 PG (ref 26.6–33)
MCHC RBC AUTO-ENTMCNC: 33.7 G/DL (ref 31.5–35.7)
MCV RBC AUTO: 104 FL (ref 79–97)
PLATELET # BLD AUTO: 231 X10E3/UL (ref 150–450)
POTASSIUM SERPL-SCNC: 4.3 MMOL/L (ref 3.5–5.2)
RBC # BLD AUTO: 2.66 X10E6/UL (ref 3.77–5.28)
SODIUM SERPL-SCNC: 141 MMOL/L (ref 134–144)
WBC # BLD AUTO: 5 X10E3/UL (ref 3.4–10.8)

## 2019-09-12 ENCOUNTER — HOSPITAL ENCOUNTER (OUTPATIENT)
Age: 74
Setting detail: OBSERVATION
Discharge: HOME | End: 2019-09-13
Attending: INTERNAL MEDICINE | Admitting: INTERNAL MEDICINE
Payer: MEDICARE

## 2019-09-12 DIAGNOSIS — I25.10 CVD (CARDIOVASCULAR DISEASE): ICD-10-CM

## 2019-09-12 LAB
ACT BLD: 224 SECS (ref 79–138)
ACT BLD: 230 SECS (ref 79–138)
ACT BLD: 235 SECS (ref 79–138)
ATRIAL RATE: 60 BPM
CALCULATED P AXIS, ECG09: 59 DEGREES
CALCULATED R AXIS, ECG10: 54 DEGREES
CALCULATED T AXIS, ECG11: 58 DEGREES
DIAGNOSIS, 93000: NORMAL
P-R INTERVAL, ECG05: 162 MS
Q-T INTERVAL, ECG07: 450 MS
QRS DURATION, ECG06: 72 MS
QTC CALCULATION (BEZET), ECG08: 450 MS
VENTRICULAR RATE, ECG03: 60 BPM

## 2019-09-12 PROCEDURE — 85347 COAGULATION TIME ACTIVATED: CPT

## 2019-09-12 PROCEDURE — C1874 STENT, COATED/COV W/DEL SYS: HCPCS | Performed by: INTERNAL MEDICINE

## 2019-09-12 PROCEDURE — 99218 HC RM OBSERVATION: CPT

## 2019-09-12 PROCEDURE — C1725 CATH, TRANSLUMIN NON-LASER: HCPCS | Performed by: INTERNAL MEDICINE

## 2019-09-12 PROCEDURE — C1894 INTRO/SHEATH, NON-LASER: HCPCS | Performed by: INTERNAL MEDICINE

## 2019-09-12 PROCEDURE — 74011250637 HC RX REV CODE- 250/637: Performed by: INTERNAL MEDICINE

## 2019-09-12 PROCEDURE — C1769 GUIDE WIRE: HCPCS | Performed by: INTERNAL MEDICINE

## 2019-09-12 PROCEDURE — 77030008543 HC TBNG MON PRSS MRTM -A: Performed by: INTERNAL MEDICINE

## 2019-09-12 PROCEDURE — 74011000250 HC RX REV CODE- 250: Performed by: INTERNAL MEDICINE

## 2019-09-12 PROCEDURE — 77030013744: Performed by: INTERNAL MEDICINE

## 2019-09-12 PROCEDURE — 93005 ELECTROCARDIOGRAM TRACING: CPT

## 2019-09-12 PROCEDURE — 74011250636 HC RX REV CODE- 250/636

## 2019-09-12 PROCEDURE — 99152 MOD SED SAME PHYS/QHP 5/>YRS: CPT | Performed by: INTERNAL MEDICINE

## 2019-09-12 PROCEDURE — 93458 L HRT ARTERY/VENTRICLE ANGIO: CPT | Performed by: INTERNAL MEDICINE

## 2019-09-12 PROCEDURE — 77030012468 HC VLV BLEEDBK CNTRL ABBT -B: Performed by: INTERNAL MEDICINE

## 2019-09-12 PROCEDURE — 99153 MOD SED SAME PHYS/QHP EA: CPT | Performed by: INTERNAL MEDICINE

## 2019-09-12 PROCEDURE — C1887 CATHETER, GUIDING: HCPCS | Performed by: INTERNAL MEDICINE

## 2019-09-12 PROCEDURE — 77030019569 HC BND COMPR RAD TERU -B: Performed by: INTERNAL MEDICINE

## 2019-09-12 PROCEDURE — 92928 PRQ TCAT PLMT NTRAC ST 1 LES: CPT | Performed by: INTERNAL MEDICINE

## 2019-09-12 PROCEDURE — 77030004532 HC CATH ANGI DX IMP BSC -A: Performed by: INTERNAL MEDICINE

## 2019-09-12 PROCEDURE — 74011250636 HC RX REV CODE- 250/636: Performed by: INTERNAL MEDICINE

## 2019-09-12 PROCEDURE — 74011636320 HC RX REV CODE- 636/320: Performed by: INTERNAL MEDICINE

## 2019-09-12 DEVICE — XIENCE SIERRA™ EVEROLIMUS ELUTING CORONARY STENT SYSTEM 2.75 MM X 15 MM / RAPID-EXCHANGE
Type: IMPLANTABLE DEVICE | Status: FUNCTIONAL
Brand: XIENCE SIERRA™

## 2019-09-12 DEVICE — XIENCE SIERRA™ EVEROLIMUS ELUTING CORONARY STENT SYSTEM 2.50 MM X 08 MM / RAPID-EXCHANGE
Type: IMPLANTABLE DEVICE | Status: FUNCTIONAL
Brand: XIENCE SIERRA™

## 2019-09-12 RX ORDER — FUROSEMIDE 20 MG/1
20 TABLET ORAL DAILY
Status: DISCONTINUED | OUTPATIENT
Start: 2019-09-12 | End: 2019-09-13 | Stop reason: HOSPADM

## 2019-09-12 RX ORDER — ATORVASTATIN CALCIUM 40 MG/1
40 TABLET, FILM COATED ORAL
Status: DISCONTINUED | OUTPATIENT
Start: 2019-09-12 | End: 2019-09-13 | Stop reason: HOSPADM

## 2019-09-12 RX ORDER — CARVEDILOL 12.5 MG/1
12.5 TABLET ORAL 2 TIMES DAILY WITH MEALS
Status: DISCONTINUED | OUTPATIENT
Start: 2019-09-12 | End: 2019-09-12

## 2019-09-12 RX ORDER — CLOPIDOGREL BISULFATE 75 MG/1
75 TABLET ORAL DAILY
Status: DISCONTINUED | OUTPATIENT
Start: 2019-09-13 | End: 2019-09-13 | Stop reason: HOSPADM

## 2019-09-12 RX ORDER — SERTRALINE HYDROCHLORIDE 50 MG/1
100 TABLET, FILM COATED ORAL
Status: DISCONTINUED | OUTPATIENT
Start: 2019-09-12 | End: 2019-09-13 | Stop reason: HOSPADM

## 2019-09-12 RX ORDER — ZOLPIDEM TARTRATE 5 MG/1
5 TABLET ORAL
Status: DISCONTINUED | OUTPATIENT
Start: 2019-09-12 | End: 2019-09-12

## 2019-09-12 RX ORDER — SODIUM CHLORIDE 0.9 % (FLUSH) 0.9 %
5-40 SYRINGE (ML) INJECTION AS NEEDED
Status: DISCONTINUED | OUTPATIENT
Start: 2019-09-12 | End: 2019-09-13 | Stop reason: HOSPADM

## 2019-09-12 RX ORDER — HEPARIN SODIUM 200 [USP'U]/100ML
INJECTION, SOLUTION INTRAVENOUS
Status: COMPLETED | OUTPATIENT
Start: 2019-09-12 | End: 2019-09-12

## 2019-09-12 RX ORDER — SODIUM CHLORIDE 0.9 % (FLUSH) 0.9 %
5-40 SYRINGE (ML) INJECTION EVERY 8 HOURS
Status: DISCONTINUED | OUTPATIENT
Start: 2019-09-12 | End: 2019-09-13 | Stop reason: HOSPADM

## 2019-09-12 RX ORDER — FENTANYL CITRATE 50 UG/ML
INJECTION, SOLUTION INTRAMUSCULAR; INTRAVENOUS AS NEEDED
Status: DISCONTINUED | OUTPATIENT
Start: 2019-09-12 | End: 2019-09-12 | Stop reason: HOSPADM

## 2019-09-12 RX ORDER — SODIUM CHLORIDE 0.9 % (FLUSH) 0.9 %
5-40 SYRINGE (ML) INJECTION EVERY 8 HOURS
Status: DISCONTINUED | OUTPATIENT
Start: 2019-09-12 | End: 2019-09-12 | Stop reason: HOSPADM

## 2019-09-12 RX ORDER — CLOPIDOGREL 300 MG/1
TABLET, FILM COATED ORAL AS NEEDED
Status: DISCONTINUED | OUTPATIENT
Start: 2019-09-12 | End: 2019-09-12 | Stop reason: HOSPADM

## 2019-09-12 RX ORDER — MIDAZOLAM HYDROCHLORIDE 1 MG/ML
INJECTION, SOLUTION INTRAMUSCULAR; INTRAVENOUS AS NEEDED
Status: DISCONTINUED | OUTPATIENT
Start: 2019-09-12 | End: 2019-09-12 | Stop reason: HOSPADM

## 2019-09-12 RX ORDER — CARVEDILOL 12.5 MG/1
12.5 TABLET ORAL DAILY
Status: DISCONTINUED | OUTPATIENT
Start: 2019-09-12 | End: 2019-09-13 | Stop reason: HOSPADM

## 2019-09-12 RX ORDER — LIDOCAINE HYDROCHLORIDE 10 MG/ML
INJECTION INFILTRATION; PERINEURAL AS NEEDED
Status: DISCONTINUED | OUTPATIENT
Start: 2019-09-12 | End: 2019-09-12 | Stop reason: HOSPADM

## 2019-09-12 RX ORDER — SODIUM CHLORIDE 9 MG/ML
75 INJECTION, SOLUTION INTRAVENOUS CONTINUOUS
Status: DISCONTINUED | OUTPATIENT
Start: 2019-09-12 | End: 2019-09-12 | Stop reason: HOSPADM

## 2019-09-12 RX ORDER — OXYCODONE AND ACETAMINOPHEN 5; 325 MG/1; MG/1
1 TABLET ORAL ONCE
Status: DISCONTINUED | OUTPATIENT
Start: 2019-09-12 | End: 2019-09-12

## 2019-09-12 RX ORDER — HEPARIN SODIUM 1000 [USP'U]/ML
INJECTION, SOLUTION INTRAVENOUS; SUBCUTANEOUS AS NEEDED
Status: DISCONTINUED | OUTPATIENT
Start: 2019-09-12 | End: 2019-09-12 | Stop reason: HOSPADM

## 2019-09-12 RX ORDER — ZOLPIDEM TARTRATE 10 MG/1
10 TABLET ORAL
Status: DISCONTINUED | OUTPATIENT
Start: 2019-09-12 | End: 2019-09-13 | Stop reason: HOSPADM

## 2019-09-12 RX ORDER — MELOXICAM 7.5 MG/1
15 TABLET ORAL
Status: DISCONTINUED | OUTPATIENT
Start: 2019-09-12 | End: 2019-09-13 | Stop reason: HOSPADM

## 2019-09-12 RX ORDER — ACETAMINOPHEN 325 MG/1
650 TABLET ORAL
Status: DISCONTINUED | OUTPATIENT
Start: 2019-09-12 | End: 2019-09-13 | Stop reason: HOSPADM

## 2019-09-12 RX ORDER — SODIUM CHLORIDE 9 MG/ML
500 INJECTION, SOLUTION INTRAVENOUS CONTINUOUS
Status: DISCONTINUED | OUTPATIENT
Start: 2019-09-12 | End: 2019-09-13 | Stop reason: HOSPADM

## 2019-09-12 RX ORDER — ONDANSETRON 2 MG/ML
4 INJECTION INTRAMUSCULAR; INTRAVENOUS
Status: DISCONTINUED | OUTPATIENT
Start: 2019-09-12 | End: 2019-09-12

## 2019-09-12 RX ORDER — ATORVASTATIN CALCIUM 40 MG/1
40 TABLET, FILM COATED ORAL DAILY
Status: DISCONTINUED | OUTPATIENT
Start: 2019-09-12 | End: 2019-09-12

## 2019-09-12 RX ADMIN — ATORVASTATIN CALCIUM 40 MG: 40 TABLET, FILM COATED ORAL at 21:21

## 2019-09-12 RX ADMIN — Medication 10 ML: at 16:59

## 2019-09-12 RX ADMIN — NIFEDIPINE 90 MG: 60 TABLET, FILM COATED, EXTENDED RELEASE ORAL at 21:20

## 2019-09-12 RX ADMIN — SODIUM CHLORIDE 500 ML: 900 INJECTION, SOLUTION INTRAVENOUS at 12:36

## 2019-09-12 RX ADMIN — SODIUM CHLORIDE 75 ML/HR: 900 INJECTION, SOLUTION INTRAVENOUS at 07:25

## 2019-09-12 RX ADMIN — MELOXICAM 15 MG: 7.5 TABLET ORAL at 21:20

## 2019-09-12 RX ADMIN — SERTRALINE HYDROCHLORIDE 100 MG: 50 TABLET ORAL at 21:21

## 2019-09-12 RX ADMIN — ZOLPIDEM TARTRATE 10 MG: 10 TABLET ORAL at 21:26

## 2019-09-12 RX ADMIN — ACETAMINOPHEN 650 MG: 325 TABLET, FILM COATED ORAL at 16:56

## 2019-09-12 RX ADMIN — SODIUM CHLORIDE 500 ML: 900 INJECTION, SOLUTION INTRAVENOUS at 16:55

## 2019-09-12 RX ADMIN — Medication 10 ML: at 21:21

## 2019-09-12 NOTE — Clinical Note
Lesion: Located in the Proximal Diag 1. Stent inserted. Stent deployed. First inflation pressure = 16 doreen; inflation time: 45 sec.

## 2019-09-12 NOTE — PROGRESS NOTES
Cardiac Cath Lab Recovery Arrival Note:      James Hoang arrived to Cardiac Cath Lab, Recovery Area. Staff introduced to patient. Patient identifiers verified with NAME and DATE OF BIRTH. Procedure verified with patient. Consent forms reviewed and signed by patient or authorized representative and verified. Allergies verified. Patient and family oriented to department. Patient and family informed of procedure and plan of care. Questions answered with review. Patient prepped for procedure, per orders from physician, prior to arrival.    Patient on cardiac monitor, non-invasive blood pressure, SPO2 monitor. On Room Air. Patient is A&Ox 4. Patient reports No Pain. Patient in stretcher, in low position, with side rails up, call bell within reach, patient instructed to call if assistance as needed. Patient prep in: [address], [address]   Family in: Waiting Room.    Prep by: Reba Halsted RN

## 2019-09-12 NOTE — Clinical Note
Lesion: Located in the Proximal Diag 1. Stent inserted. Stent deployed. Multiple inflations used. First inflation pressure = 12 doreen; inflation time: 30 sec. Second inflation pressure: 16 doreen; inflation time: 25 sec.

## 2019-09-12 NOTE — PROCEDURES
Diagnosis;  1)Progressively worsening symptoms consistent with possible ACS  2)Severe single vessel disease involving large D1.  3)Mildly increased LVEDP  4)S/p PCI of D1 with 2.75 by 15 and 2.5 by 8 mm Xience drug eluting stent in overlapping fashion    Recommendations:  1)Follow up with me in 1 months  2)IV hydration  3)DAPT for 1 year  4)Cardiac rehab  5)Identification of cause of anemia.     Contrast use 90 cc    Acces right radial artery--no issues

## 2019-09-12 NOTE — PROGRESS NOTES
TRANSFER - IN REPORT:    Verbal report received from [place] CVT on Stephen Faith  being received from procedure area for routine progression of care. Report consisted of patients Situation, Background, Assessment and Recommendations(SBAR). Information from the following report(s) SBAR, Procedure Summary, MAR, Recent Results and Cardiac Rhythm NSR was reviewed with the receiving clinician. Opportunity for questions and clarification was provided. Assessment completed upon patients arrival to [address] [place] [address] and care assumed. Cardiac Cath Lab Recovery Arrival Note:    Stephen Faith arrived to [facility] recovery area. Patient procedure= PCI. Patient on cardiac monitor, non-invasive blood pressure, SPO2 monitor. On Room Air . IV  of NS on pump at 75 ml/hr. Patient status doing well without problems. Patient is A&Ox 4  . Patient reports No Pain. PROCEDURE SITE CHECK:    Procedure site:without any bleeding and No Hematoma, No pain/discomfort reported at procedure site. No change in patient status. Continue to monitor patient and status.

## 2019-09-12 NOTE — Clinical Note
Lesion located in the Proximal Diag 1. Balloon inserted. Balloon inflated using multiple inflations inflation technique. Pressure = 10 doreen; Duration = 17 sec. Inflation 2: Pressure: 12 doreen; Duration: 30 sec.

## 2019-09-12 NOTE — PROGRESS NOTES
2cc of air let out of TR band; no bleeding; no hematoma; TR band removed; site area cleaned with chloraprepp and then 4 x along with Tegaderm applied.

## 2019-09-12 NOTE — PROGRESS NOTES
2 ml of air removed from TR band; no bleeding or hematoma. 1120: 2 ml of air removed from TR Band; no bleeding or hematoma. 1145: 2 ml of air removed from TR Band; no bleeding or hematoma. 1210: 2 ml of air removed from TR band; no bleeding or hematoma.

## 2019-09-12 NOTE — PROGRESS NOTES
TRANSFER - OUT REPORT:    Verbal report given to Estela TELLO on Elisha Vila being transferred to Room 469 for routine progression of care       Report consisted of patients Situation, Background, Assessment and   Recommendations(SBAR). Information from the following report(s) SBAR, MAR, Recent Results and Cardiac Rhythm NSR was reviewed with the receiving nurse. Opportunity for questions and clarification was provided.

## 2019-09-12 NOTE — PROGRESS NOTES
0830 -   Cardiac Cath Lab Procedure Area Arrival Note:    Andrew Piedra arrived to Cardiac Cath Lab, Procedure Area. Patient identifiers verified with NAME and DATE OF BIRTH. Procedure verified with patient. Consent forms verified. Allergies verified. Patient informed of procedure and plan of care. Questions answered with review. Patient voiced understanding of procedure and plan of care. Patient on cardiac monitor, non-invasive blood pressure, SPO2 monitor. Placed on O2 @ 3 lpm via NC.  IV of NS on pump at 75 ml/hr. Patient status doing well with some problems : SOB. Patient is A&Ox 4. Patient reports no discomfort at this time. Patient medicated during procedure with orders obtained and verified by Dr. Savanah Vale. Refer to patients Cardiac Cath Lab PROCEDURE REPORT for vital signs, assessment, status, and response during procedure, printed at end of case. Printed report on chart or scanned into chart. 1012 - TRANSFER - OUT REPORT:    Verbal report given to [facility]) on Andrew Piedra  being transferred to (unit) for routine post - op       Report consisted of patients Situation, Background, Assessment and   Recommendations(SBAR). Information from the following report(s) Procedure Summary and MAR was reviewed with the receiving nurse. Lines:   Peripheral IV 09/12/19 Right Antecubital (Active)        Opportunity for questions and clarification was provided.       Patient transported with:   [employer]

## 2019-09-13 ENCOUNTER — TELEPHONE (OUTPATIENT)
Dept: CARDIAC REHAB | Age: 74
End: 2019-09-13

## 2019-09-13 VITALS
BODY MASS INDEX: 26.76 KG/M2 | HEART RATE: 74 BPM | WEIGHT: 151.01 LBS | SYSTOLIC BLOOD PRESSURE: 120 MMHG | OXYGEN SATURATION: 96 % | TEMPERATURE: 98.2 F | RESPIRATION RATE: 16 BRPM | HEIGHT: 63 IN | DIASTOLIC BLOOD PRESSURE: 47 MMHG

## 2019-09-13 LAB
ANION GAP SERPL CALC-SCNC: 9 MMOL/L (ref 5–15)
BUN SERPL-MCNC: 20 MG/DL (ref 6–20)
BUN/CREAT SERPL: 18 (ref 12–20)
CALCIUM SERPL-MCNC: 8.2 MG/DL (ref 8.5–10.1)
CHLORIDE SERPL-SCNC: 112 MMOL/L (ref 97–108)
CO2 SERPL-SCNC: 22 MMOL/L (ref 21–32)
CREAT SERPL-MCNC: 1.11 MG/DL (ref 0.55–1.02)
ERYTHROCYTE [DISTWIDTH] IN BLOOD BY AUTOMATED COUNT: 14.4 % (ref 11.5–14.5)
FERRITIN SERPL-MCNC: 262 NG/ML (ref 26–388)
GLUCOSE SERPL-MCNC: 104 MG/DL (ref 65–100)
HCT VFR BLD AUTO: 22.8 % (ref 35–47)
HGB BLD-MCNC: 7.4 G/DL (ref 11.5–16)
IRON SATN MFR SERPL: 33 % (ref 20–50)
IRON SERPL-MCNC: 87 UG/DL (ref 35–150)
MCH RBC QN AUTO: 35.2 PG (ref 26–34)
MCHC RBC AUTO-ENTMCNC: 32.5 G/DL (ref 30–36.5)
MCV RBC AUTO: 108.6 FL (ref 80–99)
NRBC # BLD: 0 K/UL (ref 0–0.01)
NRBC BLD-RTO: 0 PER 100 WBC
PLATELET # BLD AUTO: 153 K/UL (ref 150–400)
PMV BLD AUTO: 9.5 FL (ref 8.9–12.9)
POTASSIUM SERPL-SCNC: 4.2 MMOL/L (ref 3.5–5.1)
RBC # BLD AUTO: 2.1 M/UL (ref 3.8–5.2)
RETICS # AUTO: 0.13 M/UL (ref 0.02–0.08)
RETICS/RBC NFR AUTO: 6.3 % (ref 0.7–2.1)
SODIUM SERPL-SCNC: 143 MMOL/L (ref 136–145)
TIBC SERPL-MCNC: 264 UG/DL (ref 250–450)
WBC # BLD AUTO: 3.1 K/UL (ref 3.6–11)

## 2019-09-13 PROCEDURE — 85027 COMPLETE CBC AUTOMATED: CPT

## 2019-09-13 PROCEDURE — 74011250636 HC RX REV CODE- 250/636: Performed by: INTERNAL MEDICINE

## 2019-09-13 PROCEDURE — 85045 AUTOMATED RETICULOCYTE COUNT: CPT

## 2019-09-13 PROCEDURE — 82728 ASSAY OF FERRITIN: CPT

## 2019-09-13 PROCEDURE — 80048 BASIC METABOLIC PNL TOTAL CA: CPT

## 2019-09-13 PROCEDURE — 36415 COLL VENOUS BLD VENIPUNCTURE: CPT

## 2019-09-13 PROCEDURE — 74011250637 HC RX REV CODE- 250/637: Performed by: INTERNAL MEDICINE

## 2019-09-13 PROCEDURE — 99218 HC RM OBSERVATION: CPT

## 2019-09-13 PROCEDURE — 83540 ASSAY OF IRON: CPT

## 2019-09-13 RX ORDER — ASPIRIN 81 MG/1
81 TABLET ORAL DAILY
Qty: 30 TAB | Refills: 3 | Status: SHIPPED | COMMUNITY
Start: 2019-09-13 | End: 2022-06-01

## 2019-09-13 RX ADMIN — FUROSEMIDE 20 MG: 20 TABLET ORAL at 08:56

## 2019-09-13 RX ADMIN — SODIUM CHLORIDE 500 ML: 900 INJECTION, SOLUTION INTRAVENOUS at 00:38

## 2019-09-13 RX ADMIN — CARVEDILOL 12.5 MG: 12.5 TABLET, FILM COATED ORAL at 08:56

## 2019-09-13 RX ADMIN — Medication 10 ML: at 04:32

## 2019-09-13 RX ADMIN — CLOPIDOGREL BISULFATE 75 MG: 75 TABLET ORAL at 08:56

## 2019-09-13 NOTE — TELEPHONE ENCOUNTER
9/13/2019 Cardiac Rehab: Called Ms. Primo Hancock  to discuss participation in the Cardiac Rehab Program following stent placement on 9/12/19. Discussed the importance, benefits, and side effects of Plavix. She has taken it in the past. Discussed the cardiac rehab program, format, and benefits. Initial intake scheduled for 10/2/19 at 1 pm. She is eager to begin and would like a call should we have any cancellations. Will notify OP.  Grace Macias RN

## 2019-09-13 NOTE — PROGRESS NOTES
1930 Bedside and Verbal shift change report given to Ruddy Jeans (oncoming nurse) by Eligio Mooney RN (offgoing nurse). Report included the following information SBAR, Kardex, MAR, Cardiac Rhythm NSR and Alarm Parameters . 0730 Bedside and Verbal shift change report given to Estela BLAND RN (oncoming nurse) by Merlin Arnold RN (offgoing nurse). Report included the following information SBAR, Kardex, MAR, Cardiac Rhythm NSR and Alarm Parameters .

## 2019-09-13 NOTE — DISCHARGE SUMMARY
[facility] DISTRICT: Lucie Murry  [phone]  Cardiology Discharge Summary     Patient ID:  Daren Sharif  [id number]  57 y.o.  1945    Admit Date: 9/12/2019    Discharge Date: 9/13/2019     Admitting Physician: Darvin Salinas MD     Discharge Physician: Darvin Salinas MD    Admission Diagnoses:   CVD (cardiovascular disease) [I25.10]; Coronary artery disease [I25.10]    Discharge Diagnoses: Active Problems:    Coronary artery disease (9/12/2019)      Patient was admitted post PCI after undergoing PCI to D1 with 2 REBECCA  Discharge Condition: Good stable    Cardiology Procedures this Admission:  Left heart catheterization with PCI    Patient Active Problem List    Diagnosis Date Noted    Coronary artery disease 09/12/2019     Priority: 1 - One    Renal artery stenosis ([facility] [address] 08/20/2019    Flash pulmonary edema ([facility] [address] 08/20/2019    Atherosclerotic cardiovascular disease 08/20/2019    Mixed hyperlipidemia 11/16/2018    Renovascular hypertension 11/16/2018    SOB (shortness of breath) 11/16/2018    Bilateral leg edema 11/16/2018    Family history of premature CAD 11/16/2018    HNP (herniated nucleus pulposus), cervical 12/27/2016      Past Medical History:   Diagnosis Date    Hypertension       Social History     Socioeconomic History    Marital status:      Spouse name: Not on file    Number of children: Not on file    Years of education: Not on file    Highest education level: Not on file   Tobacco Use    Smoking status: Former Smoker     Packs/day: 0.50    Smokeless tobacco: Never Used   Substance and Sexual Activity    Alcohol use:  Yes     Alcohol/week: 6.0 standard drinks     Types: 6 Glasses of wine per week     No Known Allergies   Family History   Problem Relation Age of Onset    Heart Attack Father 47        Hospital Course: Stable with discharge next day    Consults: None    Visit Vitals  /47   Pulse 74   Temp 98.2 °F (36.8 °C)   Resp 16 Ht 5' 3\" (1.6 m)   Wt 151 lb 0.2 oz (68.5 kg)   SpO2 96%   Breastfeeding? No   BMI 26.75 kg/m²       Physical Exam  Abdomen: soft, non-tender. Bowel sounds normal.   Extremities: extremities normal, no edema, pulses   Heart: regular rate and rhythm, no murmur, S3/JVD  Lungs: clear to auscultation bilaterally  Neck: supple, symmetrical, trachea midline,   Neurologic: Grossly normal    Labs:   Recent Labs     09/13/19 0433   WBC 3.1*   HGB 7.4*   HCT 22.8*        Recent Labs     09/13/19 0433      K 4.2   *   CO2 22   *   BUN 20   CREA 1.11*   CA 8.2*       No results for input(s): TROIQ, CPK, CKMB in the last 72 hours. Disposition: home    Patient Instructions:   Discharge Medication List as of 9/13/2019 10:59 AM      CONTINUE these medications which have NOT CHANGED    Details   aspirin delayed-release 81 mg tablet Take 1 Tab by mouth daily. , OTC, Disp-30 Tab, R-3      furosemide (LASIX) 20 mg tablet Take 1 Tab by mouth daily. , Normal, Disp-30 Tab, R-5      meloxicam (MOBIC) 15 mg tablet Take 15 mg by mouth daily. , Historical Med      sertraline (ZOLOFT) 100 mg tablet Take 100 mg by mouth nightly., Historical Med      atorvastatin (LIPITOR) 40 mg tablet Take 1 Tab by mouth daily for 360 days. , Normal, Disp-30 Tab, R-11      carvedilol (COREG) 12.5 mg tablet Take 1 Tab by mouth two (2) times daily (with meals) for 90 days. , Print, Disp-60 Tab, R-2      clopidogrel (PLAVIX) 75 mg tab Take 1 Tab by mouth daily for 90 days. , Print, Disp-30 Tab, R-2      NIFEdipine ER (PROCARDIA XL) 90 mg ER tablet Take 1 Tab by mouth daily for 90 days. , Print, Disp-30 Tab, R-2      zolpidem (AMBIEN) 10 mg tablet Take  by mouth nightly as needed for Sleep., Historical Med             Referenced discharge instructions provided by nursing for diet and activity.     Follow up with:    Signed:  Lyudmila Finley MD  9/13/2019  3:45 PM

## 2019-09-13 NOTE — DISCHARGE INSTRUCTIONS
Signed:  Katelyn Wilson NP  9/13/2019  10:59 AM    Radial Cardiac Catheterization/Angiography Discharge Instructions    It is normal to feel tired the first couple days. Take it easy and follow the physicians instructions. CHECK THE CATHETER INSERTION SITE DAILY:    Remove the wrist dressing 24 hours after the procedure. You may shower 24 hours after the procedure. Wash with soap and water and pat dry. Gentle cleaning of the site with soap and water is sufficient, cover with a dry clean dressing or bandage. Do not apply creams or powders to the area. No soaking the wrist for 3 days. Leave the puncture site open to air after 24 hours post-procedure. CALL THE PHYSICIANS:     If the site becomes red, swollen or feels warm to the touch  If there is bleeding or drainage or if there is unusual pain at the radial site. If there is any minor oozing, you may apply a band-aid and remove after 12 hours. If the bleeding continues, hold pressure with the middle finger against the puncture site and the thumb against the back of the wrist,call 911 to be transported to the hospital.  DO NOT DRIVE YOURSELF, [facility] [address]. ACTIVITY:   For the first 24 hours do not manipulate the wrist.  No lifting, pushing or pulling over 3-5 pounds with the affected wrist for 7 daysand no straining the insertion site. Do not life grocery bags or the garbage can, do not run the vacuum  or  for 7 days. Start with short walks as in the hospital and gradually increase as tolerated each day. It is recommended to walk 30 minutes 5-7 days per week. Follow your physicians instructions on activity. Avoid walking outside in extremes of heat or cold. Walk inside when it is cold and windy or hot and humid. Things to keep in mind:  No driving for at least 24 hours, or as designated by your physician.   Limit the number of times you go up and down the stairs  Take rests and pace yourself with activity. Be careful and do not strain with bowel movements. MEDICATIONS:    Take all medications as prescribed  Call your physician if you have any questions  Keep an updated list of your medications with you at all times and give a list to your physician and pharmacist    SIGNS AND SYMPTOMS:   Be cautious of symptoms of angina or recurrent symptoms such as chest discomfort, unusual shortness of breath or fatigue. These could be symptoms of restenosis, a new blockage or a heart attack. If your symptoms are relieved with rest it is still recommended that you notify your physician of recurrent chest pain or discomfort. For CHEST PAIN or symptoms of angina not relieved with rest:  If the discomfort is not relieved with rest, and you have been prescribed Nitroglycerin, take as directed (taken under the tongue, one at a time 5 minutes apart for a total of 3 doses). If the discomfort is not relieved after the 3rd nitroglycerin, call 911. If you have not been prescribed Nitroglycerin  and your chest discomfort is not relieved with rest, call 911. AFTER CARE:   Follow up with your physician as instructed. Follow a heart healthy diet with proper portion control, daily stress management, daily exercise, blood pressure and cholesterol control , and smoking cessation.     Ok to take metoprolol daily

## 2019-09-26 RX ORDER — CLOPIDOGREL BISULFATE 75 MG/1
75 TABLET ORAL DAILY
Qty: 90 TAB | Refills: 1 | Status: SHIPPED | OUTPATIENT
Start: 2019-09-26 | End: 2020-04-27

## 2019-09-26 RX ORDER — NIFEDIPINE 90 MG/1
90 TABLET, EXTENDED RELEASE ORAL DAILY
Qty: 90 TAB | Refills: 1 | Status: SHIPPED | OUTPATIENT
Start: 2019-09-26 | End: 2020-04-27

## 2019-09-26 RX ORDER — CLOPIDOGREL BISULFATE 75 MG/1
75 TABLET ORAL DAILY
Qty: 30 TAB | Refills: 2 | Status: SHIPPED | OUTPATIENT
Start: 2019-09-26 | End: 2019-09-26 | Stop reason: SDUPTHER

## 2019-09-26 RX ORDER — NIFEDIPINE 90 MG/1
90 TABLET, EXTENDED RELEASE ORAL DAILY
Qty: 30 TAB | Refills: 2 | Status: SHIPPED | OUTPATIENT
Start: 2019-09-26 | End: 2019-09-26 | Stop reason: SDUPTHER

## 2019-09-26 NOTE — TELEPHONE ENCOUNTER
Requested Prescriptions     Signed Prescriptions Disp Refills    clopidogrel (PLAVIX) 75 mg tab 30 Tab 2     Sig: Take 1 Tab by mouth daily for 90 days. Authorizing Provider: Ranjana Eagle     Ordering User: Greg Talavera NIFEdipine ER (PROCARDIA XL) 90 mg ER tablet 30 Tab 2     Sig: Take 1 Tab by mouth daily for 90 days.      Authorizing Provider: Ranjana Eagle     Ordering User: Bo Setting       Last office visit 8/20/19    Per Dr. Chon Paulino   Date Time Provider Andi Brunsoni   10/2/2019  1:00 PM Georgette Colbert, RN [address] [place] [address], [facility] [address]   10/15/2019 10:40 AM Colin Santamaria  [address] 2/18/2020  9:40 AM Colin Santamaria  [address]

## 2019-10-01 ENCOUNTER — TELEPHONE (OUTPATIENT)
Dept: CARDIAC REHAB | Age: 74
End: 2019-10-01

## 2019-10-01 NOTE — TELEPHONE ENCOUNTER
10/1/2019 Cardiac Wellness: Ms. Ulysses Mouton called to cancel intake appointment for 10/2/2019 d/t her shortness of breath. She will call us after she sees a pulmonary doctor as she want to participate in CR.   Tracey Jain

## 2019-10-02 ENCOUNTER — APPOINTMENT (OUTPATIENT)
Dept: CARDIAC REHAB | Age: 74
End: 2019-10-02

## 2019-10-15 ENCOUNTER — OFFICE VISIT (OUTPATIENT)
Dept: CARDIOLOGY CLINIC | Age: 74
End: 2019-10-15

## 2019-10-15 VITALS
BODY MASS INDEX: 25.87 KG/M2 | RESPIRATION RATE: 18 BRPM | DIASTOLIC BLOOD PRESSURE: 68 MMHG | OXYGEN SATURATION: 98 % | WEIGHT: 146 LBS | HEART RATE: 65 BPM | SYSTOLIC BLOOD PRESSURE: 120 MMHG | HEIGHT: 63 IN

## 2019-10-15 DIAGNOSIS — D64.9 ANEMIA, UNSPECIFIED TYPE: ICD-10-CM

## 2019-10-15 DIAGNOSIS — I15.0 RENOVASCULAR HYPERTENSION: ICD-10-CM

## 2019-10-15 DIAGNOSIS — E78.2 MIXED HYPERLIPIDEMIA: ICD-10-CM

## 2019-10-15 DIAGNOSIS — I25.10 CORONARY ARTERY DISEASE INVOLVING NATIVE CORONARY ARTERY OF NATIVE HEART WITHOUT ANGINA PECTORIS: Primary | ICD-10-CM

## 2019-10-15 DIAGNOSIS — R06.02 SOB (SHORTNESS OF BREATH): ICD-10-CM

## 2019-10-15 DIAGNOSIS — I70.1 RENAL ARTERY STENOSIS (HCC): ICD-10-CM

## 2019-10-15 RX ORDER — ATORVASTATIN CALCIUM 40 MG/1
80 TABLET, FILM COATED ORAL DAILY
Qty: 60 TAB | Refills: 11 | Status: SHIPPED | OUTPATIENT
Start: 2019-10-15 | End: 2020-10-26

## 2019-10-15 NOTE — PROGRESS NOTES
[facility] [place]: Dallin Lopez  [phone]          Cardiology Consult/Progress Note      Requesting/referring provider: Dr. Kenya Shirley  Reason for Consult: Uncontrolled hypertension    HPI: Lit Herrera, a 69y.o. year-old who was recently seen for evaluation of uncontrolled hypertension. Mrs. Tadeo Ramsey has history of extremely difficult to control hypertension and was admitted to the hospital with malignant hypertension. This was associated with evidence of mild endorgan damage in the form of creatinine increased, chest tightness. She also reported progressive shortness of breath over the previous few months with intermittent episodes of orthopnea PND consistent with flash pulmonary edema. During recent hospitalization she required IV calcium channel blockers to control her blood pressure in addition to the oral antihypertensive that she has been on. As a part of further evaluation she had a CT angiogram which demonstrated renal artery stenosis. Subsequently she underwent invasive renal angiogram which demonstrated 70 to 75% stenosis in main right renal artery in its proximal segment. She subsequently underwent stenting of right renal artery with a 5 x 15 mm Herculink bare-metal stent with no residual stenosis. She has moderate stenosis and a relatively small sized left renal artery. There is an accessory left renal artery free of significant disease along with an accessory right renal artery free of significant disease. Since undergoing the procedure she has done very well. Her blood pressures are now much better controlled and she is only on 2 antihypertensives. Occasionally she was running low blood pressures at night due to which her carvedilol was changed to once a day. Additionally she is on nifedipine XL 90 mg daily. Currently the blood pressures are under excellent control. Patient continues to report some shortness of breath.   While this is improved compared to prior to procedure it is still more than what she used to have about a year ago. Most of these symptoms are noted during exertion. She does not report any orthopnea PND. Pedal edema is noted bilaterally. Since her last visit she underwent anemia work up and a cath. She underwent PCI to a large diagonal branch with 2 REBECCA. Lad had intermediate disease. Over past 1 months with improvement in anemia and PCI she has reported much improvement in symptoms    Investigations:  10/18 vcs normal stress echo, did not reach target heart rate  10/18 vcs normal lexiscan cardiolyte  Investigations  ECG: Sinus bradycardia, nonspecific ST-T changes. Echocardiogram: Normal LV systolic function mild to moderate LV hypertrophy consistent with hypertensive heart disease. CT abdomen with angiogram and runoff: 2 renal arteries are present bilaterally. Right major renal artery has at least moderate to severe ostial stenosis. Accessory right renal artery is free of significant disease. Left renal arteries are equal in size. CT scan has been reported to demonstrate disease in superior renal artery although I cannot personally confirm it. Both arteries on the left appear to be too small to provide durable results with renal artery stenting. Renal angiogram and stenting July 2019: Main right renal artery 75% stenosis, accessory renal artery on the right free of significant disease. 2 codominant left renal arteries, one with 40 to 50% angiographic stenosis other free of disease. Status post proximal right main renal artery stenting with 5 x 15 Herculink stent with 0% residual stenosis. Renal arterial Doppler August 2019: Patent stent in main proximal right renal artery. Accessory right renal artery not visualized. Main left renal artery has less than 60% stenosis. Accessory renal artery on the left is not visualized. Technically suboptimal study. Normal kidney size bilaterally.   Cath Sep 2019: Severe disease in D1 s/p PCI with 2 REBECCA. Moderate disease in mid LAD, otherwise non obstructive disease. Assessment/Plan:  1. Hypertension likely secondary to renal artery stenosis significantly improved following renal artery stenting to proximal main right renal artery now improved status post renal artery stenting. 2.  Atherosclerotic vascular disease  3. History of flash pulmonary edema related to uncontrolled hypertension now improved  4. Renal artery stenosis bilateral.  5.  Remote history of tobacco abuse  6. Shortness of breath without orthopnea PND. 7.  Hyperlipidemia  8. Hypertensive heart disease with moderate LVH and likely chronic diastolic heart failure. 9.  Anemia of uncertain etiology with normal iron stores and increased reticulocyte count. 10.  Likely extracranial cerebrovascular disease with bilateral carotid bruit    Mrs. Daljit Ledesma was seen in his evaluated for what was deemed to be renovascular hypertension. She had documented renal artery stenosis for which she underwent stenting to her right proximal main renal artery with excellent results. Her antihypertensive need has reduced from 5-6 antihypertensive agents to 1-2 antihypertensive agents. Blood pressure is now under much better control. She is following with Dr. William Pope with nephrology. Renal function stable with creatinine ranging from 1.1 to 1.3 mg/dL. I have increased her atorvastatin from 40 to 80 mg to provide her benefit of high intensity LDL reduction. From a renal artery stenting standpoint she will require Doppler surveillance annually. She is tolerating well dual antiplatelet therapy status post cath. Symptoms of shortness of breath have significantly improved since PCI and improvement of anemia. We will plan to see her back in 6 months. Past medical history:  She  has a past medical history of Hypertension. She also has no past medical history of Adverse effect of anesthesia. Patient reports no PND/Orthpnea/CP.  He reports no cough/fever/focal neurological deficits/abdominal pain. All other systems negative except as above. PE  Vitals:    10/15/19 1025   BP: 120/68   Pulse: 65   Resp: 18   SpO2: 98%   Weight: 146 lb (66.2 kg)   Height: 5' 3\" (1.6 m)    Body mass index is 25.86 kg/m². General:    Alert, cooperative, no distress. Psychiatric:    Normal Mood and affect    Eye/ENT:      Pupils equal, No asymmetry, Conjunctival pink. Able to hear voice at normal amplitude. Bilateral carotid bruit   Lungs:      Visibly symmetric chest expansion, No palpable tenderness. Few basal crackles on auscultation bilaterally. Heart[de-identified]    Regular rate and rhythm, S1, S2 normal, no murmur, click, rub or gallop. No JVD, Normal palpable peripheral pulses. No cyanosis bilateral carotid bruit   Abdomen:     Soft, non-tender. Bowel sounds normal. No masses,  No      organomegaly. No renal bruit. Extremities:   Extremities normal, atraumatic, mild bilateral edema. Neurologic:   CN II-XII grossly intact.  No gross focal deficits           Recent Labs:  No results found for: CHOL, CHOLX, CHLST, CHOLV, [id number], HDL, HDLP, LDL, LDLC, DLDLP, TGLX, TRIGL, TRIGP, CHHD, CHHDX  Lab Results   Component Value Date/Time    Creatinine (POC) 0.9 10/20/2016 10:16 AM    Creatinine 1.11 (H) 09/13/2019 04:33 AM     Lab Results   Component Value Date/Time    BUN 20 09/13/2019 04:33 AM     Lab Results   Component Value Date/Time    Potassium 4.2 09/13/2019 04:33 AM     No results found for: HBA1C, HGBE8, ZAQ8KBMZ, GSA9ARCR  Lab Results   Component Value Date/Time    HGB 7.4 (L) 09/13/2019 04:33 AM     Lab Results   Component Value Date/Time    PLATELET 174 53/[birth year]/1964 04:33 AM       Reviewed:  Past Medical History:   Diagnosis Date    Hypertension      Social History     Tobacco Use   Smoking Status Former Smoker    Packs/day: 0.50   Smokeless Tobacco Never Used     Social History     Substance and Sexual Activity   Alcohol Use Yes    Alcohol/week: 6.0 standard drinks    Types: 6 Glasses of wine per week     No Known Allergies  Family History   Problem Relation Age of Onset    Heart Attack Father 47        Current Outpatient Medications   Medication Sig    atorvastatin (LIPITOR) 40 mg tablet Take 2 Tabs by mouth daily for 360 days.  clopidogrel (PLAVIX) 75 mg tab Take 1 Tab by mouth daily for 180 days.  NIFEdipine ER (PROCARDIA XL) 90 mg ER tablet Take 1 Tab by mouth daily for 180 days.  aspirin delayed-release 81 mg tablet Take 1 Tab by mouth daily.  furosemide (LASIX) 20 mg tablet Take 1 Tab by mouth daily.  meloxicam (MOBIC) 15 mg tablet Take 15 mg by mouth daily.  sertraline (ZOLOFT) 100 mg tablet Take 100 mg by mouth nightly.  carvedilol (COREG) 12.5 mg tablet Take 1 Tab by mouth two (2) times daily (with meals) for 90 days. (Patient taking differently: Take 12.5 mg by mouth daily.)    zolpidem (AMBIEN) 10 mg tablet Take  by mouth nightly as needed for Sleep. No current facility-administered medications for this visit. [facility], MD10/15/19 There are other unrelated non-urgent complaints, but due to the busy schedule and the amount of time I've already spent with her, time does not permit me to address these routine issues at today's visit. I've requested another appointment to review these additional issues.     [facility] [place]  [address], [address], [address] [place] [address]

## 2019-10-15 NOTE — LETTER
10/15/19 Patient: Kevin Fulton YOB: 1945 Date of Visit: 10/15/2019 Consuelo Foster MD 
[address] [phone] VIA Facsimile: [phone] Dear Consuelo Foster MD, Thank you for referring Ms. Kevin Fulton to [facility] for evaluation. My notes for this consultation are attached. If you have questions, please do not hesitate to call me. I look forward to following your patient along with you.  
 
 
Sincerely, 
 
Layne Perdomo MD

## 2019-11-27 RX ORDER — FUROSEMIDE 20 MG/1
TABLET ORAL
Qty: 30 TAB | Refills: 5 | Status: SHIPPED | OUTPATIENT
Start: 2019-11-27 | End: 2020-08-31

## 2020-02-20 ENCOUNTER — APPOINTMENT (OUTPATIENT)
Dept: GENERAL RADIOLOGY | Age: 75
DRG: 193 | End: 2020-02-20
Attending: EMERGENCY MEDICINE
Payer: MEDICARE

## 2020-02-20 ENCOUNTER — HOSPITAL ENCOUNTER (EMERGENCY)
Age: 75
Discharge: HOME | DRG: 193 | End: 2020-02-20
Attending: EMERGENCY MEDICINE
Payer: MEDICARE

## 2020-02-20 VITALS
HEART RATE: 99 BPM | SYSTOLIC BLOOD PRESSURE: 174 MMHG | TEMPERATURE: 97.6 F | HEIGHT: 63 IN | DIASTOLIC BLOOD PRESSURE: 62 MMHG | WEIGHT: 142.42 LBS | BODY MASS INDEX: 25.23 KG/M2 | OXYGEN SATURATION: 93 % | RESPIRATION RATE: 24 BRPM

## 2020-02-20 DIAGNOSIS — J20.9 ACUTE BRONCHITIS, UNSPECIFIED ORGANISM: Primary | ICD-10-CM

## 2020-02-20 LAB
ALBUMIN SERPL-MCNC: 4.6 G/DL (ref 3.5–5)
ALBUMIN/GLOB SERPL: 1.2 {RATIO} (ref 1.1–2.2)
ALP SERPL-CCNC: 103 U/L (ref 45–117)
ALT SERPL-CCNC: 35 U/L (ref 12–78)
ANION GAP SERPL CALC-SCNC: 14 MMOL/L (ref 5–15)
AST SERPL-CCNC: 23 U/L (ref 15–37)
ATRIAL RATE: 87 BPM
BASOPHILS # BLD: 0 K/UL (ref 0–0.1)
BASOPHILS NFR BLD: 0 % (ref 0–1)
BILIRUB SERPL-MCNC: 0.8 MG/DL (ref 0.2–1)
BNP SERPL-MCNC: 510 PG/ML (ref 0–125)
BUN SERPL-MCNC: 20 MG/DL (ref 6–20)
BUN/CREAT SERPL: 19 (ref 12–20)
CALCIUM SERPL-MCNC: 9.1 MG/DL (ref 8.5–10.1)
CALCULATED P AXIS, ECG09: 57 DEGREES
CALCULATED R AXIS, ECG10: 65 DEGREES
CALCULATED T AXIS, ECG11: 71 DEGREES
CHLORIDE SERPL-SCNC: 103 MMOL/L (ref 97–108)
CO2 SERPL-SCNC: 25 MMOL/L (ref 21–32)
COMMENT, HOLDF: NORMAL
CREAT SERPL-MCNC: 1.04 MG/DL (ref 0.55–1.02)
DIAGNOSIS, 93000: NORMAL
DIFFERENTIAL METHOD BLD: ABNORMAL
EOSINOPHIL # BLD: 0.3 K/UL (ref 0–0.4)
EOSINOPHIL NFR BLD: 5 % (ref 0–7)
ERYTHROCYTE [DISTWIDTH] IN BLOOD BY AUTOMATED COUNT: 13.2 % (ref 11.5–14.5)
GLOBULIN SER CALC-MCNC: 3.7 G/DL (ref 2–4)
GLUCOSE SERPL-MCNC: 126 MG/DL (ref 65–100)
HCT VFR BLD AUTO: 35.6 % (ref 35–47)
HGB BLD-MCNC: 11.6 G/DL (ref 11.5–16)
IMM GRANULOCYTES # BLD AUTO: 0 K/UL (ref 0–0.04)
IMM GRANULOCYTES NFR BLD AUTO: 0 % (ref 0–0.5)
LYMPHOCYTES # BLD: 0.7 K/UL (ref 0.8–3.5)
LYMPHOCYTES NFR BLD: 12 % (ref 12–49)
MCH RBC QN AUTO: 33.8 PG (ref 26–34)
MCHC RBC AUTO-ENTMCNC: 32.6 G/DL (ref 30–36.5)
MCV RBC AUTO: 103.8 FL (ref 80–99)
MONOCYTES # BLD: 0.5 K/UL (ref 0–1)
MONOCYTES NFR BLD: 9 % (ref 5–13)
NEUTS SEG # BLD: 4.2 K/UL (ref 1.8–8)
NEUTS SEG NFR BLD: 74 % (ref 32–75)
NRBC # BLD: 0 K/UL (ref 0–0.01)
NRBC BLD-RTO: 0 PER 100 WBC
P-R INTERVAL, ECG05: 128 MS
PLATELET # BLD AUTO: 176 K/UL (ref 150–400)
PMV BLD AUTO: 8.7 FL (ref 8.9–12.9)
POTASSIUM SERPL-SCNC: 4.2 MMOL/L (ref 3.5–5.1)
PROT SERPL-MCNC: 8.3 G/DL (ref 6.4–8.2)
Q-T INTERVAL, ECG07: 364 MS
QRS DURATION, ECG06: 64 MS
QTC CALCULATION (BEZET), ECG08: 438 MS
RBC # BLD AUTO: 3.43 M/UL (ref 3.8–5.2)
SAMPLES BEING HELD,HOLD: NORMAL
SODIUM SERPL-SCNC: 142 MMOL/L (ref 136–145)
TROPONIN I SERPL-MCNC: <0.05 NG/ML
VENTRICULAR RATE, ECG03: 87 BPM
WBC # BLD AUTO: 5.7 K/UL (ref 3.6–11)

## 2020-02-20 PROCEDURE — 71046 X-RAY EXAM CHEST 2 VIEWS: CPT

## 2020-02-20 PROCEDURE — 93005 ELECTROCARDIOGRAM TRACING: CPT

## 2020-02-20 PROCEDURE — 74011250637 HC RX REV CODE- 250/637: Performed by: EMERGENCY MEDICINE

## 2020-02-20 PROCEDURE — 83880 ASSAY OF NATRIURETIC PEPTIDE: CPT

## 2020-02-20 PROCEDURE — 94640 AIRWAY INHALATION TREATMENT: CPT

## 2020-02-20 PROCEDURE — 87633 RESP VIRUS 12-25 TARGETS: CPT

## 2020-02-20 PROCEDURE — 99285 EMERGENCY DEPT VISIT HI MDM: CPT

## 2020-02-20 PROCEDURE — 74011000250 HC RX REV CODE- 250

## 2020-02-20 PROCEDURE — 85025 COMPLETE CBC W/AUTO DIFF WBC: CPT

## 2020-02-20 PROCEDURE — 84484 ASSAY OF TROPONIN QUANT: CPT

## 2020-02-20 PROCEDURE — 74011000250 HC RX REV CODE- 250: Performed by: EMERGENCY MEDICINE

## 2020-02-20 PROCEDURE — 80053 COMPREHEN METABOLIC PANEL: CPT

## 2020-02-20 PROCEDURE — 74011636637 HC RX REV CODE- 636/637: Performed by: EMERGENCY MEDICINE

## 2020-02-20 PROCEDURE — 77030029684 HC NEB SM VOL KT MONA -A

## 2020-02-20 PROCEDURE — 36415 COLL VENOUS BLD VENIPUNCTURE: CPT

## 2020-02-20 PROCEDURE — A9270 NON-COVERED ITEM OR SERVICE: HCPCS | Performed by: EMERGENCY MEDICINE

## 2020-02-20 RX ORDER — PREDNISONE 20 MG/1
60 TABLET ORAL DAILY
Qty: 12 TAB | Refills: 0 | Status: SHIPPED | OUTPATIENT
Start: 2020-02-20 | End: 2020-03-04

## 2020-02-20 RX ORDER — CODEINE PHOSPHATE AND GUAIFENESIN 10; 100 MG/5ML; MG/5ML
10 SOLUTION ORAL
Status: COMPLETED | OUTPATIENT
Start: 2020-02-20 | End: 2020-02-20

## 2020-02-20 RX ORDER — ACETAMINOPHEN 325 MG/1
650 TABLET ORAL
Status: COMPLETED | OUTPATIENT
Start: 2020-02-20 | End: 2020-02-20

## 2020-02-20 RX ORDER — IPRATROPIUM BROMIDE AND ALBUTEROL SULFATE 2.5; .5 MG/3ML; MG/3ML
SOLUTION RESPIRATORY (INHALATION)
Status: COMPLETED
Start: 2020-02-20 | End: 2020-02-20

## 2020-02-20 RX ORDER — IPRATROPIUM BROMIDE AND ALBUTEROL SULFATE 2.5; .5 MG/3ML; MG/3ML
3 SOLUTION RESPIRATORY (INHALATION)
Status: COMPLETED | OUTPATIENT
Start: 2020-02-20 | End: 2020-02-20

## 2020-02-20 RX ORDER — PREDNISONE 20 MG/1
60 TABLET ORAL
Status: COMPLETED | OUTPATIENT
Start: 2020-02-20 | End: 2020-02-20

## 2020-02-20 RX ADMIN — ACETAMINOPHEN 650 MG: 325 TABLET ORAL at 11:13

## 2020-02-20 RX ADMIN — GUAIFENESIN AND CODEINE PHOSPHATE 10 ML: 10; 100 LIQUID ORAL at 08:57

## 2020-02-20 RX ADMIN — IPRATROPIUM BROMIDE AND ALBUTEROL SULFATE 3 ML: .5; 3 SOLUTION RESPIRATORY (INHALATION) at 12:25

## 2020-02-20 RX ADMIN — IPRATROPIUM BROMIDE AND ALBUTEROL SULFATE 3 ML: .5; 3 SOLUTION RESPIRATORY (INHALATION) at 09:27

## 2020-02-20 RX ADMIN — IPRATROPIUM BROMIDE AND ALBUTEROL SULFATE 3 ML: 2.5; .5 SOLUTION RESPIRATORY (INHALATION) at 12:25

## 2020-02-20 RX ADMIN — IPRATROPIUM BROMIDE AND ALBUTEROL SULFATE 3 ML: .5; 3 SOLUTION RESPIRATORY (INHALATION) at 09:38

## 2020-02-20 RX ADMIN — PREDNISONE 60 MG: 20 TABLET ORAL at 08:57

## 2020-02-20 RX ADMIN — IPRATROPIUM BROMIDE AND ALBUTEROL SULFATE 3 ML: .5; 3 SOLUTION RESPIRATORY (INHALATION) at 08:57

## 2020-02-20 NOTE — ED NOTES
Pt ambulatory out of ED with discharge instructions and prescriptions in hand given by Dr. Jai Bran; pt verbalized understanding of discharge paperwork and time allotted for questions. VSS. Pt alert and oriented. Pt to be picked up by friend from waiting room.

## 2020-02-20 NOTE — ED PROVIDER NOTES
77-year-old female with a history of CAD status post stent in August 2019, hypertension, recurrent pneumonia, presenting to the ED complaining of shortness of breath. Patient reports she had pneumonia in December which resolved however she remained mildly short of breath at baseline since that time with particular increase in shortness of breath with exertion. She reports 24 hours of wheezing, increased shortness of breath, dry spastic cough. No fevers. She does report multiple sick contacts including a grandchild with RSV. She did not receive a flu vaccination this year. No recent travel within the last 21 days. Past Medical History:   Diagnosis Date    Hypertension     Pneumonia        Past Surgical History:   Procedure Laterality Date    CARDIAC SURG PROCEDURE UNLIST  08/2019    cardiac stents     HX APPENDECTOMY      HX BACK SURGERY      LUMBAR FUSION X2 SURGERIES    HX BACK SURGERY      CERVICAL X2    HX GI  2019    arterial stent in right kidney    HX HYSTERECTOMY      HX TONSILLECTOMY           Family History:   Problem Relation Age of Onset    Heart Attack Father 47       Social History     Socioeconomic History    Marital status:      Spouse name: Not on file    Number of children: Not on file    Years of education: Not on file    Highest education level: Not on file   Occupational History    Not on file   Social Needs    Financial resource strain: Not on file    Food insecurity:     Worry: Not on file     Inability: Not on file    Transportation needs:     Medical: Not on file     Non-medical: Not on file   Tobacco Use    Smoking status: Former Smoker     Packs/day: 0.50    Smokeless tobacco: Never Used   Substance and Sexual Activity    Alcohol use:  Yes     Alcohol/week: 6.0 standard drinks     Types: 6 Glasses of wine per week    Drug use: Never    Sexual activity: Not on file   Lifestyle    Physical activity:     Days per week: Not on file     Minutes per session: Not on file    Stress: Not on file   Relationships    Social connections:     Talks on phone: Not on file     Gets together: Not on file     Attends Holiness service: Not on file     Active member of club or organization: Not on file     Attends meetings of clubs or organizations: Not on file     Relationship status: Not on file    Intimate partner violence:     Fear of current or ex partner: Not on file     Emotionally abused: Not on file     Physically abused: Not on file     Forced sexual activity: Not on file   Other Topics Concern    Not on file   Social History Narrative    Not on file         ALLERGIES: Patient has no known allergies. Review of Systems   Constitutional: Negative for chills and fever. HENT: Negative for congestion. Eyes: Negative for visual disturbance. Respiratory: Positive for cough, shortness of breath and wheezing. Cardiovascular: Positive for chest pain and leg swelling. Gastrointestinal: Negative for abdominal pain, diarrhea, nausea and vomiting. Loose stools, no diarrhea   Genitourinary: Negative for dysuria and hematuria. Musculoskeletal: Negative for back pain. Skin: Negative for rash. Allergic/Immunologic: Negative for immunocompromised state. Neurological: Negative for syncope, weakness and headaches. Psychiatric/Behavioral: Negative for confusion. Vitals:    02/20/20 0849   Temp: 97.6 °F (36.4 °C)            Physical Exam  Vitals signs and nursing note reviewed. Constitutional:       General: She is not in acute distress. Appearance: She is well-developed. HENT:      Head: Normocephalic and atraumatic. Eyes:      General: No scleral icterus. Neck:      Trachea: No tracheal deviation. Cardiovascular:      Rate and Rhythm: Normal rate and regular rhythm. Pulses:           Dorsalis pedis pulses are 2+ on the right side and 2+ on the left side. Heart sounds: Normal heart sounds.    Pulmonary:      Effort: Pulmonary effort is normal. No respiratory distress. Breath sounds: Examination of the right-upper field reveals wheezing. Examination of the left-upper field reveals wheezing. Examination of the right-middle field reveals wheezing. Examination of the left-middle field reveals wheezing. Examination of the right-lower field reveals wheezing. Examination of the left-lower field reveals wheezing. Wheezing present. Chest:      Chest wall: Tenderness present. Abdominal:      General: There is no distension. Palpations: Abdomen is soft. Tenderness: There is no abdominal tenderness. Musculoskeletal: Normal range of motion. Right lower leg: No edema. Left lower leg: No edema. Skin:     General: Skin is warm and dry. Neurological:      Mental Status: She is alert and oriented to person, place, and time. Cranial Nerves: No cranial nerve deficit. MDM  Number of Diagnoses or Management Options  Diagnosis management comments: 79-year-old female with a history of CAD, hypertension, pneumonia, presenting with acute shortness of breath, diffuse expiratory wheezing and bronchospastic cough noted on physical exam.  Concern for acute bronchitis versus less likely pneumonia given that she has had no fevers or hypoxia. She has no history of heart failure although she did endorse some intermittent lower extremity edema, it is currently not present on physical exam.  Her history and physical exam are more consistent with an obstructive lung pathology rather than cardiac etiology. HEART score 4, 1 for ST depression V5/V6, 2 for risk factors, 2 for age. Troponin x2. CXR to evaluate for recurrent PNA. Respiratory  viral panel given recent sick contacts. Symptomatic treatment with DuoNeb x3, prednisone, guaifenesin with codeine and reassessment.        Amount and/or Complexity of Data Reviewed  Clinical lab tests: ordered  Tests in the radiology section of CPT®: ordered Procedures    ED EKG interpretation:  Rhythm: normal sinus rhythm; and regular . Rate (approx.): 87; Axis: normal; P wave: normal; QRS interval: normal ; ST/T wave: depressed; in  Lead: V5, V6; Other findings: New since prior, poss ischemia. EKG  interpreted by Vinnie Lindsay MD, ED MD.      10:46 AM  Patient reports significant improvement after 3 duo nebs and steroids. On exam, she is moving more air but does still have faint expiratory wheezes. The nurse did try to assist her to the restroom and the patient was quite weak and unsteady. She lives alone on the fourth floor of her apartment. We will have her ambulate with a pulse ox to evaluate for any desaturation or unsteadiness to determine safe disposition. Patient preference is to be able to go home and continue her treatment.     1:02 PM  Pt is requesting to get non contrast CT Chest as recommended for outpt by her PCP. No indication for this in ED setting. I've advised her to get this done through PCP as originally planned.       Signed By: Catrachito Perla MD     February 20, 2020

## 2020-02-20 NOTE — DISCHARGE INSTRUCTIONS
Patient Education        Bronchitis: Care Instructions  Your Care Instructions    Bronchitis is inflammation of the bronchial tubes, which carry air to the lungs. The tubes swell and produce mucus, or phlegm. The mucus and inflamed bronchial tubes make you cough. You may have trouble breathing. Most cases of bronchitis are caused by viruses like those that cause colds. Antibiotics usually do not help and they may be harmful. Bronchitis usually develops rapidly and lasts about 2 to 3 weeks in otherwise healthy people. Follow-up care is a key part of your treatment and safety. Be sure to make and go to all appointments, and call your doctor if you are having problems. It's also a good idea to know your test results and keep a list of the medicines you take. How can you care for yourself at home? · Take all medicines exactly as prescribed. Call your doctor if you think you are having a problem with your medicine. · Get some extra rest.  · Take an over-the-counter pain medicine, such as acetaminophen (Tylenol), ibuprofen (Advil, Motrin), or naproxen (Aleve) to reduce fever and relieve body aches. Read and follow all instructions on the label. · Do not take two or more pain medicines at the same time unless the doctor told you to. Many pain medicines have acetaminophen, which is Tylenol. Too much acetaminophen (Tylenol) can be harmful. · Take an over-the-counter cough medicine that contains dextromethorphan to help quiet a dry, hacking cough so that you can sleep. Avoid cough medicines that have more than one active ingredient. Read and follow all instructions on the label. · Breathe moist air from a humidifier, hot shower, or sink filled with hot water. The heat and moisture will thin mucus so you can cough it out. · Do not smoke. Smoking can make bronchitis worse. If you need help quitting, talk to your doctor about stop-smoking programs and medicines.  These can increase your chances of quitting for good.  When should you call for help? Call 911 anytime you think you may need emergency care. For example, call if:    · You have severe trouble breathing.    Call your doctor now or seek immediate medical care if:    · You have new or worse trouble breathing.     · You cough up dark brown or bloody mucus (sputum).     · You have a new or higher fever.     · You have a new rash.    Watch closely for changes in your health, and be sure to contact your doctor if:    · You cough more deeply or more often, especially if you notice more mucus or a change in the color of your mucus.     · You are not getting better as expected. Where can you learn more? Go to http://cody-akanksha.info/. Enter H333 in the search box to learn more about \"Bronchitis: Care Instructions. \"  Current as of: June 9, 2019  Content Version: 12.2  © 5490-2441 [employer]. Care instructions adapted under license by [employer] (which disclaims liability or warranty for this information). If you have questions about a medical condition or this instruction, always ask your healthcare professional. [address] any warranty or liability for your use of this information.

## 2020-02-20 NOTE — ED NOTES
The patient was discharged home by Dr Stuart Sexton in stable condition. The patient is alert and oriented, in no respiratory distress and discharge vital signs obtained. The patient's diagnosis, condition and treatment were explained. The patient expressed understanding. 2 prescriptions given. A discharge plan has been developed. Aftercare instructions were given. Pt ambulatory out of the ED.

## 2020-02-20 NOTE — ED NOTES
Pt completed x3 breathing treatments. Pt continues to have expiratory wheezing but able to speak in complete sentences without getting more SOB. Dr. Soco Franks made aware.

## 2020-02-20 NOTE — ED NOTES
Pt c/o headache d/t rising BP and requesting two tylenols. Dr. Froilan Lipscomb gave verbal order to order 650mg Tylenol one time dose for now.

## 2020-02-20 NOTE — ED NOTES
Pt ambulatory with pulse oximetry on: HR 87 and SpO2 99% but after ambulating some distance  and SpO2 92%.

## 2020-02-20 NOTE — ED TRIAGE NOTES
Triage: pt recently dx with \"double pneumonia four weeks ago and then I just got this bug.\" Pt c/o SOB, chest pain with breathing, cough, urinary frequency and soft stools starting Tuesday. Denies N/V, fever.

## 2020-02-21 ENCOUNTER — HOSPITAL ENCOUNTER (INPATIENT)
Age: 75
LOS: 11 days | Discharge: HOME HEALTH | DRG: 193 | End: 2020-03-04
Attending: EMERGENCY MEDICINE | Admitting: INTERNAL MEDICINE
Payer: MEDICARE

## 2020-02-21 ENCOUNTER — APPOINTMENT (OUTPATIENT)
Dept: GENERAL RADIOLOGY | Age: 75
DRG: 193 | End: 2020-02-21
Attending: EMERGENCY MEDICINE
Payer: MEDICARE

## 2020-02-21 DIAGNOSIS — F41.9 ANXIETY: ICD-10-CM

## 2020-02-21 DIAGNOSIS — J96.21 ACUTE ON CHRONIC RESPIRATORY FAILURE WITH HYPOXIA (HCC): Primary | ICD-10-CM

## 2020-02-21 LAB
ALBUMIN SERPL-MCNC: 4.6 G/DL (ref 3.5–5)
ALBUMIN/GLOB SERPL: 1.1 {RATIO} (ref 1.1–2.2)
ALP SERPL-CCNC: 83 U/L (ref 45–117)
ALT SERPL-CCNC: 34 U/L (ref 12–78)
ANION GAP SERPL CALC-SCNC: 6 MMOL/L (ref 5–15)
ARTERIAL PATENCY WRIST A: YES
AST SERPL-CCNC: 30 U/L (ref 15–37)
BASE DEFICIT BLD-SCNC: 4 MMOL/L
BASOPHILS # BLD: 0 K/UL (ref 0–0.1)
BASOPHILS NFR BLD: 0 % (ref 0–1)
BDY SITE: ABNORMAL
BILIRUB SERPL-MCNC: 0.6 MG/DL (ref 0.2–1)
BNP SERPL-MCNC: 995 PG/ML
BUN SERPL-MCNC: 19 MG/DL (ref 6–20)
BUN/CREAT SERPL: 18 (ref 12–20)
CA-I BLD-SCNC: 1.24 MMOL/L (ref 1.12–1.32)
CALCIUM SERPL-MCNC: 9.2 MG/DL (ref 8.5–10.1)
CHLORIDE SERPL-SCNC: 106 MMOL/L (ref 97–108)
CO2 SERPL-SCNC: 25 MMOL/L (ref 21–32)
COMMENT, HOLDF: NORMAL
CREAT SERPL-MCNC: 1.08 MG/DL (ref 0.55–1.02)
DIFFERENTIAL METHOD BLD: ABNORMAL
EOSINOPHIL # BLD: 0.1 K/UL (ref 0–0.4)
EOSINOPHIL NFR BLD: 1 % (ref 0–7)
ERYTHROCYTE [DISTWIDTH] IN BLOOD BY AUTOMATED COUNT: 13.6 % (ref 11.5–14.5)
GAS FLOW.O2 O2 DELIVERY SYS: ABNORMAL L/MIN
GLOBULIN SER CALC-MCNC: 4.1 G/DL (ref 2–4)
GLUCOSE SERPL-MCNC: 130 MG/DL (ref 65–100)
HCO3 BLD-SCNC: 21 MMOL/L (ref 22–26)
HCT VFR BLD AUTO: 34.5 % (ref 35–47)
HGB BLD-MCNC: 11.4 G/DL (ref 11.5–16)
IMM GRANULOCYTES # BLD AUTO: 0 K/UL (ref 0–0.04)
IMM GRANULOCYTES NFR BLD AUTO: 1 % (ref 0–0.5)
LYMPHOCYTES # BLD: 0.9 K/UL (ref 0.8–3.5)
LYMPHOCYTES NFR BLD: 13 % (ref 12–49)
MAGNESIUM SERPL-MCNC: 2.1 MG/DL (ref 1.6–2.4)
MCH RBC QN AUTO: 34.5 PG (ref 26–34)
MCHC RBC AUTO-ENTMCNC: 33 G/DL (ref 30–36.5)
MCV RBC AUTO: 104.5 FL (ref 80–99)
MONOCYTES # BLD: 0.7 K/UL (ref 0–1)
MONOCYTES NFR BLD: 11 % (ref 5–13)
NEUTS SEG # BLD: 4.9 K/UL (ref 1.8–8)
NEUTS SEG NFR BLD: 74 % (ref 32–75)
NRBC # BLD: 0 K/UL (ref 0–0.01)
NRBC BLD-RTO: 0 PER 100 WBC
O2/TOTAL GAS SETTING VFR VENT: 30 %
PCO2 BLD: 36.1 MMHG (ref 35–45)
PEEP RESPIRATORY: 6 CMH2O
PH BLD: 7.37 [PH] (ref 7.35–7.45)
PIP ISTAT,IPIP: 12
PLATELET # BLD AUTO: 184 K/UL (ref 150–400)
PMV BLD AUTO: 9 FL (ref 8.9–12.9)
PO2 BLD: 94 MMHG (ref 80–100)
POTASSIUM SERPL-SCNC: 3.6 MMOL/L (ref 3.5–5.1)
PROT SERPL-MCNC: 8.7 G/DL (ref 6.4–8.2)
RBC # BLD AUTO: 3.3 M/UL (ref 3.8–5.2)
SAMPLES BEING HELD,HOLD: NORMAL
SAO2 % BLD: 97 % (ref 92–97)
SODIUM SERPL-SCNC: 137 MMOL/L (ref 136–145)
SPECIMEN TYPE: ABNORMAL
TOTAL RESP. RATE, ITRR: 30
TROPONIN I SERPL-MCNC: <0.05 NG/ML
WBC # BLD AUTO: 6.6 K/UL (ref 3.6–11)

## 2020-02-21 PROCEDURE — 83880 ASSAY OF NATRIURETIC PEPTIDE: CPT

## 2020-02-21 PROCEDURE — 99285 EMERGENCY DEPT VISIT HI MDM: CPT

## 2020-02-21 PROCEDURE — 94660 CPAP INITIATION&MGMT: CPT

## 2020-02-21 PROCEDURE — 96365 THER/PROPH/DIAG IV INF INIT: CPT

## 2020-02-21 PROCEDURE — 96366 THER/PROPH/DIAG IV INF ADDON: CPT

## 2020-02-21 PROCEDURE — 74011000250 HC RX REV CODE- 250: Performed by: EMERGENCY MEDICINE

## 2020-02-21 PROCEDURE — 94664 DEMO&/EVAL PT USE INHALER: CPT

## 2020-02-21 PROCEDURE — 36600 WITHDRAWAL OF ARTERIAL BLOOD: CPT

## 2020-02-21 PROCEDURE — 5A09457 ASSISTANCE WITH RESPIRATORY VENTILATION, 24-96 CONSECUTIVE HOURS, CONTINUOUS POSITIVE AIRWAY PRESSURE: ICD-10-PCS | Performed by: HOSPITALIST

## 2020-02-21 PROCEDURE — 80053 COMPREHEN METABOLIC PANEL: CPT

## 2020-02-21 PROCEDURE — 94762 N-INVAS EAR/PLS OXIMTRY CONT: CPT

## 2020-02-21 PROCEDURE — 94640 AIRWAY INHALATION TREATMENT: CPT

## 2020-02-21 PROCEDURE — 83735 ASSAY OF MAGNESIUM: CPT

## 2020-02-21 PROCEDURE — 74011250636 HC RX REV CODE- 250/636: Performed by: EMERGENCY MEDICINE

## 2020-02-21 PROCEDURE — 84484 ASSAY OF TROPONIN QUANT: CPT

## 2020-02-21 PROCEDURE — 82803 BLOOD GASES ANY COMBINATION: CPT

## 2020-02-21 PROCEDURE — 71045 X-RAY EXAM CHEST 1 VIEW: CPT

## 2020-02-21 PROCEDURE — 85025 COMPLETE CBC W/AUTO DIFF WBC: CPT

## 2020-02-21 RX ORDER — MAGNESIUM SULFATE HEPTAHYDRATE 40 MG/ML
2 INJECTION, SOLUTION INTRAVENOUS
Status: COMPLETED | OUTPATIENT
Start: 2020-02-21 | End: 2020-02-22

## 2020-02-21 RX ADMIN — MAGNESIUM SULFATE HEPTAHYDRATE 2 G: 40 INJECTION, SOLUTION INTRAVENOUS at 22:55

## 2020-02-21 RX ADMIN — ALBUTEROL SULFATE 1 DOSE: 2.5 SOLUTION RESPIRATORY (INHALATION) at 23:17

## 2020-02-21 RX ADMIN — ALBUTEROL SULFATE 1 DOSE: 2.5 SOLUTION RESPIRATORY (INHALATION) at 22:46

## 2020-02-21 RX ADMIN — ALBUTEROL SULFATE 1 DOSE: 2.5 SOLUTION RESPIRATORY (INHALATION) at 22:59

## 2020-02-22 ENCOUNTER — APPOINTMENT (OUTPATIENT)
Dept: CT IMAGING | Age: 75
DRG: 193 | End: 2020-02-22
Attending: EMERGENCY MEDICINE
Payer: MEDICARE

## 2020-02-22 ENCOUNTER — APPOINTMENT (OUTPATIENT)
Dept: GENERAL RADIOLOGY | Age: 75
DRG: 193 | End: 2020-02-22
Attending: INTERNAL MEDICINE
Payer: MEDICARE

## 2020-02-22 PROBLEM — J15.9 BACTERIAL PNEUMONIA: Status: ACTIVE | Noted: 2020-02-22

## 2020-02-22 LAB
ANION GAP SERPL CALC-SCNC: 7 MMOL/L (ref 5–15)
APPEARANCE UR: CLEAR
ARTERIAL PATENCY WRIST A: ABNORMAL
BACTERIA URNS QL MICRO: NEGATIVE /HPF
BASE DEFICIT BLD-SCNC: 3 MMOL/L
BDY SITE: ABNORMAL
BILIRUB UR QL: NEGATIVE
BUN SERPL-MCNC: 26 MG/DL (ref 6–20)
BUN/CREAT SERPL: 22 (ref 12–20)
CA-I BLD-SCNC: 1.22 MMOL/L (ref 1.12–1.32)
CALCIUM SERPL-MCNC: 9.5 MG/DL (ref 8.5–10.1)
CHLORIDE SERPL-SCNC: 106 MMOL/L (ref 97–108)
CO2 SERPL-SCNC: 24 MMOL/L (ref 21–32)
COLOR UR: NORMAL
COMMENT, HOLDF: NORMAL
CREAT SERPL-MCNC: 1.19 MG/DL (ref 0.55–1.02)
D DIMER PPP FEU-MCNC: 0.29 MG/L FEU (ref 0–0.65)
EPITH CASTS URNS QL MICRO: NORMAL /LPF
EST. AVERAGE GLUCOSE BLD GHB EST-MCNC: 108 MG/DL
GAS FLOW.O2 O2 DELIVERY SYS: ABNORMAL L/MIN
GAS FLOW.O2 SETTING OXYMISER: 15 L/M
GLUCOSE SERPL-MCNC: 117 MG/DL (ref 65–100)
GLUCOSE UR STRIP.AUTO-MCNC: NEGATIVE MG/DL
HBA1C MFR BLD: 5.4 % (ref 4–5.6)
HCO3 BLD-SCNC: 22.9 MMOL/L (ref 22–26)
HGB UR QL STRIP: NEGATIVE
KETONES UR QL STRIP.AUTO: NEGATIVE MG/DL
LEUKOCYTE ESTERASE UR QL STRIP.AUTO: NEGATIVE
NITRITE UR QL STRIP.AUTO: NEGATIVE
O2/TOTAL GAS SETTING VFR VENT: 100 %
PCO2 BLD: 42.5 MMHG (ref 35–45)
PH BLD: 7.34 [PH] (ref 7.35–7.45)
PH UR STRIP: 5 [PH] (ref 5–8)
PHOSPHATE SERPL-MCNC: 3 MG/DL (ref 2.6–4.7)
PO2 BLD: 82 MMHG (ref 80–100)
POTASSIUM SERPL-SCNC: 4.1 MMOL/L (ref 3.5–5.1)
PROT UR STRIP-MCNC: NEGATIVE MG/DL
RBC #/AREA URNS HPF: NORMAL /HPF (ref 0–5)
SAMPLES BEING HELD,HOLD: NORMAL
SAO2 % BLD: 95 % (ref 92–97)
SODIUM SERPL-SCNC: 137 MMOL/L (ref 136–145)
SP GR UR REFRACTOMETRY: 1.01
SPECIMEN TYPE: ABNORMAL
TOTAL RESP. RATE, ITRR: 30
TROPONIN I SERPL-MCNC: 0.06 NG/ML
TROPONIN I SERPL-MCNC: 0.17 NG/ML
TSH SERPL DL<=0.05 MIU/L-ACNC: 4.1 UIU/ML (ref 0.36–3.74)
UROBILINOGEN UR QL STRIP.AUTO: 0.2 EU/DL (ref 0.2–1)
WBC URNS QL MICRO: NORMAL /HPF (ref 0–4)

## 2020-02-22 PROCEDURE — 74011250636 HC RX REV CODE- 250/636

## 2020-02-22 PROCEDURE — 84484 ASSAY OF TROPONIN QUANT: CPT

## 2020-02-22 PROCEDURE — 94660 CPAP INITIATION&MGMT: CPT

## 2020-02-22 PROCEDURE — 80048 BASIC METABOLIC PNL TOTAL CA: CPT

## 2020-02-22 PROCEDURE — 93005 ELECTROCARDIOGRAM TRACING: CPT

## 2020-02-22 PROCEDURE — 74011636637 HC RX REV CODE- 636/637: Performed by: INTERNAL MEDICINE

## 2020-02-22 PROCEDURE — 82803 BLOOD GASES ANY COMBINATION: CPT

## 2020-02-22 PROCEDURE — 71260 CT THORAX DX C+: CPT

## 2020-02-22 PROCEDURE — 65660000001 HC RM ICU INTERMED STEPDOWN

## 2020-02-22 PROCEDURE — 94640 AIRWAY INHALATION TREATMENT: CPT

## 2020-02-22 PROCEDURE — 77010033711 HC HIGH FLOW OXYGEN

## 2020-02-22 PROCEDURE — 74011250637 HC RX REV CODE- 250/637: Performed by: INTERNAL MEDICINE

## 2020-02-22 PROCEDURE — 74011000258 HC RX REV CODE- 258: Performed by: INTERNAL MEDICINE

## 2020-02-22 PROCEDURE — 74011000258 HC RX REV CODE- 258: Performed by: RADIOLOGY

## 2020-02-22 PROCEDURE — 83036 HEMOGLOBIN GLYCOSYLATED A1C: CPT

## 2020-02-22 PROCEDURE — 84443 ASSAY THYROID STIM HORMONE: CPT

## 2020-02-22 PROCEDURE — 36415 COLL VENOUS BLD VENIPUNCTURE: CPT

## 2020-02-22 PROCEDURE — 74011250636 HC RX REV CODE- 250/636: Performed by: EMERGENCY MEDICINE

## 2020-02-22 PROCEDURE — 74011000250 HC RX REV CODE- 250: Performed by: HOSPITALIST

## 2020-02-22 PROCEDURE — 84100 ASSAY OF PHOSPHORUS: CPT

## 2020-02-22 PROCEDURE — 94762 N-INVAS EAR/PLS OXIMTRY CONT: CPT

## 2020-02-22 PROCEDURE — 74011250637 HC RX REV CODE- 250/637: Performed by: HOSPITALIST

## 2020-02-22 PROCEDURE — 85379 FIBRIN DEGRADATION QUANT: CPT

## 2020-02-22 PROCEDURE — 74011250636 HC RX REV CODE- 250/636: Performed by: INTERNAL MEDICINE

## 2020-02-22 PROCEDURE — 74177 CT ABD & PELVIS W/CONTRAST: CPT

## 2020-02-22 PROCEDURE — 81001 URINALYSIS AUTO W/SCOPE: CPT

## 2020-02-22 PROCEDURE — 74011000250 HC RX REV CODE- 250: Performed by: INTERNAL MEDICINE

## 2020-02-22 PROCEDURE — 36600 WITHDRAWAL OF ARTERIAL BLOOD: CPT

## 2020-02-22 PROCEDURE — 74011636320 HC RX REV CODE- 636/320: Performed by: RADIOLOGY

## 2020-02-22 PROCEDURE — 71045 X-RAY EXAM CHEST 1 VIEW: CPT

## 2020-02-22 RX ORDER — FUROSEMIDE 10 MG/ML
40 INJECTION INTRAMUSCULAR; INTRAVENOUS ONCE
Status: COMPLETED | OUTPATIENT
Start: 2020-02-22 | End: 2020-02-22

## 2020-02-22 RX ORDER — HYDRALAZINE HYDROCHLORIDE 20 MG/ML
10 INJECTION INTRAMUSCULAR; INTRAVENOUS
Status: DISCONTINUED | OUTPATIENT
Start: 2020-02-22 | End: 2020-03-04 | Stop reason: HOSPADM

## 2020-02-22 RX ORDER — ASPIRIN 81 MG/1
81 TABLET ORAL DAILY
Status: DISCONTINUED | OUTPATIENT
Start: 2020-02-22 | End: 2020-03-04 | Stop reason: HOSPADM

## 2020-02-22 RX ORDER — FUROSEMIDE 10 MG/ML
INJECTION INTRAMUSCULAR; INTRAVENOUS
Status: COMPLETED
Start: 2020-02-22 | End: 2020-02-22

## 2020-02-22 RX ORDER — SODIUM CHLORIDE 0.9 % (FLUSH) 0.9 %
5-40 SYRINGE (ML) INJECTION EVERY 8 HOURS
Status: DISCONTINUED | OUTPATIENT
Start: 2020-02-22 | End: 2020-03-04 | Stop reason: HOSPADM

## 2020-02-22 RX ORDER — ATORVASTATIN CALCIUM 40 MG/1
80 TABLET, FILM COATED ORAL DAILY
Status: DISCONTINUED | OUTPATIENT
Start: 2020-02-22 | End: 2020-03-04 | Stop reason: HOSPADM

## 2020-02-22 RX ORDER — SODIUM CHLORIDE 0.9 % (FLUSH) 0.9 %
5-40 SYRINGE (ML) INJECTION AS NEEDED
Status: DISCONTINUED | OUTPATIENT
Start: 2020-02-22 | End: 2020-03-04 | Stop reason: HOSPADM

## 2020-02-22 RX ORDER — ONDANSETRON 2 MG/ML
4 INJECTION INTRAMUSCULAR; INTRAVENOUS
Status: DISCONTINUED | OUTPATIENT
Start: 2020-02-22 | End: 2020-03-04 | Stop reason: HOSPADM

## 2020-02-22 RX ORDER — PREDNISONE 20 MG/1
40 TABLET ORAL
Status: DISCONTINUED | OUTPATIENT
Start: 2020-02-22 | End: 2020-02-22 | Stop reason: SDUPTHER

## 2020-02-22 RX ORDER — ACETAMINOPHEN 325 MG/1
650 TABLET ORAL
Status: DISCONTINUED | OUTPATIENT
Start: 2020-02-22 | End: 2020-03-04 | Stop reason: HOSPADM

## 2020-02-22 RX ORDER — NIFEDIPINE 30 MG/1
90 TABLET, EXTENDED RELEASE ORAL DAILY
Status: DISCONTINUED | OUTPATIENT
Start: 2020-02-22 | End: 2020-03-04 | Stop reason: HOSPADM

## 2020-02-22 RX ORDER — SODIUM CHLORIDE 0.9 % (FLUSH) 0.9 %
10 SYRINGE (ML) INJECTION
Status: COMPLETED | OUTPATIENT
Start: 2020-02-22 | End: 2020-02-22

## 2020-02-22 RX ORDER — METOPROLOL TARTRATE 5 MG/5ML
5 INJECTION INTRAVENOUS ONCE
Status: COMPLETED | OUTPATIENT
Start: 2020-02-22 | End: 2020-02-22

## 2020-02-22 RX ORDER — CARVEDILOL 6.25 MG/1
6.25 TABLET ORAL 2 TIMES DAILY WITH MEALS
Status: DISCONTINUED | OUTPATIENT
Start: 2020-02-22 | End: 2020-03-04 | Stop reason: HOSPADM

## 2020-02-22 RX ORDER — LEVOFLOXACIN 5 MG/ML
750 INJECTION, SOLUTION INTRAVENOUS
Status: COMPLETED | OUTPATIENT
Start: 2020-02-22 | End: 2020-02-22

## 2020-02-22 RX ORDER — BISACODYL 5 MG
5 TABLET, DELAYED RELEASE (ENTERIC COATED) ORAL DAILY PRN
Status: DISCONTINUED | OUTPATIENT
Start: 2020-02-22 | End: 2020-03-04 | Stop reason: HOSPADM

## 2020-02-22 RX ORDER — LORAZEPAM 2 MG/ML
INJECTION INTRAMUSCULAR
Status: COMPLETED
Start: 2020-02-22 | End: 2020-02-22

## 2020-02-22 RX ORDER — PREDNISONE 20 MG/1
40 TABLET ORAL
Status: DISCONTINUED | OUTPATIENT
Start: 2020-02-22 | End: 2020-02-22

## 2020-02-22 RX ORDER — IPRATROPIUM BROMIDE AND ALBUTEROL SULFATE 2.5; .5 MG/3ML; MG/3ML
3 SOLUTION RESPIRATORY (INHALATION) 2 TIMES DAILY
Status: DISCONTINUED | OUTPATIENT
Start: 2020-02-22 | End: 2020-02-23

## 2020-02-22 RX ORDER — HEPARIN SODIUM 5000 [USP'U]/ML
5000 INJECTION, SOLUTION INTRAVENOUS; SUBCUTANEOUS EVERY 8 HOURS
Status: DISCONTINUED | OUTPATIENT
Start: 2020-02-22 | End: 2020-02-28

## 2020-02-22 RX ORDER — IPRATROPIUM BROMIDE AND ALBUTEROL SULFATE 2.5; .5 MG/3ML; MG/3ML
3 SOLUTION RESPIRATORY (INHALATION)
Status: DISCONTINUED | OUTPATIENT
Start: 2020-02-22 | End: 2020-03-04 | Stop reason: HOSPADM

## 2020-02-22 RX ORDER — CLOPIDOGREL BISULFATE 75 MG/1
75 TABLET ORAL DAILY
Status: DISCONTINUED | OUTPATIENT
Start: 2020-02-22 | End: 2020-03-04 | Stop reason: HOSPADM

## 2020-02-22 RX ORDER — FUROSEMIDE 10 MG/ML
40 INJECTION INTRAMUSCULAR; INTRAVENOUS DAILY
Status: DISCONTINUED | OUTPATIENT
Start: 2020-02-22 | End: 2020-02-25

## 2020-02-22 RX ORDER — SERTRALINE HYDROCHLORIDE 50 MG/1
100 TABLET, FILM COATED ORAL
Status: DISCONTINUED | OUTPATIENT
Start: 2020-02-22 | End: 2020-03-04 | Stop reason: HOSPADM

## 2020-02-22 RX ORDER — METOPROLOL TARTRATE 5 MG/5ML
INJECTION INTRAVENOUS
Status: DISPENSED
Start: 2020-02-22 | End: 2020-02-23

## 2020-02-22 RX ORDER — LORAZEPAM 2 MG/ML
0.5 INJECTION INTRAMUSCULAR ONCE
Status: COMPLETED | OUTPATIENT
Start: 2020-02-22 | End: 2020-02-22

## 2020-02-22 RX ADMIN — METOPROLOL TARTRATE 5 MG: 5 INJECTION, SOLUTION INTRAVENOUS at 14:35

## 2020-02-22 RX ADMIN — Medication 10 ML: at 01:21

## 2020-02-22 RX ADMIN — ATORVASTATIN CALCIUM 80 MG: 40 TABLET, FILM COATED ORAL at 08:32

## 2020-02-22 RX ADMIN — IPRATROPIUM BROMIDE AND ALBUTEROL SULFATE 3 ML: .5; 3 SOLUTION RESPIRATORY (INHALATION) at 06:41

## 2020-02-22 RX ADMIN — ASPIRIN 81 MG: 81 TABLET, COATED ORAL at 08:31

## 2020-02-22 RX ADMIN — IOPAMIDOL 100 ML: 755 INJECTION, SOLUTION INTRAVENOUS at 01:26

## 2020-02-22 RX ADMIN — HYDRALAZINE HYDROCHLORIDE 10 MG: 20 INJECTION INTRAMUSCULAR; INTRAVENOUS at 14:03

## 2020-02-22 RX ADMIN — FUROSEMIDE 40 MG: 10 INJECTION, SOLUTION INTRAMUSCULAR; INTRAVENOUS at 08:31

## 2020-02-22 RX ADMIN — LORAZEPAM 0.5 MG: 2 INJECTION INTRAMUSCULAR; INTRAVENOUS at 14:42

## 2020-02-22 RX ADMIN — Medication 10 ML: at 21:10

## 2020-02-22 RX ADMIN — NITROGLYCERIN 1 INCH: 20 OINTMENT TOPICAL at 13:25

## 2020-02-22 RX ADMIN — CEFTRIAXONE 1 G: 1 INJECTION, POWDER, FOR SOLUTION INTRAMUSCULAR; INTRAVENOUS at 06:19

## 2020-02-22 RX ADMIN — Medication 5 ML: at 14:00

## 2020-02-22 RX ADMIN — NIFEDIPINE 90 MG: 60 TABLET, FILM COATED, EXTENDED RELEASE ORAL at 11:24

## 2020-02-22 RX ADMIN — SERTRALINE HYDROCHLORIDE 100 MG: 50 TABLET ORAL at 21:09

## 2020-02-22 RX ADMIN — NITROGLYCERIN 1 INCH: 20 OINTMENT TOPICAL at 21:07

## 2020-02-22 RX ADMIN — ACETAMINOPHEN 650 MG: 325 TABLET ORAL at 21:09

## 2020-02-22 RX ADMIN — FUROSEMIDE 40 MG: 10 INJECTION, SOLUTION INTRAMUSCULAR; INTRAVENOUS at 14:28

## 2020-02-22 RX ADMIN — Medication 1 CAPSULE: at 08:31

## 2020-02-22 RX ADMIN — HEPARIN SODIUM 5000 UNITS: 5000 INJECTION INTRAVENOUS; SUBCUTANEOUS at 13:25

## 2020-02-22 RX ADMIN — CLOPIDOGREL BISULFATE 75 MG: 75 TABLET, FILM COATED ORAL at 08:31

## 2020-02-22 RX ADMIN — SODIUM CHLORIDE 100 ML: 900 INJECTION, SOLUTION INTRAVENOUS at 01:21

## 2020-02-22 RX ADMIN — AZITHROMYCIN MONOHYDRATE 500 MG: 500 INJECTION, POWDER, LYOPHILIZED, FOR SOLUTION INTRAVENOUS at 07:09

## 2020-02-22 RX ADMIN — PREDNISONE 40 MG: 20 TABLET ORAL at 08:31

## 2020-02-22 RX ADMIN — Medication 10 ML: at 05:29

## 2020-02-22 RX ADMIN — IPRATROPIUM BROMIDE AND ALBUTEROL SULFATE 3 ML: .5; 3 SOLUTION RESPIRATORY (INHALATION) at 13:03

## 2020-02-22 RX ADMIN — HEPARIN SODIUM 5000 UNITS: 5000 INJECTION INTRAVENOUS; SUBCUTANEOUS at 21:09

## 2020-02-22 RX ADMIN — ACETAMINOPHEN 650 MG: 325 TABLET ORAL at 03:32

## 2020-02-22 RX ADMIN — IPRATROPIUM BROMIDE AND ALBUTEROL SULFATE 3 ML: .5; 3 SOLUTION RESPIRATORY (INHALATION) at 19:40

## 2020-02-22 RX ADMIN — LEVOFLOXACIN 750 MG: 5 INJECTION, SOLUTION INTRAVENOUS at 02:54

## 2020-02-22 RX ADMIN — FUROSEMIDE 40 MG: 10 INJECTION INTRAMUSCULAR; INTRAVENOUS at 14:28

## 2020-02-22 RX ADMIN — LORAZEPAM 0.5 MG: 2 INJECTION INTRAMUSCULAR at 14:42

## 2020-02-22 NOTE — ROUTINE PROCESS
TRANSFER - OUT REPORT: 
 
Verbal report given to Irvin TELLO(name) on Percy Victor  being transferred to [facility]) for routine progression of care Report consisted of patients Situation, Background, Assessment and  
Recommendations(SBAR). Information from the following report(s) SBAR, Kardex, ED Summary and MAR was reviewed with the receiving nurse. Lines:  
Peripheral IV 02/22/20 Left Wrist (Active) Opportunity for questions and clarification was provided.    
 
Patient transported with: 
 Registered Nurse and RT

## 2020-02-22 NOTE — CONSULTS
[facility]    ICU Team Brief Consult Note    Name: Virgilio Golden   : 1945   MRN: [id number]   Date: 2020      Subjective:   Progress Note: 2020      Patient is asked to be seen by Dr. Pura Winslow for Hypoxia,  necessitating the need for possible ICU care. Patient is a 68 yo female who presents to the ED via EMS for dyspnea and shortness of breath and hypoxia. Patient states that symptoms began in December where she was on a trip to [facility] and then got sick and had to fly by to [facility] where she was hospitalized and diagnosed with bilateral pneumonia. Patient stated that she was treated and then flew back [place] with O2 NC and was instructed to follow up with her PCP and pulmonology. Patient stated that she did and the pneumonia cleared however over the last few months she has been having intermittent and now progressive shortness of breath with non productive cough. Patient states that cough has gotten more frequent and more harsh and is now causing abdominal pain and aches mostly when she coughs. Patient also states that she has been having subjective fevers and loose stools over the last week. Patient stated that yesterday she was schedule for an outpatient CT however SOB got so bad and the abdominal soreness from coughing that she called EMS. They arrive and patient was sating mid 80's on RA. EMS put her on CPAP for work of breathing and she was brought to the ED. In the ED she was evaluated. Denies history of asthma, or COPD. States that she used to smoke and quit about a year and a half ago. Denies diabetes. Admits to HTN and states that she has had heart stents placed before. Evaluated patient in the ED. Currently on Bipap. ABG was done on settings of 12/6 and Fi02 of 30% Patient is sitting up in the chair. Has orthopnea. States that bi-bap is too strong in her face. Changed her to non re-breather.  Patient went to CT for CT of chest. Added CT abdomen as patient stated that she was having abdominal pain and diarrhea. After CT patient was transitioned to Mid flow 10 L NC and sats are 100%. Patient states that she is more comfortable on this. Still has dyspnea with exertion. Patient without need for continuous BiPAP and respiratory status has improved. Does not need ICU at this time. Notified ED physician Dr. Marina Shipman. Was asked to contact hospitalist for admission. Perfect served a message with response back to Dr. Alpesh Muñoz for admission. 30 minutes spent on consult, reviewing chart, assessing patient, and recs.      Monie Ley [facility]     Critical Care Medicine  [facility]    2/22/2020

## 2020-02-22 NOTE — ED NOTES
Patient's IV infiltrated this morning. Multiple attempts being made to establish PIV. Increased WOB noted on high flow O2. Pt agreeable to try CPAP again. Paged hospitalist for new order and RT.    0905: Pt not tolerating CPAP or BiPAP. Placed back on high flow. Hospitalist will come round. Nursing supervisor called to initiate possible PICC placement.

## 2020-02-22 NOTE — CONSULTS
Cardiovascular Consult    Patient: Ginny Bardales MRN: [id number]  SSN: [id number]    YOB: 1945  Age: 69 y.o. Sex: female       Subjective:      Date of  Admission: 2/21/2020   Primary Care Provider: Harika Glez MD  Admission type:   Chief Complaint   Patient presents with    Shortness of Breath         Ginny Bardales is a 69 y.o.  female admitted for Bacterial pneumonia [J15.9]. Patient complains of dyspnea. This consultation was requested by Dr. Vickie Roberto. Ms. Alexei Nunes is a 70 yo WF well known to our practice with dyslipidemia, severe COPD, CAD s/p stent D1, MARY s/p right renal stent who presents with shortness of breath. The patient stopped smoking one year ago, and has had recurrent pulmonary issues with bronchitis/pneumonia. Over the past three days she has had progressive shortness of breath with any activity. She denies fevers or chills. No cough or sputum. No syncope or pre-syncope. She was evaluated in the [place] ER and diagnosed with bronchitis treated with albuterol inhaler and prednisone. Due top progression of symptom and usage of accessory muscles, she was brought in to [facility] by family. Despite aggressive medical treatment, breathing remains tenuous, and Bipap has been initiated. CT chest suggested possible infection. Blood pressure has been labile and elevated. The patient is not on oxygen at home. No history of intubation. The patient has not been officially diagnosed with COPD, but the patient was an active heavy cigarette smoker for so many years before she quit 1 year ago. She stated that she was treated for pneumonia in [place], December last year. The patient was on a trip when she got sick and was in [place], at that time received treatment at a hospital in [place]. The patient was also admitted to this hospital in 07/2019. The patient was admitted and treated for renal artery stenosis in which a stent was placed.   Following that hospitalization, the patient was seen by Cardiology as an outpatient and in 09/2019 underwent cardiac catheterization with stent placement. The patient has not been diagnosed with congestive heart failure. Past Medical History:   Diagnosis Date    CAD (coronary artery disease) 09/12/2019    stent D1 9/12/2019 (Ludmila Spaulding)    Hypertension     Pneumonia     MARY (renal artery stenosis) ([facility] [address] 07/24/2019    s/p right renal stent 7/24/2019 Vernell Tellez)      Past Surgical History:   Procedure Laterality Date    CARDIAC SURG PROCEDURE UNLIST  08/2019    cardiac stents     HX APPENDECTOMY      HX BACK SURGERY      LUMBAR FUSION X2 SURGERIES    HX BACK SURGERY      CERVICAL X2    HX GI  2019    arterial stent in right kidney    HX HYSTERECTOMY      HX TONSILLECTOMY       Family History   Problem Relation Age of Onset    Heart Attack Father 47      Social History     Tobacco Use    Smoking status: Former Smoker     Packs/day: 0.50    Smokeless tobacco: Never Used   Substance Use Topics    Alcohol use:  Yes     Alcohol/week: 6.0 standard drinks     Types: 6 Glasses of wine per week      Current Facility-Administered Medications   Medication Dose Route Frequency    acetaminophen (TYLENOL) tablet 650 mg  650 mg Oral Q4H PRN    albuterol-ipratropium (DUO-NEB) 2.5 MG-0.5 MG/3 ML  3 mL Nebulization Q4H PRN    aspirin delayed-release tablet 81 mg  81 mg Oral DAILY    atorvastatin (LIPITOR) tablet 80 mg  80 mg Oral DAILY    clopidogreL (PLAVIX) tablet 75 mg  75 mg Oral DAILY    furosemide (LASIX) injection 40 mg  40 mg IntraVENous DAILY    NIFEdipine ER (PROCARDIA XL) tablet 90 mg  90 mg Oral DAILY    sertraline (ZOLOFT) tablet 100 mg  100 mg Oral QHS    sodium chloride (NS) flush 5-40 mL  5-40 mL IntraVENous Q8H    sodium chloride (NS) flush 5-40 mL  5-40 mL IntraVENous PRN    ondansetron (ZOFRAN) injection 4 mg  4 mg IntraVENous Q4H PRN    bisacodyL (DULCOLAX) tablet 5 mg  5 mg Oral DAILY PRN    heparin (porcine) injection 5,000 Units  5,000 Units SubCUTAneous Q8H    lactobac ac& pc-s.therm-b.anim (PETRA Q/RISAQUAD)  1 Cap Oral DAILY    cefTRIAXone (ROCEPHIN) 1 g in 0.9% sodium chloride (MBP/ADV) 50 mL  1 g IntraVENous Q24H    azithromycin (ZITHROMAX) 500 mg in 0.9% sodium chloride (MBP/ADV) 250 mL  500 mg IntraVENous Q24H    hydrALAZINE (APRESOLINE) 20 mg/mL injection 10 mg  10 mg IntraVENous Q6H PRN    carvediloL (COREG) tablet 6.25 mg  6.25 mg Oral BID WITH MEALS    albuterol-ipratropium (DUO-NEB) 2.5 MG-0.5 MG/3 ML  3 mL Nebulization BID    nitroglycerin (NITROBID) 2 % ointment 1 Inch  1 Inch Topical Q6H        No Known Allergies     Review of Symptoms:  Constitutional: No fevers or chills. HEET: No trauma. No visual changes. No hearing loss. No sore throat. Neck: No adenopathy or swelling. Heart: No chest pain or palpitations. Lungs: Shortness of breath  Abdomen: No pain. No nausea of vomiting. No BRBPR or melena. No diarrhea. Urology: No dysuria or hematuria. Ext: No edema. Skin: No acute rashes or ulcers. Endocrine: No heat or cold intolerance. Neuro: No transient neurologic deficits. Subjective:     Visit Vitals  /77 (BP 1 Location: Left arm, BP Patient Position: At rest)   Pulse 93   Temp 97.8 °F (36.6 °C)   Resp (!) 33   Wt 63.1 kg (139 lb 1.8 oz)   SpO2 99%   BMI 24.64 kg/m²     Physical Exam: Respiratory distress  Head: Normocephalic, atraumatic. Eyes: Pupils equal, round, reactive to light and accomodation. , Extra ocular muscles intact. Sclera anicteric. Ears: Grossly responsive to sound. Neck: No adenopathy. No bruits. Throat: No sores or erythema. Heart: Regular rate and rhythm. Normal S1 and S2. No murmurs, gallops, or rub. Lungs: Prolonged expiration. Abdomen: Soft, non-tender. No guarding or rebound. No hepatosplenomegaly. Bowel sounds active. Ext: No edema. No ulceration. Skin: Normal coloration. Warm and dry. No rash. Neuro: Cranial nerves II through XII intact. Motor and sensory grossly intact. Affect: Appropriate. Alert and interactive. Cardiographics:  ECG:  EKG Results     Procedure 720 Value Units Date/Time    EKG 12 LEAD INITIAL [[id number]] Collected:  02/22/20 0715    Order Status:  Completed Updated:  02/22/20 0717     Ventricular Rate 89 BPM      Atrial Rate 89 BPM      P-R Interval 118 ms      QRS Duration 64 ms      Q-T Interval 378 ms      QTC Calculation (Bezet) 459 ms      Calculated P Axis 39 degrees      Calculated R Axis 65 degrees      Calculated T Axis 52 degrees      Diagnosis --     Normal sinus rhythm  When compared with ECG of 20-FEB-2020 08:53,  No significant change was found          Echocardiogram:  07/18/19   ECHO ADULT COMPLETE 07/19/2019 7/19/2019    Narrative · Left Ventricle: Normal cavity size, wall thickness, systolic function   (ejection fraction normal) and diastolic function. Estimated left   ventricular ejection fraction is 56 - 60%. · Pulmonary Artery: There is no evidence of pulmonary hypertension. · Interatrial Septum: Color flow Doppler was used. · Mitral Valve: Trace mitral valve regurgitation. Signed by: Pilar Quinonez MD       Cath:  09/12/19   CARDIAC PROCEDURE 09/12/2019 9/12/2019    Narrative Diagnosis;  1)Progressively worsening symptoms consistent with possible ACS  2)Severe single vessel disease involving large D1.  3)Mildly increased LVEDP  4)S/p PCI of D1 with 2.75 by 15 and 2.5 by 8 mm Xience drug eluting stent   in overlapping fashion    Recommendations:  1)Follow up with me in 1 months  2)IV hydration  3)DAPT for 1 year  4)Cardiac rehab  5)Identification of cause of anemia.     Contrast use 90 cc    Acces right radial artery--no issues       Signed by: Rosalind Richard MD         Data Reviewed: CMP:   Lab Results   Component Value Date/Time     02/21/2020 10:25 PM    K 3.6 02/21/2020 10:25 PM     02/21/2020 10:25 PM    CO2 25 02/21/2020 10:25 PM    AGAP 6 02/21/2020 10:25 PM     (H) 02/21/2020 10:25 PM    BUN 19 02/21/2020 10:25 PM    CREA 1.08 (H) 02/21/2020 10:25 PM    GFRAA >60 02/21/2020 10:25 PM    GFRNA 50 (L) 02/21/2020 10:25 PM    CA 9.2 02/21/2020 10:25 PM    MG 2.1 02/21/2020 10:25 PM    PHOS 3.0 02/22/2020 05:32 AM    ALB 4.6 02/21/2020 10:25 PM    TP 8.7 (H) 02/21/2020 10:25 PM    GLOB 4.1 (H) 02/21/2020 10:25 PM    AGRAT 1.1 02/21/2020 10:25 PM    SGOT 30 02/21/2020 10:25 PM    ALT 34 02/21/2020 10:25 PM     CBC:   Lab Results   Component Value Date/Time    WBC 6.6 02/21/2020 10:25 PM    HGB 11.4 (L) 02/21/2020 10:25 PM    HCT 34.5 (L) 02/21/2020 10:25 PM     02/21/2020 10:25 PM     All Cardiac Markers in the last 24 hours:   Lab Results   Component Value Date/Time    TROIQ 0.06 (H) 02/22/2020 12:07 PM    TROIQ 0.17 (H) 02/22/2020 05:32 AM    TROIQ <0.05 02/21/2020 10:25 PM     ABG:   Lab Results   Component Value Date/Time    PHI 7.373 02/21/2020 10:29 PM    PCO2I 36.1 02/21/2020 10:29 PM    PO2I 94 02/21/2020 10:29 PM    HCO3I 21.0 (L) 02/21/2020 10:29 PM    FIO2I 30 02/21/2020 10:29 PM     COAGS: No results found for: APTT, PTP, INR, INREXT, INREXT, INREXT     2/21/2020 CXR:        CT Results (most recent):  Results from [place] encounter on 02/21/20   CT ABD PELV W CONT    Narrative INDICATION: Abdominal pain. Diarrhea. Subjective fever. Recent pneumonia. COMPARISON:None    TECHNIQUE: Following the uneventful intravenous administration of 100 cc  QNWRIW-260, thin helical axial images were obtained through the chest, abdomen,  and pelvis. Oral contrast was not administered. CT dose reduction was achieved  through use of a standardized protocol tailored for this examination and  automatic exposure control for dose modulation. FINDINGS      Chest:  THYROID: No nodule. MEDIASTINUM: No mass or lymphadenopathy. YONI: No mass or lymphadenopathy. THORACIC AORTA: No dissection or aneurysm. MAIN PULMONARY ARTERY: Normal in caliber.    TRACHEA/BRONCHI: Patent. ESOPHAGUS: No wall thickening or dilatation. HEART: Normal in size. Moderate coronary artery calcifications. PLEURA: No pleural effusion or pneumothorax. LUNGS: Mild nodular airspace disease in the left lower lobe. Subcentimeter  groundglass opacity adjacent to the right minor fissure. Calcified granuloma in  the left lower lobe. Abdomen/pelvis:  LIVER: No mass or biliary dilatation. GALLBLADDER: Unremarkable. SPLEEN: No mass. PANCREAS: No mass or ductal dilatation. ADRENALS: Unremarkable. KIDNEYS: No mass, calculus, or hydronephrosis. Right renal cortical scarring. STOMACH: Unremarkable. SMALL BOWEL: No dilatation or wall thickening. COLON: No dilatation or wall thickening. APPENDIX: Surgically absent. PERITONEUM: No ascites or pneumoperitoneum. RETROPERITONEUM: No lymphadenopathy. No abdominal aortic aneurysm. REPRODUCTIVE ORGANS: Status post hysterectomy. URINARY BLADDER: No mass or calculus. BONES: No destructive bone lesion. Right hip prosthesis. Fusion hardware in the  lumbosacral spine. Status post kyphoplasty at L1 and L2. Chronic appearing T11  compression deformity. Fusion hardware in the lower cervical spine. ADDITIONAL COMMENTS: N/A      Impression IMPRESSION:  Mild patchy airspace disease in the left lower lobe may represent early or  resolving pneumonia. No acute process in the abdomen or pelvis. Incidental  findings as detailed above.           Assessment:         Hospital Problems  Date Reviewed: 2/22/2020          Codes Class Noted POA    Coronary artery disease ICD-10-CM: I25.10  ICD-9-CM: 414.00  9/12/2019 Yes        * (Principal) Bacterial pneumonia ICD-10-CM: J15.9  ICD-9-CM: 482.9  2/22/2020 Yes        Renal artery stenosis (HCC) ICD-10-CM: I70.1  ICD-9-CM: 440.1  8/20/2019 Yes        Flash pulmonary edema ([facility] [address] ICD-10-CM: J81.0  ICD-9-CM: 518.4  8/20/2019 Yes        Mixed hyperlipidemia ICD-10-CM: E78.2  ICD-9-CM: 272.2  11/16/2018 Yes        Renovascular hypertension ICD-10-CM: I15.0  ICD-9-CM: 405.91  11/16/2018 Yes        SOB (shortness of breath) ICD-10-CM: R06.02  ICD-9-CM: 786.05  11/16/2018 Yes               Plan:     Ms. Nannette Herrera is a 70 yo WF with CAD and renovascular HTN who presents with shortness of breath and dyspnea on exertion. She is felt to have a pulmonary infection superimposed upon COPD. However, she has no sputum or fevers to suggest an acute infectious process. I am concerned about her renovascular HTN as a contributing factor. Agree with treatment of possible pneumonia/bronchitis. Will also aggressively attend to blood pressure. Support respiratory function with Duo-nebs and Bipap/oxygen. Will need PFT's when stable to assess pulmonary function. No chest pain. Slight elevation of cardiac markers likely due to supply-demand mismatch and poorly controlled blood pressure. Thank you for calling. Will follow with you.       Brandon Coats MD  2/22/2020, 1:03 PM    [facility] Office:  [address]   [address] [address] [place] [address]  P: [phone]  F: 9 [facility] Office:  [address] [facility]  [address] [facility],[address] Floor  [address]  P: [phone]  F: [phone]

## 2020-02-22 NOTE — ED NOTES
2245 pt unable to tolerate bipap, MD and RT aware, RT @ bedside    0015 pt reports relief from bipap and tolerating well now    0344 Dr. Garcia Pap @ bedside    6101 pt removed O2 and has increased WOB, unable to perform EKG, prn duo neb given and pt placed back on bipap    0710 pt refusing bipap and placed back on hi flow cannula, work of breathing appears better

## 2020-02-22 NOTE — ROUTINE PROCESS
RN called pr wanted to go on CPAP. Came  in and put  her on CPAP but she kept saing I need sanjeev air, so I initiated BIPAP to give her little sanjeev support. She seams to toileted blether. PT is on BiPAP of 12/6 and 30%.

## 2020-02-22 NOTE — ED PROVIDER NOTES
69 y.o. female with past medical history significant for s/p stent placement (August), HTN, and recurrent pneumonia who presents from home via EMS with chief complaint of SOB. Per old chart review: Patient was seen at [facility] for SOB - dx with concern for acute bronchitis, d/c with -  Albuterol, and prednisone. Per EMS patient has hx of recent recurrent pneumonia - upon arrival, patient had shallow breaths, 87% O2 sat, and tachycardic -  placed on cpap upon arrival to patient's residence and breathing treatment given PTA here. Patient presents today with increased SOB despite albuterol and prednisone use prescribed at [facility] yesterday. Patient's Medical POA at bedside for hx. States that patient had increased exertional and non-exertional SOB today with use of accesory muscles for the past 3 days and cough. She notes patient not getting any better despite albuterol, and prednisone use. Patient explains she is unable to walk to the bathroom without getting SOB. + anticoagulant use. Patient denies any hx of intubation. She also denies any current abx use. There are no other acute medical concerns at this time. Social hx: Former Smoker (lifetime - quit 1 year ago), + EtOH use, Never illicit drug use  PCP: Jak Suero MD      Note written by Adore Gillette, as dictated by Kailey Santos MD 10:19 PM      The history is provided by the patient, a caregiver and medical records. No  was used.         Past Medical History:   Diagnosis Date    Hypertension     Pneumonia        Past Surgical History:   Procedure Laterality Date    CARDIAC SURG PROCEDURE UNLIST  08/2019    cardiac stents     HX APPENDECTOMY      HX BACK SURGERY      LUMBAR FUSION X2 SURGERIES    HX BACK SURGERY      CERVICAL X2    HX GI  2019    arterial stent in right kidney    HX HYSTERECTOMY      HX TONSILLECTOMY           Family History:   Problem Relation Age of Onset    Heart Attack Father 47       Social History Socioeconomic History    Marital status:      Spouse name: Not on file    Number of children: Not on file    Years of education: Not on file    Highest education level: Not on file   Occupational History    Not on file   Social Needs    Financial resource strain: Not on file    Food insecurity:     Worry: Not on file     Inability: Not on file    Transportation needs:     Medical: Not on file     Non-medical: Not on file   Tobacco Use    Smoking status: Former Smoker     Packs/day: 0.50    Smokeless tobacco: Never Used   Substance and Sexual Activity    Alcohol use: Yes     Alcohol/week: 6.0 standard drinks     Types: 6 Glasses of wine per week    Drug use: Never    Sexual activity: Not on file   Lifestyle    Physical activity:     Days per week: Not on file     Minutes per session: Not on file    Stress: Not on file   Relationships    Social connections:     Talks on phone: Not on file     Gets together: Not on file     Attends Episcopal service: Not on file     Active member of club or organization: Not on file     Attends meetings of clubs or organizations: Not on file     Relationship status: Not on file    Intimate partner violence:     Fear of current or ex partner: Not on file     Emotionally abused: Not on file     Physically abused: Not on file     Forced sexual activity: Not on file   Other Topics Concern    Not on file   Social History Narrative    Not on file         ALLERGIES: Patient has no known allergies. Review of Systems   Constitutional: Negative for activity change, chills and fever. HENT: Negative for nosebleeds, sore throat, trouble swallowing and voice change. Eyes: Negative for visual disturbance. Respiratory: Positive for cough and shortness of breath. Cardiovascular: Negative for chest pain and palpitations. Gastrointestinal: Negative for abdominal pain, constipation, diarrhea and nausea.    Genitourinary: Negative for difficulty urinating, dysuria, hematuria and urgency. Musculoskeletal: Negative for back pain, neck pain and neck stiffness. Skin: Negative for color change. Allergic/Immunologic: Negative for immunocompromised state. Neurological: Negative for dizziness, seizures, syncope, weakness, light-headedness, numbness and headaches. Psychiatric/Behavioral: Negative for behavioral problems, confusion, hallucinations, self-injury and suicidal ideas. There were no vitals filed for this visit. Physical Exam  Vitals signs and nursing note reviewed. Constitutional:       General: She is not in acute distress. Appearance: She is well-developed. She is not diaphoretic. HENT:      Head: Atraumatic. Neck:      Trachea: No tracheal deviation. Cardiovascular:      Rate and Rhythm: Tachycardia present. Comments: Warm and well perfused  Pulmonary:      Effort: Tachypnea and accessory muscle usage present. Breath sounds: Decreased breath sounds and wheezing (end expiratory wheezes) present. Musculoskeletal: Normal range of motion. Skin:     General: Skin is warm and dry. Neurological:      Mental Status: She is alert. Coordination: Coordination normal.   Psychiatric:         Behavior: Behavior normal.         Thought Content: Thought content normal.         Judgment: Judgment normal.        Note written by Adore Villalobos, as dictated by Roderick Almaguer MD 10:19 PM    MDM  Number of Diagnoses or Management Options  Acute on chronic respiratory failure with hypoxia [facility]):   Diagnosis management comments:  Total critical care time spent exclusive of procedures:  40min       This is a 82-year-old female with past medical history, review of systems, physical exam as above presenting with complaints of gradually worsening shortness of breath, in the setting of approximately 1 year history of dyspnea, yet to be completely evaluated, and recent emergency department visit, for presumed bronchitis, improved with duo nebs. Per report, EMS discovered the patient to be hypoxic on room air, with tachycardia, decreased pulmonary exam, with wheezing. Upon arrival she is noted to be resting comfortably, while on BiPAP initiated by EMS. Physical exam otherwise remarkable for acutely ill-appearing elderly female, in no acute distress, with diffuse wheezes, and decreased breath sounds even now. She endorses a previous history of tobacco use, denies a diagnosis of COPD, or emphysema. Plan to continue BiPAP, provide IV magnesium, defer steroids that she is currently being treated with oral prednisone, inline nebulizers. We will obtain CMP, CBC, ABG, chest x-ray, cardiac enzymes and BNP. We will reassess, and make a disposition, however the patient will likely require admission for further care and evaluation. Procedures      CONSULT NOTE:  12:35 - Spoke with intensivist regarding      2:10 AM  Patient d/w Intensivist darrel MILNER deferred to Hospitalist who will admit. Hospitalist Perfect Serve for Admission  2:10 AM    ED Room Number: ER08/08  Patient Name and age:  Evelin Babcock 69 y.o.  female  Working Diagnosis:   1.  Acute on chronic respiratory failure with hypoxia (HCC)      Readmission: no  Isolation Requirements:  no  Recommended Level of Care:  step down  Code Status:  Full Code  Department:[facility] Adult ED - 21   Other:  D/w Dr. Sg Reynolds

## 2020-02-22 NOTE — ED NOTES
Attempted to perform EKG. Pt found sitting on the side of the bed and dyspneic without O2 on. Pt placed back on bipap and PRN duoneb administered. Unable to perform EKG at this time.

## 2020-02-22 NOTE — PROGRESS NOTES
Spiritual Care Assessment/Progress Note  [facility]      NAME: Jens Valero      MRN: [id number]  AGE: 69 y.o. SEX: female  Yarsani Affiliation: Religion   Language: English     2/22/2020     Total Time (in minutes): 15     Spiritual Assessment begun in [facility] [place] [facility] I[facility] through conversation with:         []Patient        [] Family    [] Friend(s)        Reason for Consult: Rapid response team     Spiritual beliefs: (Please include comment if needed)     [] Identifies with a orlin tradition:         [] Supported by a orlin community:            [] Claims no spiritual orientation:           [] Seeking spiritual identity:                [] Adheres to an individual form of spirituality:           [x] Not able to assess:                           Identified resources for coping:      [] Prayer                               [] Music                  [] Guided Imagery     [] Family/friends                 [] Pet visits     [] Devotional reading                         [] Unknown     [] Other:                                             Interventions offered during this visit: (See comments for more details)    Patient Interventions: Initial visit           Plan of Care:     [] Support spiritual and/or cultural needs    [] Support AMD and/or advance care planning process      [] Support grieving process   [] Coordinate Rites and/or Rituals    [] Coordination with community clergy   [] No spiritual needs identified at this time   [] Detailed Plan of Care below (See Comments)  [] Make referral to Music Therapy  [] Make referral to Pet Therapy     [] Make referral to Addiction services  [] Make referral to [facility]  [] Make referral to Spiritual Care Partner  [] No future visits requested        [x] Follow up visits as needed     Responded to RRT. Pt was being attended to by medical staff and no family present. Chaplains will continue to offer support as needed.   Chaplain Jones MDiv, MS, [address] [facility]  [phone] ([phone])

## 2020-02-22 NOTE — H&P
[facility]  HISTORY AND PHYSICAL    Name:  Vernell Robison  MR#:  [id number]  :  1945  ACCOUNT #:  [de-identified]  ADMIT DATE:  2020    The patient was seen, evaluated and admitted by me on 2020. PRIMARY CARE PHYSICIAN:  Vincent Escalona MD    SOURCE OF INFORMATION:  The patient. CHIEF COMPLAINT:  Shortness of breath. HISTORY OF PRESENT ILLNESS:  This is a 77-year-old woman with a past medical history significant for hypertension; renal artery stenosis, status post stent placement; dyslipidemia; coronary artery disease, status post stent placement; was in usual state of health until a couple of days ago when the patient developed shortness of breath, which is progressive and getting worse. The shortness of breath is associated with cough. The cough is productive of yellowish sputum. The shortness of breath is worse with mild exertion such as walking. The patient was seen at [facility] Emergency Room at [place] at the onset of symptoms. The patient was diagnosed with bronchitis, was discharged home on albuterol inhaler and prednisone. Despite this intervention, the patient's shortness of breath did not improve. The patient was brought to the emergency room today because of worsening symptoms. The patient was brought to the emergency room on CPAP. The patient is not on oxygen at home. No history of intubation. The patient has not been officially diagnosed with COPD, but the patient was an active heavy cigarette smoker for so many years before she quit 1 year ago. She stated that she was treated for pneumonia in [place], December last year. The patient was on a trip when she got sick and was in [facility], at that time received treatment at a hospital in [facility]. The patient was also admitted to this hospital in 2019. The patient was admitted and treated for renal artery stenosis in which a stent was placed.   Following that hospitalization, the patient was seen by Cardiology as an outpatient and in 09/2019 underwent cardiac catheterization with stent placement. The patient has not been diagnosed with congestive heart failure. The patient denies fever, rigors and chills. When the patient arrived at the emergency room today, a noncontrast CT scan of the chest shows evidence of pneumonia. The patient was in significant respiratory distress, requiring BiPAP in the emergency room. Because of that, consultation was requested with the intensivist on-call. The patient was seen by the intensivist, was determined that the patient is not a candidate for ICU admission. The patient was then referred to the hospitalist service for evaluation for admission. PAST MEDICAL HISTORY:  1. Hypertension. 2.  Renal artery stenosis, status post stent placement. 3.  Dyslipidemia. 4.  Coronary artery disease, status post stent placement. ALLERGIES:  NO KNOWN DRUG ALLERGIES. MEDICATIONS:  1. Albuterol 90 mcg 2 puffs by inhalation every 4 hours as needed. 2.  Aspirin 81 mg daily. 3.  Lipitor 80 mg daily. 4.  Plavix 75 mg daily. 5.  Lasix 20 mg daily. 6.  Mobic 15 mg daily. 7.  Procardia XL 90 mg daily. 8.  Prednisone 60 mg daily. 9.  Zoloft 100 mg daily at bedtime. 10.  Ambien 10 mg daily at bedtime as needed for sleep. FAMILY HISTORY:  This was reviewed. Her father had heart disease. PAST SURGICAL HISTORY:  This is significant for cardiac stent placement; appendectomy; lumbar fusion; hysterectomy; renal artery stenosis, status post stent placement. SOCIAL HISTORY:  The patient is a former smoker. The patient admits to social consumption of alcohol. REVIEW OF SYSTEMS:  HEAD, EYES, EARS, NOSE AND THROAT:  No headache, no dizziness, no blurring of vision, no photophobia. RESPIRATORY SYSTEM:  This is positive for shortness of breath and cough. No hemoptysis. CARDIOVASCULAR SYSTEM:  This is positive for orthopnea.   No chest pain, no palpitation. GASTROINTESTINAL SYSTEM:  This is positive for abdominal pain. No nausea or vomiting. No diarrhea. No constipation. GENITOURINARY SYSTEM:  No dysuria, no urgency and no frequency. All other systems are reviewed and they are negative. PHYSICAL EXAMINATION:  GENERAL APPEARANCE:  The patient appeared ill in moderate distress. VITAL SIGNS:  On arrival at the emergency room, temperature 98.9, pulse 91, respiratory rate 27, blood pressure 171/81, oxygen saturation 97% on BiPAP. HEAD:  Normocephalic, atraumatic. EYES:  Normal eye movement. No redness, no drainage, no discharge. EARS:  Normal external ears with no evidence of drainage. NOSE:  No deformity, no drainage. MOUTH AND THROAT:  No visible oral lesion. NECK:  Neck is supple. Mild JVD, no thyromegaly. CHEST:  Diffuse expiratory wheezing. Few bilateral basilar crackles. HEART:  Normal S1 and S2, regular. No clinically appreciable murmur. ABDOMEN:  Soft, nontender. Normal bowel sounds. CNS:  Alert, oriented x3. No gross focal neurological deficit. EXTREMITIES:  No edema, pulses 2+ bilaterally. MUSCULOSKELETAL SYSTEM:  No evidence of joint deformity and swelling. SKIN:  No active skin lesions seen in the exposed parts of the body. PSYCHIATRY:  Normal mood and affect. LYMPHATIC SYSTEM:  No cervical lymphadenopathy. DIAGNOSTIC DATA:  Chest x-ray, no evidence of acute cardiopulmonary process. CT scan of the chest, abdomen and pelvis without contrast shows mild patchy airspace disease in the left lower lobe, may represent early or resolving pneumonia. No acute process in the abdomen or pelvis. LABORATORY DATA:  ABG done in the emergency room, pH 7.373, pCO2 36.1, pO2 94, oxygen saturation 97%. Hematology:  WBC 6.6, hemoglobin 11.4, hematocrit 34.5, platelets 355. Cardiac profile, troponin less than 0.05. Pro-BNP level 995.   Chemistry:  Sodium 137, potassium 3.6, chloride 106, CO2 25, glucose 130, BUN 19, creatinine 1.08, calcium 9.2, total bilirubin 0.6, ALT 34, AST 30, alkaline phosphatase 83, total protein 8.7, albumin level 4.6, globulin at 4.1. Magnesium level 2.1. D-dimer 0.29.    ASSESSMENT:  1.  Bacterial pneumonia. 2.  Suspected chronic obstructive pulmonary disease with acute exacerbation. 3.  Hypertension. 4.  Suspected acute congestive heart failure. 5.  Hyperglycemia. 6.  Renal artery stenosis, status post stent placement. 7.  Dyslipidemia. 8.  Coronary artery disease, status post stent placement. PLAN:  1. Bacterial pneumonia. We will admit the patient for further evaluation and treatment. We will start the patient on Rocephin and Zithromax. We will monitor the patient's clinical response to treatment. This is contributing to the patient's shortness of breath. 2.  Chronic obstructive pulmonary disease with acute exacerbation. This is a clinical diagnosis. The patient will require outpatient referral to pulmonologist for evaluation with pulmonary function test to determine whether the patient has COPD or not. The patient will be placed on DuoNeb as needed. The patient will also be placed on short course of prednisone. This is contributing to the patient's shortness of breath. 3.  Hypertension. We will resume preadmission medication. We will monitor the patient's blood pressure closely. The patient will also be placed on hydralazine as needed for better blood pressure control. We will check TSH level. 4.  Suspected acute congestive heart failure. We will continue with Lasix. We will check cardiac markers to rule out acute myocardial infarction as a possible cause of shortness of breath. We will obtain echocardiogram to determine the ejection fraction and the type of congestive heart failure. Cardiology consult will be requested. The patient's cardiologist will be consulted to assist in evaluation and treatment. 5.  Hyperglycemia. We will check hemoglobin A1c level.   The patient has no history of diabetes. This may be as a result of recent steroid therapy. 6.  Renal artery stenosis, status post stent placement. We will continue with preadmission medication. 7.  Dyslipidemia. We will resume preadmission medication. We will check lipid profile. 8.  Coronary artery disease, status post stent placement. We will continue with cardiac medication. We will check cardiac markers to rule out acute myocardial infarction as a possible cause of shortness of breath. We will obtain EKG. OTHER ISSUES:  Code status, the patient is a full code. We will place the patient on heparin for DVT prophylaxis. FUNCTIONAL STATUS PRIOR TO ADMISSION:  The patient came from home. The patient is ambulatory with no assistant or device.       Adrian Ch MD      [id number]  D:  02/22/2020 6:28  T:  02/22/2020 8:00  JOB #:  [id number]  CC:  Farida Mcclure MD

## 2020-02-23 ENCOUNTER — APPOINTMENT (OUTPATIENT)
Dept: NON INVASIVE DIAGNOSTICS | Age: 75
DRG: 193 | End: 2020-02-23
Attending: HOSPITALIST
Payer: MEDICARE

## 2020-02-23 LAB
ALBUMIN SERPL-MCNC: 4.2 G/DL (ref 3.5–5)
ALBUMIN/GLOB SERPL: 1.2 {RATIO} (ref 1.1–2.2)
ALP SERPL-CCNC: 71 U/L (ref 45–117)
ALT SERPL-CCNC: 31 U/L (ref 12–78)
ANION GAP SERPL CALC-SCNC: 8 MMOL/L (ref 5–15)
AST SERPL-CCNC: 23 U/L (ref 15–37)
ATRIAL RATE: 89 BPM
BASOPHILS # BLD: 0 K/UL (ref 0–0.1)
BASOPHILS NFR BLD: 0 % (ref 0–1)
BILIRUB SERPL-MCNC: 0.7 MG/DL (ref 0.2–1)
BNP SERPL-MCNC: 2408 PG/ML
BUN SERPL-MCNC: 37 MG/DL (ref 6–20)
BUN/CREAT SERPL: 30 (ref 12–20)
CALCIUM SERPL-MCNC: 9.5 MG/DL (ref 8.5–10.1)
CALCULATED P AXIS, ECG09: 39 DEGREES
CALCULATED R AXIS, ECG10: 65 DEGREES
CALCULATED T AXIS, ECG11: 52 DEGREES
CHLORIDE SERPL-SCNC: 106 MMOL/L (ref 97–108)
CHOLEST SERPL-MCNC: 111 MG/DL
CO2 SERPL-SCNC: 23 MMOL/L (ref 21–32)
CREAT SERPL-MCNC: 1.23 MG/DL (ref 0.55–1.02)
DIAGNOSIS, 93000: NORMAL
DIFFERENTIAL METHOD BLD: ABNORMAL
EOSINOPHIL # BLD: 0.1 K/UL (ref 0–0.4)
EOSINOPHIL NFR BLD: 1 % (ref 0–7)
ERYTHROCYTE [DISTWIDTH] IN BLOOD BY AUTOMATED COUNT: 13.6 % (ref 11.5–14.5)
FLUAV RNA SPEC QL NAA+PROBE: NEGATIVE
FLUBV RNA SPEC QL NAA+PROBE: NEGATIVE
GLOBULIN SER CALC-MCNC: 3.6 G/DL (ref 2–4)
GLUCOSE BLD STRIP.AUTO-MCNC: 119 MG/DL (ref 65–100)
GLUCOSE BLD STRIP.AUTO-MCNC: 233 MG/DL (ref 65–100)
GLUCOSE BLD STRIP.AUTO-MCNC: 93 MG/DL (ref 65–100)
GLUCOSE SERPL-MCNC: 102 MG/DL (ref 65–100)
HADV DNA SPEC QL NAA+PROBE: NEGATIVE
HCT VFR BLD AUTO: 31.1 % (ref 35–47)
HDLC SERPL-MCNC: 27 MG/DL
HDLC SERPL: 4.1 {RATIO} (ref 0–5)
HGB BLD-MCNC: 10.1 G/DL (ref 11.5–16)
HMPV RNA SPEC QL NAA+PROBE: NEGATIVE
HPIV1 RNA SPEC QL NAA+PROBE: NEGATIVE
HPIV2 RNA SPEC QL NAA+PROBE: NEGATIVE
HPIV3 RNA SPEC QL NAA+PROBE: NEGATIVE
IMM GRANULOCYTES # BLD AUTO: 0 K/UL (ref 0–0.04)
IMM GRANULOCYTES NFR BLD AUTO: 0 % (ref 0–0.5)
LDLC SERPL CALC-MCNC: 35.4 MG/DL (ref 0–100)
LIPID PROFILE,FLP: ABNORMAL
LYMPHOCYTES # BLD: 1.2 K/UL (ref 0.8–3.5)
LYMPHOCYTES NFR BLD: 23 % (ref 12–49)
MCH RBC QN AUTO: 34.2 PG (ref 26–34)
MCHC RBC AUTO-ENTMCNC: 32.5 G/DL (ref 30–36.5)
MCV RBC AUTO: 105.4 FL (ref 80–99)
MONOCYTES # BLD: 0.6 K/UL (ref 0–1)
MONOCYTES NFR BLD: 12 % (ref 5–13)
NEUTS SEG # BLD: 3.3 K/UL (ref 1.8–8)
NEUTS SEG NFR BLD: 64 % (ref 32–75)
NRBC # BLD: 0 K/UL (ref 0–0.01)
NRBC BLD-RTO: 0 PER 100 WBC
P-R INTERVAL, ECG05: 118 MS
PLATELET # BLD AUTO: 179 K/UL (ref 150–400)
PMV BLD AUTO: 9.2 FL (ref 8.9–12.9)
POTASSIUM SERPL-SCNC: 3.6 MMOL/L (ref 3.5–5.1)
PROT SERPL-MCNC: 7.8 G/DL (ref 6.4–8.2)
Q-T INTERVAL, ECG07: 378 MS
QRS DURATION, ECG06: 64 MS
QTC CALCULATION (BEZET), ECG08: 459 MS
RBC # BLD AUTO: 2.95 M/UL (ref 3.8–5.2)
RHINOVIRUS RNA SPEC QL NAA+PROBE: NEGATIVE
RSV A RNA SPEC QL NAA+PROBE: NEGATIVE
RSV B RNA SPEC QL NAA+PROBE: POSITIVE
SERVICE CMNT-IMP: ABNORMAL
SERVICE CMNT-IMP: ABNORMAL
SERVICE CMNT-IMP: NORMAL
SODIUM SERPL-SCNC: 137 MMOL/L (ref 136–145)
SPECIMEN SOURCE: ABNORMAL
TRIGL SERPL-MCNC: 243 MG/DL (ref ?–150)
VENTRICULAR RATE, ECG03: 89 BPM
VLDLC SERPL CALC-MCNC: 48.6 MG/DL
WBC # BLD AUTO: 5.2 K/UL (ref 3.6–11)

## 2020-02-23 PROCEDURE — 36415 COLL VENOUS BLD VENIPUNCTURE: CPT

## 2020-02-23 PROCEDURE — 94660 CPAP INITIATION&MGMT: CPT

## 2020-02-23 PROCEDURE — 82962 GLUCOSE BLOOD TEST: CPT

## 2020-02-23 PROCEDURE — 80061 LIPID PANEL: CPT

## 2020-02-23 PROCEDURE — 74011000258 HC RX REV CODE- 258: Performed by: INTERNAL MEDICINE

## 2020-02-23 PROCEDURE — 80053 COMPREHEN METABOLIC PANEL: CPT

## 2020-02-23 PROCEDURE — 74011636637 HC RX REV CODE- 636/637: Performed by: HOSPITALIST

## 2020-02-23 PROCEDURE — 74011250637 HC RX REV CODE- 250/637: Performed by: INTERNAL MEDICINE

## 2020-02-23 PROCEDURE — 74011250637 HC RX REV CODE- 250/637: Performed by: HOSPITALIST

## 2020-02-23 PROCEDURE — 65660000001 HC RM ICU INTERMED STEPDOWN

## 2020-02-23 PROCEDURE — 74011000250 HC RX REV CODE- 250: Performed by: INTERNAL MEDICINE

## 2020-02-23 PROCEDURE — 77010033678 HC OXYGEN DAILY

## 2020-02-23 PROCEDURE — 74011250636 HC RX REV CODE- 250/636: Performed by: INTERNAL MEDICINE

## 2020-02-23 PROCEDURE — 85025 COMPLETE CBC W/AUTO DIFF WBC: CPT

## 2020-02-23 PROCEDURE — 74011000250 HC RX REV CODE- 250: Performed by: HOSPITALIST

## 2020-02-23 PROCEDURE — 74011250636 HC RX REV CODE- 250/636: Performed by: HOSPITALIST

## 2020-02-23 PROCEDURE — 94640 AIRWAY INHALATION TREATMENT: CPT

## 2020-02-23 PROCEDURE — 83880 ASSAY OF NATRIURETIC PEPTIDE: CPT

## 2020-02-23 PROCEDURE — 94664 DEMO&/EVAL PT USE INHALER: CPT

## 2020-02-23 RX ORDER — BUDESONIDE 0.5 MG/2ML
500 INHALANT ORAL
Status: DISCONTINUED | OUTPATIENT
Start: 2020-02-23 | End: 2020-03-04 | Stop reason: HOSPADM

## 2020-02-23 RX ORDER — INSULIN LISPRO 100 [IU]/ML
INJECTION, SOLUTION INTRAVENOUS; SUBCUTANEOUS
Status: DISCONTINUED | OUTPATIENT
Start: 2020-02-23 | End: 2020-02-28

## 2020-02-23 RX ORDER — MAGNESIUM SULFATE 100 %
4 CRYSTALS MISCELLANEOUS AS NEEDED
Status: DISCONTINUED | OUTPATIENT
Start: 2020-02-23 | End: 2020-02-28 | Stop reason: SDUPTHER

## 2020-02-23 RX ORDER — IPRATROPIUM BROMIDE AND ALBUTEROL SULFATE 2.5; .5 MG/3ML; MG/3ML
3 SOLUTION RESPIRATORY (INHALATION)
Status: DISCONTINUED | OUTPATIENT
Start: 2020-02-23 | End: 2020-02-25

## 2020-02-23 RX ORDER — LISINOPRIL 5 MG/1
5 TABLET ORAL DAILY
Status: DISCONTINUED | OUTPATIENT
Start: 2020-02-24 | End: 2020-03-04 | Stop reason: HOSPADM

## 2020-02-23 RX ORDER — ARFORMOTEROL TARTRATE 15 UG/2ML
15 SOLUTION RESPIRATORY (INHALATION)
Status: DISCONTINUED | OUTPATIENT
Start: 2020-02-23 | End: 2020-03-04 | Stop reason: HOSPADM

## 2020-02-23 RX ORDER — ZOLPIDEM TARTRATE 5 MG/1
5 TABLET ORAL
Status: DISCONTINUED | OUTPATIENT
Start: 2020-02-23 | End: 2020-02-23

## 2020-02-23 RX ORDER — ZOLPIDEM TARTRATE 5 MG/1
5 TABLET ORAL
Status: COMPLETED | OUTPATIENT
Start: 2020-02-23 | End: 2020-02-23

## 2020-02-23 RX ORDER — DEXTROSE MONOHYDRATE 100 MG/ML
0-250 INJECTION, SOLUTION INTRAVENOUS AS NEEDED
Status: DISCONTINUED | OUTPATIENT
Start: 2020-02-23 | End: 2020-03-04 | Stop reason: HOSPADM

## 2020-02-23 RX ADMIN — BUDESONIDE INHALATION 500 MCG: 0.5 SUSPENSION RESPIRATORY (INHALATION) at 12:00

## 2020-02-23 RX ADMIN — AZITHROMYCIN MONOHYDRATE 500 MG: 500 INJECTION, POWDER, LYOPHILIZED, FOR SOLUTION INTRAVENOUS at 06:05

## 2020-02-23 RX ADMIN — NITROGLYCERIN 1 INCH: 20 OINTMENT TOPICAL at 02:02

## 2020-02-23 RX ADMIN — ACETAMINOPHEN 650 MG: 325 TABLET ORAL at 06:12

## 2020-02-23 RX ADMIN — NIFEDIPINE 90 MG: 60 TABLET, FILM COATED, EXTENDED RELEASE ORAL at 08:59

## 2020-02-23 RX ADMIN — INSULIN LISPRO 3 UNITS: 100 INJECTION, SOLUTION INTRAVENOUS; SUBCUTANEOUS at 17:34

## 2020-02-23 RX ADMIN — IPRATROPIUM BROMIDE AND ALBUTEROL SULFATE 3 ML: .5; 3 SOLUTION RESPIRATORY (INHALATION) at 12:00

## 2020-02-23 RX ADMIN — Medication 10 ML: at 05:17

## 2020-02-23 RX ADMIN — ATORVASTATIN CALCIUM 80 MG: 40 TABLET, FILM COATED ORAL at 08:59

## 2020-02-23 RX ADMIN — FUROSEMIDE 40 MG: 10 INJECTION, SOLUTION INTRAMUSCULAR; INTRAVENOUS at 08:59

## 2020-02-23 RX ADMIN — CARVEDILOL 6.25 MG: 6.25 TABLET, FILM COATED ORAL at 08:59

## 2020-02-23 RX ADMIN — CLOPIDOGREL BISULFATE 75 MG: 75 TABLET, FILM COATED ORAL at 09:00

## 2020-02-23 RX ADMIN — CEFTRIAXONE 1 G: 1 INJECTION, POWDER, FOR SOLUTION INTRAMUSCULAR; INTRAVENOUS at 05:17

## 2020-02-23 RX ADMIN — IPRATROPIUM BROMIDE AND ALBUTEROL SULFATE 3 ML: .5; 3 SOLUTION RESPIRATORY (INHALATION) at 16:07

## 2020-02-23 RX ADMIN — IPRATROPIUM BROMIDE AND ALBUTEROL SULFATE 3 ML: .5; 3 SOLUTION RESPIRATORY (INHALATION) at 07:42

## 2020-02-23 RX ADMIN — SERTRALINE HYDROCHLORIDE 100 MG: 50 TABLET ORAL at 22:31

## 2020-02-23 RX ADMIN — NITROGLYCERIN 1 INCH: 20 OINTMENT TOPICAL at 13:15

## 2020-02-23 RX ADMIN — METHYLPREDNISOLONE SODIUM SUCCINATE 40 MG: 40 INJECTION, POWDER, FOR SOLUTION INTRAMUSCULAR; INTRAVENOUS at 11:23

## 2020-02-23 RX ADMIN — HEPARIN SODIUM 5000 UNITS: 5000 INJECTION INTRAVENOUS; SUBCUTANEOUS at 05:17

## 2020-02-23 RX ADMIN — CARVEDILOL 6.25 MG: 6.25 TABLET, FILM COATED ORAL at 17:34

## 2020-02-23 RX ADMIN — NITROGLYCERIN 1 INCH: 20 OINTMENT TOPICAL at 08:59

## 2020-02-23 RX ADMIN — NITROGLYCERIN 1 INCH: 20 OINTMENT TOPICAL at 20:43

## 2020-02-23 RX ADMIN — ASPIRIN 81 MG: 81 TABLET, COATED ORAL at 09:00

## 2020-02-23 RX ADMIN — Medication 1 CAPSULE: at 08:59

## 2020-02-23 RX ADMIN — HEPARIN SODIUM 5000 UNITS: 5000 INJECTION INTRAVENOUS; SUBCUTANEOUS at 20:42

## 2020-02-23 RX ADMIN — BUDESONIDE INHALATION 500 MCG: 0.5 SUSPENSION RESPIRATORY (INHALATION) at 19:50

## 2020-02-23 RX ADMIN — HEPARIN SODIUM 5000 UNITS: 5000 INJECTION INTRAVENOUS; SUBCUTANEOUS at 13:14

## 2020-02-23 RX ADMIN — IPRATROPIUM BROMIDE AND ALBUTEROL SULFATE 3 ML: .5; 3 SOLUTION RESPIRATORY (INHALATION) at 19:50

## 2020-02-23 RX ADMIN — ZOLPIDEM TARTRATE 5 MG: 5 TABLET ORAL at 22:31

## 2020-02-23 RX ADMIN — Medication 10 ML: at 22:31

## 2020-02-23 RX ADMIN — METHYLPREDNISOLONE SODIUM SUCCINATE 40 MG: 40 INJECTION, POWDER, FOR SOLUTION INTRAMUSCULAR; INTRAVENOUS at 22:32

## 2020-02-23 RX ADMIN — Medication 10 ML: at 13:15

## 2020-02-23 NOTE — PROGRESS NOTES
Progress Note    Patient: Marquise Shell MRN: [id number]  SSN: [id number]    YOB: 1945  Age: 69 y.o. Sex: female      Admit Date: 2020    LOS: 1 day     Subjective:     Ms. Paty Otero is a 68 yo WF well known to our practice with dyslipidemia, severe COPD, CAD s/p stent D1, MARY s/p right renal stent who presents with shortness of breath. The patient stopped smoking one year ago, and has had recurrent pulmonary issues with bronchitis/pneumonia. Over the past three days she has had progressive shortness of breath with any activity. She denies fevers or chills. No cough or sputum. No syncope or pre-syncope. She was evaluated in the [place] ER and diagnosed with bronchitis treated with albuterol inhaler and prednisone. Due top progression of symptom and usage of accessory muscles, she was brought in to [facility] by family. Despite aggressive medical treatment, breathing remains tenuous, and Bipap has been initiated. CT chest suggested possible infection. Blood pressure has been labile and elevated. Overnight, she has had good urine output and aggressive treatment of pulmonary wheezing with nebulizers. Improved control of blood pressure. This morning she is still short of breath, but overall better. Off Bipap. Objective:     Vitals:    20 0811 20 1107 20 1201 20 1314   BP: 147/73 150/74  122/63   Pulse: 83 83  81   Resp: 21 29     Temp: 97.5 °F (36.4 °C)      SpO2: 99% 96% 95%    Weight:       Height:          Temp (24hrs), Av.6 °F (36.4 °C), Min:96.8 °F (36 °C), Max:98.4 °F (36.9 °C)      Intake and Output:  Current Shift: No intake/output data recorded. Last three shifts:  1901 -  0700  In: 300 [I.V.:300]  Out: 2100 [Urine:2100]    Physical Exam:  General:  Alert, cooperative, well nourished, well developed, appears stated age. Mild repsiratory distress   Eyes:  Conjunctivae/corneas clear    Nose: Nares normal. Septum midline.  Mucosa normal. No drainage or sinus tenderness. Neck: Supple, symmetrical, trachea midline, no JVD   Lungs:   rhonchi   Heart:  Regular rate and rhythm, no murmur, click, rub, or gallop   Abdomen:   Soft, non-tender, bowel sounds normal, non-distended   Extremities: Normal, atraumatic, no cyanosis or edema   Skin: Skin color, texture, turgor normal. No rash or lesions.    Neurologic: Non focal       Current Facility-Administered Medications:     methylPREDNISolone (PF) (SOLU-MEDROL) injection 40 mg, 40 mg, IntraVENous, Q8H, Linda Collins MD, 40 mg at 02/23/20 1123    albuterol-ipratropium (DUO-NEB) 2.5 MG-0.5 MG/3 ML, 3 mL, Nebulization, Q6H RT, Linda Collins MD, 3 mL at 02/23/20 1200    arformoteroL (BROVANA) neb solution 15 mcg, 15 mcg, Nebulization, BID RT **AND** budesonide (PULMICORT) 500 mcg/2 ml nebulizer suspension, 500 mcg, Nebulization, BID RT, Linda Collins MD, 500 mcg at 02/23/20 1200    insulin lispro (HUMALOG) injection, , SubCUTAneous, AC&HS, Linda Collins MD, Stopped at 02/23/20 1130    glucose chewable tablet 16 g, 4 Tab, Oral, PRN, Linda Collins MD    glucagon (GLUCAGEN) injection 1 mg, 1 mg, IntraMUSCular, PRN, Linda Collins MD    dextrose 10% infusion 0-250 mL, 0-250 mL, IntraVENous, PRN, Linda Collins MD    acetaminophen (TYLENOL) tablet 650 mg, 650 mg, Oral, Q4H PRN, Rosalia Vazquez MD, 650 mg at 02/23/20 0612    albuterol-ipratropium (DUO-NEB) 2.5 MG-0.5 MG/3 ML, 3 mL, Nebulization, Q4H PRN, Rosalia Vazquez MD, 3 mL at 02/22/20 1303    aspirin delayed-release tablet 81 mg, 81 mg, Oral, DAILY, Rosalia Vazquez MD, 81 mg at 02/23/20 0900    atorvastatin (LIPITOR) tablet 80 mg, 80 mg, Oral, DAILY, Rosalia Vazquez MD, 80 mg at 02/23/20 0859    clopidogreL (PLAVIX) tablet 75 mg, 75 mg, Oral, DAILY, Rosalia Vazquez MD, 75 mg at 02/23/20 0900    furosemide (LASIX) injection 40 mg, 40 mg, IntraVENous, DAILY, Rosalia Vazquez MD, 40 mg at 02/23/20 0859    NIFEdipine ER (PROCARDIA XL) tablet 90 mg, 90 mg, Oral, DAILY, Rosalia Vazquez MD, 90 mg at 02/23/20 0859    sertraline (ZOLOFT) tablet 100 mg, 100 mg, Oral, QHS, Rosalia Vazquez MD, 100 mg at 02/22/20 2109    sodium chloride (NS) flush 5-40 mL, 5-40 mL, IntraVENous, Q8H, Rosalia Vazquez MD, 10 mL at 02/23/20 1315    sodium chloride (NS) flush 5-40 mL, 5-40 mL, IntraVENous, PRN, Rosalia Vazquez MD    ondansetron (ZOFRAN) injection 4 mg, 4 mg, IntraVENous, Q4H PRN, Rosalia Vazquez MD    bisacodyL (DULCOLAX) tablet 5 mg, 5 mg, Oral, DAILY PRN, Rosalia Vazquez MD    heparin (porcine) injection 5,000 Units, 5,000 Units, SubCUTAneous, Q8H, Rosalia Vazquez MD, 5,000 Units at 02/23/20 1314    lactobac ac& pc-s.therm-b.anim (PETRA Q/RISAQUAD), 1 Cap, Oral, DAILY, Rosalia Vazquez MD, 1 Cap at 02/23/20 0859    cefTRIAXone (ROCEPHIN) 1 g in 0.9% sodium chloride (MBP/ADV) 50 mL, 1 g, IntraVENous, Q24H, Rosalia Vazquez MD, Last Rate: 100 mL/hr at 02/23/20 0517, 1 g at 02/23/20 0517    azithromycin (ZITHROMAX) 500 mg in 0.9% sodium chloride (MBP/ADV) 250 mL, 500 mg, IntraVENous, Q24H, Rosalia Vazquez MD, Last Rate: 250 mL/hr at 02/23/20 0605, 500 mg at 02/23/20 0605    hydrALAZINE (APRESOLINE) 20 mg/mL injection 10 mg, 10 mg, IntraVENous, Q6H PRN, Rosalia Vazquez MD, 10 mg at 02/22/20 1403    carvediloL (COREG) tablet 6.25 mg, 6.25 mg, Oral, BID WITH MEALS, Valencia Beckford MD, 6.25 mg at 02/23/20 0859    nitroglycerin (NITROBID) 2 % ointment 1 Inch, 1 Inch, Topical, Q6H, Valencia Beckford MD, 1 Inch at 02/23/20 1315    Lab/Data Review:  Labs Latest Ref Rng & Units 2/23/2020 2/22/2020 2/21/2020 2/20/2020   WBC 3.6 - 11.0 K/uL 5.2 - 6.6 5.7   RBC 3.80 - 5.20 M/uL 2.95(L) - 3.30(L) 3.43(L)   Hemoglobin 11.5 - 16.0 g/dL 10. 1(L) - 11. 4(L) 11.6   Hematocrit 35.0 - 47.0 % 31. 1(L) - 34. 5(L) 35.6   MCV 80.0 - 99.0 . 4(H) - 104. 5(H) 103. 8(H)   Platelets 601 - 355 K/uL 179 - 184 176   Lymphocytes 12 - 49 % 23 - 13 12   Monocytes 5 - 13 % 12 - 11 9   Eosinophils 0 - 7 % 1 - 1 5   Basophils 0 - 1 % 0 - 0 0   Albumin 3.5 - 5.0 g/dL 4.2 - 4.6 4.6   Calcium 8.5 - 10.1 MG/DL 9.5 9.5 9.2 9.1   SGOT 15 - 37 U/L 23 - 30 23   Glucose 65 - 100 mg/dL 102(H) 117(H) 130(H) 126(H)   BUN 6 - 20 MG/DL 37(H) 26(H) 19 20   Creatinine 0.55 - 1.02 MG/DL 1.23(H) 1.19(H) 1.08(H) 1.04(H)   Sodium 136 - 145 mmol/L 137 137 137 142   Potassium 3.5 - 5.1 mmol/L 3.6 4.1 3.6 4.2   TSH 0.36 - 3.74 uIU/mL - 4.10(H) - -   Some recent data might be hidden       07/18/19   ECHO ADULT COMPLETE 07/19/2019 7/19/2019    Narrative · Left Ventricle: Normal cavity size, wall thickness, systolic function   (ejection fraction normal) and diastolic function. Estimated left   ventricular ejection fraction is 56 - 60%. · Pulmonary Artery: There is no evidence of pulmonary hypertension. · Interatrial Septum: Color flow Doppler was used. · Mitral Valve: Trace mitral valve regurgitation. Signed by: Warren Webster MD       09/12/19   CARDIAC PROCEDURE 09/12/2019 9/12/2019    Narrative Diagnosis;  1)Progressively worsening symptoms consistent with possible ACS  2)Severe single vessel disease involving large D1.  3)Mildly increased LVEDP  4)S/p PCI of D1 with 2.75 by 15 and 2.5 by 8 mm Xience drug eluting stent   in overlapping fashion    Recommendations:  1)Follow up with me in 1 months  2)IV hydration  3)DAPT for 1 year  4)Cardiac rehab  5)Identification of cause of anemia. Contrast use 90 cc    Acces right radial artery--no issues       Signed by: Deepa Cabrera MD     2/22/2020 CXR:  Study Result     EXAM: XR CHEST PORT   INDICATION: Shortness of breath, acute on chronic respiratory failure, hypoxia,  and bacterial pneumonia. COMPARISON: Portable chest on 2/21/2020. CT chest earlier this morning. TECHNIQUE: Upright portable chest AP view  FINDINGS: Cardiac monitoring wires overlie the thorax.  The cardiomediastinal and  hilar contours are within normal limits. The pulmonary vasculature is within  normal limits.      Reticular interstitial opacities are mildly prominent, which is increased. No  focal airspace opacity. Pleural spaces are clear. Osteopenia and cervical spine  hardware are unchanged.     IMPRESSION:  Increased mild interstitial lung disease represents pulmonary edema versus  interstitial pneumonia.            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 Faizan Metzger is a 68 yo WF with CAD and renovascular HTN who presents with shortness of breath and dyspnea on exertion. She is felt to have a pulmonary infection superimposed upon COPD. Elevated troponin likely due to supply-demand mismatch. Respiratory function improving with Duonebs. Blood pressure slowly improving. Agree with advancing anti-hypertensives as needed. Will try for Doppler imaging of renal artery, but may need CTA.         Signed By: Vincent Cogan, MD     February 23, 2020      Interventional Cardiology  [facility] [address] [place] [address] [place], [address] [place] [address] Avenue  P: [phone]  F: [phone]

## 2020-02-23 NOTE — PROGRESS NOTES
Problem: Pneumonia: Day 1  Goal: Activity/Safety  Outcome: Progressing Towards Goal  Note:   Bed is in the lowest position and wheels are locked, call bell is within reach, bathroom light is on during evening hours, gripper socks are on and patient has been instructed to call out for assistance if needed. As of now, patient is free from falls and will continue to be monitored. Goal: Diagnostic Test/Procedures  Outcome: Progressing Towards Goal  Note:   Patient has an echo scheduled  Goal: Nutrition/Diet  Outcome: Progressing Towards Goal  Note:   Patient was able to come off of her bipap and eat dinner which she tolerated well  Goal: Treatments/Interventions/Procedures  Outcome: Progressing Towards Goal  Note:   Patient has been on bipap and resting comfortably  Goal: Psychosocial  Outcome: Progressing Towards Goal  Note:   Patient is A&O x4, calm and cooperatice  Goal: *Oxygen saturation within defined limits  Outcome: Progressing Towards Goal  Note:   Patient is on continous bipap and her O2 saturations are remaining above 90%       Bedside shift change report given to Jerel Lira RN (oncoming nurse) by Hair Chávez (offgoing nurse). Report included the following information SBAR, Kardex, ED Summary, MAR, Accordion, Recent Results and Cardiac Rhythm NSR/Sinus Tach.

## 2020-02-23 NOTE — PROGRESS NOTES
Patient seen and examined this morning. States that she was SOB for the last one week. Complained of some chest pressure. Denied any cough  Was on high flow oxygen and using BiPAP intermittently. Received lasix. In the afternoon,RRT was called as she was tachypnic and resp distress  BP was high 200s. Ordered 5 mg metoprolol, 40 mg IV lasix. ABG results reviewed. CXR showed pulmonary edema. Plaed patient on BiPAP    Acute hypoxic resp failure due to flash pulmonary edema with uncontrolled HTN  -on nitro paste, added coreg to nifedipine.  -Can use nicardene drip if BP remains uncontrolled.

## 2020-02-23 NOTE — PROGRESS NOTES
1230--Patient up from ED on mid flow 10 liters, patients states she cannot breath and she is noted to be in the tripod position. Sats 95-97% Called respiratory to place her on bipap with the full face mask. She was given neb tx as well w/ Bipap placement. Once Bipap was placed she started to relax and was able to lay in the bed in more of sitting position no more tripod. Dr. Chaves Hawks in to see patient and ordered nitropaste which was placed for BP and she was given IV hydralazine     1410--Patient pulled of her Bipap, another nurse walked in placed her on NRB and called me in and I placed her back on Bipap and she said she could not breath, she was again in the tripod position, her O2 sats remained stable. 1415--Called RRT -tachypnic and resp distress BP was high 200s. Dr. Alber Mendez and CCU at bedside. Dr. Alber Mendez ordered ABG, CXR, Lasix, Metopolol, and small dose IV ativan. 1440--RRT Ended and she remained on Bipap and BP down 281-007'W systolic-- Cardene gtt was also ordered which Dr. Alber Mendez wants to keep on board just in case but to call if systolic start to creep back up above 150 again.      1900--Patient voided 350 mls on BSC    1930--Beside report was given to Virgene Nissen and patient since RRT has remained on the Bipap stable, sats high 90's, BP stable and HR has also come down into the 90's

## 2020-02-23 NOTE — PROGRESS NOTES
Problem: Falls - Risk of  Goal: *Absence of Falls  Description  Document Asmita Kaplan Fall Risk and appropriate interventions in the flowsheet. Outcome: Progressing Towards Goal  Note: Fall Risk Interventions:  Mobility Interventions: Communicate number of staff needed for ambulation/transfer, Patient to call before getting OOB, PT Consult for mobility concerns         Medication Interventions: Patient to call before getting OOB, Teach patient to arise slowly, Evaluate medications/consider consulting pharmacy    Elimination Interventions: Call light in reach, Stay With Me (per policy), Toilet paper/wipes in reach, Toileting schedule/hourly rounds     Problem: Gas Exchange - Impaired  Goal: *Absence of hypoxia  Outcome: Progressing Towards Goal     Problem: Pneumonia: Day 3  Goal: Respiratory  Outcome: Progressing Towards Goal   Pt weaned and tolerating 4 LNC throughout the shift and wearing PRN bipap. Pt remains dyspnic this shift. Problem: Heart Failure: Day 2  Goal: Diagnostic Test/Procedures  Outcome: Progressing Towards Goal   Pt to get echo this shift. 1930: Bedside shift change report given to Catrina Rothman RN (oncoming nurse) by Esther Sung RN (offgoing nurse). Report included the following information SBAR, ED Summary, Intake/Output, MAR, Recent Results, Med Rec Status and Cardiac Rhythm NSR/sinus tach.

## 2020-02-23 NOTE — PROGRESS NOTES
[facility] [facility] Adult  Hospitalist Group                                                                                          Hospitalist Progress Note  Krystle Walton MD  Answering service: [phone] OR 36 from in house phone        Date of Service:  2020  NAME:  Macie Lucas  :  1945  MRN:  [id number]      Admission Summary:   79-year-old woman with a past medical history significant for hypertension; renal artery stenosis, status post stent placement; dyslipidemia; coronary artery disease, status post stent placement came to the hospital because of SOB    Interval history / Subjective:   Patient seen and examined    This morning, she states that she still feels short of breath,has dry cough. Was wheezing. Ordered IV methyl prednisone,decreased duoneb frequency    Re evaluated patient again in the afternoon,she said she is feeling better. SOB has decreased and O2 sat >90% on 4 lt oxygen     Assessment & Plan:     #Acute hypoxic resp failure:  -multifactorial Flash pulmonary edema from uncontrolled HTN vs suspected COPD exacerbation( has long smoking history,quit last year)  - using BIPAP intermittently due to resp distress. -wean off oxygen as toelrated  -BP control.  -continue IV lasix. -start solumedrol 40 mg TID, duonebs q 6 hr, Pulmicort/LABA  -echo pending  -on ceftriaxone and azithromycin   -    #HTN:  -BP better today with nifedipine,coreg and lisinorpil.  Will d/c nitropaste when stable    #History of renal artery stenosis s/p rt renal artery stenting  -cardiology following,ordered repeat renal artery duplex    #Coronary artery disease  -on aspirin,statin,plavix,coreg      Code status: full  DVT prophylaxis: loevnox    Care Plan discussed with: patient,nurse  Anticipated Disposition: home  Anticipated Discharge: > 48 hrs     Hospital Problems  Date Reviewed: 2020          Codes Class Noted POA    Coronary artery disease ICD-10-CM: I25.10  ICD-9-CM: 414.00  2019 Yes        * (Principal) Bacterial pneumonia ICD-10-CM: J15.9  ICD-9-CM: 482.9  2/22/2020 Yes        Renal artery stenosis (HCC) ICD-10-CM: I70.1  ICD-9-CM: 440.1  8/20/2019 Yes        Flash pulmonary edema ([address] ICD-10-CM: J81.0  ICD-9-CM: 518.4  8/20/2019 Yes        Mixed hyperlipidemia ICD-10-CM: E78.2  ICD-9-CM: 272.2  11/16/2018 Yes        Renovascular hypertension ICD-10-CM: I15.0  ICD-9-CM: 405.91  11/16/2018 Yes        SOB (shortness of breath) ICD-10-CM: R06.02  ICD-9-CM: 786.05  11/16/2018 Yes                Review of Systems:   As per HPII      Vital Signs:    Last 24hrs VS reviewed since prior progress note. Most recent are:  Visit Vitals  /61   Pulse (!) 103   Temp 97.9 °F (36.6 °C)   Resp 26   Ht 5' 3\" (1.6 m)   Wt 63 kg (138 lb 14.2 oz)   SpO2 94%   BMI 24.60 kg/m²         Intake/Output Summary (Last 24 hours) at 2/23/2020 1805  Last data filed at 2/23/2020 1619  Gross per 24 hour   Intake 300 ml   Output 825 ml   Net -525 ml        Physical Examination:             Constitutional: In moderate distress   ENT:  Oral mucosa moist, oropharynx benign. EOMI   Resp:  diffuse b/l wheezing,conversational dyspnea   CV:  Regular rhythm, normal rate    GI:  Soft, non distended, non tender. normoactive bowel sounds    Musculoskeletal:  No significant LE edema    Neurologic:  Moves all extremities. AAOx3, CN II-XII reviewed     Psych: appears anxious.   Skin:  Good turgor, no rashes or ulcers       Data Review:    Review and/or order of clinical lab test  Review and/or order of tests in the radiology section of CPT  Review and/or order of tests in the medicine section of CPT      Labs:     Recent Labs     02/23/20  0328 02/21/20  2225   WBC 5.2 6.6   HGB 10.1* 11.4*   HCT 31.1* 34.5*    184     Recent Labs     02/23/20  0328 02/22/20  1836 02/22/20  0532 02/21/20  2225    137  --  137   K 3.6 4.1  --  3.6    106  --  106   CO2 23 24  --  25   BUN 37* 26*  --  19   CREA 1.23* 1.19*  -- 1.08*   * 117*  --  130*   CA 9.5 9.5  --  9.2   MG  --   --   --  2.1   PHOS  --   --  3.0  --      Recent Labs     02/23/20  0328 02/21/20  2225   SGOT 23 30   ALT 31 34   AP 71 83   TBILI 0.7 0.6   TP 7.8 8.7*   ALB 4.2 4.6   GLOB 3.6 4.1*     No results for input(s): INR, PTP, APTT, INREXT in the last 72 hours. No results for input(s): FE, TIBC, PSAT, FERR in the last 72 hours. Lab Results   Component Value Date/Time    Folate 10.9 07/24/2019 03:41 AM      No results for input(s): PH, PCO2, PO2 in the last 72 hours.   Recent Labs     02/22/20  1207 02/22/20  0532 02/21/20 2225   TROIQ 0.06* 0.17* <0.05     Lab Results   Component Value Date/Time    Cholesterol, total 111 02/23/2020 03:28 AM    HDL Cholesterol 27 02/23/2020 03:28 AM    LDL, calculated 35.4 02/23/2020 03:28 AM    Triglyceride 243 (H) 02/23/2020 03:28 AM    CHOL/HDL Ratio 4.1 02/23/2020 03:28 AM     Lab Results   Component Value Date/Time    Glucose (POC) 233 (H) 02/23/2020 05:18 PM    Glucose (POC) 93 02/23/2020 11:33 AM    Glucose (POC) 106 (H) 12/27/2016 12:24 PM     Lab Results   Component Value Date/Time    Color YELLOW/STRAW 02/22/2020 06:17 AM    Appearance CLEAR 02/22/2020 06:17 AM    Specific gravity 1.015 02/22/2020 06:17 AM    pH (UA) 5.0 02/22/2020 06:17 AM    Protein NEGATIVE  02/22/2020 06:17 AM    Glucose NEGATIVE  02/22/2020 06:17 AM    Ketone NEGATIVE  02/22/2020 06:17 AM    Bilirubin NEGATIVE  02/22/2020 06:17 AM    Urobilinogen 0.2 02/22/2020 06:17 AM    Nitrites NEGATIVE  02/22/2020 06:17 AM    Leukocyte Esterase NEGATIVE  02/22/2020 06:17 AM    Epithelial cells FEW 02/22/2020 06:17 AM    Bacteria NEGATIVE  02/22/2020 06:17 AM    WBC 0-4 02/22/2020 06:17 AM    RBC 0-5 02/22/2020 06:17 AM         Medications Reviewed:     Current Facility-Administered Medications   Medication Dose Route Frequency    methylPREDNISolone (PF) (SOLU-MEDROL) injection 40 mg  40 mg IntraVENous Q8H    albuterol-ipratropium (DUO-NEB) 2.5 MG-0.5 MG/3 ML  3 mL Nebulization Q6H RT    arformoteroL (BROVANA) neb solution 15 mcg  15 mcg Nebulization BID RT    And    budesonide (PULMICORT) 500 mcg/2 ml nebulizer suspension  500 mcg Nebulization BID RT    insulin lispro (HUMALOG) injection   SubCUTAneous AC&HS    glucose chewable tablet 16 g  4 Tab Oral PRN    glucagon (GLUCAGEN) injection 1 mg  1 mg IntraMUSCular PRN    dextrose 10% infusion 0-250 mL  0-250 mL IntraVENous PRN    [START ON 2/24/2020] lisinopril (PRINIVIL, ZESTRIL) tablet 5 mg  5 mg Oral DAILY    acetaminophen (TYLENOL) tablet 650 mg  650 mg Oral Q4H PRN    albuterol-ipratropium (DUO-NEB) 2.5 MG-0.5 MG/3 ML  3 mL Nebulization Q4H PRN    aspirin delayed-release tablet 81 mg  81 mg Oral DAILY    atorvastatin (LIPITOR) tablet 80 mg  80 mg Oral DAILY    clopidogreL (PLAVIX) tablet 75 mg  75 mg Oral DAILY    furosemide (LASIX) injection 40 mg  40 mg IntraVENous DAILY    NIFEdipine ER (PROCARDIA XL) tablet 90 mg  90 mg Oral DAILY    sertraline (ZOLOFT) tablet 100 mg  100 mg Oral QHS    sodium chloride (NS) flush 5-40 mL  5-40 mL IntraVENous Q8H    sodium chloride (NS) flush 5-40 mL  5-40 mL IntraVENous PRN    ondansetron (ZOFRAN) injection 4 mg  4 mg IntraVENous Q4H PRN    bisacodyL (DULCOLAX) tablet 5 mg  5 mg Oral DAILY PRN    heparin (porcine) injection 5,000 Units  5,000 Units SubCUTAneous Q8H    lactobac ac& pc-s.therm-b.anim (PETRA Q/RISAQUAD)  1 Cap Oral DAILY    cefTRIAXone (ROCEPHIN) 1 g in 0.9% sodium chloride (MBP/ADV) 50 mL  1 g IntraVENous Q24H    azithromycin (ZITHROMAX) 500 mg in 0.9% sodium chloride (MBP/ADV) 250 mL  500 mg IntraVENous Q24H    hydrALAZINE (APRESOLINE) 20 mg/mL injection 10 mg  10 mg IntraVENous Q6H PRN    carvediloL (COREG) tablet 6.25 mg  6.25 mg Oral BID WITH MEALS    nitroglycerin (NITROBID) 2 % ointment 1 Inch  1 Inch Topical Q6H     ______________________________________________________________________  EXPECTED LENGTH OF STAY: - - -  ACTUAL LENGTH OF STAY:          1                 Laurette Lesch, MD

## 2020-02-24 ENCOUNTER — APPOINTMENT (OUTPATIENT)
Dept: NON INVASIVE DIAGNOSTICS | Age: 75
DRG: 193 | End: 2020-02-24
Attending: HOSPITALIST
Payer: MEDICARE

## 2020-02-24 ENCOUNTER — APPOINTMENT (OUTPATIENT)
Dept: GENERAL RADIOLOGY | Age: 75
DRG: 193 | End: 2020-02-24
Attending: HOSPITALIST
Payer: MEDICARE

## 2020-02-24 LAB
ANION GAP SERPL CALC-SCNC: 8 MMOL/L (ref 5–15)
BASOPHILS # BLD: 0 K/UL (ref 0–0.1)
BASOPHILS NFR BLD: 0 % (ref 0–1)
BUN SERPL-MCNC: 38 MG/DL (ref 6–20)
BUN/CREAT SERPL: 31 (ref 12–20)
CALCIUM SERPL-MCNC: 9.1 MG/DL (ref 8.5–10.1)
CHLORIDE SERPL-SCNC: 107 MMOL/L (ref 97–108)
CO2 SERPL-SCNC: 24 MMOL/L (ref 21–32)
CREAT SERPL-MCNC: 1.24 MG/DL (ref 0.55–1.02)
DIFFERENTIAL METHOD BLD: ABNORMAL
EOSINOPHIL # BLD: 0 K/UL (ref 0–0.4)
EOSINOPHIL NFR BLD: 0 % (ref 0–7)
ERYTHROCYTE [DISTWIDTH] IN BLOOD BY AUTOMATED COUNT: 13.3 % (ref 11.5–14.5)
GLUCOSE BLD STRIP.AUTO-MCNC: 154 MG/DL (ref 65–100)
GLUCOSE BLD STRIP.AUTO-MCNC: 158 MG/DL (ref 65–100)
GLUCOSE BLD STRIP.AUTO-MCNC: 163 MG/DL (ref 65–100)
GLUCOSE BLD STRIP.AUTO-MCNC: 234 MG/DL (ref 65–100)
GLUCOSE SERPL-MCNC: 162 MG/DL (ref 65–100)
HCT VFR BLD AUTO: 29.8 % (ref 35–47)
HGB BLD-MCNC: 9.7 G/DL (ref 11.5–16)
IMM GRANULOCYTES # BLD AUTO: 0 K/UL (ref 0–0.04)
IMM GRANULOCYTES NFR BLD AUTO: 1 % (ref 0–0.5)
LYMPHOCYTES # BLD: 0.9 K/UL (ref 0.8–3.5)
LYMPHOCYTES NFR BLD: 17 % (ref 12–49)
MCH RBC QN AUTO: 34 PG (ref 26–34)
MCHC RBC AUTO-ENTMCNC: 32.6 G/DL (ref 30–36.5)
MCV RBC AUTO: 104.6 FL (ref 80–99)
MONOCYTES # BLD: 0.2 K/UL (ref 0–1)
MONOCYTES NFR BLD: 3 % (ref 5–13)
NEUTS SEG # BLD: 4 K/UL (ref 1.8–8)
NEUTS SEG NFR BLD: 79 % (ref 32–75)
NRBC # BLD: 0 K/UL (ref 0–0.01)
NRBC BLD-RTO: 0 PER 100 WBC
PLATELET # BLD AUTO: 176 K/UL (ref 150–400)
PMV BLD AUTO: 9.2 FL (ref 8.9–12.9)
POTASSIUM SERPL-SCNC: 3.6 MMOL/L (ref 3.5–5.1)
PROCALCITONIN SERPL-MCNC: 0.14 NG/ML
RBC # BLD AUTO: 2.85 M/UL (ref 3.8–5.2)
SERVICE CMNT-IMP: ABNORMAL
SODIUM SERPL-SCNC: 139 MMOL/L (ref 136–145)
WBC # BLD AUTO: 5.1 K/UL (ref 3.6–11)

## 2020-02-24 PROCEDURE — 74011250636 HC RX REV CODE- 250/636: Performed by: INTERNAL MEDICINE

## 2020-02-24 PROCEDURE — 74011000258 HC RX REV CODE- 258: Performed by: INTERNAL MEDICINE

## 2020-02-24 PROCEDURE — 80048 BASIC METABOLIC PNL TOTAL CA: CPT

## 2020-02-24 PROCEDURE — 77010033678 HC OXYGEN DAILY

## 2020-02-24 PROCEDURE — 36415 COLL VENOUS BLD VENIPUNCTURE: CPT

## 2020-02-24 PROCEDURE — 94762 N-INVAS EAR/PLS OXIMTRY CONT: CPT

## 2020-02-24 PROCEDURE — 71045 X-RAY EXAM CHEST 1 VIEW: CPT

## 2020-02-24 PROCEDURE — 74011250636 HC RX REV CODE- 250/636: Performed by: HOSPITALIST

## 2020-02-24 PROCEDURE — 74011250637 HC RX REV CODE- 250/637: Performed by: HOSPITALIST

## 2020-02-24 PROCEDURE — 74011250637 HC RX REV CODE- 250/637: Performed by: STUDENT IN AN ORGANIZED HEALTH CARE EDUCATION/TRAINING PROGRAM

## 2020-02-24 PROCEDURE — 74011000250 HC RX REV CODE- 250: Performed by: HOSPITALIST

## 2020-02-24 PROCEDURE — 82962 GLUCOSE BLOOD TEST: CPT

## 2020-02-24 PROCEDURE — 84145 PROCALCITONIN (PCT): CPT

## 2020-02-24 PROCEDURE — 94664 DEMO&/EVAL PT USE INHALER: CPT

## 2020-02-24 PROCEDURE — 74011250637 HC RX REV CODE- 250/637: Performed by: INTERNAL MEDICINE

## 2020-02-24 PROCEDURE — 85025 COMPLETE CBC W/AUTO DIFF WBC: CPT

## 2020-02-24 PROCEDURE — 94660 CPAP INITIATION&MGMT: CPT

## 2020-02-24 PROCEDURE — 94640 AIRWAY INHALATION TREATMENT: CPT

## 2020-02-24 PROCEDURE — 93306 TTE W/DOPPLER COMPLETE: CPT

## 2020-02-24 PROCEDURE — 65660000001 HC RM ICU INTERMED STEPDOWN

## 2020-02-24 PROCEDURE — 74011636637 HC RX REV CODE- 636/637: Performed by: HOSPITALIST

## 2020-02-24 RX ORDER — ZOLPIDEM TARTRATE 5 MG/1
5 TABLET ORAL
Status: DISCONTINUED | OUTPATIENT
Start: 2020-02-24 | End: 2020-03-04 | Stop reason: HOSPADM

## 2020-02-24 RX ORDER — BENZONATATE 100 MG/1
100 CAPSULE ORAL
Status: DISCONTINUED | OUTPATIENT
Start: 2020-02-24 | End: 2020-02-27

## 2020-02-24 RX ORDER — AZITHROMYCIN 250 MG/1
500 TABLET, FILM COATED ORAL DAILY
Status: DISCONTINUED | OUTPATIENT
Start: 2020-02-25 | End: 2020-02-25

## 2020-02-24 RX ADMIN — ATORVASTATIN CALCIUM 80 MG: 40 TABLET, FILM COATED ORAL at 08:59

## 2020-02-24 RX ADMIN — NITROGLYCERIN 1 INCH: 20 OINTMENT TOPICAL at 02:01

## 2020-02-24 RX ADMIN — METHYLPREDNISOLONE SODIUM SUCCINATE 40 MG: 40 INJECTION, POWDER, FOR SOLUTION INTRAMUSCULAR; INTRAVENOUS at 06:01

## 2020-02-24 RX ADMIN — FUROSEMIDE 40 MG: 10 INJECTION, SOLUTION INTRAMUSCULAR; INTRAVENOUS at 09:00

## 2020-02-24 RX ADMIN — Medication 1 DROP: at 14:19

## 2020-02-24 RX ADMIN — BENZONATATE 100 MG: 100 CAPSULE ORAL at 14:25

## 2020-02-24 RX ADMIN — IPRATROPIUM BROMIDE AND ALBUTEROL SULFATE 3 ML: .5; 3 SOLUTION RESPIRATORY (INHALATION) at 01:35

## 2020-02-24 RX ADMIN — BENZONATATE 100 MG: 100 CAPSULE ORAL at 21:32

## 2020-02-24 RX ADMIN — HEPARIN SODIUM 5000 UNITS: 5000 INJECTION INTRAVENOUS; SUBCUTANEOUS at 14:23

## 2020-02-24 RX ADMIN — ACETAMINOPHEN 650 MG: 325 TABLET ORAL at 03:12

## 2020-02-24 RX ADMIN — IPRATROPIUM BROMIDE AND ALBUTEROL SULFATE 3 ML: .5; 3 SOLUTION RESPIRATORY (INHALATION) at 19:49

## 2020-02-24 RX ADMIN — CLOPIDOGREL BISULFATE 75 MG: 75 TABLET, FILM COATED ORAL at 09:00

## 2020-02-24 RX ADMIN — IPRATROPIUM BROMIDE AND ALBUTEROL SULFATE 3 ML: .5; 3 SOLUTION RESPIRATORY (INHALATION) at 08:26

## 2020-02-24 RX ADMIN — CARVEDILOL 6.25 MG: 6.25 TABLET, FILM COATED ORAL at 07:01

## 2020-02-24 RX ADMIN — CEFTRIAXONE 1 G: 1 INJECTION, POWDER, FOR SOLUTION INTRAMUSCULAR; INTRAVENOUS at 06:01

## 2020-02-24 RX ADMIN — CARVEDILOL 6.25 MG: 6.25 TABLET, FILM COATED ORAL at 17:26

## 2020-02-24 RX ADMIN — ACETAMINOPHEN 650 MG: 325 TABLET ORAL at 09:02

## 2020-02-24 RX ADMIN — BUDESONIDE INHALATION 500 MCG: 0.5 SUSPENSION RESPIRATORY (INHALATION) at 19:56

## 2020-02-24 RX ADMIN — NITROGLYCERIN 1 INCH: 20 OINTMENT TOPICAL at 07:01

## 2020-02-24 RX ADMIN — HEPARIN SODIUM 5000 UNITS: 5000 INJECTION INTRAVENOUS; SUBCUTANEOUS at 06:00

## 2020-02-24 RX ADMIN — Medication 10 ML: at 06:01

## 2020-02-24 RX ADMIN — NITROGLYCERIN 1 INCH: 20 OINTMENT TOPICAL at 20:36

## 2020-02-24 RX ADMIN — METHYLPREDNISOLONE SODIUM SUCCINATE 40 MG: 40 INJECTION, POWDER, FOR SOLUTION INTRAMUSCULAR; INTRAVENOUS at 14:20

## 2020-02-24 RX ADMIN — SERTRALINE HYDROCHLORIDE 100 MG: 50 TABLET ORAL at 21:32

## 2020-02-24 RX ADMIN — Medication 1 CAPSULE: at 08:58

## 2020-02-24 RX ADMIN — METHYLPREDNISOLONE SODIUM SUCCINATE 40 MG: 40 INJECTION, POWDER, FOR SOLUTION INTRAMUSCULAR; INTRAVENOUS at 21:32

## 2020-02-24 RX ADMIN — HYDRALAZINE HYDROCHLORIDE 10 MG: 20 INJECTION INTRAMUSCULAR; INTRAVENOUS at 03:12

## 2020-02-24 RX ADMIN — Medication 10 ML: at 03:14

## 2020-02-24 RX ADMIN — Medication 10 ML: at 14:24

## 2020-02-24 RX ADMIN — NIFEDIPINE 90 MG: 60 TABLET, FILM COATED, EXTENDED RELEASE ORAL at 08:59

## 2020-02-24 RX ADMIN — LISINOPRIL 5 MG: 5 TABLET ORAL at 09:00

## 2020-02-24 RX ADMIN — HEPARIN SODIUM 5000 UNITS: 5000 INJECTION INTRAVENOUS; SUBCUTANEOUS at 20:39

## 2020-02-24 RX ADMIN — ASPIRIN 81 MG: 81 TABLET, COATED ORAL at 08:59

## 2020-02-24 RX ADMIN — AZITHROMYCIN MONOHYDRATE 500 MG: 500 INJECTION, POWDER, LYOPHILIZED, FOR SOLUTION INTRAVENOUS at 06:58

## 2020-02-24 RX ADMIN — INSULIN LISPRO 3 UNITS: 100 INJECTION, SOLUTION INTRAVENOUS; SUBCUTANEOUS at 09:01

## 2020-02-24 RX ADMIN — IPRATROPIUM BROMIDE AND ALBUTEROL SULFATE 3 ML: .5; 3 SOLUTION RESPIRATORY (INHALATION) at 14:14

## 2020-02-24 RX ADMIN — BUDESONIDE INHALATION 500 MCG: 0.5 SUSPENSION RESPIRATORY (INHALATION) at 08:27

## 2020-02-24 RX ADMIN — INSULIN LISPRO 2 UNITS: 100 INJECTION, SOLUTION INTRAVENOUS; SUBCUTANEOUS at 14:19

## 2020-02-24 RX ADMIN — NITROGLYCERIN 1 INCH: 20 OINTMENT TOPICAL at 14:20

## 2020-02-24 RX ADMIN — INSULIN LISPRO 2 UNITS: 100 INJECTION, SOLUTION INTRAVENOUS; SUBCUTANEOUS at 17:23

## 2020-02-24 RX ADMIN — ZOLPIDEM TARTRATE 5 MG: 5 TABLET ORAL at 21:32

## 2020-02-24 RX ADMIN — Medication 10 ML: at 21:33

## 2020-02-24 NOTE — CDMP QUERY
Query 1 of 1 Patient admitted with hypoxic respiratory failure and noted documentation in H&P of bacterial pneumonia on 2/21. This diagnosis was dropped in subsequent progress notes. If possible, please document in the progress notes and discharge summary if bacterial pneumonia was: Bacterial pneumonia, Ruled out after study Bacterial pneumonia, Treated and resolved Bacterial pneumonia, Confirmed after study Other, please specify Clinically unable to determine The medical record reflects the following: 
 
  Risk Factors: hx smoking, recurrent pneumonia Clinical Indicators: CT Chest- mild patchy airspace disease in the left lower lobe, may represent early or resolving pneumonia; 
 
H&P 2/22- \"noncontrast CT scan of the chest shows evidence of pneumonia. The patient was in significant respiratory distress, requiring BiPAP in the emergency room. Bacterial pneumonia. We will admit the patient for further evaluation and treatment. We will start the patient on Rocephin and Zithromax. We will monitor the patient's clinical response to treatment. This is contributing to the patient's shortness of breath\"; 
 
Cards CN 2/22- \"Agree with treatment of possible pneumonia/bronchitis\"; Treatment: CXR, CT,  500mg Azithromycin IV daily; 1g Ceftriaxone IV daily; Thank you, Merrill Joyce, RN, BSN, [facility] Clinical  
[facility] 
[phone]

## 2020-02-24 NOTE — PROGRESS NOTES
[facility] [facility] Adult  Hospitalist Group                                                                                          Hospitalist Progress Note  Dorota Patricia MD  Answering service: [phone] OR 36 from in house phone        Date of Service:  2020  NAME:  Dharmesh Castanon  :  1945  MRN:  [id number]      Admission Summary:   68-year-old woman with a past medical history significant for hypertension; renal artery stenosis, status post stent placement; dyslipidemia; coronary artery disease, status post stent placement came to the hospital because of SOB    Interval history / Subjective:   Patient seen and examined    She states that she is feeling better, breathing is improving. Still has some dyspnea on exertion. Has cough. Assessment & Plan:     #Acute hypoxic resp failure: Improving  -multifactorial Flash pulmonary edema from uncontrolled HTN vs suspected COPD exacerbation( has long smoking history,quit last year)  -We will stop BiPAP today and wean off oxygen as toelrated  -BP controlled  -continue IV lasix.  -Continue Solumedrol 40 mg TID, duonebs q 6 hr, Pulmicort/LABA. Add Mucomyst  -echo pending  -on ceftriaxone and azithromycin     #HTN:  -BP controlled with nifedipine,coreg and lisinorpil.      #History of renal artery stenosis s/p rt renal artery stenting  -cardiology following,ordered repeat renal artery duplex    #Coronary artery disease  -on aspirin,statin,plavix,coreg      Code status: full  DVT prophylaxis: loevnox    Care Plan discussed with: patient,nurse  Anticipated Disposition: home  Anticipated Discharge: > 48 hrs     Hospital Problems  Date Reviewed: 2020          Codes Class Noted POA    Coronary artery disease ICD-10-CM: I25.10  ICD-9-CM: 414.00  2019 Yes        * (Principal) Bacterial pneumonia ICD-10-CM: J15.9  ICD-9-CM: 482.9  2020 Yes        Renal artery stenosis (HCC) ICD-10-CM: I70.1  ICD-9-CM: 440.1  2019 Yes        Flash pulmonary edema (HCC) ICD-10-CM: J81.0  ICD-9-CM: 518.4  8/20/2019 Yes        Mixed hyperlipidemia ICD-10-CM: E78.2  ICD-9-CM: 272.2  11/16/2018 Yes        Renovascular hypertension ICD-10-CM: I15.0  ICD-9-CM: 405.91  11/16/2018 Yes        SOB (shortness of breath) ICD-10-CM: R06.02  ICD-9-CM: 786.05  11/16/2018 Yes                Review of Systems:   As per HPII      Vital Signs:    Last 24hrs VS reviewed since prior progress note. Most recent are:  Visit Vitals  /43 (BP 1 Location: Left arm, BP Patient Position: At rest)   Pulse 79   Temp 98.4 °F (36.9 °C)   Resp 21   Ht 5' 3\" (1.6 m)   Wt 63.4 kg (139 lb 12.4 oz)   SpO2 93%   BMI 24.76 kg/m²         Intake/Output Summary (Last 24 hours) at 2/24/2020 1814  Last data filed at 2/24/2020 8253  Gross per 24 hour   Intake 800 ml   Output 425 ml   Net 375 ml        Physical Examination:             Constitutional: In minimal distress   ENT:  Oral mucosa moist, oropharynx benign. EOMI   Resp:  Decreased wheezing today. Diminished breath sounds   CV:  Regular rhythm, normal rate    GI:  Soft, non distended, non tender. normoactive bowel sounds    Musculoskeletal:  No significant LE edema    Neurologic:  Moves all extremities.   AAOx3, CN II-XII reviewed     Psych: Not agitated  Skin:  Good turgor, no rashes or ulcers       Data Review:    Review and/or order of clinical lab test  Review and/or order of tests in the radiology section of CPT  Review and/or order of tests in the medicine section of CPT      Labs:     Recent Labs     02/24/20 0319 02/23/20  0328   WBC 5.1 5.2   HGB 9.7* 10.1*   HCT 29.8* 31.1*    179     Recent Labs     02/24/20  0319 02/23/20  0328 02/22/20  1836 02/22/20  0532 02/21/20  2225    137 137  --  137   K 3.6 3.6 4.1  --  3.6    106 106  --  106   CO2 24 23 24  --  25   BUN 38* 37* 26*  --  19   CREA 1.24* 1.23* 1.19*  --  1.08*   * 102* 117*  --  130*   CA 9.1 9.5 9.5  --  9.2   MG  --   --   --   --  2.1   PHOS --   --   --  3.0  --      Recent Labs     02/23/20  0328 02/21/20  2225   SGOT 23 30   ALT 31 34   AP 71 83   TBILI 0.7 0.6   TP 7.8 8.7*   ALB 4.2 4.6   GLOB 3.6 4.1*     No results for input(s): INR, PTP, APTT, INREXT, INREXT in the last 72 hours. No results for input(s): FE, TIBC, PSAT, FERR in the last 72 hours. Lab Results   Component Value Date/Time    Folate 10.9 07/24/2019 03:41 AM      No results for input(s): PH, PCO2, PO2 in the last 72 hours.   Recent Labs     02/22/20  1207 02/22/20  0532 02/21/20 2225   TROIQ 0.06* 0.17* <0.05     Lab Results   Component Value Date/Time    Cholesterol, total 111 02/23/2020 03:28 AM    HDL Cholesterol 27 02/23/2020 03:28 AM    LDL, calculated 35.4 02/23/2020 03:28 AM    Triglyceride 243 (H) 02/23/2020 03:28 AM    CHOL/HDL Ratio 4.1 02/23/2020 03:28 AM     Lab Results   Component Value Date/Time    Glucose (POC) 154 (H) 02/24/2020 04:57 PM    Glucose (POC) 163 (H) 02/24/2020 01:53 PM    Glucose (POC) 234 (H) 02/24/2020 08:49 AM    Glucose (POC) 119 (H) 02/23/2020 10:43 PM    Glucose (POC) 233 (H) 02/23/2020 05:18 PM     Lab Results   Component Value Date/Time    Color YELLOW/STRAW 02/22/2020 06:17 AM    Appearance CLEAR 02/22/2020 06:17 AM    Specific gravity 1.015 02/22/2020 06:17 AM    pH (UA) 5.0 02/22/2020 06:17 AM    Protein NEGATIVE  02/22/2020 06:17 AM    Glucose NEGATIVE  02/22/2020 06:17 AM    Ketone NEGATIVE  02/22/2020 06:17 AM    Bilirubin NEGATIVE  02/22/2020 06:17 AM    Urobilinogen 0.2 02/22/2020 06:17 AM    Nitrites NEGATIVE  02/22/2020 06:17 AM    Leukocyte Esterase NEGATIVE  02/22/2020 06:17 AM    Epithelial cells FEW 02/22/2020 06:17 AM    Bacteria NEGATIVE  02/22/2020 06:17 AM    WBC 0-4 02/22/2020 06:17 AM    RBC 0-5 02/22/2020 06:17 AM         Medications Reviewed:     Current Facility-Administered Medications   Medication Dose Route Frequency    polyvinyl alcohol-povidone (NATURAL TEARS) 0.5-0.6 % ophthalmic solution 1 Drop  1 Drop Both Eyes PRN    benzonatate (TESSALON) capsule 100 mg  100 mg Oral TID PRN    [START ON 2/25/2020] azithromycin (ZITHROMAX) tablet 500 mg  500 mg Oral DAILY    methylPREDNISolone (PF) (SOLU-MEDROL) injection 40 mg  40 mg IntraVENous Q8H    albuterol-ipratropium (DUO-NEB) 2.5 MG-0.5 MG/3 ML  3 mL Nebulization Q6H RT    arformoteroL (BROVANA) neb solution 15 mcg  15 mcg Nebulization BID RT    And    budesonide (PULMICORT) 500 mcg/2 ml nebulizer suspension  500 mcg Nebulization BID RT    insulin lispro (HUMALOG) injection   SubCUTAneous AC&HS    glucose chewable tablet 16 g  4 Tab Oral PRN    glucagon (GLUCAGEN) injection 1 mg  1 mg IntraMUSCular PRN    dextrose 10% infusion 0-250 mL  0-250 mL IntraVENous PRN    lisinopril (PRINIVIL, ZESTRIL) tablet 5 mg  5 mg Oral DAILY    acetaminophen (TYLENOL) tablet 650 mg  650 mg Oral Q4H PRN    albuterol-ipratropium (DUO-NEB) 2.5 MG-0.5 MG/3 ML  3 mL Nebulization Q4H PRN    aspirin delayed-release tablet 81 mg  81 mg Oral DAILY    atorvastatin (LIPITOR) tablet 80 mg  80 mg Oral DAILY    clopidogreL (PLAVIX) tablet 75 mg  75 mg Oral DAILY    furosemide (LASIX) injection 40 mg  40 mg IntraVENous DAILY    NIFEdipine ER (PROCARDIA XL) tablet 90 mg  90 mg Oral DAILY    sertraline (ZOLOFT) tablet 100 mg  100 mg Oral QHS    sodium chloride (NS) flush 5-40 mL  5-40 mL IntraVENous Q8H    sodium chloride (NS) flush 5-40 mL  5-40 mL IntraVENous PRN    ondansetron (ZOFRAN) injection 4 mg  4 mg IntraVENous Q4H PRN    bisacodyL (DULCOLAX) tablet 5 mg  5 mg Oral DAILY PRN    heparin (porcine) injection 5,000 Units  5,000 Units SubCUTAneous Q8H    lactobac ac& pc-s.therm-b.anim (PETRA Q/RISAQUAD)  1 Cap Oral DAILY    cefTRIAXone (ROCEPHIN) 1 g in 0.9% sodium chloride (MBP/ADV) 50 mL  1 g IntraVENous Q24H    hydrALAZINE (APRESOLINE) 20 mg/mL injection 10 mg  10 mg IntraVENous Q6H PRN    carvediloL (COREG) tablet 6.25 mg  6.25 mg Oral BID WITH MEALS    nitroglycerin (NITROBID) 2 % ointment 1 Inch  1 Inch Topical Q6H     ______________________________________________________________________  EXPECTED LENGTH OF STAY: 3d 19h  ACTUAL LENGTH OF STAY:          2                 Noe Colunga MD

## 2020-02-24 NOTE — PROGRESS NOTES
2100- pt states she takes ambien at home nightly for sleep, noted in PTA med list Dr. Kaitlin Coleman notified, orders received. 0800- Bedside shift change report given to Matilde Russell RN by Alejandro Osman RN. Report included the following information SBAR, Kardex, Intake/Output, MAR, Recent Results and Cardiac Rhythm NSR to sinus tach. Last 3 Recorded Weights in this Encounter    02/22/20 1230 02/23/20 0432 02/24/20 0312   Weight: 63.1 kg (139 lb 1.8 oz) 63 kg (138 lb 14.2 oz) 63.4 kg (139 lb 12.4 oz)       Intake/Output Summary (Last 24 hours) at 2/24/2020 0747  Last data filed at 2/24/2020 7397  Gross per 24 hour   Intake 800 ml   Output 875 ml   Net -75 ml     Problem: Gas Exchange - Impaired  Goal: *Absence of hypoxia  Outcome: Progressing Towards Goal  Note:   Pt continues to have shortness of breath upon minimal exertion. Problem: Pneumonia: Day 2  Goal: Activity/Safety  Outcome: Progressing Towards Goal  Note:   Pt verbalizes understanding to call for assistance with toileting or if having increased shortness of breath. Pt demonstrates how to use call light. Goal: Medications  Outcome: Progressing Towards Goal  Note:   Pt verbalizes understanding of prescribed and administered medications and potential side effects. Teachback provided. Problem: Heart Failure: Day 2  Goal: Diagnostic Test/Procedures  Outcome: Progressing Towards Goal  Note:   Pt aware of plan for echo tomorrow. Goal: Medications  Outcome: Progressing Towards Goal  Note:   Pt verbalizes understanding of and able to provide teachback for heart failure related medications.

## 2020-02-24 NOTE — PROGRESS NOTES
Clinical Pharmacy Note: Re: IV to PO Automatic Conversion - Antibiotic    Please note: Garth Monterroso medication- azithromycin has been changed from IV to PO based on the following criteria:    The patient:  1. Has received IV therapy for at least 48 hours   2. Has a functioning GI tract  - Taking scheduled oral medications  - Tolerating tube feeds at goal rate or a full liquid, soft, or regular diet         3. Is clinically stable        - Temperature < 100.4F for at least 24 hours        - WBC is trending down    This IV to PO conversion is based on the [employer] approved automatic conversion policy for eligible patients. Please call with questions.

## 2020-02-25 LAB
ANION GAP SERPL CALC-SCNC: 8 MMOL/L (ref 5–15)
AV VELOCITY RATIO: 0.65
BASOPHILS # BLD: 0 K/UL (ref 0–0.1)
BASOPHILS NFR BLD: 0 % (ref 0–1)
BUN SERPL-MCNC: 38 MG/DL (ref 6–20)
BUN/CREAT SERPL: 32 (ref 12–20)
CALCIUM SERPL-MCNC: 9.4 MG/DL (ref 8.5–10.1)
CHLORIDE SERPL-SCNC: 107 MMOL/L (ref 97–108)
CO2 SERPL-SCNC: 23 MMOL/L (ref 21–32)
CREAT SERPL-MCNC: 1.18 MG/DL (ref 0.55–1.02)
DIFFERENTIAL METHOD BLD: ABNORMAL
ECHO AV AREA PEAK VELOCITY: 1.9 CM2
ECHO AV PEAK GRADIENT: 11.8 MMHG
ECHO AV PEAK VELOCITY: 171.63 CM/S
ECHO LA AREA 4C: 14.5 CM2
ECHO LA MAJOR AXIS: 3.35 CM
ECHO LA VOL 4C: 35.99 ML (ref 22–52)
ECHO LA VOLUME INDEX A4C: 21.76 ML/M2 (ref 16–28)
ECHO LV E' LATERAL VELOCITY: 8.43 CM/S
ECHO LV E' SEPTAL VELOCITY: 6.77 CM/S
ECHO LV INTERNAL DIMENSION DIASTOLIC: 3.24 CM (ref 3.9–5.3)
ECHO LV INTERNAL DIMENSION SYSTOLIC: 2.11 CM
ECHO LV IVSD: 1.01 CM (ref 0.6–0.9)
ECHO LV MASS 2D: 106.1 G (ref 67–162)
ECHO LV MASS INDEX 2D: 64.1 G/M2 (ref 43–95)
ECHO LV POSTERIOR WALL DIASTOLIC: 1.05 CM (ref 0.6–0.9)
ECHO LVOT DIAM: 1.95 CM
ECHO LVOT PEAK GRADIENT: 4.9 MMHG
ECHO LVOT PEAK VELOCITY: 111.21 CM/S
ECHO MV A VELOCITY: 96.27 CM/S
ECHO MV E VELOCITY: 68.51 CM/S
ECHO MV E/A RATIO: 0.71
ECHO MV E/E' LATERAL: 8.13
ECHO MV E/E' RATIO (AVERAGED): 9.12
ECHO MV E/E' SEPTAL: 10.12
ECHO PV MAX VELOCITY: 112.77 CM/S
ECHO PV PEAK GRADIENT: 5.1 MMHG
ECHO RV INTERNAL DIMENSION: 2.88 CM
ECHO RV TAPSE: 2.8 CM (ref 1.5–2)
ECHO RVOT PEAK VELOCITY: 113.45 CM/S
ECHO TV REGURGITANT MAX VELOCITY: 264.39 CM/S
ECHO TV REGURGITANT PEAK GRADIENT: 28 MMHG
EOSINOPHIL # BLD: 0 K/UL (ref 0–0.4)
EOSINOPHIL NFR BLD: 0 % (ref 0–7)
ERYTHROCYTE [DISTWIDTH] IN BLOOD BY AUTOMATED COUNT: 13.4 % (ref 11.5–14.5)
GLUCOSE BLD STRIP.AUTO-MCNC: 103 MG/DL (ref 65–100)
GLUCOSE BLD STRIP.AUTO-MCNC: 128 MG/DL (ref 65–100)
GLUCOSE BLD STRIP.AUTO-MCNC: 142 MG/DL (ref 65–100)
GLUCOSE BLD STRIP.AUTO-MCNC: 263 MG/DL (ref 65–100)
GLUCOSE BLD STRIP.AUTO-MCNC: 296 MG/DL (ref 65–100)
GLUCOSE SERPL-MCNC: 150 MG/DL (ref 65–100)
HCT VFR BLD AUTO: 27.3 % (ref 35–47)
HGB BLD-MCNC: 9 G/DL (ref 11.5–16)
IMM GRANULOCYTES # BLD AUTO: 0.1 K/UL (ref 0–0.04)
IMM GRANULOCYTES NFR BLD AUTO: 1 % (ref 0–0.5)
LVFS 2D: 34.96 %
LYMPHOCYTES # BLD: 1.2 K/UL (ref 0.8–3.5)
LYMPHOCYTES NFR BLD: 17 % (ref 12–49)
MCH RBC QN AUTO: 34.7 PG (ref 26–34)
MCHC RBC AUTO-ENTMCNC: 33 G/DL (ref 30–36.5)
MCV RBC AUTO: 105.4 FL (ref 80–99)
MONOCYTES # BLD: 0.4 K/UL (ref 0–1)
MONOCYTES NFR BLD: 5 % (ref 5–13)
NEUTS SEG # BLD: 5.5 K/UL (ref 1.8–8)
NEUTS SEG NFR BLD: 77 % (ref 32–75)
NRBC # BLD: 0 K/UL (ref 0–0.01)
NRBC BLD-RTO: 0 PER 100 WBC
PLATELET # BLD AUTO: 177 K/UL (ref 150–400)
PMV BLD AUTO: 9.2 FL (ref 8.9–12.9)
POTASSIUM SERPL-SCNC: 3.6 MMOL/L (ref 3.5–5.1)
RBC # BLD AUTO: 2.59 M/UL (ref 3.8–5.2)
SERVICE CMNT-IMP: ABNORMAL
SODIUM SERPL-SCNC: 138 MMOL/L (ref 136–145)
WBC # BLD AUTO: 7.2 K/UL (ref 3.6–11)

## 2020-02-25 PROCEDURE — 97161 PT EVAL LOW COMPLEX 20 MIN: CPT

## 2020-02-25 PROCEDURE — 97165 OT EVAL LOW COMPLEX 30 MIN: CPT

## 2020-02-25 PROCEDURE — 85025 COMPLETE CBC W/AUTO DIFF WBC: CPT

## 2020-02-25 PROCEDURE — 74011250637 HC RX REV CODE- 250/637: Performed by: STUDENT IN AN ORGANIZED HEALTH CARE EDUCATION/TRAINING PROGRAM

## 2020-02-25 PROCEDURE — 74011250636 HC RX REV CODE- 250/636: Performed by: HOSPITALIST

## 2020-02-25 PROCEDURE — 36415 COLL VENOUS BLD VENIPUNCTURE: CPT

## 2020-02-25 PROCEDURE — 74011250637 HC RX REV CODE- 250/637: Performed by: INTERNAL MEDICINE

## 2020-02-25 PROCEDURE — 82962 GLUCOSE BLOOD TEST: CPT

## 2020-02-25 PROCEDURE — 80048 BASIC METABOLIC PNL TOTAL CA: CPT

## 2020-02-25 PROCEDURE — 97535 SELF CARE MNGMENT TRAINING: CPT

## 2020-02-25 PROCEDURE — 74011636637 HC RX REV CODE- 636/637: Performed by: HOSPITALIST

## 2020-02-25 PROCEDURE — 77010033678 HC OXYGEN DAILY

## 2020-02-25 PROCEDURE — 74011250637 HC RX REV CODE- 250/637: Performed by: HOSPITALIST

## 2020-02-25 PROCEDURE — 74011250636 HC RX REV CODE- 250/636: Performed by: INTERNAL MEDICINE

## 2020-02-25 PROCEDURE — 94640 AIRWAY INHALATION TREATMENT: CPT

## 2020-02-25 PROCEDURE — 97530 THERAPEUTIC ACTIVITIES: CPT

## 2020-02-25 PROCEDURE — 94762 N-INVAS EAR/PLS OXIMTRY CONT: CPT

## 2020-02-25 PROCEDURE — 74011000258 HC RX REV CODE- 258: Performed by: INTERNAL MEDICINE

## 2020-02-25 PROCEDURE — 74011000250 HC RX REV CODE- 250: Performed by: HOSPITALIST

## 2020-02-25 PROCEDURE — 65660000000 HC RM CCU STEPDOWN

## 2020-02-25 RX ORDER — FUROSEMIDE 40 MG/1
40 TABLET ORAL DAILY
Status: DISCONTINUED | OUTPATIENT
Start: 2020-02-26 | End: 2020-02-25

## 2020-02-25 RX ORDER — IPRATROPIUM BROMIDE AND ALBUTEROL SULFATE 2.5; .5 MG/3ML; MG/3ML
3 SOLUTION RESPIRATORY (INHALATION)
Status: DISCONTINUED | OUTPATIENT
Start: 2020-02-26 | End: 2020-02-28

## 2020-02-25 RX ORDER — AZITHROMYCIN 250 MG/1
500 TABLET, FILM COATED ORAL DAILY
Status: COMPLETED | OUTPATIENT
Start: 2020-02-26 | End: 2020-02-27

## 2020-02-25 RX ORDER — POLYETHYLENE GLYCOL 3350 17 G/17G
17 POWDER, FOR SOLUTION ORAL DAILY
Status: DISCONTINUED | OUTPATIENT
Start: 2020-02-25 | End: 2020-02-27

## 2020-02-25 RX ADMIN — CARVEDILOL 6.25 MG: 6.25 TABLET, FILM COATED ORAL at 07:11

## 2020-02-25 RX ADMIN — BUDESONIDE INHALATION 500 MCG: 0.5 SUSPENSION RESPIRATORY (INHALATION) at 08:54

## 2020-02-25 RX ADMIN — INSULIN LISPRO 5 UNITS: 100 INJECTION, SOLUTION INTRAVENOUS; SUBCUTANEOUS at 18:02

## 2020-02-25 RX ADMIN — HEPARIN SODIUM 5000 UNITS: 5000 INJECTION INTRAVENOUS; SUBCUTANEOUS at 13:05

## 2020-02-25 RX ADMIN — CLOPIDOGREL BISULFATE 75 MG: 75 TABLET, FILM COATED ORAL at 10:09

## 2020-02-25 RX ADMIN — BUDESONIDE INHALATION 500 MCG: 0.5 SUSPENSION RESPIRATORY (INHALATION) at 20:22

## 2020-02-25 RX ADMIN — HEPARIN SODIUM 5000 UNITS: 5000 INJECTION INTRAVENOUS; SUBCUTANEOUS at 20:41

## 2020-02-25 RX ADMIN — LISINOPRIL 5 MG: 5 TABLET ORAL at 10:08

## 2020-02-25 RX ADMIN — ATORVASTATIN CALCIUM 80 MG: 40 TABLET, FILM COATED ORAL at 10:08

## 2020-02-25 RX ADMIN — CARVEDILOL 6.25 MG: 6.25 TABLET, FILM COATED ORAL at 16:41

## 2020-02-25 RX ADMIN — IPRATROPIUM BROMIDE AND ALBUTEROL SULFATE 3 ML: .5; 3 SOLUTION RESPIRATORY (INHALATION) at 20:22

## 2020-02-25 RX ADMIN — ZOLPIDEM TARTRATE 5 MG: 5 TABLET ORAL at 20:41

## 2020-02-25 RX ADMIN — POLYETHYLENE GLYCOL 3350 17 G: 17 POWDER, FOR SOLUTION ORAL at 19:09

## 2020-02-25 RX ADMIN — Medication 10 ML: at 06:59

## 2020-02-25 RX ADMIN — Medication 10 ML: at 13:07

## 2020-02-25 RX ADMIN — Medication 1 DROP: at 10:10

## 2020-02-25 RX ADMIN — IPRATROPIUM BROMIDE AND ALBUTEROL SULFATE 3 ML: .5; 3 SOLUTION RESPIRATORY (INHALATION) at 02:15

## 2020-02-25 RX ADMIN — Medication 1 CAPSULE: at 10:08

## 2020-02-25 RX ADMIN — NITROGLYCERIN 1 INCH: 20 OINTMENT TOPICAL at 02:31

## 2020-02-25 RX ADMIN — ASPIRIN 81 MG: 81 TABLET, COATED ORAL at 10:09

## 2020-02-25 RX ADMIN — IPRATROPIUM BROMIDE AND ALBUTEROL SULFATE 3 ML: .5; 3 SOLUTION RESPIRATORY (INHALATION) at 08:54

## 2020-02-25 RX ADMIN — NITROGLYCERIN 1 INCH: 20 OINTMENT TOPICAL at 07:11

## 2020-02-25 RX ADMIN — ACETAMINOPHEN 650 MG: 325 TABLET ORAL at 10:09

## 2020-02-25 RX ADMIN — HEPARIN SODIUM 5000 UNITS: 5000 INJECTION INTRAVENOUS; SUBCUTANEOUS at 04:45

## 2020-02-25 RX ADMIN — BENZONATATE 100 MG: 100 CAPSULE ORAL at 04:45

## 2020-02-25 RX ADMIN — CEFTRIAXONE 1 G: 1 INJECTION, POWDER, FOR SOLUTION INTRAMUSCULAR; INTRAVENOUS at 06:55

## 2020-02-25 RX ADMIN — METHYLPREDNISOLONE SODIUM SUCCINATE 40 MG: 40 INJECTION, POWDER, FOR SOLUTION INTRAMUSCULAR; INTRAVENOUS at 22:55

## 2020-02-25 RX ADMIN — Medication 10 ML: at 22:56

## 2020-02-25 RX ADMIN — AZITHROMYCIN MONOHYDRATE 500 MG: 250 TABLET ORAL at 07:11

## 2020-02-25 RX ADMIN — SERTRALINE HYDROCHLORIDE 100 MG: 50 TABLET ORAL at 22:55

## 2020-02-25 RX ADMIN — METHYLPREDNISOLONE SODIUM SUCCINATE 40 MG: 40 INJECTION, POWDER, FOR SOLUTION INTRAMUSCULAR; INTRAVENOUS at 13:07

## 2020-02-25 RX ADMIN — ACETAMINOPHEN 650 MG: 325 TABLET ORAL at 16:41

## 2020-02-25 RX ADMIN — NIFEDIPINE 90 MG: 60 TABLET, FILM COATED, EXTENDED RELEASE ORAL at 10:08

## 2020-02-25 RX ADMIN — Medication 1 DROP: at 18:03

## 2020-02-25 RX ADMIN — BENZONATATE 100 MG: 100 CAPSULE ORAL at 15:01

## 2020-02-25 RX ADMIN — ACETAMINOPHEN 650 MG: 325 TABLET ORAL at 20:41

## 2020-02-25 RX ADMIN — FUROSEMIDE 40 MG: 10 INJECTION, SOLUTION INTRAMUSCULAR; INTRAVENOUS at 10:09

## 2020-02-25 RX ADMIN — IPRATROPIUM BROMIDE AND ALBUTEROL SULFATE 3 ML: .5; 3 SOLUTION RESPIRATORY (INHALATION) at 13:31

## 2020-02-25 RX ADMIN — METHYLPREDNISOLONE SODIUM SUCCINATE 40 MG: 40 INJECTION, POWDER, FOR SOLUTION INTRAMUSCULAR; INTRAVENOUS at 06:59

## 2020-02-25 RX ADMIN — INSULIN LISPRO 5 UNITS: 100 INJECTION, SOLUTION INTRAVENOUS; SUBCUTANEOUS at 10:35

## 2020-02-25 RX ADMIN — BENZONATATE 100 MG: 100 CAPSULE ORAL at 22:55

## 2020-02-25 NOTE — PROGRESS NOTES
Problem: Self Care Deficits Care Plan (Adult)  Goal: *Acute Goals and Plan of Care (Insert Text)  Description    FUNCTIONAL STATUS PRIOR TO ADMISSION: Patient was independent and active without use of DME.     HOME SUPPORT: The patient lived alone with friend to provide assistance. Occupational Therapy Goals  Initiated 2/25/2020  1. Patient will perform standing ADLs x 10 min with modified independence within 7 day(s). 2.  Patient will perform lower body dressing with modified independence within 7 day(s). 3.  Patient will perform bathing with modified independence within 7 day(s). 4.  Patient will perform toilet transfers with modified independence within 7 day(s). 5.  Patient will perform all aspects of toileting with modified independence within 7 day(s). 6.  Patient will utilize energy conservation techniques during functional activities with verbal cues within 7 day(s). Outcome: Progressing Towards Goal       OCCUPATIONAL THERAPY EVALUATION  Patient: Gab Ruano (7 y.o. female)  Date: 2/25/2020  Primary Diagnosis: Bacterial pneumonia [J15.9]        Precautions:   Contact(and droplet)    ASSESSMENT  Based on the objective data described below, the patient presents with generalized weakness, decreased activity tolerance, need for 3L nasal cannula, and decline in functional status s/p admission for acute respiratory failure and RSV/bacterial PNA. Pt received in chair, reported feeling better. She is SBA with slow functional mobility without AD. Noted pt KIRAN and tachypneic, O2 90-91% immediately post activity but able to reach as high as 96-97% after 1 minute seated rest break. Reviewed energy conservation techniques with pt, particularly recommending a shower chair. Current Level of Function Impacting Discharge (ADLs/self-care): SBA to min A, need for 3 L, decreased activity tolerance    Functional Outcome Measure:   The patient scored Total: 65/100 on the Barthel Index outcome measure which is indicative of 35% impaired ability to care for basic self needs/dependency on others; inferred 100% dependency on others for instrumental ADLs. Other factors to consider for discharge: lives alone, can use elevator     Patient will benefit from skilled therapy intervention to address the above noted impairments. PLAN :  Recommendations and Planned Interventions: self care training, functional mobility training, therapeutic exercise, balance training, endurance activities, patient education, home safety training, and family training/education    Frequency/Duration: Patient will be followed by occupational therapy 3 times a week to address goals. Recommendation for discharge: (in order for the patient to meet his/her long term goals)  No skilled occupational therapy/ follow up rehabilitation needs identified at this time. This discharge recommendation:  Has not yet been discussed the attending provider and/or case management    IF patient discharges home will need the following DME: shower chair       SUBJECTIVE:   Patient stated Oh I am better but I was so sick.     OBJECTIVE DATA SUMMARY:   HISTORY:   Past Medical History:   Diagnosis Date    CAD (coronary artery disease) 09/12/2019    stent D1 9/12/2019 ([facility])    Hypertension     Pneumonia     MARY (renal artery stenosis) ([facility] [address] 07/24/2019    s/p right renal stent 7/24/2019 Catheryn )     Past Surgical History:   Procedure Laterality Date    CARDIAC SURG PROCEDURE UNLIST  08/2019    cardiac stents     HX APPENDECTOMY      HX BACK SURGERY      LUMBAR FUSION X2 SURGERIES    HX BACK SURGERY      CERVICAL X2    HX GI  2019    arterial stent in right kidney    HX HYSTERECTOMY      HX TONSILLECTOMY         Expanded or extensive additional review of patient history:     Home Situation  Home Environment: Private residence  # Steps to Enter: 2  Rails to Enter: No  One/Two Story Residence: (4 story with an elevator)  Living Alone: Yes  Support Systems: Friends \ neighbors  Patient Expects to be Discharged to[de-identified] Private residence  Current DME Used/Available at Home: Walker, rolling(portable 02 concentrator and a pulse  ox)  Tub or Shower Type: Tub/Shower combination    Hand dominance: Right    EXAMINATION OF PERFORMANCE DEFICITS:  Cognitive/Behavioral Status:  Neurologic State: Alert  Orientation Level: Oriented X4  Cognition: Follows commands  Perception: Appears intact  Perseveration: No perseveration noted  Safety/Judgement: Awareness of environment;Home safety    Skin: intact    Edema: mild BLEs    Hearing: Auditory  Auditory Impairment: None    Vision/Perceptual:                           Acuity: Within Defined Limits         Range of Motion:    AROM: Within functional limits                         Strength:    Strength: Within functional limits                Coordination:     Fine Motor Skills-Upper: Left Intact; Right Intact    Gross Motor Skills-Upper: Left Intact; Right Intact    Tone & Sensation:  intact                            Balance:  Sitting: Intact; Without support  Standing: Intact; Without support    Functional Mobility and Transfers for ADLs:  Bed Mobility:  Supine to Sit: Modified independent    Transfers:  Sit to Stand: Stand-by assistance  Stand to Sit: Stand-by assistance  Toilet Transfer : Stand-by assistance    ADL Assessment:  Feeding: Independent    Oral Facial Hygiene/Grooming: Stand-by assistance    Bathing: Minimum assistance    Upper Body Dressing: Setup    Lower Body Dressing: Stand-by assistance    Toileting: Stand by assistance                ADL Intervention and task modifications:     Educated pt on pacing, increased OOB activity, chair for meals, mobilizing to bathroom for ADLs.  Also reviewed cross leg technique for LB ADLs/dressing as well as purchasing shower chair for energy conservation during bathing                                Cognitive Retraining  Safety/Judgement: Awareness of environment;Home safety      Functional Measure:  Barthel Index:    Bathin  Bladder: 10  Bowels: 10  Groomin  Dressing: 10  Feeding: 10  Mobility: 0  Stairs: 0  Toilet Use: 10  Transfer (Bed to Chair and Back): 10  Total: 65/100        The Barthel ADL Index: Guidelines  1. The index should be used as a record of what a patient does, not as a record of what a patient could do. 2. The main aim is to establish degree of independence from any help, physical or verbal, however minor and for whatever reason. 3. The need for supervision renders the patient not independent. 4. A patient's performance should be established using the best available evidence. Asking the patient, friends/relatives and nurses are the usual sources, but direct observation and common sense are also important. However direct testing is not needed. 5. Usually the patient's performance over the preceding 24-48 hours is important, but occasionally longer periods will be relevant. 6. Middle categories imply that the patient supplies over 50 per cent of the effort. 7. Use of aids to be independent is allowed. Kathleen Patel., Barthel, D.W. (8609). Functional evaluation: the Barthel Index. [facility] (14)2. Antonino Zhu lis MADDIE Amador, Dorie Gonzalez., Sheng Guerra., [place], [address] [place] [address] (). Measuring the change indisability after inpatient rehabilitation; comparison of the responsiveness of the Barthel Index and Functional [place] Measure. Journal of Neurology, Neurosurgery, and Psychiatry, 66(4), 488-430. [place] Arms, N.[place].A, PABLO Carrillo, & Jocelyn Charlton M.A. (2004.) Assessment of post-stroke quality of life in cost-effectiveness studies: The usefulness of the Barthel Index and the EuroQoL-5D.  Quality of Life Research, 15, 533-15         Occupational Therapy Evaluation Charge Determination   History Examination Decision-Making   LOW Complexity : Brief history review  LOW Complexity : 1-3 performance deficits relating to physical, cognitive , or psychosocial skils that result in activity limitations and / or participation restrictions  LOW Complexity : No comorbidities that affect functional and no verbal or physical assistance needed to complete eval tasks       Based on the above components, the patient evaluation is determined to be of the following complexity level: LOW   Pain Rating:  none    Activity Tolerance:   Fair, requires rest breaks, and observed SOB with activity  Please refer to the flowsheet for vital signs taken during this treatment. After treatment patient left in no apparent distress:    Sitting in chair and Call bell within reach    COMMUNICATION/EDUCATION:   The patients plan of care was discussed with: Registered Nurse. Patient/family have participated as able in goal setting and plan of care. This patients plan of care is appropriate for delegation to [facility].     Thank you for this referral.  Devan Colby, OT  Time Calculation: 23 mins

## 2020-02-25 NOTE — PROGRESS NOTES
Reason for Admission:   Admitted from home with complaints of shortness of breath. She has a history of HTN, CAD and renal artery stenosis. RUR Score:     14%             PCP: First and Last name: Kisha Alexandra   Name of Practice: private Family practice   Are you a current patient: Yes/No: yes Approximate date of last visit:5/2019    Do you (patient/family) have any concerns for transition/discharge? None               Plan for utilizing home health:   No needs identified at this time    Current Advanced Directive/Advance Care Plan:  Patient reports that she does have AMD but no copy with her. Transition of Care Plan:   Admitted from home with increasing shortness of breath. PNA- antibiotics course of steroids will need follow up with Pulmonary for possible COPD. Patient reports independent with all ADLs. Friend will provide transportation home when medically stable.    Care Management Interventions  PCP Verified by CM: Yes(Dr Ayan Brothers)  Palliative Care Criteria Met (RRAT>21 & CHF Dx)?: No  Mode of Transport at Discharge: (private car)  Physical Therapy Consult: Yes  Occupational Therapy Consult: Yes  Current Support Network: Own Home, Lives Alone(resolution of shortness of breath back to baseline)  Confirm Follow Up Transport: Self  The Patient and/or Patient Representative was Provided with a Choice of Provider and Agrees with the Discharge Plan?: (Denver Payan)  [address] Provided?: No    Jennifer Coulter RN CRM  Ext [phone]

## 2020-02-25 NOTE — PROGRESS NOTES
2030- pt requesting home medication Dr. Magdalene israel notified. Orders received. 0000- Oxygen weaned to 3L nasal cannula. spo2 98%  0830- Bedside shift change report given to Gerard Ozuna RN by Chay Mccall RN (offgoing nurse). Report included the following information SBAR, Kardex, Procedure Summary, Intake/Output, MAR, Recent Results and Cardiac Rhythm NSR. Problem: Gas Exchange - Impaired  Goal: *Absence of hypoxia  Outcome: Progressing Towards Goal  Note:   Continues to have shortness of breath at rest and with minimal exertion, but able to remain on 4L nasal cannula this evening rather than requesting prn bipap.    Goal: Respiratory  Outcome: Progressing Towards Goal  Problem: Pneumonia: Day 3  Goal: Medications  Outcome: Progressing Towards Goal  Note:   Pt verbalizes understanding and provides teach-back of education for respiratory medications including solumedrol, antibiotics, and tessalon     Last 3 Recorded Weights in this Encounter    02/24/20 0312 02/24/20 1321 02/25/20 0430   Weight: 63.4 kg (139 lb 12.4 oz) 63.4 kg (139 lb 12.4 oz) 62.8 kg (138 lb 7.2 oz)

## 2020-02-25 NOTE — PROGRESS NOTES
[facility] [address] [place]: Candace Blood  [phone]          Cardiology Consult/Progress Note      Interim: she has had good urine output and aggressive treatment of pulmonary wheezing with nebulizers. Improved control of blood pressure. This morning she is still short of breath, but overall better. Assessment/Plan:  1. Hypertension likely secondary to renal artery stenosis significantly improved following renal artery stenting to proximal main right renal artery now improved status post renal artery stenting. 2.  Atherosclerotic vascular disease  3. History of flash pulmonary edema related to uncontrolled hypertension now improved  4. Renal artery stenosis bilateral.  5.  Remote history of tobacco abuse  6. Shortness of breath without orthopnea PND. 7.  Hyperlipidemia  8. Hypertensive heart disease with moderate LVH and likely chronic diastolic heart failure. 9.  Anemia of uncertain etiology with normal iron stores and increased reticulocyte count. 10.  Likely extracranial cerebrovascular disease with bilateral carotid bruit  11. Acute hypoxemia respiratory failure: likely from acute bronchitis/bronchiolitis--no definite CHF      Mrs. Jamil Leal was seen for hypoxemic respiratory failure. She does not have conclusive signs of CHF. Mild troponin elevation does not represent ACS but demand ischemia. NO further work up is needed for it. Since her renal function has been relatively stable and current presentation is not classic for flash pulmonary edema but acute COPD exacerbation, I do not strongly feel the need to reimage her renal artery. Likely reason for decompensation is viral mediated COPD exacerbation/acute bronchitis with bronchiolitis in the setting of recent airway inflammation from pneumonia.     Investigations:  10/18 vcs normal stress echo, did not reach target heart rate  10/18 vcs normal lexiscan cardiolyte  Investigations  ECG: Sinus bradycardia, nonspecific ST-T changes. Echocardiogram: Normal LV systolic function mild to moderate LV hypertrophy consistent with hypertensive heart disease. CT abdomen with angiogram and runoff: 2 renal arteries are present bilaterally. Right major renal artery has at least moderate to severe ostial stenosis. Accessory right renal artery is free of significant disease. Left renal arteries are equal in size. CT scan has been reported to demonstrate disease in superior renal artery although I cannot personally confirm it. Both arteries on the left appear to be too small to provide durable results with renal artery stenting. Renal angiogram and stenting July 2019: Main right renal artery 75% stenosis, accessory renal artery on the right free of significant disease. 2 codominant left renal arteries, one with 40 to 50% angiographic stenosis other free of disease. Status post proximal right main renal artery stenting with 5 x 15 Herculink stent with 0% residual stenosis. Renal arterial Doppler August 2019: Patent stent in main proximal right renal artery. Accessory right renal artery not visualized. Main left renal artery has less than 60% stenosis. Accessory renal artery on the left is not visualized. Technically suboptimal study. Normal kidney size bilaterally. Cath Sep 2019: Severe disease in D1 s/p PCI with 2 REBECCA. Moderate disease in mid LAD, otherwise non obstructive disease. Ct chest Feb 2020: Resolving airspace disease, mild ground glassing, no definite pulmonary edema pattern    Requesting/referring provider: Omkar Prater  Reason for Consult: Acute hypoxemic respiratory failure    HPI: Yandel Bloch, a 69y.o. year-old who was recently seen for evaluation of uncontrolled hypertension. Mrs. Mariia Weiss has history of extremely difficult to control hypertension and was admitted to the hospital with malignant hypertension.   Subsequently she underwent invasive renal angiogram which demonstrated 70 to 75% stenosis in main right renal artery in its proximal segment. She subsequently underwent stenting of right renal artery with a 5 x 15 mm Herculink bare-metal stent with no residual stenosis. Since undergoing the procedure she has done very well. Her blood pressures are now much better controlled and she is only on 2 antihypertensives. She also underwent cardiac cath in September with PCI to a large diagonal branch with 2 REBECCA. Lad had intermediate disease. She did well for 2 months until [facility] [place] [facility]. Sendy Frey She developed b/lateral pneumonia on her way back requiring hospitalization in [place]. She was severely hypoxic and required discharge on home O2. She was recovering from the episode in Late Jan and early Feb however, over the past few days she has had progressive shortness of breath with any activity. She denies fevers or chills. No cough or sputum. No syncope or pre-syncope. She was evaluated in the [place] [facility] and diagnosed with bronchitis treated with albuterol inhaler and prednisone. Due top progression of symptom and usage of accessory muscles, she was brought in to [address] [place] [address] by family. Despite aggressive medical treatment, breathing remains tenuous, and Bipap has been initiated. CT chest suggested possible infection. Blood pressure has been labile and elevated. Past medical history:  She  has a past medical history of CAD (coronary artery disease) (09/12/2019), Hypertension, Pneumonia, and MARY (renal artery stenosis) ([facility] [address] (07/24/2019). She also has no past medical history of Adverse effect of anesthesia. Patient reports no PND/Orthpnea/CP. He reports no cough/fever/focal neurological deficits/abdominal pain. All other systems negative except as above.      PE  Vitals:    02/24/20 1414 02/24/20 1420 02/24/20 1726 02/24/20 1758   BP:  128/57 147/57 111/43   Pulse:  77 81 79   Resp:    21   Temp:    98.4 °F (36.9 °C)   SpO2: 93%   93%   Weight:       Height:        Body mass index is 24.76 kg/m². General:    Alert, cooperative, in respiratory distress. Psychiatric:    Normal Mood and affect    Eye/ENT:      Pupils equal, No asymmetry, Conjunctival pink. Able to hear voice at normal amplitude. Bilateral carotid bruit   Lungs:      Visibly symmetric chest expansion, No palpable tenderness. Bilateral inspiratory and expiratory wheezing. Heart[de-identified]    Regular rate and rhythm, S1, S2 normal, no murmur, click, rub or gallop. No JVD, Normal palpable peripheral pulses. No cyanosis bilateral carotid bruit   Abdomen:     Soft, non-tender. Bowel sounds normal. No masses,  No      organomegaly. No renal bruit. Extremities:   Extremities normal, atraumatic, mild bilateral edema. Neurologic:   CN II-XII grossly intact.  No gross focal deficits           Recent Labs:  Lab Results   Component Value Date/Time    Cholesterol, total 111 02/23/2020 03:28 AM    HDL Cholesterol 27 02/23/2020 03:28 AM    LDL, calculated 35.4 02/23/2020 03:28 AM    Triglyceride 243 (H) 02/23/2020 03:28 AM    CHOL/HDL Ratio 4.1 02/23/2020 03:28 AM     Lab Results   Component Value Date/Time    Creatinine (POC) 0.9 10/20/2016 10:16 AM    Creatinine 1.24 (H) 02/24/2020 03:19 AM     Lab Results   Component Value Date/Time    BUN 38 (H) 02/24/2020 03:19 AM     Lab Results   Component Value Date/Time    Potassium 3.6 02/24/2020 03:19 AM     Lab Results   Component Value Date/Time    Hemoglobin A1c 5.4 02/22/2020 05:32 AM     Lab Results   Component Value Date/Time    HGB 9.7 (L) 02/24/2020 03:19 AM     Lab Results   Component Value Date/Time    PLATELET 635 66/66/5041 03:19 AM       Reviewed:  Past Medical History:   Diagnosis Date    CAD (coronary artery disease) 09/12/2019    stent D1 9/12/2019 ([facility])    Hypertension     Pneumonia     [facility]    s/p right renal stent 7/24/2019 [facility])     Social History     Tobacco Use   Smoking Status Former Smoker    Packs/day: 0.50   Smokeless Tobacco Never Used     Social History     Substance and Sexual Activity   Alcohol Use Yes    Alcohol/week: 6.0 standard drinks    Types: 6 Glasses of wine per week     No Known Allergies  Family History   Problem Relation Age of Onset    Heart Attack Father 47        Current Facility-Administered Medications   Medication Dose Route Frequency    polyvinyl alcohol-povidone (NATURAL TEARS) 0.5-0.6 % ophthalmic solution 1 Drop  1 Drop Both Eyes PRN    benzonatate (TESSALON) capsule 100 mg  100 mg Oral TID PRN    [START ON 2/25/2020] azithromycin (ZITHROMAX) tablet 500 mg  500 mg Oral DAILY    methylPREDNISolone (PF) (SOLU-MEDROL) injection 40 mg  40 mg IntraVENous Q8H    albuterol-ipratropium (DUO-NEB) 2.5 MG-0.5 MG/3 ML  3 mL Nebulization Q6H RT    arformoteroL (BROVANA) neb solution 15 mcg  15 mcg Nebulization BID RT    And    budesonide (PULMICORT) 500 mcg/2 ml nebulizer suspension  500 mcg Nebulization BID RT    insulin lispro (HUMALOG) injection   SubCUTAneous AC&HS    glucose chewable tablet 16 g  4 Tab Oral PRN    glucagon (GLUCAGEN) injection 1 mg  1 mg IntraMUSCular PRN    dextrose 10% infusion 0-250 mL  0-250 mL IntraVENous PRN    lisinopril (PRINIVIL, ZESTRIL) tablet 5 mg  5 mg Oral DAILY    acetaminophen (TYLENOL) tablet 650 mg  650 mg Oral Q4H PRN    albuterol-ipratropium (DUO-NEB) 2.5 MG-0.5 MG/3 ML  3 mL Nebulization Q4H PRN    aspirin delayed-release tablet 81 mg  81 mg Oral DAILY    atorvastatin (LIPITOR) tablet 80 mg  80 mg Oral DAILY    clopidogreL (PLAVIX) tablet 75 mg  75 mg Oral DAILY    furosemide (LASIX) injection 40 mg  40 mg IntraVENous DAILY    NIFEdipine ER (PROCARDIA XL) tablet 90 mg  90 mg Oral DAILY    sertraline (ZOLOFT) tablet 100 mg  100 mg Oral QHS    sodium chloride (NS) flush 5-40 mL  5-40 mL IntraVENous Q8H    sodium chloride (NS) flush 5-40 mL  5-40 mL IntraVENous PRN    ondansetron (ZOFRAN) injection 4 mg  4 mg IntraVENous Q4H PRN    bisacodyL (DULCOLAX) tablet 5 mg  5 mg Oral DAILY PRN    heparin (porcine) injection 5,000 Units  5,000 Units SubCUTAneous Q8H    lactobac ac& pc-s.therm-b.anim (PETRA Q/RISAQUAD)  1 Cap Oral DAILY    cefTRIAXone (ROCEPHIN) 1 g in 0.9% sodium chloride (MBP/ADV) 50 mL  1 g IntraVENous Q24H    hydrALAZINE (APRESOLINE) 20 mg/mL injection 10 mg  10 mg IntraVENous Q6H PRN    carvediloL (COREG) tablet 6.25 mg  6.25 mg Oral BID WITH MEALS    nitroglycerin (NITROBID) 2 % ointment 1 Inch  1 Inch Topical Q6H       Mecca Holt MD02/24/20     [facility] [place]  [address], [address] [place], [address]

## 2020-02-25 NOTE — PROGRESS NOTES
[address] [facility] Adult  Hospitalist Group                                                                                          Hospitalist Progress Note  Giovanny Amador MD  Answering service: [phone] from in house phone        Date of Service:  2020  NAME:  Jemal Stockton  :  1945  MRN:  [id number]      Admission Summary:   68-year-old woman with a past medical history significant for hypertension; renal artery stenosis, status post stent placement; dyslipidemia; coronary artery disease, status post stent placement came to the hospital because of SOB    Interval history / Subjective:   Patient seen and examined    States that she is feeling better today, SOB has decreased. Still has cough. Assessment & Plan:     #Acute hypoxic resp failure: Improving  -Suspected COPD exacerbation with RSV viral illness( has long smoking history,quit last year)  -Off BiPAP and wean off oxygen as toelrated  -Will wean of steroids starting tomorrow, duonebs q 6 hr, Pulmicort/LABA,Mucomyst  -echo normal EF and grade 1 diastolic CHF  -on ceftriaxone and azithromycin   -Never seen a pulmonologist before,consult pulmonology team per patient request  -will need OP PFTs and follow up. #HTN:  -BP controlled with nifedipine,coreg and lisinorpil. Will switch to oral lasix.     #History of renal artery stenosis s/p rt renal artery stenting    #Coronary artery disease  -on aspirin,statin,plavix,coreg    #Chronic anemia:  -Hb stable    Code status: full  DVT prophylaxis: loevnox    Care Plan discussed with: patient,nurse  Anticipated Disposition: home  Anticipated Discharge: > 48 hrs     Hospital Problems  Date Reviewed: 2020          Codes Class Noted POA    Coronary artery disease ICD-10-CM: I25.10  ICD-9-CM: 414.00  2019 Yes        * (Principal) Bacterial pneumonia ICD-10-CM: J15.9  ICD-9-CM: 482.9  2020 Yes        Renal artery stenosis (HCC) ICD-10-CM: I70.1  ICD-9-CM: 440.1  2019 Yes        Flash pulmonary edema ([facility] [address] ICD-10-CM: J81.0  ICD-9-CM: 518.4  8/20/2019 Yes        Mixed hyperlipidemia ICD-10-CM: E78.2  ICD-9-CM: 272.2  11/16/2018 Yes        Renovascular hypertension ICD-10-CM: I15.0  ICD-9-CM: 405.91  11/16/2018 Yes        SOB (shortness of breath) ICD-10-CM: R06.02  ICD-9-CM: 786.05  11/16/2018 Yes                Review of Systems:   As per HPII      Vital Signs:    Last 24hrs VS reviewed since prior progress note. Most recent are:  Visit Vitals  /69 (BP 1 Location: Left arm, BP Patient Position: At rest)   Pulse 74   Temp 98.2 °F (36.8 °C)   Resp 17   Ht 5' 3\" (1.6 m)   Wt 62.8 kg (138 lb 7.2 oz)   SpO2 93%   BMI 24.53 kg/m²         Intake/Output Summary (Last 24 hours) at 2/25/2020 1835  Last data filed at 2/25/2020 0915  Gross per 24 hour   Intake 200 ml   Output 300 ml   Net -100 ml        Physical Examination:             Constitutional: No acute distress   ENT:  Oral mucosa moist, oropharynx benign. EOMI   Resp:  Minimal wheezing on exam  Diminished breath sounds   CV:  Regular rhythm, normal rate,S1,S2 wnl    GI:  Soft, non distended, non tender. normoactive bowel sounds    Musculoskeletal:  No significant LE edema    Neurologic:  Moves all extremities.   AAOx3, CN II-XII reviewed     Psych: Not agitated  Skin:  Good turgor, no rashes or ulcers       Data Review:    Review and/or order of clinical lab test  Review and/or order of tests in the medicine section of CPT      Labs:     Recent Labs     02/25/20  0515 02/24/20 0319   WBC 7.2 5.1   HGB 9.0* 9.7*   HCT 27.3* 29.8*    176     Recent Labs     02/25/20  0515 02/24/20  0319 02/23/20  0328    139 137   K 3.6 3.6 3.6    107 106   CO2 23 24 23   BUN 38* 38* 37*   CREA 1.18* 1.24* 1.23*   * 162* 102*   CA 9.4 9.1 9.5     Recent Labs     02/23/20  0328   SGOT 23   ALT 31   AP 71   TBILI 0.7   TP 7.8   ALB 4.2   GLOB 3.6     No results for input(s): INR, PTP, APTT, INREXT, INREXT in the last 72 hours. No results for input(s): FE, TIBC, PSAT, FERR in the last 72 hours. Lab Results   Component Value Date/Time    Folate 10.9 07/24/2019 03:41 AM      No results for input(s): PH, PCO2, PO2 in the last 72 hours. No results for input(s): CPK, CKNDX, TROIQ in the last 72 hours.     No lab exists for component: CPKMB  Lab Results   Component Value Date/Time    Cholesterol, total 111 02/23/2020 03:28 AM    HDL Cholesterol 27 02/23/2020 03:28 AM    LDL, calculated 35.4 02/23/2020 03:28 AM    Triglyceride 243 (H) 02/23/2020 03:28 AM    CHOL/HDL Ratio 4.1 02/23/2020 03:28 AM     Lab Results   Component Value Date/Time    Glucose (POC) 263 (H) 02/25/2020 05:53 PM    Glucose (POC) 128 (H) 02/25/2020 12:22 PM    Glucose (POC) 296 (H) 02/25/2020 10:20 AM    Glucose (POC) 142 (H) 02/25/2020 08:49 AM    Glucose (POC) 158 (H) 02/24/2020 09:20 PM     Lab Results   Component Value Date/Time    Color YELLOW/STRAW 02/22/2020 06:17 AM    Appearance CLEAR 02/22/2020 06:17 AM    Specific gravity 1.015 02/22/2020 06:17 AM    pH (UA) 5.0 02/22/2020 06:17 AM    Protein NEGATIVE  02/22/2020 06:17 AM    Glucose NEGATIVE  02/22/2020 06:17 AM    Ketone NEGATIVE  02/22/2020 06:17 AM    Bilirubin NEGATIVE  02/22/2020 06:17 AM    Urobilinogen 0.2 02/22/2020 06:17 AM    Nitrites NEGATIVE  02/22/2020 06:17 AM    Leukocyte Esterase NEGATIVE  02/22/2020 06:17 AM    Epithelial cells FEW 02/22/2020 06:17 AM    Bacteria NEGATIVE  02/22/2020 06:17 AM    WBC 0-4 02/22/2020 06:17 AM    RBC 0-5 02/22/2020 06:17 AM         Medications Reviewed:     Current Facility-Administered Medications   Medication Dose Route Frequency    [START ON 2/26/2020] azithromycin (ZITHROMAX) tablet 500 mg  500 mg Oral DAILY    [START ON 2/26/2020] furosemide (LASIX) tablet 40 mg  40 mg Oral DAILY    polyethylene glycol (MIRALAX) packet 17 g  17 g Oral DAILY    polyvinyl alcohol-povidone (NATURAL TEARS) 0.5-0.6 % ophthalmic solution 1 Drop  1 Drop Both Eyes PRN    benzonatate (TESSALON) capsule 100 mg  100 mg Oral TID PRN    zolpidem (AMBIEN) tablet 5 mg  5 mg Oral QHS PRN    methylPREDNISolone (PF) (SOLU-MEDROL) injection 40 mg  40 mg IntraVENous Q8H    albuterol-ipratropium (DUO-NEB) 2.5 MG-0.5 MG/3 ML  3 mL Nebulization Q6H RT    arformoteroL (BROVANA) neb solution 15 mcg  15 mcg Nebulization BID RT    And    budesonide (PULMICORT) 500 mcg/2 ml nebulizer suspension  500 mcg Nebulization BID RT    insulin lispro (HUMALOG) injection   SubCUTAneous AC&HS    glucose chewable tablet 16 g  4 Tab Oral PRN    glucagon (GLUCAGEN) injection 1 mg  1 mg IntraMUSCular PRN    dextrose 10% infusion 0-250 mL  0-250 mL IntraVENous PRN    lisinopril (PRINIVIL, ZESTRIL) tablet 5 mg  5 mg Oral DAILY    acetaminophen (TYLENOL) tablet 650 mg  650 mg Oral Q4H PRN    albuterol-ipratropium (DUO-NEB) 2.5 MG-0.5 MG/3 ML  3 mL Nebulization Q4H PRN    aspirin delayed-release tablet 81 mg  81 mg Oral DAILY    atorvastatin (LIPITOR) tablet 80 mg  80 mg Oral DAILY    clopidogreL (PLAVIX) tablet 75 mg  75 mg Oral DAILY    NIFEdipine ER (PROCARDIA XL) tablet 90 mg  90 mg Oral DAILY    sertraline (ZOLOFT) tablet 100 mg  100 mg Oral QHS    sodium chloride (NS) flush 5-40 mL  5-40 mL IntraVENous Q8H    sodium chloride (NS) flush 5-40 mL  5-40 mL IntraVENous PRN    ondansetron (ZOFRAN) injection 4 mg  4 mg IntraVENous Q4H PRN    bisacodyL (DULCOLAX) tablet 5 mg  5 mg Oral DAILY PRN    heparin (porcine) injection 5,000 Units  5,000 Units SubCUTAneous Q8H    lactobac ac& pc-s.therm-b.anim (PETRA Q/RISAQUAD)  1 Cap Oral DAILY    cefTRIAXone (ROCEPHIN) 1 g in 0.9% sodium chloride (MBP/ADV) 50 mL  1 g IntraVENous Q24H    hydrALAZINE (APRESOLINE) 20 mg/mL injection 10 mg  10 mg IntraVENous Q6H PRN    carvediloL (COREG) tablet 6.25 mg  6.25 mg Oral BID WITH MEALS     ______________________________________________________________________  EXPECTED LENGTH OF STAY: 3d 19h  ACTUAL LENGTH OF STAY:          3                 Ayala Coelho MD

## 2020-02-25 NOTE — PROGRESS NOTES
Problem: Mobility Impaired (Adult and Pediatric)  Goal: *Acute Goals and Plan of Care (Insert Text)  Description  FUNCTIONAL STATUS PRIOR TO ADMISSION: Patient was independent and active without use of DME, but reports her portable owygen concentrator just prior to admission because of difficulty breathing. [facility] HOME SUPPORT PRIOR TO ADMISSION: The patient lived alone with one local friend, she reports. Physical Therapy Goals  Initiated 2/25/2020  1. Patient will move from supine to sit and sit to supine , scoot up and down and roll side to side in bed with independence within 7 day(s). 2.  Patient will transfer from bed to chair and chair to bed with independence using the least restrictive device within 7 day(s). 3.  Patient will perform sit to stand with independence within 7 day(s). 4.  Patient will ambulate with independence for 300 feet with the least restrictive device within 7 day(s). 5.  Patient will ascend/descend 2 stairs with no handrail(s) with independence within 7 day(s). Outcome: Progressing Towards Goal   PHYSICAL THERAPY EVALUATION  Patient: Cande Fung (3 y.o. female)  Date: 2/25/2020  Primary Diagnosis: Bacterial pneumonia [J15.9]        Precautions: appears low fall risk  Contact(and droplet)      ASSESSMENT  Based on the objective data described below, the patient presents with significant coughing at rest and agreeable to eval, limited activity. She was on 02 at 3 liters throughout session and 02 sats remained stable in the mid 90s. Limited to transfer to the bedside commode and then to the chair. Anticipate rapids gains with mobility as respiratory status allows. Likely to see only one to two times more. .    Current Level of Function Impacting Discharge (mobility/balance): primarily provided assist with line management, pt stable on her feet. Functional Outcome Measure:   The patient scored 10 (extrapolated) on the TUG outcome measure which is indicative of likely normal fall risk. .      Other factors to consider for discharge: lives alone, two steps to enter without rails     Patient will benefit from skilled therapy intervention to address the above noted impairments. PLAN :  Recommendations and Planned Interventions: bed mobility training, transfer training, gait training, therapeutic exercises, patient and family training/education, and therapeutic activities      Frequency/Duration: Patient will be followed by physical therapy:  3 times a week to address goals. Recommendation for discharge: (in order for the patient to meet his/her long term goals)  No skilled physical therapy/ follow up rehabilitation needs identified at this time. This discharge recommendation:  A follow-up discussion with the attending provider and/or case management is planned    IF patient discharges home will need the following DME: none         SUBJECTIVE:   Patient stated I appreciate it, when I told pt that I wasn't going to ask her to do too much (much coughing).     OBJECTIVE DATA SUMMARY:   Consult received, chart reviewed, pt cleared by nursing  HISTORY:    Past Medical History:   Diagnosis Date    CAD (coronary artery disease) 09/12/2019    stent D1 9/12/2019 ([place] [facility])    Hypertension     Pneumonia     MARY (renal artery stenosis) ([facility] [address] 07/24/2019    s/p right renal stent 7/24/2019 [place] )     Past Surgical History:   Procedure Laterality Date    CARDIAC SURG PROCEDURE UNLIST  08/2019    cardiac stents     HX APPENDECTOMY      HX BACK SURGERY      LUMBAR FUSION X2 SURGERIES    HX BACK SURGERY      CERVICAL X2    HX GI  2019    arterial stent in right kidney    HX HYSTERECTOMY      HX TONSILLECTOMY         Personal factors and/or comorbidities impacting plan of care: lives alone, recent PNA    Home Situation  Home Environment: Private residence  # Steps to Enter: 2  Rails to Enter: No  One/Two Story Residence: (4 story with an elevator)  Living Alone: Yes  Support Systems: Friends \ neighbors  Patient Expects to be Discharged to[de-identified] Private residence  Current DME Used/Available at Home: geri Nieves(portable 02 concentrator and a pulse  ox)    EXAMINATION/PRESENTATION/DECISION MAKING:   Critical Behavior:  Neurologic State: Alert  Orientation Level: Oriented X4        Hearing: Auditory  Auditory Impairment: None  Skin:  refer to MD and nursing notes  Edema: none noted  Range Of Motion:  AROM: Within functional limits                       Strength:    Strength: Within functional limits                    Tone & Sensation:                                  Coordination:     Vision:      Functional Mobility:  Bed Mobility:     Supine to Sit: Modified independent        Transfers:  Sit to Stand: Stand-by assistance  Stand to Sit: Stand-by assistance  Stand Pivot Transfers: Stand-by assistance                    Balance:   Sitting: Intact; Without support  Standing: Intact; Without support  Ambulation/Gait Training:  Distance (ft): 2 Feet (ft)  Assistive Device: Gait belt  Ambulation - Level of Assistance: Stand-by assistance(equipment managment)                 Base of Support: Widened                              Stairs: Therapeutic Exercises:       Functional Measure:  Timed up and go:    Timed Get Up And Go Test: 10(extrapolated)       < than 10 seconds=Normal  Greater then 13.5 seconds (in elderly)=Increased fall risk   Dhiraj Dacosta [place] M. Predicting the probability for falls in community dwelling older adults using the Timed Up and Go Test. Phys Ther. 2000;80:896-903.              Physical Therapy Evaluation Charge Determination   History Examination Presentation Decision-Making   MEDIUM  Complexity : 1-2 comorbidities / personal factors will impact the outcome/ POC  MEDIUM Complexity : 3 Standardized tests and measures addressing body structure, function, activity limitation and / or participation in recreation  LOW Complexity : Stable, uncomplicated  LOW Complexity : FOTO score of       Based on the above components, the patient evaluation is determined to be of the following complexity level: LOW     Pain Rating:  None rated    Activity Tolerance:   Good and see assessment above   Please refer to the flowsheet for vital signs taken during this treatment. After treatment patient left in no apparent distress:   Sitting in chair and Call bell within reach    COMMUNICATION/EDUCATION:   The patients plan of care was discussed with: Registered Nurse. Fall prevention education was provided and the patient/caregiver indicated understanding., Patient/family have participated as able in goal setting and plan of care. , and Patient/family agree to work toward stated goals and plan of care.     Thank you for this referral.  Nobie Longest   Time Calculation: 31 mins

## 2020-02-25 NOTE — PROGRESS NOTES
Problem: Falls - Risk of  Goal: *Absence of Falls  Description  Document Jermaine Hines Fall Risk and appropriate interventions in the flowsheet. Outcome: Progressing Towards Goal  Note: Fall Risk Interventions:  Mobility Interventions: Assess mobility with egress test, Patient to call before getting OOB    Medication Interventions: Evaluate medications/consider consulting pharmacy, Patient to call before getting OOB    Elimination Interventions: Patient to call for help with toileting needs       Problem: Gas Exchange - Impaired  Goal: *Absence of hypoxia  Outcome: Progressing Towards Goal         Problem: Heart Failure: Day 3  Goal: Treatments/Interventions/Procedures  Outcome: Progressing Towards Goal   Echo completed    Bedside and Verbal shift change report given to Tiff Mayorga (oncoming nurse) by [place] (offgoing nurse). Report included the following information SBAR, Kardex, and Quality Measures.

## 2020-02-25 NOTE — PROGRESS NOTES
Orders received, chart reviewed and patient evaluated by physical therapy. Pending progression with skilled acute physical therapy, recommend:  No skilled physical therapy/ follow up rehabilitation needs identified at this time. Recommend with nursing patient to complete as able in order to maintain strength, endurance and independence: OOB to chair 3x/day with assist X 1 and ambulating with assist X 1, no assistive device, as tolerated. Thank you for your assistance. Full evaluation to follow.  Karthik Saavedra, PT

## 2020-02-25 NOTE — PROGRESS NOTES
Problem: Falls - Risk of  Goal: *Absence of Falls  Description  Document Francie Kay Fall Risk and appropriate interventions in the flowsheet. Outcome: Progressing Towards Goal  Note: Fall Risk Interventions:  Mobility Interventions: Assess mobility with egress test, Patient to call before getting OOB       Medication Interventions: Evaluate medications/consider consulting pharmacy    Elimination Interventions: Patient to call for help with toileting needs     Problem: Pneumonia: Day 3  Goal: Medications  Outcome: Progressing Towards Goal  Note:   Solumedrol and antibiotic continued. Breathing treatments also administered. Goal: Respiratory  Outcome: Progressing Towards Goal  Note:   Will have moments of dyspnea. Deep breathing performed. Patient weaned to 2 L NC     Problem: Heart Failure: Day 5  Goal: Medications  Outcome: Progressing Towards Goal  Note:   Carvedilol given. Nitroglycerin patch stopped per Dr. Nery Mayfield. Bedside and Verbal shift change report given to Cuca Jaffe (oncoming nurse) by Opal Wolff (offgoing nurse). Report included the following information SBAR, Kardex, and Quality Measures.

## 2020-02-25 NOTE — PROGRESS NOTES
Provided pastoral care visit to [facility] 5 patient. Did not include sacramental care.     Manuel Howell

## 2020-02-26 ENCOUNTER — APPOINTMENT (OUTPATIENT)
Dept: GENERAL RADIOLOGY | Age: 75
DRG: 193 | End: 2020-02-26
Attending: HOSPITALIST
Payer: MEDICARE

## 2020-02-26 LAB
ANION GAP SERPL CALC-SCNC: 6 MMOL/L (ref 5–15)
BASOPHILS # BLD: 0 K/UL (ref 0–0.1)
BASOPHILS NFR BLD: 0 % (ref 0–1)
BUN SERPL-MCNC: 38 MG/DL (ref 6–20)
BUN/CREAT SERPL: 35 (ref 12–20)
CALCIUM SERPL-MCNC: 9 MG/DL (ref 8.5–10.1)
CHLORIDE SERPL-SCNC: 107 MMOL/L (ref 97–108)
CO2 SERPL-SCNC: 23 MMOL/L (ref 21–32)
CREAT SERPL-MCNC: 1.08 MG/DL (ref 0.55–1.02)
DIFFERENTIAL METHOD BLD: ABNORMAL
EOSINOPHIL # BLD: 0 K/UL (ref 0–0.4)
EOSINOPHIL NFR BLD: 0 % (ref 0–7)
ERYTHROCYTE [DISTWIDTH] IN BLOOD BY AUTOMATED COUNT: 13.6 % (ref 11.5–14.5)
GLUCOSE BLD STRIP.AUTO-MCNC: 157 MG/DL (ref 65–100)
GLUCOSE BLD STRIP.AUTO-MCNC: 172 MG/DL (ref 65–100)
GLUCOSE BLD STRIP.AUTO-MCNC: 200 MG/DL (ref 65–100)
GLUCOSE BLD STRIP.AUTO-MCNC: 271 MG/DL (ref 65–100)
GLUCOSE SERPL-MCNC: 141 MG/DL (ref 65–100)
HCT VFR BLD AUTO: 30.5 % (ref 35–47)
HGB BLD-MCNC: 9.6 G/DL (ref 11.5–16)
IMM GRANULOCYTES # BLD AUTO: 0.1 K/UL (ref 0–0.04)
IMM GRANULOCYTES NFR BLD AUTO: 1 % (ref 0–0.5)
LYMPHOCYTES # BLD: 1.4 K/UL (ref 0.8–3.5)
LYMPHOCYTES NFR BLD: 17 % (ref 12–49)
MCH RBC QN AUTO: 35.2 PG (ref 26–34)
MCHC RBC AUTO-ENTMCNC: 31.5 G/DL (ref 30–36.5)
MCV RBC AUTO: 111.7 FL (ref 80–99)
MONOCYTES # BLD: 0.4 K/UL (ref 0–1)
MONOCYTES NFR BLD: 5 % (ref 5–13)
NEUTS SEG # BLD: 6.2 K/UL (ref 1.8–8)
NEUTS SEG NFR BLD: 77 % (ref 32–75)
NRBC # BLD: 0 K/UL (ref 0–0.01)
NRBC BLD-RTO: 0 PER 100 WBC
PLATELET # BLD AUTO: 200 K/UL (ref 150–400)
PMV BLD AUTO: 9.5 FL (ref 8.9–12.9)
POTASSIUM SERPL-SCNC: 4 MMOL/L (ref 3.5–5.1)
RBC # BLD AUTO: 2.73 M/UL (ref 3.8–5.2)
RBC MORPH BLD: ABNORMAL
RBC MORPH BLD: ABNORMAL
SERVICE CMNT-IMP: ABNORMAL
SODIUM SERPL-SCNC: 136 MMOL/L (ref 136–145)
WBC # BLD AUTO: 8.1 K/UL (ref 3.6–11)

## 2020-02-26 PROCEDURE — 74011250636 HC RX REV CODE- 250/636: Performed by: HOSPITALIST

## 2020-02-26 PROCEDURE — 77010033678 HC OXYGEN DAILY

## 2020-02-26 PROCEDURE — 94762 N-INVAS EAR/PLS OXIMTRY CONT: CPT

## 2020-02-26 PROCEDURE — 74011000250 HC RX REV CODE- 250: Performed by: INTERNAL MEDICINE

## 2020-02-26 PROCEDURE — 74011250637 HC RX REV CODE- 250/637: Performed by: INTERNAL MEDICINE

## 2020-02-26 PROCEDURE — 74011250637 HC RX REV CODE- 250/637: Performed by: STUDENT IN AN ORGANIZED HEALTH CARE EDUCATION/TRAINING PROGRAM

## 2020-02-26 PROCEDURE — 71045 X-RAY EXAM CHEST 1 VIEW: CPT

## 2020-02-26 PROCEDURE — 82962 GLUCOSE BLOOD TEST: CPT

## 2020-02-26 PROCEDURE — 94640 AIRWAY INHALATION TREATMENT: CPT

## 2020-02-26 PROCEDURE — 74011000258 HC RX REV CODE- 258: Performed by: HOSPITALIST

## 2020-02-26 PROCEDURE — 74011636637 HC RX REV CODE- 636/637: Performed by: HOSPITALIST

## 2020-02-26 PROCEDURE — 65660000000 HC RM CCU STEPDOWN

## 2020-02-26 PROCEDURE — 36415 COLL VENOUS BLD VENIPUNCTURE: CPT

## 2020-02-26 PROCEDURE — 85025 COMPLETE CBC W/AUTO DIFF WBC: CPT

## 2020-02-26 PROCEDURE — 74011250637 HC RX REV CODE- 250/637: Performed by: HOSPITALIST

## 2020-02-26 PROCEDURE — 74011000250 HC RX REV CODE- 250: Performed by: HOSPITALIST

## 2020-02-26 PROCEDURE — 97116 GAIT TRAINING THERAPY: CPT

## 2020-02-26 PROCEDURE — 74011250636 HC RX REV CODE- 250/636: Performed by: INTERNAL MEDICINE

## 2020-02-26 PROCEDURE — 80048 BASIC METABOLIC PNL TOTAL CA: CPT

## 2020-02-26 RX ORDER — GUAIFENESIN/DEXTROMETHORPHAN 100-10MG/5
10 SYRUP ORAL
Status: DISCONTINUED | OUTPATIENT
Start: 2020-02-26 | End: 2020-02-26

## 2020-02-26 RX ORDER — CODEINE PHOSPHATE AND GUAIFENESIN 10; 100 MG/5ML; MG/5ML
10 SOLUTION ORAL
Status: DISCONTINUED | OUTPATIENT
Start: 2020-02-26 | End: 2020-03-04 | Stop reason: HOSPADM

## 2020-02-26 RX ORDER — POLYETHYLENE GLYCOL 3350 17 G/17G
17 POWDER, FOR SOLUTION ORAL DAILY
Status: DISCONTINUED | OUTPATIENT
Start: 2020-02-27 | End: 2020-03-04 | Stop reason: HOSPADM

## 2020-02-26 RX ORDER — PREDNISONE 20 MG/1
40 TABLET ORAL
Status: DISCONTINUED | OUTPATIENT
Start: 2020-02-27 | End: 2020-02-29

## 2020-02-26 RX ADMIN — ZOLPIDEM TARTRATE 5 MG: 5 TABLET ORAL at 21:37

## 2020-02-26 RX ADMIN — SERTRALINE HYDROCHLORIDE 100 MG: 50 TABLET ORAL at 21:37

## 2020-02-26 RX ADMIN — INSULIN LISPRO 2 UNITS: 100 INJECTION, SOLUTION INTRAVENOUS; SUBCUTANEOUS at 08:39

## 2020-02-26 RX ADMIN — INSULIN LISPRO 2 UNITS: 100 INJECTION, SOLUTION INTRAVENOUS; SUBCUTANEOUS at 21:36

## 2020-02-26 RX ADMIN — Medication 10 ML: at 05:32

## 2020-02-26 RX ADMIN — IPRATROPIUM BROMIDE AND ALBUTEROL SULFATE 3 ML: .5; 3 SOLUTION RESPIRATORY (INHALATION) at 00:23

## 2020-02-26 RX ADMIN — GUAIFENESIN AND CODEINE PHOSPHATE 10 ML: 100; 10 SOLUTION ORAL at 14:05

## 2020-02-26 RX ADMIN — ACETAMINOPHEN 650 MG: 325 TABLET ORAL at 14:06

## 2020-02-26 RX ADMIN — INSULIN LISPRO 5 UNITS: 100 INJECTION, SOLUTION INTRAVENOUS; SUBCUTANEOUS at 17:42

## 2020-02-26 RX ADMIN — CEFTRIAXONE 1 G: 1 INJECTION, POWDER, FOR SOLUTION INTRAMUSCULAR; INTRAVENOUS at 05:32

## 2020-02-26 RX ADMIN — METHYLPREDNISOLONE SODIUM SUCCINATE 40 MG: 40 INJECTION, POWDER, FOR SOLUTION INTRAMUSCULAR; INTRAVENOUS at 14:06

## 2020-02-26 RX ADMIN — METHYLPREDNISOLONE SODIUM SUCCINATE 40 MG: 40 INJECTION, POWDER, FOR SOLUTION INTRAMUSCULAR; INTRAVENOUS at 05:32

## 2020-02-26 RX ADMIN — ASPIRIN 81 MG: 81 TABLET, COATED ORAL at 08:38

## 2020-02-26 RX ADMIN — Medication 10 ML: at 21:37

## 2020-02-26 RX ADMIN — BISACODYL 5 MG: 5 TABLET, COATED ORAL at 20:11

## 2020-02-26 RX ADMIN — IPRATROPIUM BROMIDE AND ALBUTEROL SULFATE 3 ML: .5; 3 SOLUTION RESPIRATORY (INHALATION) at 20:11

## 2020-02-26 RX ADMIN — CARVEDILOL 6.25 MG: 6.25 TABLET, FILM COATED ORAL at 17:42

## 2020-02-26 RX ADMIN — IPRATROPIUM BROMIDE AND ALBUTEROL SULFATE 3 ML: .5; 3 SOLUTION RESPIRATORY (INHALATION) at 08:34

## 2020-02-26 RX ADMIN — BUDESONIDE INHALATION 500 MCG: 0.5 SUSPENSION RESPIRATORY (INHALATION) at 08:34

## 2020-02-26 RX ADMIN — CARVEDILOL 6.25 MG: 6.25 TABLET, FILM COATED ORAL at 07:53

## 2020-02-26 RX ADMIN — HEPARIN SODIUM 5000 UNITS: 5000 INJECTION INTRAVENOUS; SUBCUTANEOUS at 21:35

## 2020-02-26 RX ADMIN — GUAIFENESIN AND DEXTROMETHORPHAN 10 ML: 100; 10 SYRUP ORAL at 07:53

## 2020-02-26 RX ADMIN — GUAIFENESIN AND CODEINE PHOSPHATE 10 ML: 100; 10 SOLUTION ORAL at 20:11

## 2020-02-26 RX ADMIN — BUDESONIDE INHALATION 500 MCG: 0.5 SUSPENSION RESPIRATORY (INHALATION) at 20:12

## 2020-02-26 RX ADMIN — Medication 10 ML: at 14:07

## 2020-02-26 RX ADMIN — HEPARIN SODIUM 5000 UNITS: 5000 INJECTION INTRAVENOUS; SUBCUTANEOUS at 05:31

## 2020-02-26 RX ADMIN — HEPARIN SODIUM 5000 UNITS: 5000 INJECTION INTRAVENOUS; SUBCUTANEOUS at 14:06

## 2020-02-26 RX ADMIN — BENZONATATE 100 MG: 100 CAPSULE ORAL at 06:45

## 2020-02-26 RX ADMIN — POLYETHYLENE GLYCOL 3350 17 G: 17 POWDER, FOR SOLUTION ORAL at 08:39

## 2020-02-26 RX ADMIN — AZITHROMYCIN MONOHYDRATE 500 MG: 250 TABLET ORAL at 08:38

## 2020-02-26 RX ADMIN — CLOPIDOGREL BISULFATE 75 MG: 75 TABLET, FILM COATED ORAL at 08:38

## 2020-02-26 RX ADMIN — LISINOPRIL 5 MG: 5 TABLET ORAL at 08:38

## 2020-02-26 RX ADMIN — GUAIFENESIN AND DEXTROMETHORPHAN 10 ML: 100; 10 SYRUP ORAL at 01:56

## 2020-02-26 RX ADMIN — IPRATROPIUM BROMIDE AND ALBUTEROL SULFATE 3 ML: .5; 3 SOLUTION RESPIRATORY (INHALATION) at 13:23

## 2020-02-26 RX ADMIN — NIFEDIPINE 90 MG: 60 TABLET, FILM COATED, EXTENDED RELEASE ORAL at 08:38

## 2020-02-26 RX ADMIN — INSULIN LISPRO 2 UNITS: 100 INJECTION, SOLUTION INTRAVENOUS; SUBCUTANEOUS at 11:50

## 2020-02-26 RX ADMIN — Medication 1 CAPSULE: at 08:38

## 2020-02-26 RX ADMIN — ACETAMINOPHEN 650 MG: 325 TABLET ORAL at 08:38

## 2020-02-26 RX ADMIN — ATORVASTATIN CALCIUM 80 MG: 40 TABLET, FILM COATED ORAL at 08:38

## 2020-02-26 NOTE — PROGRESS NOTES
Bedside shift change report given to Roselyn Huggins by Olamide Chinchilla RN. Report included the following information SBAR, Kardex, Intake/Output, MAR, Accordion, Recent Results and Cardiac Rhythm NSR. Pt has had increased coughing overnight. Oxygen increased again to 3L nasal cannula. Problem: Pneumonia: Day 4  Goal: Activity/Safety  Outcome: Progressing Towards Goal  Note:   Pt demonstrates appropriate safety precautions with activity. Takes breaks to catch her breath when needed. Activity tolerance is improving, pt hopes to be able to take a walk around the unit tomorrow morning. Goal: Medications  Outcome: Progressing Towards Goal  Note:   Pt verbalizes understanding of medications. Teachback performed. Goal: Respiratory  Outcome: Progressing Towards Goal  Note:   Frequency of coughing spells increased overnight, pt starts to panic when in the middle of coughing, Dr. Luis Alberto George notified of this. Orders received for robitussin.      Last 3 Recorded Weights in this Encounter    02/24/20 1321 02/25/20 0430 02/26/20 0345   Weight: 63.4 kg (139 lb 12.4 oz) 62.8 kg (138 lb 7.2 oz) 62.1 kg (137 lb)

## 2020-02-26 NOTE — PROGRESS NOTES
Problem: Falls - Risk of  Goal: *Absence of Falls  Description  Document Elvia Bravo Fall Risk and appropriate interventions in the flowsheet. Outcome: Progressing Towards Goal  Note: Fall Risk Interventions:  Mobility Interventions: Assess mobility with egress test, Patient to call before getting OOB         Medication Interventions: Evaluate medications/consider consulting pharmacy, Patient to call before getting OOB, Teach patient to arise slowly    Elimination Interventions: Patient to call for help with toileting needs    Problem: Gas Exchange - Impaired  Goal: *Absence of hypoxia  Outcome: Progressing Towards Goal     Problem: Pneumonia: Day 3  Goal: Respiratory  Outcome: Progressing Towards Goal   Pt weaned to 1 LNC this shift. Continuing to monitor and wean as tolerated. Provided education and encouragement to patient on incentive spirometer use.

## 2020-02-26 NOTE — PROGRESS NOTES
[place]: [facility]  [phone]          Cardiology Consult/Progress Note      Interim: Doing better. O2 requirements reducing. BP better controlled. Assessment/Plan:  1. Hypertension likely secondary to renal artery stenosis significantly improved following renal artery stenting to proximal main right renal artery now improved status post renal artery stenting. 2.  Atherosclerotic vascular disease  3. History of flash pulmonary edema related to uncontrolled hypertension now improved  4. Renal artery stenosis bilateral.  5.  Remote history of tobacco abuse  6. Shortness of breath without orthopnea PND. 7.  Hyperlipidemia  8. Hypertensive heart disease with moderate LVH and likely chronic diastolic heart failure. 9.  Anemia of uncertain etiology with normal iron stores and increased reticulocyte count. 10.  Likely extracranial cerebrovascular disease with bilateral carotid bruit  11. Acute hypoxemia respiratory failure: likely from acute bronchitis/bronchiolitis--no definite CHF      Mrs. Mariia Weiss was seen for hypoxemic respiratory failure. She does not have conclusive signs of CHF. Mild troponin elevation does not represent ACS but demand ischemia. NO further work up is needed for it. Since her renal function has been relatively stable and current presentation is not classic for flash pulmonary edema but acute COPD exacerbation, I do not strongly feel the need to reimage her renal artery. Expect her BP to be controlled with 2 agents. Likely reason for decompensation is viral mediated COPD exacerbation/acute bronchitis with bronchiolitis in the setting of recent airway inflammation from pneumonia. Pulmonary to see. Investigations:  10/18 vcs normal stress echo, did not reach target heart rate  10/18 vcs normal lexiscan cardiolyte  Investigations  ECG: Sinus bradycardia, nonspecific ST-T changes.   Echocardiogram: Normal LV systolic function mild to moderate LV hypertrophy consistent with hypertensive heart disease. CT abdomen with angiogram and runoff: 2 renal arteries are present bilaterally. Right major renal artery has at least moderate to severe ostial stenosis. Accessory right renal artery is free of significant disease. Left renal arteries are equal in size. CT scan has been reported to demonstrate disease in superior renal artery although I cannot personally confirm it. Both arteries on the left appear to be too small to provide durable results with renal artery stenting. Renal angiogram and stenting July 2019: Main right renal artery 75% stenosis, accessory renal artery on the right free of significant disease. 2 codominant left renal arteries, one with 40 to 50% angiographic stenosis other free of disease. Status post proximal right main renal artery stenting with 5 x 15 Herculink stent with 0% residual stenosis. Renal arterial Doppler August 2019: Patent stent in main proximal right renal artery. Accessory right renal artery not visualized. Main left renal artery has less than 60% stenosis. Accessory renal artery on the left is not visualized. Technically suboptimal study. Normal kidney size bilaterally. Cath Sep 2019: Severe disease in D1 s/p PCI with 2 REBECCA. Moderate disease in mid LAD, otherwise non obstructive disease. Ct chest Feb 2020: Resolving airspace disease, mild ground glassing, no definite pulmonary edema pattern    Requesting/referring provider: Osmin Olmos  Reason for Consult: Acute hypoxemic respiratory failure    HPI: Radhika Man, a 69y.o. year-old who was recently seen for evaluation of uncontrolled hypertension. Mrs. Janie Esparza has history of extremely difficult to control hypertension and was admitted to the hospital with malignant hypertension. Subsequently she underwent invasive renal angiogram which demonstrated 70 to 75% stenosis in main right renal artery in its proximal segment.   She subsequently underwent stenting of right renal artery with a 5 x 15 mm Herculink bare-metal stent with no residual stenosis. Since undergoing the procedure she has done very well. Her blood pressures are now much better controlled and she is only on 2 antihypertensives. She also underwent cardiac cath in September with PCI to a large diagonal branch with 2 REBECCA. Lad had intermediate disease. She did well for 2 months until [facility] [place] [facility] Kath Stout She developed b/lateral pneumonia on her way back requiring hospitalization in [place]. She was severely hypoxic and required discharge on home O2. She was recovering from the episode in Late Jan and early Feb however, over the past few days she has had progressive shortness of breath with any activity. She denies fevers or chills. No cough or sputum. No syncope or pre-syncope. She was evaluated in the [place] [facility] and diagnosed with bronchitis treated with albuterol inhaler and prednisone. Due top progression of symptom and usage of accessory muscles, she was brought in to [facility] by family. Despite aggressive medical treatment, breathing remains tenuous, and Bipap has been initiated. CT chest suggested possible infection. Blood pressure has been labile and elevated. Past medical history:  She  has a past medical history of CAD (coronary artery disease) (09/12/2019), Hypertension, Pneumonia, and MRAY (renal artery stenosis) ([facility] [address] (07/24/2019). She also has no past medical history of Adverse effect of anesthesia. Patient reports no PND/Orthpnea/CP. He reports no cough/fever/focal neurological deficits/abdominal pain. All other systems negative except as above. PE  Vitals:    02/25/20 1332 02/25/20 1639 02/25/20 1943 02/25/20 2022   BP:  154/69 134/59    Pulse:  74 66    Resp:  17 21    Temp:  98.2 °F (36.8 °C) 97.7 °F (36.5 °C)    SpO2: 95% 93% 98% 99%   Weight:       Height:        Body mass index is 24.53 kg/m².     General:    Alert, cooperative, in respiratory distress. Psychiatric:    Normal Mood and affect    Eye/ENT:      Pupils equal, No asymmetry, Conjunctival pink. Able to hear voice at normal amplitude. Bilateral carotid bruit   Lungs:      Visibly symmetric chest expansion, No palpable tenderness. Bilateral inspiratory and expiratory wheezing. Heart[de-identified]    Regular rate and rhythm, S1, S2 normal, no murmur, click, rub or gallop. No JVD, Normal palpable peripheral pulses. No cyanosis bilateral carotid bruit   Abdomen:     Soft, non-tender. Bowel sounds normal. No masses,  No      organomegaly. No renal bruit. Extremities:   Extremities normal, atraumatic, mild bilateral edema. Neurologic:   CN II-XII grossly intact.  No gross focal deficits           Recent Labs:  Lab Results   Component Value Date/Time    Cholesterol, total 111 02/23/2020 03:28 AM    HDL Cholesterol 27 02/23/2020 03:28 AM    LDL, calculated 35.4 02/23/2020 03:28 AM    Triglyceride 243 (H) 02/23/2020 03:28 AM    CHOL/HDL Ratio 4.1 02/23/2020 03:28 AM     Lab Results   Component Value Date/Time    Creatinine (POC) 0.9 10/20/2016 10:16 AM    Creatinine 1.18 (H) 02/25/2020 05:15 AM     Lab Results   Component Value Date/Time    BUN 38 (H) 02/25/2020 05:15 AM     Lab Results   Component Value Date/Time    Potassium 3.6 02/25/2020 05:15 AM     Lab Results   Component Value Date/Time    Hemoglobin A1c 5.4 02/22/2020 05:32 AM     Lab Results   Component Value Date/Time    HGB 9.0 (L) 02/25/2020 05:15 AM     Lab Results   Component Value Date/Time    PLATELET 553 95/00/4947 05:15 AM       Reviewed:  Past Medical History:   Diagnosis Date    CAD (coronary artery disease) 09/12/2019    stent D1 9/12/2019 (Rachelle Hallman)    Hypertension     Pneumonia     MARY (renal artery stenosis) ([facility] [address] 07/24/2019    s/p right renal stent 7/24/2019 Fay Willams)     Social History     Tobacco Use   Smoking Status Former Smoker    Packs/day: 0.50   Smokeless Tobacco Never Used     Social History     Substance and Sexual Activity   Alcohol Use Yes    Alcohol/week: 6.0 standard drinks    Types: 6 Glasses of wine per week     No Known Allergies  Family History   Problem Relation Age of Onset    Heart Attack Father 47        Current Facility-Administered Medications   Medication Dose Route Frequency    [START ON 2/26/2020] azithromycin (ZITHROMAX) tablet 500 mg  500 mg Oral DAILY    polyethylene glycol (MIRALAX) packet 17 g  17 g Oral DAILY    [START ON 2/26/2020] albuterol-ipratropium (DUO-NEB) 2.5 MG-0.5 MG/3 ML  3 mL Nebulization TID RT    polyvinyl alcohol-povidone (NATURAL TEARS) 0.5-0.6 % ophthalmic solution 1 Drop  1 Drop Both Eyes PRN    benzonatate (TESSALON) capsule 100 mg  100 mg Oral TID PRN    zolpidem (AMBIEN) tablet 5 mg  5 mg Oral QHS PRN    methylPREDNISolone (PF) (SOLU-MEDROL) injection 40 mg  40 mg IntraVENous Q8H    arformoteroL (BROVANA) neb solution 15 mcg  15 mcg Nebulization BID RT    And    budesonide (PULMICORT) 500 mcg/2 ml nebulizer suspension  500 mcg Nebulization BID RT    insulin lispro (HUMALOG) injection   SubCUTAneous AC&HS    glucose chewable tablet 16 g  4 Tab Oral PRN    glucagon (GLUCAGEN) injection 1 mg  1 mg IntraMUSCular PRN    dextrose 10% infusion 0-250 mL  0-250 mL IntraVENous PRN    lisinopril (PRINIVIL, ZESTRIL) tablet 5 mg  5 mg Oral DAILY    acetaminophen (TYLENOL) tablet 650 mg  650 mg Oral Q4H PRN    albuterol-ipratropium (DUO-NEB) 2.5 MG-0.5 MG/3 ML  3 mL Nebulization Q4H PRN    aspirin delayed-release tablet 81 mg  81 mg Oral DAILY    atorvastatin (LIPITOR) tablet 80 mg  80 mg Oral DAILY    clopidogreL (PLAVIX) tablet 75 mg  75 mg Oral DAILY    NIFEdipine ER (PROCARDIA XL) tablet 90 mg  90 mg Oral DAILY    sertraline (ZOLOFT) tablet 100 mg  100 mg Oral QHS    sodium chloride (NS) flush 5-40 mL  5-40 mL IntraVENous Q8H    sodium chloride (NS) flush 5-40 mL  5-40 mL IntraVENous PRN    ondansetron (ZOFRAN) injection 4 mg  4 mg IntraVENous Q4H PRN    bisacodyL (DULCOLAX) tablet 5 mg  5 mg Oral DAILY PRN    heparin (porcine) injection 5,000 Units  5,000 Units SubCUTAneous Q8H    lactobac ac& pc-s.therm-b.anim (PETRA Q/RISAQUAD)  1 Cap Oral DAILY    cefTRIAXone (ROCEPHIN) 1 g in 0.9% sodium chloride (MBP/ADV) 50 mL  1 g IntraVENous Q24H    hydrALAZINE (APRESOLINE) 20 mg/mL injection 10 mg  10 mg IntraVENous Q6H PRN    carvediloL (COREG) tablet 6.25 mg  6.25 mg Oral BID WITH MEALS       Addie Mayfield MD02/25/20     [facility] [place]  [address], [address], [address]

## 2020-02-26 NOTE — PROGRESS NOTES
Spiritual Care Assessment/Progress Note  [facility]      NAME: Savannah Lackey      MRN: [id number]  AGE: 69 y.o.  SEX: female  Voodoo Affiliation: Jehovah's witness   Language: English     2/26/2020     Total Time (in minutes): 5     Spiritual Assessment begun in [facility]MCU through conversation with:         [x]Patient        [] Family    [] Friend(s)        Reason for Consult: [facility]     Spiritual beliefs: (Please include comment if needed)     [x] Identifies with a orlin tradition: Jehovah's witness        [x] Supported by a orlin community:            [] Claims no spiritual orientation:           [] Seeking spiritual identity:                [] Adheres to an individual form of spirituality:           [] Not able to assess:                           Identified resources for coping:      [x] Prayer                               [x] Music                  [] Guided Imagery     [x] Family/friends                 [] Pet visits     [] Devotional reading                         [] Unknown     [] Other:                                             Interventions offered during this visit: (See comments for more details)    Patient Interventions: Affirmation of orlin, Communion (Jehovah's witness), Initial/Spiritual assessment, patient floor, Normalization of emotional/spiritual concerns, Prayer (actual), Prayer (assurance of)           Plan of Care:     [x] Support spiritual and/or cultural needs    [] Support AMD and/or advance care planning process      [] Support grieving process   [] Coordinate Rites and/or Rituals    [] Coordination with community clergy   [] No spiritual needs identified at this time   [] Detailed Plan of Care below (See Comments)  [] Make referral to Music Therapy  [] Make referral to Pet Therapy     [] Make referral to Addiction services  [] Make referral to [facility]  [] Make referral to Spiritual Care Partner  [] No future visits requested        [] Follow up visits as needed     Comments: Mrs. Cosmo Lam is short of breath and has difficulty carrying on conversation. Prayer and communion offered along with ashes for [facility].     CAIN Peralta, RN, ACSW, LCSW   Page:  [phone]

## 2020-02-26 NOTE — PROGRESS NOTES
[address] [facility]  Hospitalist Group                                                                                          Hospitalist Progress Note  Giovanny Amador MD  Answering service: [phone] OR [phone] from in house phone        Date of Service:  2020  NAME:  Jemal Stockton  :  1945  MRN:  [id number]      Admission Summary:   68-year-old woman with a past medical history significant for hypertension; renal artery stenosis, status post stent placement; dyslipidemia; coronary artery disease, status post stent placement came to the hospital because of SOB    Interval history / Subjective:   Patient seen and examined    States that she still has cough, SOB improving. Assessment & Plan:     #Acute hypoxic resp failure: Improving  -Suspected COPD exacerbation with RSV viral bronchitis( has long smoking history,quit last year)  -Off BiPAPfor 2 days  - wean off oxygen as tolerated  -change to prednisone tomorrow, duonebs q 6 hr, Pulmicort/LABA  -echo normal EF and grade 1 diastolic CHF  -on ceftriaxone and azithromycin -complete 5 days  -Pulmonologist following  -will need OP PFTs and follow up. -still requiring oxygen, check CXR    #HTN:  -BP controlled with nifedipine,coreg and lisinorpil.      #History of renal artery stenosis s/p rt renal artery stenting    #Coronary artery disease  -on aspirin,statin,plavix,coreg    #Chronic anemia:  -Hb stable    Code status: full  DVT prophylaxis: loevnox    Care Plan discussed with: patient,nurse  Anticipated Disposition: home  Anticipated Discharge: 2-3 days     Hospital Problems  Date Reviewed: 2020          Codes Class Noted POA    Coronary artery disease ICD-10-CM: I25.10  ICD-9-CM: 414.00  2019 Yes        * (Principal) Bacterial pneumonia ICD-10-CM: J15.9  ICD-9-CM: 482.9  2020 Yes        Renal artery stenosis (HCC) ICD-10-CM: I70.1  ICD-9-CM: 440.1  2019 Yes        Flash pulmonary edema ([address] ICD-10-CM: J81.0  ICD-9-CM: 518.4  8/20/2019 Yes        Mixed hyperlipidemia ICD-10-CM: E78.2  ICD-9-CM: 272.2  11/16/2018 Yes        Renovascular hypertension ICD-10-CM: I15.0  ICD-9-CM: 405.91  11/16/2018 Yes        SOB (shortness of breath) ICD-10-CM: R06.02  ICD-9-CM: 786.05  11/16/2018 Yes                Review of Systems:   As per HPII      Vital Signs:    Last 24hrs VS reviewed since prior progress note. Most recent are:  Visit Vitals  /50 (BP 1 Location: Left arm, BP Patient Position: Sitting)   Pulse 71   Temp 98.1 °F (36.7 °C)   Resp 23   Ht 5' 3\" (1.6 m)   Wt 62.1 kg (137 lb)   SpO2 94%   BMI 24.27 kg/m²         Intake/Output Summary (Last 24 hours) at 2/26/2020 1712  Last data filed at 2/25/2020 2000  Gross per 24 hour   Intake 0 ml   Output    Net 0 ml        Physical Examination:             Constitutional: No acute distress   ENT:  Oral mucosa moist, oropharynx benign. EOMI   Resp: Minimal wheezing, diminished breath sounds b/l   CV:  Regular rhythm, normal rate,S1,S2 wnl    GI:  Soft, non distended, non tender. normoactive bowel sounds    Musculoskeletal:  No  LE edema    Neurologic:  Moves all extremities. AAOx3, CN II-XII reviewed     Psych: Not agitated  Skin:  Good turgor, no rashes or ulcers       Data Review:    Review and/or order of clinical lab test  Review and/or order of tests in the medicine section of [facility]      Labs:     Recent Labs     02/26/20  0541 02/25/20 0515   WBC 8.1 7.2   HGB 9.6* 9.0*   HCT 30.5* 27.3*    177     Recent Labs     02/26/20  0541 02/25/20  0515 02/24/20 0319    138 139   K 4.0 3.6 3.6    107 107   CO2 23 23 24   BUN 38* 38* 38*   CREA 1.08* 1.18* 1.24*   * 150* 162*   CA 9.0 9.4 9.1     No results for input(s): SGOT, GPT, ALT, AP, TBIL, TBILI, TP, ALB, GLOB, GGT, AML, LPSE in the last 72 hours. No lab exists for component: AMYP, HLPSE  No results for input(s): INR, PTP, APTT, INREXT, INREXT in the last 72 hours.    No results for input(s): FE, TIBC, PSAT, FERR in the last 72 hours. Lab Results   Component Value Date/Time    Folate 10.9 07/24/2019 03:41 AM      No results for input(s): PH, PCO2, PO2 in the last 72 hours. No results for input(s): CPK, CKNDX, TROIQ in the last 72 hours.     No lab exists for component: CPKMB  Lab Results   Component Value Date/Time    Cholesterol, total 111 02/23/2020 03:28 AM    HDL Cholesterol 27 02/23/2020 03:28 AM    LDL, calculated 35.4 02/23/2020 03:28 AM    Triglyceride 243 (H) 02/23/2020 03:28 AM    CHOL/HDL Ratio 4.1 02/23/2020 03:28 AM     Lab Results   Component Value Date/Time    Glucose (POC) 172 (H) 02/26/2020 11:23 AM    Glucose (POC) 157 (H) 02/26/2020 07:43 AM    Glucose (POC) 103 (H) 02/25/2020 09:22 PM    Glucose (POC) 263 (H) 02/25/2020 05:53 PM    Glucose (POC) 128 (H) 02/25/2020 12:22 PM     Lab Results   Component Value Date/Time    Color YELLOW/STRAW 02/22/2020 06:17 AM    Appearance CLEAR 02/22/2020 06:17 AM    Specific gravity 1.015 02/22/2020 06:17 AM    pH (UA) 5.0 02/22/2020 06:17 AM    Protein NEGATIVE  02/22/2020 06:17 AM    Glucose NEGATIVE  02/22/2020 06:17 AM    Ketone NEGATIVE  02/22/2020 06:17 AM    Bilirubin NEGATIVE  02/22/2020 06:17 AM    Urobilinogen 0.2 02/22/2020 06:17 AM    Nitrites NEGATIVE  02/22/2020 06:17 AM    Leukocyte Esterase NEGATIVE  02/22/2020 06:17 AM    Epithelial cells FEW 02/22/2020 06:17 AM    Bacteria NEGATIVE  02/22/2020 06:17 AM    WBC 0-4 02/22/2020 06:17 AM    RBC 0-5 02/22/2020 06:17 AM         Medications Reviewed:     Current Facility-Administered Medications   Medication Dose Route Frequency    guaiFENesin-codeine (ROBITUSSIN AC) 100-10 mg/5 mL solution 10 mL  10 mL Oral Q6H PRN    azithromycin (ZITHROMAX) tablet 500 mg  500 mg Oral DAILY    polyethylene glycol (MIRALAX) packet 17 g  17 g Oral DAILY    albuterol-ipratropium (DUO-NEB) 2.5 MG-0.5 MG/3 ML  3 mL Nebulization TID RT    polyvinyl alcohol-povidone (NATURAL TEARS) 0.5-0.6 % ophthalmic solution 1 Drop  1 Drop Both Eyes PRN    benzonatate (TESSALON) capsule 100 mg  100 mg Oral TID PRN    zolpidem (AMBIEN) tablet 5 mg  5 mg Oral QHS PRN    methylPREDNISolone (PF) (SOLU-MEDROL) injection 40 mg  40 mg IntraVENous Q8H    arformoteroL (BROVANA) neb solution 15 mcg  15 mcg Nebulization BID RT    And    budesonide (PULMICORT) 500 mcg/2 ml nebulizer suspension  500 mcg Nebulization BID RT    insulin lispro (HUMALOG) injection   SubCUTAneous AC&HS    glucose chewable tablet 16 g  4 Tab Oral PRN    glucagon (GLUCAGEN) injection 1 mg  1 mg IntraMUSCular PRN    dextrose 10% infusion 0-250 mL  0-250 mL IntraVENous PRN    lisinopril (PRINIVIL, ZESTRIL) tablet 5 mg  5 mg Oral DAILY    acetaminophen (TYLENOL) tablet 650 mg  650 mg Oral Q4H PRN    albuterol-ipratropium (DUO-NEB) 2.5 MG-0.5 MG/3 ML  3 mL Nebulization Q4H PRN    aspirin delayed-release tablet 81 mg  81 mg Oral DAILY    atorvastatin (LIPITOR) tablet 80 mg  80 mg Oral DAILY    clopidogreL (PLAVIX) tablet 75 mg  75 mg Oral DAILY    NIFEdipine ER (PROCARDIA XL) tablet 90 mg  90 mg Oral DAILY    sertraline (ZOLOFT) tablet 100 mg  100 mg Oral QHS    sodium chloride (NS) flush 5-40 mL  5-40 mL IntraVENous Q8H    sodium chloride (NS) flush 5-40 mL  5-40 mL IntraVENous PRN    ondansetron (ZOFRAN) injection 4 mg  4 mg IntraVENous Q4H PRN    bisacodyL (DULCOLAX) tablet 5 mg  5 mg Oral DAILY PRN    heparin (porcine) injection 5,000 Units  5,000 Units SubCUTAneous Q8H    lactobac ac& pc-s.therm-b.anim (PETRA Q/RISAQUAD)  1 Cap Oral DAILY    cefTRIAXone (ROCEPHIN) 1 g in 0.9% sodium chloride (MBP/ADV) 50 mL  1 g IntraVENous Q24H    hydrALAZINE (APRESOLINE) 20 mg/mL injection 10 mg  10 mg IntraVENous Q6H PRN    carvediloL (COREG) tablet 6.25 mg  6.25 mg Oral BID WITH MEALS     ______________________________________________________________________  EXPECTED LENGTH OF STAY: 3d 19h  ACTUAL LENGTH OF STAY:          4 Radha Almanza MD

## 2020-02-26 NOTE — CONSULTS
Name: [facility]: [address]   : 1945 Admit Date: 2020   Phone: [phone]  Room: [facility]/   PCP: Rafael Cohen MD  MRN: [id number]   Date: 2020  Code: Full Code        HPI:    2:41 PM       History was obtained from patient. I was asked by Judson Chapin MD to see Betty Tatum in consultation for a chief complaint of shortness of breath. History of Present Illness: 69year old female with past medical history of copd who presented to [facility] with increasing shortness of breath and coughing. As per patient she went to [place] in 2019 where she developed double pneumonia and was admitted to [facility] and treated with abx. She came back on christmass day. She was slowly recovering well over 4 weeks this. 2 weeks back she went to a party where some people were sick with productive cough and a 3month old child was sick with RSV. Few days later she developed cough - dry, chest pain from coughing, shortness of breath - worsening but no fever, chills, night sweats. She was on her couch for 3 days hardly able to move and end of calling 911. Data reviewed:  CT Chest - clear parenchyma. Some right base bronchial thickening. Cr up. Pro BNP up  Echo normal ef.  RSV positive. Initially required BiPAP.     Past Medical History:   Diagnosis Date    CAD (coronary artery disease) 2019    stent D1 2019 ([place])    Hypertension     Pneumonia     MARY (renal artery stenosis) ([facility] [address] 2019    s/p right renal stent 2019 [facility])       Past Surgical History:   Procedure Laterality Date    CARDIAC SURG PROCEDURE UNLIST  2019    cardiac stents     HX APPENDECTOMY      HX BACK SURGERY      LUMBAR FUSION X2 SURGERIES    HX BACK SURGERY      CERVICAL X2    HX GI  2019    arterial stent in right kidney    HX HYSTERECTOMY      HX TONSILLECTOMY         Family History   Problem Relation Age of Onset    Heart Attack Father 47       Social History     Tobacco Use    Smoking status: Former Smoker     Packs/day: 0.50    Smokeless tobacco: Never Used   Substance Use Topics    Alcohol use:  Yes     Alcohol/week: 6.0 standard drinks     Types: 6 Glasses of wine per week       No Known Allergies    Current Facility-Administered Medications   Medication Dose Route Frequency    guaiFENesin-codeine (ROBITUSSIN AC) 100-10 mg/5 mL solution 10 mL  10 mL Oral Q6H PRN    azithromycin (ZITHROMAX) tablet 500 mg  500 mg Oral DAILY    polyethylene glycol (MIRALAX) packet 17 g  17 g Oral DAILY    albuterol-ipratropium (DUO-NEB) 2.5 MG-0.5 MG/3 ML  3 mL Nebulization TID RT    polyvinyl alcohol-povidone (NATURAL TEARS) 0.5-0.6 % ophthalmic solution 1 Drop  1 Drop Both Eyes PRN    benzonatate (TESSALON) capsule 100 mg  100 mg Oral TID PRN    zolpidem (AMBIEN) tablet 5 mg  5 mg Oral QHS PRN    methylPREDNISolone (PF) (SOLU-MEDROL) injection 40 mg  40 mg IntraVENous Q8H    arformoteroL (BROVANA) neb solution 15 mcg  15 mcg Nebulization BID RT    And    budesonide (PULMICORT) 500 mcg/2 ml nebulizer suspension  500 mcg Nebulization BID RT    insulin lispro (HUMALOG) injection   SubCUTAneous AC&HS    glucose chewable tablet 16 g  4 Tab Oral PRN    glucagon (GLUCAGEN) injection 1 mg  1 mg IntraMUSCular PRN    dextrose 10% infusion 0-250 mL  0-250 mL IntraVENous PRN    lisinopril (PRINIVIL, ZESTRIL) tablet 5 mg  5 mg Oral DAILY    acetaminophen (TYLENOL) tablet 650 mg  650 mg Oral Q4H PRN    albuterol-ipratropium (DUO-NEB) 2.5 MG-0.5 MG/3 ML  3 mL Nebulization Q4H PRN    aspirin delayed-release tablet 81 mg  81 mg Oral DAILY    atorvastatin (LIPITOR) tablet 80 mg  80 mg Oral DAILY    clopidogreL (PLAVIX) tablet 75 mg  75 mg Oral DAILY    NIFEdipine ER (PROCARDIA XL) tablet 90 mg  90 mg Oral DAILY    sertraline (ZOLOFT) tablet 100 mg  100 mg Oral QHS    sodium chloride (NS) flush 5-40 mL  5-40 mL IntraVENous Q8H    sodium chloride (NS) flush 5-40 mL  5-40 mL IntraVENous PRN    ondansetron (ZOFRAN) injection 4 mg  4 mg IntraVENous Q4H PRN    bisacodyL (DULCOLAX) tablet 5 mg  5 mg Oral DAILY PRN    heparin (porcine) injection 5,000 Units  5,000 Units SubCUTAneous Q8H    lactobac ac& pc-s.therm-b.anim (PETRA Q/RISAQUAD)  1 Cap Oral DAILY    cefTRIAXone (ROCEPHIN) 1 g in 0.9% sodium chloride (MBP/ADV) 50 mL  1 g IntraVENous Q24H    hydrALAZINE (APRESOLINE) 20 mg/mL injection 10 mg  10 mg IntraVENous Q6H PRN    carvediloL (COREG) tablet 6.25 mg  6.25 mg Oral BID WITH MEALS         REVIEW OF SYSTEMS   Negative except as stated in the HPI. Physical Exam:   Visit Vitals  /46 (BP 1 Location: Left arm, BP Patient Position: At rest)   Pulse 69   Temp 97.8 °F (36.6 °C)   Resp 22   Ht 5' 3\" (1.6 m)   Wt 62.1 kg (137 lb)   SpO2 94%   BMI 24.27 kg/m²       General:  Alert, cooperative, no distress, appears stated age. Head:  Normocephalic, without obvious abnormality, atraumatic. Eyes:  Conjunctivae/corneas clear. PERRL, EOMs intact. Nose: Nares normal. Septum midline. Mucosa normal. No drainage or sinus tenderness. Throat: Lips, mucosa, and tongue normal. Teeth and gums normal.   Neck: Supple, symmetrical, trachea midline, no adenopathy. Lungs:   Bilateral rhonchi       Heart:  Regular rate and rhythm, S1, S2 normal, no murmur. Abdomen:   Soft, non-tender. Bowel sounds normal.   Extremities: Extremities normal, atraumatic, no cyanosis or edema. Pulses: 2+ and symmetric all extremities.            Neurologic: Grossly nonfocal       Lab Results   Component Value Date/Time    Sodium 136 02/26/2020 05:41 AM    Potassium 4.0 02/26/2020 05:41 AM    Chloride 107 02/26/2020 05:41 AM    CO2 23 02/26/2020 05:41 AM    BUN 38 (H) 02/26/2020 05:41 AM    Creatinine 1.08 (H) 02/26/2020 05:41 AM    Glucose 141 (H) 02/26/2020 05:41 AM    Calcium 9.0 02/26/2020 05:41 AM    Magnesium 2.1 02/21/2020 10:25 PM    Phosphorus 3.0 02/22/2020 05:32 AM       Lab Results Component Value Date/Time    WBC 8.1 02/26/2020 05:41 AM    HGB 9.6 (L) 02/26/2020 05:41 AM    PLATELET 656 76/12/3073 05:41 AM    .7 (H) 02/26/2020 05:41 AM       Lab Results   Component Value Date/Time    INR 1.0 06/24/2019 01:49 PM    aPTT 25.4 06/24/2019 01:49 PM    AST (SGOT) 23 02/23/2020 03:28 AM    Alk. phosphatase 71 02/23/2020 03:28 AM    Protein, total 7.8 02/23/2020 03:28 AM    Albumin 4.2 02/23/2020 03:28 AM    Globulin 3.6 02/23/2020 03:28 AM       Lab Results   Component Value Date/Time    Iron 87 09/13/2019 04:33 AM    TIBC 264 09/13/2019 04:33 AM    Iron % saturation 33 09/13/2019 04:33 AM    Ferritin 262 09/13/2019 04:33 AM       Lab Results   Component Value Date/Time    C-Reactive protein <0.29 07/18/2019 04:58 PM    TSH 4.10 (H) 02/22/2020 05:32 AM       Lab Results   Component Value Date/Time    Troponin-I, Qt. 0.06 (H) 02/22/2020 12:07 PM        Lab Results   Component Value Date/Time    Color YELLOW/STRAW 02/22/2020 06:17 AM    Appearance CLEAR 02/22/2020 06:17 AM    Specific gravity 1.015 02/22/2020 06:17 AM    pH (UA) 5.0 02/22/2020 06:17 AM    Protein NEGATIVE  02/22/2020 06:17 AM    Glucose NEGATIVE  02/22/2020 06:17 AM    Ketone NEGATIVE  02/22/2020 06:17 AM    Bilirubin NEGATIVE  02/22/2020 06:17 AM    Blood NEGATIVE  02/22/2020 06:17 AM    Urobilinogen 0.2 02/22/2020 06:17 AM    Nitrites NEGATIVE  02/22/2020 06:17 AM    Leukocyte Esterase NEGATIVE  02/22/2020 06:17 AM    WBC 0-4 02/22/2020 06:17 AM    RBC 0-5 02/22/2020 06:17 AM    Bacteria NEGATIVE  02/22/2020 06:17 AM       IMPRESSION:  ===========  -COPD exacerbation from RSV.  -RSV bronchiolitis. -Hypoxemic respiratory failure. -CAD with hx of stent.  -Renal artery stenosis with hx of stent.  -Hx of flash pulmonary edema with htn crises. PLAN:  =====  -Duonebz/ pulmicort / brovana.  -Solumedrol.  -Incentive spirometry. -PTOT.   -O2 supplementation.  -BiPAP prn.  -May try lasix to see if lung clear up quickly if shortness of breath persist and since Cr has recovered. Thank you for the consult.       Ana Laura Arcos MD awaiting consult

## 2020-02-26 NOTE — PROGRESS NOTES
Problem: Mobility Impaired (Adult and Pediatric)  Goal: *Acute Goals and Plan of Care (Insert Text)  Description  FUNCTIONAL STATUS PRIOR TO ADMISSION: Patient was independent and active without use of DME, but reports her portable owygen concentrator just prior to admission because of difficulty breathing. Michelle Gearing HOME SUPPORT PRIOR TO ADMISSION: The patient lived alone with one local friend, she reports. Physical Therapy Goals  Initiated 2/25/2020  1. Patient will move from supine to sit and sit to supine , scoot up and down and roll side to side in bed with independence within 7 day(s). 2.  Patient will transfer from bed to chair and chair to bed with independence using the least restrictive device within 7 day(s). 3.  Patient will perform sit to stand with independence within 7 day(s). 4.  Patient will ambulate with independence for 300 feet with the least restrictive device within 7 day(s). 5.  Patient will ascend/descend 2 stairs with no handrail(s) with independence within 7 day(s). Outcome: Progressing Towards Goal  PHYSICAL THERAPY TREATMENT  Patient: Radhika Man (61 y.o. female)  Date: 2/26/2020  Diagnosis: Bacterial pneumonia [J15.9]   Bacterial pneumonia       Precautions: Contact(and droplet)  Chart, physical therapy assessment, plan of care and goals were reviewed. ASSESSMENT  Patient continues with skilled PT services and is progressing towards goals. Attempted to complete activity on RA as pt SpO2 upper 90's on 2L. Noted lowest O2 reading at 90% while standing bedside on RA. Continues to require O2 during amb as she begins to display SOB with activity. Encouraged PLBing throughout gait. She amb approx 50Ft (x2) w/gait belt and  w/o A.D. Noted increased trunk sway when advancing steps and increased pace as well. Instructed to pt to decrease pace to conserve energy and for increased stability. Pt reports she is usually a fast walker and is very active.   Also discussed activity pacing and energy conservation during gait. Paced improved upon instruction and pt's rate of breathing and SOB also lessened. Current Level of Function Impacting Discharge (mobility/balance): Mod I -CGA for functional transfers and gait w/o A.D. Other factors to consider for discharge: Lives alone         PLAN :  Patient continues to benefit from skilled intervention to address the above impairments. Continue treatment per established plan of care. to address goals. Recommendation for discharge: (in order for the patient to meet his/her long term goals)  No skilled physical therapy/ follow up rehabilitation needs identified at this time. Vs HHPT pending progress    This discharge recommendation:  Has been made in collaboration with the attending provider and/or case management    IF patient discharges home will need the following DME: none       SUBJECTIVE:   Patient stated Oh. It just feels so good to be up and walking.     OBJECTIVE DATA SUMMARY:   Critical Behavior:  Neurologic State: Alert  Orientation Level: Oriented X4  Cognition: Follows commands, Appropriate decision making  Safety/Judgement: Awareness of environment, Home safety  Functional Mobility Training:  Bed Mobility:     Supine to Sit: Modified independent  Sit to Supine: (remained OOB in chair)           Transfers:  Sit to Stand: Stand-by assistance  Stand to Sit: Stand-by assistance        Bed to Chair: Stand-by assistance                    Balance:  Sitting: Intact  Standing: Intact; Without support  Ambulation/Gait Training:  Distance (ft): 50 Feet (ft)(x2)  Assistive Device: Gait belt  Ambulation - Level of Assistance: Stand-by assistance;Contact guard assistance        Gait Abnormalities: Altered arm swing;Decreased step clearance; Path deviations        Base of Support: Widened     Speed/Nell: Pace decreased (<100 feet/min); Shuffled          Therapeutic Exercises:   PLB during activity  Pain Rating:  C/o of soreness in abdominal muscles from coughing. Activity Tolerance:   Fair, requires frequent rest breaks, and observed SOB with activity  Please refer to the flowsheet for vital signs taken during this treatment.     After treatment patient left in no apparent distress:   Sitting in chair and Call bell within reach    COMMUNICATION/COLLABORATION:   The patients plan of care was discussed with: Registered Nurse    Denzel Saxena PTA   Time Calculation: 31 mins

## 2020-02-27 LAB
ANION GAP SERPL CALC-SCNC: 4 MMOL/L (ref 5–15)
BASOPHILS # BLD: 0 K/UL (ref 0–0.1)
BASOPHILS NFR BLD: 0 % (ref 0–1)
BUN SERPL-MCNC: 33 MG/DL (ref 6–20)
BUN/CREAT SERPL: 34 (ref 12–20)
CALCIUM SERPL-MCNC: 9 MG/DL (ref 8.5–10.1)
CHLORIDE SERPL-SCNC: 109 MMOL/L (ref 97–108)
CO2 SERPL-SCNC: 26 MMOL/L (ref 21–32)
CREAT SERPL-MCNC: 0.97 MG/DL (ref 0.55–1.02)
DIFFERENTIAL METHOD BLD: ABNORMAL
EOSINOPHIL # BLD: 0.1 K/UL (ref 0–0.4)
EOSINOPHIL NFR BLD: 1 % (ref 0–7)
ERYTHROCYTE [DISTWIDTH] IN BLOOD BY AUTOMATED COUNT: 13.3 % (ref 11.5–14.5)
GLUCOSE BLD STRIP.AUTO-MCNC: 178 MG/DL (ref 65–100)
GLUCOSE BLD STRIP.AUTO-MCNC: 190 MG/DL (ref 65–100)
GLUCOSE BLD STRIP.AUTO-MCNC: 193 MG/DL (ref 65–100)
GLUCOSE BLD STRIP.AUTO-MCNC: 96 MG/DL (ref 65–100)
GLUCOSE SERPL-MCNC: 91 MG/DL (ref 65–100)
HCT VFR BLD AUTO: 30 % (ref 35–47)
HGB BLD-MCNC: 10 G/DL (ref 11.5–16)
IMM GRANULOCYTES # BLD AUTO: 0.2 K/UL (ref 0–0.04)
IMM GRANULOCYTES NFR BLD AUTO: 2 % (ref 0–0.5)
LYMPHOCYTES # BLD: 3.2 K/UL (ref 0.8–3.5)
LYMPHOCYTES NFR BLD: 27 % (ref 12–49)
MCH RBC QN AUTO: 34.4 PG (ref 26–34)
MCHC RBC AUTO-ENTMCNC: 33.3 G/DL (ref 30–36.5)
MCV RBC AUTO: 103.1 FL (ref 80–99)
METAMYELOCYTES NFR BLD MANUAL: 1 %
MONOCYTES # BLD: 1.2 K/UL (ref 0–1)
MONOCYTES NFR BLD: 10 % (ref 5–13)
NEUTS BAND NFR BLD MANUAL: 2 %
NEUTS SEG # BLD: 7.1 K/UL (ref 1.8–8)
NEUTS SEG NFR BLD: 57 % (ref 32–75)
NRBC # BLD: 0 K/UL (ref 0–0.01)
NRBC BLD-RTO: 0 PER 100 WBC
PLATELET # BLD AUTO: 224 K/UL (ref 150–400)
PMV BLD AUTO: 9.3 FL (ref 8.9–12.9)
POTASSIUM SERPL-SCNC: 3.9 MMOL/L (ref 3.5–5.1)
RBC # BLD AUTO: 2.91 M/UL (ref 3.8–5.2)
RBC MORPH BLD: ABNORMAL
RBC MORPH BLD: ABNORMAL
SERVICE CMNT-IMP: ABNORMAL
SERVICE CMNT-IMP: NORMAL
SODIUM SERPL-SCNC: 139 MMOL/L (ref 136–145)
WBC # BLD AUTO: 12 K/UL (ref 3.6–11)

## 2020-02-27 PROCEDURE — 94760 N-INVAS EAR/PLS OXIMETRY 1: CPT

## 2020-02-27 PROCEDURE — 97535 SELF CARE MNGMENT TRAINING: CPT

## 2020-02-27 PROCEDURE — 94640 AIRWAY INHALATION TREATMENT: CPT

## 2020-02-27 PROCEDURE — 74011250637 HC RX REV CODE- 250/637: Performed by: HOSPITALIST

## 2020-02-27 PROCEDURE — 85025 COMPLETE CBC W/AUTO DIFF WBC: CPT

## 2020-02-27 PROCEDURE — 65660000000 HC RM CCU STEPDOWN

## 2020-02-27 PROCEDURE — 74011250637 HC RX REV CODE- 250/637: Performed by: STUDENT IN AN ORGANIZED HEALTH CARE EDUCATION/TRAINING PROGRAM

## 2020-02-27 PROCEDURE — 80048 BASIC METABOLIC PNL TOTAL CA: CPT

## 2020-02-27 PROCEDURE — 74011636637 HC RX REV CODE- 636/637: Performed by: HOSPITALIST

## 2020-02-27 PROCEDURE — 97116 GAIT TRAINING THERAPY: CPT

## 2020-02-27 PROCEDURE — 77010033678 HC OXYGEN DAILY

## 2020-02-27 PROCEDURE — 74011250637 HC RX REV CODE- 250/637: Performed by: INTERNAL MEDICINE

## 2020-02-27 PROCEDURE — 74011000250 HC RX REV CODE- 250: Performed by: HOSPITALIST

## 2020-02-27 PROCEDURE — 74011250636 HC RX REV CODE- 250/636: Performed by: INTERNAL MEDICINE

## 2020-02-27 PROCEDURE — 82962 GLUCOSE BLOOD TEST: CPT

## 2020-02-27 PROCEDURE — 36415 COLL VENOUS BLD VENIPUNCTURE: CPT

## 2020-02-27 PROCEDURE — 74011000250 HC RX REV CODE- 250: Performed by: INTERNAL MEDICINE

## 2020-02-27 PROCEDURE — 74011000258 HC RX REV CODE- 258: Performed by: HOSPITALIST

## 2020-02-27 PROCEDURE — 74011250636 HC RX REV CODE- 250/636: Performed by: HOSPITALIST

## 2020-02-27 RX ORDER — BENZONATATE 100 MG/1
100 CAPSULE ORAL 3 TIMES DAILY
Status: DISCONTINUED | OUTPATIENT
Start: 2020-02-27 | End: 2020-02-28

## 2020-02-27 RX ORDER — ACETYLCYSTEINE 200 MG/ML
400 SOLUTION ORAL; RESPIRATORY (INHALATION)
Status: DISCONTINUED | OUTPATIENT
Start: 2020-02-27 | End: 2020-03-04 | Stop reason: HOSPADM

## 2020-02-27 RX ORDER — PANTOPRAZOLE SODIUM 40 MG/1
40 TABLET, DELAYED RELEASE ORAL
Status: DISCONTINUED | OUTPATIENT
Start: 2020-02-27 | End: 2020-03-04 | Stop reason: HOSPADM

## 2020-02-27 RX ORDER — PANTOPRAZOLE SODIUM 40 MG/1
40 TABLET, DELAYED RELEASE ORAL
Status: DISCONTINUED | OUTPATIENT
Start: 2020-02-28 | End: 2020-02-27

## 2020-02-27 RX ORDER — CALCIUM CARBONATE 200(500)MG
200 TABLET,CHEWABLE ORAL
Status: DISCONTINUED | OUTPATIENT
Start: 2020-02-27 | End: 2020-03-04 | Stop reason: HOSPADM

## 2020-02-27 RX ORDER — BISACODYL 5 MG
10 TABLET, DELAYED RELEASE (ENTERIC COATED) ORAL
Status: COMPLETED | OUTPATIENT
Start: 2020-02-27 | End: 2020-02-27

## 2020-02-27 RX ORDER — SODIUM CHLORIDE, SODIUM LACTATE, POTASSIUM CHLORIDE, CALCIUM CHLORIDE 600; 310; 30; 20 MG/100ML; MG/100ML; MG/100ML; MG/100ML
100 INJECTION, SOLUTION INTRAVENOUS CONTINUOUS
Status: DISCONTINUED | OUTPATIENT
Start: 2020-02-27 | End: 2020-03-03

## 2020-02-27 RX ADMIN — PREDNISONE 40 MG: 20 TABLET ORAL at 07:39

## 2020-02-27 RX ADMIN — CALCIUM CARBONATE (ANTACID) CHEW TAB 500 MG 200 MG: 500 CHEW TAB at 17:11

## 2020-02-27 RX ADMIN — ACETYLCYSTEINE 400 MG: 200 SOLUTION ORAL; RESPIRATORY (INHALATION) at 13:20

## 2020-02-27 RX ADMIN — IPRATROPIUM BROMIDE AND ALBUTEROL SULFATE 3 ML: .5; 3 SOLUTION RESPIRATORY (INHALATION) at 13:20

## 2020-02-27 RX ADMIN — IPRATROPIUM BROMIDE AND ALBUTEROL SULFATE 3 ML: .5; 3 SOLUTION RESPIRATORY (INHALATION) at 07:54

## 2020-02-27 RX ADMIN — Medication 10 ML: at 14:20

## 2020-02-27 RX ADMIN — CLOPIDOGREL BISULFATE 75 MG: 75 TABLET, FILM COATED ORAL at 10:14

## 2020-02-27 RX ADMIN — ATORVASTATIN CALCIUM 80 MG: 40 TABLET, FILM COATED ORAL at 10:14

## 2020-02-27 RX ADMIN — BENZONATATE 100 MG: 100 CAPSULE ORAL at 20:27

## 2020-02-27 RX ADMIN — BENZONATATE 100 MG: 100 CAPSULE ORAL at 04:47

## 2020-02-27 RX ADMIN — CEFTRIAXONE 1 G: 1 INJECTION, POWDER, FOR SOLUTION INTRAMUSCULAR; INTRAVENOUS at 05:01

## 2020-02-27 RX ADMIN — GUAIFENESIN AND CODEINE PHOSPHATE 10 ML: 100; 10 SOLUTION ORAL at 14:16

## 2020-02-27 RX ADMIN — INSULIN LISPRO 2 UNITS: 100 INJECTION, SOLUTION INTRAVENOUS; SUBCUTANEOUS at 17:10

## 2020-02-27 RX ADMIN — BUDESONIDE INHALATION 500 MCG: 0.5 SUSPENSION RESPIRATORY (INHALATION) at 07:58

## 2020-02-27 RX ADMIN — BUDESONIDE INHALATION 500 MCG: 0.5 SUSPENSION RESPIRATORY (INHALATION) at 18:06

## 2020-02-27 RX ADMIN — HEPARIN SODIUM 5000 UNITS: 5000 INJECTION INTRAVENOUS; SUBCUTANEOUS at 13:49

## 2020-02-27 RX ADMIN — ACETAMINOPHEN 650 MG: 325 TABLET ORAL at 17:16

## 2020-02-27 RX ADMIN — INSULIN LISPRO 2 UNITS: 100 INJECTION, SOLUTION INTRAVENOUS; SUBCUTANEOUS at 12:50

## 2020-02-27 RX ADMIN — POLYETHYLENE GLYCOL 3350 17 G: 17 POWDER, FOR SOLUTION ORAL at 10:18

## 2020-02-27 RX ADMIN — IPRATROPIUM BROMIDE AND ALBUTEROL SULFATE 3 ML: .5; 3 SOLUTION RESPIRATORY (INHALATION) at 04:59

## 2020-02-27 RX ADMIN — ONDANSETRON 4 MG: 2 INJECTION, SOLUTION INTRAMUSCULAR; INTRAVENOUS at 18:01

## 2020-02-27 RX ADMIN — BISACODYL 10 MG: 5 TABLET, COATED ORAL at 17:10

## 2020-02-27 RX ADMIN — ACETYLCYSTEINE 400 MG: 200 SOLUTION ORAL; RESPIRATORY (INHALATION) at 18:04

## 2020-02-27 RX ADMIN — IPRATROPIUM BROMIDE AND ALBUTEROL SULFATE 3 ML: .5; 3 SOLUTION RESPIRATORY (INHALATION) at 18:05

## 2020-02-27 RX ADMIN — SERTRALINE HYDROCHLORIDE 100 MG: 50 TABLET ORAL at 20:26

## 2020-02-27 RX ADMIN — Medication 10 ML: at 05:11

## 2020-02-27 RX ADMIN — GUAIFENESIN AND CODEINE PHOSPHATE 10 ML: 100; 10 SOLUTION ORAL at 07:39

## 2020-02-27 RX ADMIN — CARVEDILOL 6.25 MG: 6.25 TABLET, FILM COATED ORAL at 07:39

## 2020-02-27 RX ADMIN — POLYETHYLENE GLYCOL 3350 17 G: 17 POWDER, FOR SOLUTION ORAL at 10:20

## 2020-02-27 RX ADMIN — SODIUM CHLORIDE, SODIUM LACTATE, POTASSIUM CHLORIDE, AND CALCIUM CHLORIDE 100 ML/HR: 600; 310; 30; 20 INJECTION, SOLUTION INTRAVENOUS at 14:20

## 2020-02-27 RX ADMIN — ASPIRIN 81 MG: 81 TABLET, COATED ORAL at 10:14

## 2020-02-27 RX ADMIN — HEPARIN SODIUM 5000 UNITS: 5000 INJECTION INTRAVENOUS; SUBCUTANEOUS at 20:26

## 2020-02-27 RX ADMIN — NIFEDIPINE 90 MG: 60 TABLET, FILM COATED, EXTENDED RELEASE ORAL at 10:13

## 2020-02-27 RX ADMIN — CARVEDILOL 6.25 MG: 6.25 TABLET, FILM COATED ORAL at 17:10

## 2020-02-27 RX ADMIN — GUAIFENESIN AND CODEINE PHOSPHATE 10 ML: 100; 10 SOLUTION ORAL at 02:06

## 2020-02-27 RX ADMIN — LISINOPRIL 5 MG: 5 TABLET ORAL at 10:14

## 2020-02-27 RX ADMIN — HEPARIN SODIUM 5000 UNITS: 5000 INJECTION INTRAVENOUS; SUBCUTANEOUS at 04:53

## 2020-02-27 RX ADMIN — ZOLPIDEM TARTRATE 5 MG: 5 TABLET ORAL at 20:26

## 2020-02-27 RX ADMIN — PANTOPRAZOLE SODIUM 40 MG: 40 TABLET, DELAYED RELEASE ORAL at 17:10

## 2020-02-27 RX ADMIN — AZITHROMYCIN MONOHYDRATE 500 MG: 250 TABLET ORAL at 10:14

## 2020-02-27 RX ADMIN — BENZONATATE 100 MG: 100 CAPSULE ORAL at 17:10

## 2020-02-27 RX ADMIN — Medication 1 CAPSULE: at 10:14

## 2020-02-27 NOTE — PROGRESS NOTES
Problem: Self Care Deficits Care Plan (Adult)  Goal: *Acute Goals and Plan of Care (Insert Text)  Description    FUNCTIONAL STATUS PRIOR TO ADMISSION: Patient was independent and active without use of DME.     HOME SUPPORT: The patient lived alone with friend to provide assistance. Occupational Therapy Goals  Initiated 2/25/2020  1. Patient will perform standing ADLs x 10 min with modified independence within 7 day(s). 2.  Patient will perform lower body dressing with modified independence within 7 day(s). 3.  Patient will perform bathing with modified independence within 7 day(s). 4.  Patient will perform toilet transfers with modified independence within 7 day(s). MET 2/27/2020  5. Patient will perform all aspects of toileting with modified independence within 7 day(s). MET 2/27/2020  6. Patient will utilize energy conservation techniques during functional activities with verbal cues within 7 day(s). Outcome: Progressing Towards Goal    OCCUPATIONAL THERAPY TREATMENT  Patient: Evelin Babcock (33 y.o. female)  Date: 2/27/2020  Diagnosis: Bacterial pneumonia [J15.9]   Bacterial pneumonia       Precautions: Contact(and droplet)  Chart, occupational therapy assessment, plan of care, and goals were reviewed. ASSESSMENT  Patient continues with skilled OT services and has progressed towards goals. Patient progressing with today in bathroom ADLs. Patient declining shower chair order at this time. Order in chart just in case and informed her as to where she can purchase once she discharges.      Current Level of Function (ADLs/self-care): supervision ADLs 2* cues PLB, pacing, and O2 line management for fall prevention (3L)    Other factors to consider for discharge: lives alone, still on O2 (fall hazard, so will need practice if discharges home with O2)         PLAN : Will continue to follow  Recommend with staff: Tyesha Mckeon or at least EOB for all meals, bathroom ADLs (start to decrease BSC use)  Next session: gathering ADL items, standing tolerance and/or bathing ADL  Recommendation for discharge: (in order for the patient to meet his/her long term goals)  Occupational therapy at least 2 days/week in the home     This discharge recommendation:  Has not yet been discussed the attending provider and/or case management    IF patient discharges home will need the following DME:shower chair (does not have detachable shower head, Patient declining shower chair order at this time. Order in chart just in case and informed her as to where she can purchase once she discharges). SUBJECTIVE:   Patient stated It is good to get moving!     OBJECTIVE DATA SUMMARY:   Cognitive/Behavioral Status:  Neurologic State: Alert  Orientation Level: Oriented X4  Cognition: Appropriate decision making             Functional Mobility and Transfers for ADLs:  Bed Mobility:  Supine to Sit: Independent    Transfers:  Sit to Stand: Supervision  Functional Transfers  Bathroom Mobility: Supervision/set up  Toilet Transfer : Supervision(stood and dizzy, braced herself and then okay) BSC used. Encouraged to increase use of bathroom toilet. Balance:  Sitting: Intact; Without support  Standing: Impaired; Without support(supervision)  Standing - Static: Good  Standing - Dynamic : Fair(one hand on supportive surface PRN)    ADL Intervention:  Feeding  Feeding Assistance: Independent    Grooming  Grooming Assistance: Supervision(standing at sink 5 mins )                   Lower Body Dressing Assistance  Socks: Independent     Toileting  Toileting Assistance: Independent  Bladder Hygiene: Independent  Clothing Management: Independent    Patient received sitting up in bed finishing breakfast. Instruction as to OT and benefits. Patient instructed and indicated understanding the benefits of maintaining activity tolerance, functional mobility, and independence with self care tasks during acute stay  to ensure safe return home and to baseline. Encouraged patient to increase frequency and duration OOB, be out of bed for all meals, perform daily ADLs (as approved by RN/MD regarding bathing etc), and performing functional mobility to/from bathroom. Instruction on pacing and how to increase independence with all basic and then to instrumental ADLs here and at home. Patient instructed and indicated understanding energy conservation techniques to increase independence and safety during ADLs with  visual handout provided. Discussed sitting on commode to dry off and/or bathrobe. Home safety checklist handout provided. Therapeutic Exercises:       Pain:      Activity Tolerance:   requires frequent rest breaks and observed SOB with activity 3L during activity, coughing, SOB  Please refer to the flowsheet for vital signs taken during this treatment.     After treatment patient left in no apparent distress:   Call bell within reach and sitting EOB, stating more comfortable than chair     COMMUNICATION/COLLABORATION:   The patients plan of care was discussed with: Physical Therapist and Registered Nurse    Cecy Lynn  Time Calculation: 21 mins

## 2020-02-27 NOTE — PROGRESS NOTES
Bedside and Verbal shift change report given to 30 Sharla Lowe (oncoming nurse) by Shannan Pritchett (offgoing nurse). Report included the following information SBAR, Kardex, Intake/Output, MAR and Recent Results.

## 2020-02-27 NOTE — PROGRESS NOTES
Problem: Falls - Risk of  Goal: *Absence of Falls  Description  Document Bishop Prabhakar Fall Risk and appropriate interventions in the flowsheet.   Outcome: Progressing Towards Goal  Note: Fall Risk Interventions:  Mobility Interventions: Utilize walker, cane, or other assistive device, Strengthening exercises (ROM-active/passive)    Mentation Interventions: Adequate sleep, hydration, pain control    Medication Interventions: Evaluate medications/consider consulting pharmacy, Teach patient to arise slowly    Elimination Interventions: Call light in reach

## 2020-02-27 NOTE — PROGRESS NOTES
Bedside shift change report given to [address] [place] [address] (oncoming nurse) by Albina Asif  (offgoing nurse). Report included the following information SBAR and Kardex.

## 2020-02-27 NOTE — PROGRESS NOTES
[address] [facility] Adult  Hospitalist Group                                                                                          Hospitalist Progress Note  Carl Gomez NP  Answering service: [phone] OR [phone] from in house phone        Date of Service:  2020  NAME:  Bhanu Dong  :  1945  MRN:  [id number]      Admission Summary:   69 y.o. female with past medical history significant for s/p stent placement (August), HTN, and recurrent pneumonia (most recently in Dec 2019) presented to the ED from home via EMS with chief complaint of SOB, increased exertional and non exertional dyspnea w accessory muscle use and cough x three days despite use of albuterol and oral prednisone prescribed by another facility  On  for presumed bronchitis. CPAP was initiated in the ED. She tested + for RSV B. Pt is a former smoker. Interval history / Subjective:   Patient seen and examined. Cough and SOB are improving; Activity tolerance is improving w PT. Able to speak in full sentences. Still on nasal canula 3L/min. Assessment & Plan:     # Acute hypoxemia respiratory failure Improving: likely from acute bronchitis /bronchiolitis  -continue to wean off oxygen as tolerated  -continue oral prednisone, Duonebs q 6 hr, Pulmicort/LABA  -echo normal EF and grade 1 diastolic CHF  -on ceftriaxone (end 3/1); end azithromycin today  -Pulmonologist following:  will need OP PFTs and follow up. #HTN:  -BP controlled with nifedipine,coreg and lisinorpil. #History of renal artery stenosis s/p rt renal artery stenting    #Coronary artery disease  -on aspirin,statin,Plavix,Coreg    #Chronic macrocytic anemia:  -Hb stable  -OP f/up    #CKD   -GFR fluctuating, likely r/to DM and HTN. Continue to observe and follow. OP f/up recommended.       Code status: full  DVT prophylaxis: loevnox    Care Plan discussed with: patient,nurse  Anticipated Disposition: home  Anticipated Discharge: 2-3 days Hospital Problems  Date Reviewed: 2/22/2020          Codes Class Noted POA    Coronary artery disease ICD-10-CM: I25.10  ICD-9-CM: 414.00  9/12/2019 Yes        * (Principal) Bacterial pneumonia ICD-10-CM: J15.9  ICD-9-CM: 482.9  2/22/2020 Yes        Renal artery stenosis ([facility] [address] ICD-10-CM: I70.1  ICD-9-CM: 440.1  8/20/2019 Yes        Flash pulmonary edema ([facility] [address] ICD-10-CM: J81.0  ICD-9-CM: 518.4  8/20/2019 Yes        Mixed hyperlipidemia ICD-10-CM: E78.2  ICD-9-CM: 272.2  11/16/2018 Yes        Renovascular hypertension ICD-10-CM: I15.0  ICD-9-CM: 405.91  11/16/2018 Yes        SOB (shortness of breath) ICD-10-CM: R06.02  ICD-9-CM: 786.05  11/16/2018 Yes                Review of Systems:     Review of Systems   Constitutional: Negative for chills and fever. HENT: Negative for sore throat. Oral mucosa pale and dry. Respiratory: Positive for cough and shortness of breath. Negative for sputum production, wheezing and stridor. Cardiovascular: Negative for chest pain, orthopnea and leg swelling. Vital Signs:    Last 24hrs VS reviewed since prior progress note. Most recent are:  Visit Vitals  /63 (BP 1 Location: Left arm, BP Patient Position: At rest)   Pulse 79   Temp 97.7 °F (36.5 °C)   Resp 18   Ht 5' 3\" (1.6 m)   Wt 67 kg (147 lb 12.8 oz)   SpO2 94%   BMI 26.18 kg/m²       No intake or output data in the 24 hours ending 02/27/20 1628     Physical Examination:             Constitutional: No acute distress   ENT:  Oral mucosa moist, oropharynx benign. EOMI   Resp: Bibasilar crackles, no wheezing or diminished breath sounds   CV:  Regular rhythm, normal rate,S1,S2     GI:  Soft, non distended, non tender. normoactive bowel sounds    Musculoskeletal:  No  LE edema    Neurologic:  Moves all extremities.   AAOx3, CN II-XII reviewed     Psych: Not agitated; pleasant  Skin:  Poor turgor, no rashes or ulcers       Data Review:    Review and/or order of clinical lab test  Review and/or order of tests in the medicine section of [facility]      Labs:     Recent Labs     02/27/20 0458 02/26/20  0541   WBC 12.0* 8.1   HGB 10.0* 9.6*   HCT 30.0* 30.5*    200     Recent Labs     02/27/20 0458 02/26/20  0541 02/25/20  0515    136 138   K 3.9 4.0 3.6   * 107 107   CO2 26 23 23   BUN 33* 38* 38*   CREA 0.97 1.08* 1.18*   GLU 91 141* 150*   CA 9.0 9.0 9.4     No results for input(s): SGOT, GPT, ALT, AP, TBIL, TBILI, TP, ALB, GLOB, GGT, AML, LPSE in the last 72 hours. No lab exists for component: AMYP, HLPSE  No results for input(s): INR, PTP, APTT, INREXT, INREXT in the last 72 hours. No results for input(s): FE, TIBC, PSAT, FERR in the last 72 hours. Lab Results   Component Value Date/Time    Folate 10.9 07/24/2019 03:41 AM      No results for input(s): PH, PCO2, PO2 in the last 72 hours. No results for input(s): CPK, CKNDX, TROIQ in the last 72 hours.     No lab exists for component: CPKMB  Lab Results   Component Value Date/Time    Cholesterol, total 111 02/23/2020 03:28 AM    HDL Cholesterol 27 02/23/2020 03:28 AM    LDL, calculated 35.4 02/23/2020 03:28 AM    Triglyceride 243 (H) 02/23/2020 03:28 AM    CHOL/HDL Ratio 4.1 02/23/2020 03:28 AM     Lab Results   Component Value Date/Time    Glucose (POC) 193 (H) 02/27/2020 11:24 AM    Glucose (POC) 96 02/27/2020 07:26 AM    Glucose (POC) 200 (H) 02/26/2020 09:14 PM    Glucose (POC) 271 (H) 02/26/2020 05:12 PM    Glucose (POC) 172 (H) 02/26/2020 11:23 AM     Lab Results   Component Value Date/Time    Color YELLOW/STRAW 02/22/2020 06:17 AM    Appearance CLEAR 02/22/2020 06:17 AM    Specific gravity 1.015 02/22/2020 06:17 AM    pH (UA) 5.0 02/22/2020 06:17 AM    Protein NEGATIVE  02/22/2020 06:17 AM    Glucose NEGATIVE  02/22/2020 06:17 AM    Ketone NEGATIVE  02/22/2020 06:17 AM    Bilirubin NEGATIVE  02/22/2020 06:17 AM    Urobilinogen 0.2 02/22/2020 06:17 AM    Nitrites NEGATIVE  02/22/2020 06:17 AM    Leukocyte Esterase NEGATIVE  02/22/2020 06:17 AM Epithelial cells FEW 02/22/2020 06:17 AM    Bacteria NEGATIVE  02/22/2020 06:17 AM    WBC 0-4 02/22/2020 06:17 AM    RBC 0-5 02/22/2020 06:17 AM         Medications Reviewed:     Current Facility-Administered Medications   Medication Dose Route Frequency    benzonatate (TESSALON) capsule 100 mg  100 mg Oral TID    acetylcysteine (MUCOMYST) 200 mg/mL (20 %) solution 400 mg  400 mg Nebulization TID RT    lactated Ringers infusion  100 mL/hr IntraVENous CONTINUOUS    bisacodyL (DULCOLAX) tablet 10 mg  10 mg Oral NOW    calcium carbonate (TUMS) chewable tablet 200 mg [elemental]  200 mg Oral QID PRN    guaiFENesin-codeine (ROBITUSSIN AC) 100-10 mg/5 mL solution 10 mL  10 mL Oral Q6H PRN    predniSONE (DELTASONE) tablet 40 mg  40 mg Oral DAILY WITH BREAKFAST    polyethylene glycol (MIRALAX) packet 17 g  17 g Oral DAILY    albuterol-ipratropium (DUO-NEB) 2.5 MG-0.5 MG/3 ML  3 mL Nebulization TID RT    polyvinyl alcohol-povidone (NATURAL TEARS) 0.5-0.6 % ophthalmic solution 1 Drop  1 Drop Both Eyes PRN    zolpidem (AMBIEN) tablet 5 mg  5 mg Oral QHS PRN    arformoteroL (BROVANA) neb solution 15 mcg  15 mcg Nebulization BID RT    And    budesonide (PULMICORT) 500 mcg/2 ml nebulizer suspension  500 mcg Nebulization BID RT    insulin lispro (HUMALOG) injection   SubCUTAneous AC&HS    glucose chewable tablet 16 g  4 Tab Oral PRN    glucagon (GLUCAGEN) injection 1 mg  1 mg IntraMUSCular PRN    dextrose 10% infusion 0-250 mL  0-250 mL IntraVENous PRN    lisinopril (PRINIVIL, ZESTRIL) tablet 5 mg  5 mg Oral DAILY    acetaminophen (TYLENOL) tablet 650 mg  650 mg Oral Q4H PRN    albuterol-ipratropium (DUO-NEB) 2.5 MG-0.5 MG/3 ML  3 mL Nebulization Q4H PRN    aspirin delayed-release tablet 81 mg  81 mg Oral DAILY    atorvastatin (LIPITOR) tablet 80 mg  80 mg Oral DAILY    clopidogreL (PLAVIX) tablet 75 mg  75 mg Oral DAILY    NIFEdipine ER (PROCARDIA XL) tablet 90 mg  90 mg Oral DAILY    sertraline (ZOLOFT) tablet 100 mg  100 mg Oral QHS    sodium chloride (NS) flush 5-40 mL  5-40 mL IntraVENous Q8H    sodium chloride (NS) flush 5-40 mL  5-40 mL IntraVENous PRN    ondansetron (ZOFRAN) injection 4 mg  4 mg IntraVENous Q4H PRN    bisacodyL (DULCOLAX) tablet 5 mg  5 mg Oral DAILY PRN    heparin (porcine) injection 5,000 Units  5,000 Units SubCUTAneous Q8H    lactobac ac& pc-s.therm-b.anim (PETRA Q/RISAQUAD)  1 Cap Oral DAILY    cefTRIAXone (ROCEPHIN) 1 g in 0.9% sodium chloride (MBP/ADV) 50 mL  1 g IntraVENous Q24H    hydrALAZINE (APRESOLINE) 20 mg/mL injection 10 mg  10 mg IntraVENous Q6H PRN    carvediloL (COREG) tablet 6.25 mg  6.25 mg Oral BID WITH MEALS     ______________________________________________________________________  EXPECTED LENGTH OF STAY: 3d 19h  ACTUAL LENGTH OF STAY:          5                 Syl Sanchez NP

## 2020-02-27 NOTE — PROGRESS NOTES
Name: [facility]: [address]   : 1945 Admit Date: 2020   Phone: [phone]  Room: 211/   PCP: Charli Gates MD  MRN: [id number]   Date: 2020  Code: Full Code        HPI:      Breathing is better       2:41 PM       History was obtained from patient. I was asked by Mehran Hines MD to see Bhanu Dong in consultation for a chief complaint of shortness of breath. History of Present Illness: 69year old female with past medical history of copd who presented to [facility] with increasing shortness of breath and coughing. As per patient she went to [place] in 2019 where she developed double pneumonia and was admitted to [facility] and treated with abx. She came back on jannette day. She was slowly recovering well over 4 weeks this. 2 weeks back she went to a party where some people were sick with productive cough and a 3month old child was sick with RSV. Few days later she developed cough - dry, chest pain from coughing, shortness of breath - worsening but no fever, chills, night sweats. She was on her couch for 3 days hardly able to move and end of calling 911. Data reviewed:  CT Chest - clear parenchyma. Some right base bronchial thickening. Cr up. Pro BNP up  Echo normal ef.  RSV positive. Initially required BiPAP.     Past Medical History:   Diagnosis Date    CAD (coronary artery disease) 2019    stent D1 2019 ([place] [facility])    Hypertension     Pneumonia     MARY (renal artery stenosis) ([facility] [address] 2019    s/p right renal stent 2019 Catheryn )       Past Surgical History:   Procedure Laterality Date    CARDIAC SURG PROCEDURE UNLIST  2019    cardiac stents     HX APPENDECTOMY      HX BACK SURGERY      LUMBAR FUSION X2 SURGERIES    HX BACK SURGERY      CERVICAL X2    HX GI  2019    arterial stent in right kidney    HX HYSTERECTOMY      HX TONSILLECTOMY         Family History   Problem Relation Age of Onset    Heart Attack Father 47       Social History     Tobacco Use    Smoking status: Former Smoker     Packs/day: 0.50    Smokeless tobacco: Never Used   Substance Use Topics    Alcohol use:  Yes     Alcohol/week: 6.0 standard drinks     Types: 6 Glasses of wine per week       No Known Allergies    Current Facility-Administered Medications   Medication Dose Route Frequency    guaiFENesin-codeine (ROBITUSSIN AC) 100-10 mg/5 mL solution 10 mL  10 mL Oral Q6H PRN    predniSONE (DELTASONE) tablet 40 mg  40 mg Oral DAILY WITH BREAKFAST    polyethylene glycol (MIRALAX) packet 17 g  17 g Oral DAILY    azithromycin (ZITHROMAX) tablet 500 mg  500 mg Oral DAILY    polyethylene glycol (MIRALAX) packet 17 g  17 g Oral DAILY    albuterol-ipratropium (DUO-NEB) 2.5 MG-0.5 MG/3 ML  3 mL Nebulization TID RT    polyvinyl alcohol-povidone (NATURAL TEARS) 0.5-0.6 % ophthalmic solution 1 Drop  1 Drop Both Eyes PRN    benzonatate (TESSALON) capsule 100 mg  100 mg Oral TID PRN    zolpidem (AMBIEN) tablet 5 mg  5 mg Oral QHS PRN    arformoteroL (BROVANA) neb solution 15 mcg  15 mcg Nebulization BID RT    And    budesonide (PULMICORT) 500 mcg/2 ml nebulizer suspension  500 mcg Nebulization BID RT    insulin lispro (HUMALOG) injection   SubCUTAneous AC&HS    glucose chewable tablet 16 g  4 Tab Oral PRN    glucagon (GLUCAGEN) injection 1 mg  1 mg IntraMUSCular PRN    dextrose 10% infusion 0-250 mL  0-250 mL IntraVENous PRN    lisinopril (PRINIVIL, ZESTRIL) tablet 5 mg  5 mg Oral DAILY    acetaminophen (TYLENOL) tablet 650 mg  650 mg Oral Q4H PRN    albuterol-ipratropium (DUO-NEB) 2.5 MG-0.5 MG/3 ML  3 mL Nebulization Q4H PRN    aspirin delayed-release tablet 81 mg  81 mg Oral DAILY    atorvastatin (LIPITOR) tablet 80 mg  80 mg Oral DAILY    clopidogreL (PLAVIX) tablet 75 mg  75 mg Oral DAILY    NIFEdipine ER (PROCARDIA XL) tablet 90 mg  90 mg Oral DAILY    sertraline (ZOLOFT) tablet 100 mg  100 mg Oral QHS    sodium chloride (NS) flush 5-40 mL  5-40 mL IntraVENous Q8H    sodium chloride (NS) flush 5-40 mL  5-40 mL IntraVENous PRN    ondansetron (ZOFRAN) injection 4 mg  4 mg IntraVENous Q4H PRN    bisacodyL (DULCOLAX) tablet 5 mg  5 mg Oral DAILY PRN    heparin (porcine) injection 5,000 Units  5,000 Units SubCUTAneous Q8H    lactobac ac& pc-s.therm-b.anim (PETRA Q/RISAQUAD)  1 Cap Oral DAILY    cefTRIAXone (ROCEPHIN) 1 g in 0.9% sodium chloride (MBP/ADV) 50 mL  1 g IntraVENous Q24H    hydrALAZINE (APRESOLINE) 20 mg/mL injection 10 mg  10 mg IntraVENous Q6H PRN    carvediloL (COREG) tablet 6.25 mg  6.25 mg Oral BID WITH MEALS         REVIEW OF SYSTEMS   Negative except as stated in the HPI. Physical Exam:   Visit Vitals  /69 (BP 1 Location: Left arm, BP Patient Position: At rest)   Pulse 66   Temp 97.4 °F (36.3 °C)   Resp 20   Ht 5' 3\" (1.6 m)   Wt 67 kg (147 lb 12.8 oz)   SpO2 96%   BMI 26.18 kg/m²       General:  Alert, cooperative, no distress, appears stated age. Head:  Normocephalic, without obvious abnormality, atraumatic. Eyes:  Conjunctivae/corneas clear. PERRL, EOMs intact. Nose: Nares normal. Septum midline. Mucosa normal. No drainage or sinus tenderness. Throat: Lips, mucosa, and tongue normal. Teeth and gums normal.   Neck: Supple, symmetrical, trachea midline, no adenopathy. Lungs:   Wheeze        Heart:  Regular rate and rhythm, S1, S2 normal, no murmur. Abdomen:   Soft, non-tender. Bowel sounds normal.   Extremities: Extremities normal, atraumatic, no cyanosis or edema. Pulses: 2+ and symmetric all extremities.            Neurologic: Grossly nonfocal       Lab Results   Component Value Date/Time    Sodium 139 02/27/2020 04:58 AM    Potassium 3.9 02/27/2020 04:58 AM    Chloride 109 (H) 02/27/2020 04:58 AM    CO2 26 02/27/2020 04:58 AM    BUN 33 (H) 02/27/2020 04:58 AM    Creatinine 0.97 02/27/2020 04:58 AM    Glucose 91 02/27/2020 04:58 AM    Calcium 9.0 02/27/2020 04:58 AM    Magnesium 2.1 02/21/2020 10:25 PM    Phosphorus 3.0 02/22/2020 05:32 AM       Lab Results   Component Value Date/Time    WBC 12.0 (H) 02/27/2020 04:58 AM    HGB 10.0 (L) 02/27/2020 04:58 AM    PLATELET 608 06/30/8905 04:58 AM    .1 (H) 02/27/2020 04:58 AM       Lab Results   Component Value Date/Time    INR 1.0 06/24/2019 01:49 PM    aPTT 25.4 06/24/2019 01:49 PM    AST (SGOT) 23 02/23/2020 03:28 AM    Alk. phosphatase 71 02/23/2020 03:28 AM    Protein, total 7.8 02/23/2020 03:28 AM    Albumin 4.2 02/23/2020 03:28 AM    Globulin 3.6 02/23/2020 03:28 AM       Lab Results   Component Value Date/Time    Iron 87 09/13/2019 04:33 AM    TIBC 264 09/13/2019 04:33 AM    Iron % saturation 33 09/13/2019 04:33 AM    Ferritin 262 09/13/2019 04:33 AM       Lab Results   Component Value Date/Time    C-Reactive protein <0.29 07/18/2019 04:58 PM    TSH 4.10 (H) 02/22/2020 05:32 AM       Lab Results   Component Value Date/Time    Troponin-I, Qt. 0.06 (H) 02/22/2020 12:07 PM        Lab Results   Component Value Date/Time    Color YELLOW/STRAW 02/22/2020 06:17 AM    Appearance CLEAR 02/22/2020 06:17 AM    Specific gravity 1.015 02/22/2020 06:17 AM    pH (UA) 5.0 02/22/2020 06:17 AM    Protein NEGATIVE  02/22/2020 06:17 AM    Glucose NEGATIVE  02/22/2020 06:17 AM    Ketone NEGATIVE  02/22/2020 06:17 AM    Bilirubin NEGATIVE  02/22/2020 06:17 AM    Blood NEGATIVE  02/22/2020 06:17 AM    Urobilinogen 0.2 02/22/2020 06:17 AM    Nitrites NEGATIVE  02/22/2020 06:17 AM    Leukocyte Esterase NEGATIVE  02/22/2020 06:17 AM    WBC 0-4 02/22/2020 06:17 AM    RBC 0-5 02/22/2020 06:17 AM    Bacteria NEGATIVE  02/22/2020 06:17 AM       IMPRESSION:  ===========  -COPD exacerbation from RSV.  -RSV bronchiolitis. -Hypoxemic respiratory failure. -CAD with hx of stent.  -Renal artery stenosis with hx of stent.  -Hx of flash pulmonary edema with htn crises. PLAN:  =====  -Katlyn/ Bishop Narvaez / Sean Vicente.  May benefit from these nebs at discharge  -PO prednisone   -Incentive spirometry. -PT/OT.  -O2 supplementation with saturations above 90%   -follow up with Dr Jan Allen in 1-2 wks.        Radha Xiong PA-C

## 2020-02-27 NOTE — PROGRESS NOTES
Problem: Mobility Impaired (Adult and Pediatric)  Goal: *Acute Goals and Plan of Care (Insert Text)  Description  FUNCTIONAL STATUS PRIOR TO ADMISSION: Patient was independent and active without use of DME, but reports her portable owygen concentrator just prior to admission because of difficulty breathing. Michelle Gearing HOME SUPPORT PRIOR TO ADMISSION: The patient lived alone with one local friend, she reports. Physical Therapy Goals  Initiated 2/25/2020  1. Patient will move from supine to sit and sit to supine , scoot up and down and roll side to side in bed with independence within 7 day(s). 2.  Patient will transfer from bed to chair and chair to bed with independence using the least restrictive device within 7 day(s). 3.  Patient will perform sit to stand with independence within 7 day(s). 4.  Patient will ambulate with independence for 300 feet with the least restrictive device within 7 day(s). 5.  Patient will ascend/descend 2 stairs with no handrail(s) with independence within 7 day(s). Outcome: Progressing Towards Goal   PHYSICAL THERAPY TREATMENT  Patient: Radhika Man (69 y.o. female)  Date: 2/27/2020  Diagnosis: Bacterial pneumonia [J15.9]   Bacterial pneumonia       Precautions: Contact(and droplet)  Chart, physical therapy assessment, plan of care and goals were reviewed. ASSESSMENT  Patient continues with skilled PT services and is progressing slowly towards goals. Pt seen following RN clearance and OT session. Pt on 3 L NC at rest and reporting a JOE score of 7/10. Pt agreeable and motivated for transfers, gait training, education with vitals monitoring. Pt presents with limitations associated with imbalance, weakness, decreased endurance/activity tolerance, and forceful coughing resulting in exacerbated LOB while performing dynamic standing activity. Pt also presents with an initial steady decline in BP from supine to sit to stand; however, this rebounded with ambulation and activity. Pt appropriate for OOB to chair for all meals and walking to/from bathroom for toileting needs with assistance and supplemental O2. Recommend: walkin in halls with assistance and RW-pt unable to safely manage O2 tank and balance/RW    Next session: increased distance ambulated vs multiple reps with rest breaks as indicated; monitor SpO2 and BP-trial RW? Current Level of Function Impacting Discharge (mobility/balance): assist x1 with DME    Other factors to consider for discharge: pt lives alone and was indep PTA         PLAN :  Patient continues to benefit from skilled intervention to address the above impairments. Continue treatment per established plan of care. to address goals. Recommendation for discharge: (in order for the patient to meet his/her long term goals)  Physical therapy at least 2 days/week in the home pending significant progress and possibly support from friends at initial D/C    This discharge recommendation:  A follow-up discussion with the attending provider and/or case management is planned    IF patient discharges home will need the following DME: pt may benefit from DME pending progress with balance and safety; O2 tank may be required and will need to be carry-type if DME indicated        SUBJECTIVE:   Patient stated It's [SOB] pretty bad. .. but I am feeling better. ..    OBJECTIVE DATA SUMMARY:   Critical Behavior:  Neurologic State: Alert  Orientation Level: Oriented X4  Cognition: Appropriate decision making  Safety/Judgement: Awareness of environment, Home safety  Functional Mobility Training:    Transfers:  Sit to Stand: Supervision  Stand to Sit: Supervision  Bed to Chair: (NT)  Balance:  Sitting: Intact; Without support  Standing: Impaired; With support  Standing - Static: Fair  Standing - Dynamic : Fair  Ambulation/Gait Training:  Distance (ft): 45 Feet (ft)(x2 with multiple standing rest breaks and coughing/LOB)  Assistive Device: Gait belt(recommend RW trial next session)  Ambulation - Level of Assistance: Minimal assistance; Additional time        Gait Abnormalities: Decreased step clearance; Path deviations;Trunk sway increased(pt with +LOB using stepping mechanism to recover as well as reaching for object support)        Base of Support: Center of gravity altered; Widened     Speed/Nell: Pace decreased (<100 feet/min); Slow  Step Length: Right shortened;Left shortened    Therapeutic Exercises:   APs, LAQ, marching in chair  Pain Rating:  NT    Activity Tolerance:   Poor, desaturates with exertion and requires oxygen, requires rest breaks, requires frequent rest breaks, observed SOB with activity, and signs and symptoms of orthostatic hypotension  Please refer to the flowsheet for vital signs taken during this treatment.     After treatment patient left in no apparent distress:   Sitting in chair and Call bell within reach    COMMUNICATION/COLLABORATION:   The patients plan of care was discussed with: Registered Nurse    Li Baron   Time Calculation: 19 mins

## 2020-02-28 ENCOUNTER — APPOINTMENT (OUTPATIENT)
Dept: GENERAL RADIOLOGY | Age: 75
DRG: 193 | End: 2020-02-28
Attending: HOSPITALIST
Payer: MEDICARE

## 2020-02-28 LAB
ANION GAP SERPL CALC-SCNC: 4 MMOL/L (ref 5–15)
BASOPHILS # BLD: 0.1 K/UL (ref 0–0.1)
BASOPHILS NFR BLD: 1 % (ref 0–1)
BUN SERPL-MCNC: 28 MG/DL (ref 6–20)
BUN/CREAT SERPL: 27 (ref 12–20)
CALCIUM SERPL-MCNC: 8.8 MG/DL (ref 8.5–10.1)
CHLORIDE SERPL-SCNC: 109 MMOL/L (ref 97–108)
CO2 SERPL-SCNC: 26 MMOL/L (ref 21–32)
CREAT SERPL-MCNC: 1.04 MG/DL (ref 0.55–1.02)
DIFFERENTIAL METHOD BLD: ABNORMAL
EOSINOPHIL # BLD: 0.2 K/UL (ref 0–0.4)
EOSINOPHIL NFR BLD: 2 % (ref 0–7)
ERYTHROCYTE [DISTWIDTH] IN BLOOD BY AUTOMATED COUNT: 13.4 % (ref 11.5–14.5)
GLUCOSE BLD STRIP.AUTO-MCNC: 160 MG/DL (ref 65–100)
GLUCOSE BLD STRIP.AUTO-MCNC: 197 MG/DL (ref 65–100)
GLUCOSE BLD STRIP.AUTO-MCNC: 93 MG/DL (ref 65–100)
GLUCOSE SERPL-MCNC: 84 MG/DL (ref 65–100)
HCT VFR BLD AUTO: 29.3 % (ref 35–47)
HGB BLD-MCNC: 9.7 G/DL (ref 11.5–16)
IMM GRANULOCYTES # BLD AUTO: 0 K/UL
IMM GRANULOCYTES NFR BLD AUTO: 0 %
LYMPHOCYTES # BLD: 3.1 K/UL (ref 0.8–3.5)
LYMPHOCYTES NFR BLD: 28 % (ref 12–49)
MCH RBC QN AUTO: 34.5 PG (ref 26–34)
MCHC RBC AUTO-ENTMCNC: 33.1 G/DL (ref 30–36.5)
MCV RBC AUTO: 104.3 FL (ref 80–99)
METAMYELOCYTES NFR BLD MANUAL: 1 %
MONOCYTES # BLD: 0.9 K/UL (ref 0–1)
MONOCYTES NFR BLD: 8 % (ref 5–13)
NEUTS BAND NFR BLD MANUAL: 2 % (ref 0–6)
NEUTS SEG # BLD: 6.6 K/UL (ref 1.8–8)
NEUTS SEG NFR BLD: 58 % (ref 32–75)
NRBC # BLD: 0 K/UL (ref 0–0.01)
NRBC BLD-RTO: 0 PER 100 WBC
PLATELET # BLD AUTO: 211 K/UL (ref 150–400)
PMV BLD AUTO: 9.1 FL (ref 8.9–12.9)
POTASSIUM SERPL-SCNC: 3.9 MMOL/L (ref 3.5–5.1)
RBC # BLD AUTO: 2.81 M/UL (ref 3.8–5.2)
RBC MORPH BLD: ABNORMAL
SERVICE CMNT-IMP: ABNORMAL
SERVICE CMNT-IMP: ABNORMAL
SERVICE CMNT-IMP: NORMAL
SODIUM SERPL-SCNC: 139 MMOL/L (ref 136–145)
WBC # BLD AUTO: 11 K/UL (ref 3.6–11)

## 2020-02-28 PROCEDURE — 36415 COLL VENOUS BLD VENIPUNCTURE: CPT

## 2020-02-28 PROCEDURE — 65660000000 HC RM CCU STEPDOWN

## 2020-02-28 PROCEDURE — 74011636637 HC RX REV CODE- 636/637: Performed by: HOSPITALIST

## 2020-02-28 PROCEDURE — 74011000250 HC RX REV CODE- 250: Performed by: HOSPITALIST

## 2020-02-28 PROCEDURE — 74011250637 HC RX REV CODE- 250/637: Performed by: HOSPITALIST

## 2020-02-28 PROCEDURE — 77010033678 HC OXYGEN DAILY

## 2020-02-28 PROCEDURE — 85025 COMPLETE CBC W/AUTO DIFF WBC: CPT

## 2020-02-28 PROCEDURE — 74011000258 HC RX REV CODE- 258: Performed by: HOSPITALIST

## 2020-02-28 PROCEDURE — 74011250636 HC RX REV CODE- 250/636: Performed by: HOSPITALIST

## 2020-02-28 PROCEDURE — 74011250636 HC RX REV CODE- 250/636: Performed by: NURSE PRACTITIONER

## 2020-02-28 PROCEDURE — 94640 AIRWAY INHALATION TREATMENT: CPT

## 2020-02-28 PROCEDURE — 74011250637 HC RX REV CODE- 250/637: Performed by: NURSE PRACTITIONER

## 2020-02-28 PROCEDURE — 82962 GLUCOSE BLOOD TEST: CPT

## 2020-02-28 PROCEDURE — 74011250636 HC RX REV CODE- 250/636: Performed by: INTERNAL MEDICINE

## 2020-02-28 PROCEDURE — 97535 SELF CARE MNGMENT TRAINING: CPT

## 2020-02-28 PROCEDURE — 80048 BASIC METABOLIC PNL TOTAL CA: CPT

## 2020-02-28 PROCEDURE — 94760 N-INVAS EAR/PLS OXIMETRY 1: CPT

## 2020-02-28 PROCEDURE — 97116 GAIT TRAINING THERAPY: CPT | Performed by: PHYSICAL THERAPIST

## 2020-02-28 PROCEDURE — 77030029684 HC NEB SM VOL KT MONA -A

## 2020-02-28 PROCEDURE — 97530 THERAPEUTIC ACTIVITIES: CPT | Performed by: PHYSICAL THERAPIST

## 2020-02-28 PROCEDURE — 74011250637 HC RX REV CODE- 250/637: Performed by: INTERNAL MEDICINE

## 2020-02-28 PROCEDURE — 71045 X-RAY EXAM CHEST 1 VIEW: CPT

## 2020-02-28 RX ORDER — MAGNESIUM SULFATE 100 %
4 CRYSTALS MISCELLANEOUS AS NEEDED
Status: DISCONTINUED | OUTPATIENT
Start: 2020-02-28 | End: 2020-03-04 | Stop reason: HOSPADM

## 2020-02-28 RX ORDER — INSULIN LISPRO 100 [IU]/ML
INJECTION, SOLUTION INTRAVENOUS; SUBCUTANEOUS
Status: DISCONTINUED | OUTPATIENT
Start: 2020-02-28 | End: 2020-03-04 | Stop reason: HOSPADM

## 2020-02-28 RX ORDER — FLUTICASONE PROPIONATE 50 MCG
2 SPRAY, SUSPENSION (ML) NASAL DAILY
Status: DISCONTINUED | OUTPATIENT
Start: 2020-02-28 | End: 2020-03-04 | Stop reason: HOSPADM

## 2020-02-28 RX ORDER — IPRATROPIUM BROMIDE AND ALBUTEROL SULFATE 2.5; .5 MG/3ML; MG/3ML
3 SOLUTION RESPIRATORY (INHALATION)
Status: DISCONTINUED | OUTPATIENT
Start: 2020-02-28 | End: 2020-03-02

## 2020-02-28 RX ORDER — ENOXAPARIN SODIUM 100 MG/ML
40 INJECTION SUBCUTANEOUS EVERY 24 HOURS
Status: DISCONTINUED | OUTPATIENT
Start: 2020-02-29 | End: 2020-03-04 | Stop reason: HOSPADM

## 2020-02-28 RX ORDER — DEXTROSE MONOHYDRATE 100 MG/ML
0-250 INJECTION, SOLUTION INTRAVENOUS AS NEEDED
Status: DISCONTINUED | OUTPATIENT
Start: 2020-02-28 | End: 2020-02-28 | Stop reason: SDUPTHER

## 2020-02-28 RX ADMIN — GUAIFENESIN AND CODEINE PHOSPHATE 10 ML: 100; 10 SOLUTION ORAL at 04:53

## 2020-02-28 RX ADMIN — ACETYLCYSTEINE 400 MG: 200 SOLUTION ORAL; RESPIRATORY (INHALATION) at 08:34

## 2020-02-28 RX ADMIN — CLOPIDOGREL BISULFATE 75 MG: 75 TABLET, FILM COATED ORAL at 10:36

## 2020-02-28 RX ADMIN — PANTOPRAZOLE SODIUM 40 MG: 40 TABLET, DELAYED RELEASE ORAL at 06:26

## 2020-02-28 RX ADMIN — GUAIFENESIN AND CODEINE PHOSPHATE 10 ML: 100; 10 SOLUTION ORAL at 10:45

## 2020-02-28 RX ADMIN — ACETAMINOPHEN 650 MG: 325 TABLET ORAL at 10:37

## 2020-02-28 RX ADMIN — ZOLPIDEM TARTRATE 5 MG: 5 TABLET ORAL at 19:36

## 2020-02-28 RX ADMIN — HEPARIN SODIUM 5000 UNITS: 5000 INJECTION INTRAVENOUS; SUBCUTANEOUS at 04:38

## 2020-02-28 RX ADMIN — BUDESONIDE INHALATION 500 MCG: 0.5 SUSPENSION RESPIRATORY (INHALATION) at 08:34

## 2020-02-28 RX ADMIN — LISINOPRIL 5 MG: 5 TABLET ORAL at 10:36

## 2020-02-28 RX ADMIN — GUAIFENESIN AND CODEINE PHOSPHATE 10 ML: 100; 10 SOLUTION ORAL at 19:36

## 2020-02-28 RX ADMIN — SODIUM CHLORIDE, SODIUM LACTATE, POTASSIUM CHLORIDE, AND CALCIUM CHLORIDE 100 ML/HR: 600; 310; 30; 20 INJECTION, SOLUTION INTRAVENOUS at 18:04

## 2020-02-28 RX ADMIN — POLYETHYLENE GLYCOL 3350 17 G: 17 POWDER, FOR SOLUTION ORAL at 10:39

## 2020-02-28 RX ADMIN — IPRATROPIUM BROMIDE AND ALBUTEROL SULFATE 3 ML: .5; 3 SOLUTION RESPIRATORY (INHALATION) at 14:21

## 2020-02-28 RX ADMIN — SODIUM CHLORIDE, SODIUM LACTATE, POTASSIUM CHLORIDE, AND CALCIUM CHLORIDE 100 ML/HR: 600; 310; 30; 20 INJECTION, SOLUTION INTRAVENOUS at 04:39

## 2020-02-28 RX ADMIN — GUAIFENESIN AND CODEINE PHOSPHATE 10 ML: 100; 10 SOLUTION ORAL at 23:26

## 2020-02-28 RX ADMIN — NIFEDIPINE 90 MG: 60 TABLET, FILM COATED, EXTENDED RELEASE ORAL at 10:37

## 2020-02-28 RX ADMIN — ACETYLCYSTEINE 400 MG: 200 SOLUTION ORAL; RESPIRATORY (INHALATION) at 14:25

## 2020-02-28 RX ADMIN — ACETYLCYSTEINE 400 MG: 200 SOLUTION ORAL; RESPIRATORY (INHALATION) at 19:47

## 2020-02-28 RX ADMIN — CARVEDILOL 6.25 MG: 6.25 TABLET, FILM COATED ORAL at 06:25

## 2020-02-28 RX ADMIN — CARVEDILOL 6.25 MG: 6.25 TABLET, FILM COATED ORAL at 18:02

## 2020-02-28 RX ADMIN — CEFTRIAXONE 1 G: 1 INJECTION, POWDER, FOR SOLUTION INTRAMUSCULAR; INTRAVENOUS at 06:26

## 2020-02-28 RX ADMIN — IPRATROPIUM BROMIDE AND ALBUTEROL SULFATE 3 ML: .5; 3 SOLUTION RESPIRATORY (INHALATION) at 19:47

## 2020-02-28 RX ADMIN — PREDNISONE 40 MG: 20 TABLET ORAL at 06:26

## 2020-02-28 RX ADMIN — ENOXAPARIN SODIUM 40 MG: 40 INJECTION SUBCUTANEOUS at 23:24

## 2020-02-28 RX ADMIN — BUDESONIDE INHALATION 500 MCG: 0.5 SUSPENSION RESPIRATORY (INHALATION) at 19:49

## 2020-02-28 RX ADMIN — INSULIN LISPRO 2 UNITS: 100 INJECTION, SOLUTION INTRAVENOUS; SUBCUTANEOUS at 12:14

## 2020-02-28 RX ADMIN — FLUTICASONE PROPIONATE 2 SPRAY: 50 SPRAY, METERED NASAL at 12:13

## 2020-02-28 RX ADMIN — SERTRALINE HYDROCHLORIDE 100 MG: 50 TABLET ORAL at 21:20

## 2020-02-28 RX ADMIN — Medication 1 CAPSULE: at 10:37

## 2020-02-28 RX ADMIN — Medication 10 ML: at 06:27

## 2020-02-28 RX ADMIN — ATORVASTATIN CALCIUM 80 MG: 40 TABLET, FILM COATED ORAL at 10:36

## 2020-02-28 RX ADMIN — IPRATROPIUM BROMIDE AND ALBUTEROL SULFATE 3 ML: .5; 3 SOLUTION RESPIRATORY (INHALATION) at 23:23

## 2020-02-28 RX ADMIN — IPRATROPIUM BROMIDE AND ALBUTEROL SULFATE 3 ML: .5; 3 SOLUTION RESPIRATORY (INHALATION) at 08:34

## 2020-02-28 RX ADMIN — ASPIRIN 81 MG: 81 TABLET, COATED ORAL at 10:36

## 2020-02-28 RX ADMIN — Medication 10 ML: at 23:24

## 2020-02-28 NOTE — PROGRESS NOTES
[id number]  Hospitalist Group                                                                                          Hospitalist Progress Note  Yoan Larsen NP  Answering service: [phone] OR [phone] from in house phone        Date of Service:  2020  NAME:  Alexia Mars  :  1945  MRN:  [id number]      Admission Summary:   69 y.o. female with past medical history significant for s/p stent placement (August), HTN, and recurrent pneumonia (most recently in Dec 2019) presented to the ED from home via EMS with chief complaint of SOB, increased exertional and non exertional dyspnea w accessory muscle use and cough x three days despite use of albuterol and oral prednisone prescribed by another facility  On  for presumed bronchitis. CPAP was initiated in the ED. She tested + for RSV B. Pt is a former smoker. Interval history / Subjective:   Patient seen and examined. Cough and SOB are improving; Activity tolerance is improving w PT and OT. Able to speak in full sentences. Still requires nasal canula 3L/min during activity / ambulation. PT/OT are concerned about limitation in activity tolerance and unsteadiness and have discussed discharge to rehab w pt. Pt prefers to return home. Assessment & Plan:     # Acute hypoxemia respiratory failure Improving: likely from acute bronchitis /bronchiolitis  -continue to wean off oxygen as tolerated  -continue oral prednisone, Duonebs q 6 hr, Pulmicort/LABA  -echo normal EF and grade 1 diastolic CHF  -on ceftriaxone (end 3/1); end azithromycin  for presumed bacterial etiology based on CT findings :  \"Mild patchy airspace disease in the left lower lobe may represent early or  resolving pneumonia. \"  -Pulmonologist following:  will need OP PFTs and follow up. #HTN:  -BP controlled with nifedipine,coreg and lisinorpil.      #History of renal artery stenosis s/p rt renal artery stenting    #Coronary artery disease  -on aspirin,statin,Plavix,Coreg    #Chronic macrocytic anemia:  -Hb stable  -OP f/up    #CKD   -GFR fluctuating, likely r/to DM and HTN. Continue to observe and follow. OP f/up recommended. #Hyperglycemia:  POC glucose consistently <170mg/dl  -Likely steroid induced. -A1C 5.4 on 2/22  -Humalog SS modified to insulin resistant protocol. Code status: full    DVT prophylaxis: Lovenox     Care Plan discussed with: patient,nurse    Anticipated Disposition: Pt's preference is to return home    Anticipated Discharge: 2-3 days     Hospital Problems  Date Reviewed: 2/22/2020          Codes Class Noted POA    Coronary artery disease ICD-10-CM: I25.10  ICD-9-CM: 414.00  9/12/2019 Yes        * (Principal) Bacterial pneumonia ICD-10-CM: J15.9  ICD-9-CM: 482.9  2/22/2020 Yes        Renal artery stenosis ([facility] [address] ICD-10-CM: I70.1  ICD-9-CM: 440.1  8/20/2019 Yes        Flash pulmonary edema ([facility] [address] ICD-10-CM: J81.0  ICD-9-CM: 518.4  8/20/2019 Yes        Mixed hyperlipidemia ICD-10-CM: E78.2  ICD-9-CM: 272.2  11/16/2018 Yes        Renovascular hypertension ICD-10-CM: I15.0  ICD-9-CM: 405.91  11/16/2018 Yes        SOB (shortness of breath) ICD-10-CM: R06.02  ICD-9-CM: 786.05  11/16/2018 Yes                Review of Systems:     Review of Systems   Constitutional: Negative for chills and fever. HENT: Negative for sore throat. Respiratory: Positive for cough and shortness of breath. Negative for sputum production, wheezing and stridor. Cardiovascular: Negative for chest pain, orthopnea and leg swelling. Gastrointestinal: Negative for abdominal pain, constipation and diarrhea. Neurological: Positive for weakness. Negative for dizziness and headaches. Vital Signs:    Last 24hrs VS reviewed since prior progress note.  Most recent are:  Visit Vitals  /65 (BP 1 Location: Left arm, BP Patient Position: At rest)   Pulse 64   Temp 97.4 °F (36.3 °C)   Resp 17   Ht 5' 3\" (1.6 m)   Wt 68.3 kg (150 lb 8 oz)   SpO2 91%   BMI 26.66 kg/m²         Intake/Output Summary (Last 24 hours) at 2/28/2020 1642  Last data filed at 2/27/2020 1826  Gross per 24 hour   Intake 240 ml   Output    Net 240 ml        Physical Examination:             Constitutional: No acute distress   ENT:  Oral mucosa still dry but improved from yesterday. EOMI   Resp: Bibasilar crackles, slight exp wheeze. No diminished breath sounds   CV:  Regular rhythm, normal rate,S1,S2     GI:  Soft, non distended, non tender. normoactive bowel sounds    Musculoskeletal:  No  LE edema    Neurologic:  Moves all extremities. AAOx3, CN II-XII reviewed     Psych: Not agitated; pleasant  Skin:  Poor turgor, no rashes or ulcers       Data Review:    Review and/or order of clinical lab test  Review and/or order of tests in the medicine section of [facility]      Labs:     Recent Labs     02/28/20 0448 02/27/20 0458   WBC 11.0 12.0*   HGB 9.7* 10.0*   HCT 29.3* 30.0*    224     Recent Labs     02/28/20 0448 02/27/20 0458 02/26/20  0541    139 136   K 3.9 3.9 4.0   * 109* 107   CO2 26 26 23   BUN 28* 33* 38*   CREA 1.04* 0.97 1.08*   GLU 84 91 141*   CA 8.8 9.0 9.0     No results for input(s): SGOT, GPT, ALT, AP, TBIL, TBILI, TP, ALB, GLOB, GGT, AML, LPSE in the last 72 hours. No lab exists for component: AMYP, HLPSE  No results for input(s): INR, PTP, APTT, INREXT, INREXT in the last 72 hours. No results for input(s): FE, TIBC, PSAT, FERR in the last 72 hours. Lab Results   Component Value Date/Time    Folate 10.9 07/24/2019 03:41 AM      No results for input(s): PH, PCO2, PO2 in the last 72 hours. No results for input(s): CPK, CKNDX, TROIQ in the last 72 hours.     No lab exists for component: CPKMB  Lab Results   Component Value Date/Time    Cholesterol, total 111 02/23/2020 03:28 AM    HDL Cholesterol 27 02/23/2020 03:28 AM    LDL, calculated 35.4 02/23/2020 03:28 AM    Triglyceride 243 (H) 02/23/2020 03:28 AM    CHOL/HDL Ratio 4.1 02/23/2020 03:28 AM     Lab Results   Component Value Date/Time    Glucose (POC) 197 (H) 02/28/2020 03:52 PM    Glucose (POC) 160 (H) 02/28/2020 11:21 AM    Glucose (POC) 93 02/28/2020 07:16 AM    Glucose (POC) 190 (H) 02/27/2020 08:32 PM    Glucose (POC) 178 (H) 02/27/2020 04:56 PM     Lab Results   Component Value Date/Time    Color YELLOW/STRAW 02/22/2020 06:17 AM    Appearance CLEAR 02/22/2020 06:17 AM    Specific gravity 1.015 02/22/2020 06:17 AM    pH (UA) 5.0 02/22/2020 06:17 AM    Protein NEGATIVE  02/22/2020 06:17 AM    Glucose NEGATIVE  02/22/2020 06:17 AM    Ketone NEGATIVE  02/22/2020 06:17 AM    Bilirubin NEGATIVE  02/22/2020 06:17 AM    Urobilinogen 0.2 02/22/2020 06:17 AM    Nitrites NEGATIVE  02/22/2020 06:17 AM    Leukocyte Esterase NEGATIVE  02/22/2020 06:17 AM    Epithelial cells FEW 02/22/2020 06:17 AM    Bacteria NEGATIVE  02/22/2020 06:17 AM    WBC 0-4 02/22/2020 06:17 AM    RBC 0-5 02/22/2020 06:17 AM         Medications Reviewed:     Current Facility-Administered Medications   Medication Dose Route Frequency    albuterol-ipratropium (DUO-NEB) 2.5 MG-0.5 MG/3 ML  3 mL Nebulization Q4H RT    fluticasone propionate (FLONASE) 50 mcg/actuation nasal spray 2 Spray  2 Spray Both Nostrils DAILY    insulin lispro (HUMALOG) injection   SubCUTAneous AC&HS    glucose chewable tablet 16 g  4 Tab Oral PRN    glucagon (GLUCAGEN) injection 1 mg  1 mg IntraMUSCular PRN    dextrose 10% infusion 0-250 mL  0-250 mL IntraVENous PRN    [START ON 2/29/2020] enoxaparin (LOVENOX) injection 40 mg  40 mg SubCUTAneous Q24H    acetylcysteine (MUCOMYST) 200 mg/mL (20 %) solution 400 mg  400 mg Nebulization TID RT    lactated Ringers infusion  100 mL/hr IntraVENous CONTINUOUS    calcium carbonate (TUMS) chewable tablet 200 mg [elemental]  200 mg Oral QID PRN    pantoprazole (PROTONIX) tablet 40 mg  40 mg Oral ACB    guaiFENesin-codeine (ROBITUSSIN AC) 100-10 mg/5 mL solution 10 mL  10 mL Oral Q6H PRN    predniSONE (DELTASONE) tablet 40 mg  40 mg Oral DAILY WITH BREAKFAST    polyethylene glycol (MIRALAX) packet 17 g  17 g Oral DAILY    polyvinyl alcohol-povidone (NATURAL TEARS) 0.5-0.6 % ophthalmic solution 1 Drop  1 Drop Both Eyes PRN    zolpidem (AMBIEN) tablet 5 mg  5 mg Oral QHS PRN    arformoteroL (BROVANA) neb solution 15 mcg  15 mcg Nebulization BID RT    And    budesonide (PULMICORT) 500 mcg/2 ml nebulizer suspension  500 mcg Nebulization BID RT    glucose chewable tablet 16 g  4 Tab Oral PRN    glucagon (GLUCAGEN) injection 1 mg  1 mg IntraMUSCular PRN    dextrose 10% infusion 0-250 mL  0-250 mL IntraVENous PRN    lisinopril (PRINIVIL, ZESTRIL) tablet 5 mg  5 mg Oral DAILY    acetaminophen (TYLENOL) tablet 650 mg  650 mg Oral Q4H PRN    albuterol-ipratropium (DUO-NEB) 2.5 MG-0.5 MG/3 ML  3 mL Nebulization Q4H PRN    aspirin delayed-release tablet 81 mg  81 mg Oral DAILY    atorvastatin (LIPITOR) tablet 80 mg  80 mg Oral DAILY    clopidogreL (PLAVIX) tablet 75 mg  75 mg Oral DAILY    NIFEdipine ER (PROCARDIA XL) tablet 90 mg  90 mg Oral DAILY    sertraline (ZOLOFT) tablet 100 mg  100 mg Oral QHS    sodium chloride (NS) flush 5-40 mL  5-40 mL IntraVENous Q8H    sodium chloride (NS) flush 5-40 mL  5-40 mL IntraVENous PRN    ondansetron (ZOFRAN) injection 4 mg  4 mg IntraVENous Q4H PRN    bisacodyL (DULCOLAX) tablet 5 mg  5 mg Oral DAILY PRN    lactobac ac& pc-s.therm-b.anim (PETRA Q/RISAQUAD)  1 Cap Oral DAILY    cefTRIAXone (ROCEPHIN) 1 g in 0.9% sodium chloride (MBP/ADV) 50 mL  1 g IntraVENous Q24H    hydrALAZINE (APRESOLINE) 20 mg/mL injection 10 mg  10 mg IntraVENous Q6H PRN    carvediloL (COREG) tablet 6.25 mg  6.25 mg Oral BID WITH MEALS     ______________________________________________________________________  EXPECTED LENGTH OF STAY: 3d 19h  ACTUAL LENGTH OF STAY:          6                 Sandrita Shoemaker NP

## 2020-02-28 NOTE — PROGRESS NOTES
Problem: Mobility Impaired (Adult and Pediatric)  Goal: *Acute Goals and Plan of Care (Insert Text)  Description  FUNCTIONAL STATUS PRIOR TO ADMISSION: Patient was independent and active without use of DME, but reports her portable owygen concentrator just prior to admission because of difficulty breathing. Michael Lomeli HOME SUPPORT PRIOR TO ADMISSION: The patient lived alone with one local friend, she reports. Physical Therapy Goals  Initiated 2/25/2020  1. Patient will move from supine to sit and sit to supine , scoot up and down and roll side to side in bed with independence within 7 day(s). 2.  Patient will transfer from bed to chair and chair to bed with independence using the least restrictive device within 7 day(s). 3.  Patient will perform sit to stand with independence within 7 day(s). 4.  Patient will ambulate with independence for 300 feet with the least restrictive device within 7 day(s). 5.  Patient will ascend/descend 2 stairs with no handrail(s) with independence within 7 day(s). Outcome: Progressing Towards Goal   PHYSICAL THERAPY TREATMENT  Patient: Rahel Lugo (19 y.o. female)  Date: 2/28/2020  Diagnosis: Bacterial pneumonia [J15.9]   Bacterial pneumonia       Precautions: Contact(and droplet)  Chart, physical therapy assessment, plan of care and goals were reviewed. ASSESSMENT  Patient continues with skilled PT services and is progressing towards goals. Patient continues to be limited by SOB, impaired balance, and gait instability. Overall needing supervision for bed mobility and CGA for transfers. Has immediate posterior LOB in standing, has dizziness, poor activity tolerance and unstable during ambulation. Needing 3 L O2 during gait to maintain SpO2>90%. Needs frequent rest break secondary to fatigue and dizziness. At baseline she was completely independent and is currently functioning well below her baseline.   Could benefit from inpt rehab setting prior to DC but she is insistent that she will DC home. Minimally recommend HH PT as she lives alone. Other factors to consider for discharge: at risk for falls, functioning well below her independent baseline, at risk for falls, lives alone         PLAN :  Patient continues to benefit from skilled intervention to address the above impairments. Continue treatment per established plan of care. to address goals. Recommendation for discharge: (in order for the patient to meet his/her long term goals)  Therapy 3 hours per day 5-7 days per week (patient states she would prefer to DC home). Will minimally need HH PT      IF patient discharges home will need the following DME: may benefit from RW for stability       SUBJECTIVE:   Patient stated I don't want to do rehab. I just want to go home.     OBJECTIVE DATA SUMMARY:   Critical Behavior:  Neurologic State: Alert  Orientation Level: Oriented X4  Cognition: Appropriate decision making, Follows commands, Appropriate safety awareness, Appropriate for age attention/concentration  Safety/Judgement: Awareness of environment, Home safety  Functional Mobility Training:  Bed Mobility:     Supine to Sit: Supervision  Sit to Supine: Supervision           Transfers:  Sit to Stand: Contact guard assistance(initial posterior LOB)  Stand to Sit: Contact guard assistance                             Balance:  Sitting: Intact; Without support  Standing: Impaired; With support  Standing - Static: Constant support; Fair  Standing - Dynamic : Constant support; Fair  Ambulation/Gait Training:  Distance (ft): 50 Feet (ft)(x 3 with standing rest breaks intermittently)  Assistive Device: Gait belt; Other (comment)(HHA)  Ambulation - Level of Assistance: Minimal assistance;Assist x1        Gait Abnormalities: Decreased step clearance; Path deviations; Shuffling gait        Base of Support: Center of gravity altered;Narrowed     Speed/Nell: Pace decreased (<100 feet/min); Slow  Step Length: Left shortened;Right shortened       Pain Rating:  No c/o pain    Activity Tolerance:   Fair and requires rest breaks  Please refer to the flowsheet for vital signs taken during this treatment.     After treatment patient left in no apparent distress:   Sitting in chair, Call bell within reach, and Caregiver / family present    COMMUNICATION/COLLABORATION:   The patients plan of care was discussed with: Physical Therapist, Occupational Therapist, and Registered Nurse    Kelly Haynes PT, DPT   Time Calculation: 31 mins

## 2020-02-28 NOTE — PROGRESS NOTES
Name: [facility]: [address]   : 1945 Admit Date: 2020   Phone: [phone]  Room:    PCP: Kelsi Adame MD  MRN: [id number]   Date: 2020  Code: Full Code        HPI:    : Up walking around with PT  + cough and KIRAN      Breathing is better       2:41 PM       History was obtained from patient. I was asked by Viral Garcia MD to see Chantal Husain in consultation for a chief complaint of shortness of breath. History of Present Illness: 69year old female with past medical history of copd who presented to [address] [place] [address] with increasing shortness of breath and coughing. As per patient she went to [place] in 2019 where she developed double pneumonia and was admitted to [facility] and treated with abx. She came back on jannette day. She was slowly recovering well over 4 weeks this. 2 weeks back she went to a party where some people were sick with productive cough and a 3month old child was sick with RSV. Few days later she developed cough - dry, chest pain from coughing, shortness of breath - worsening but no fever, chills, night sweats. She was on her couch for 3 days hardly able to move and end of calling 911. Data reviewed:  CT Chest - clear parenchyma. Some right base bronchial thickening. Cr up. Pro BNP up  Echo normal ef.  RSV positive. Initially required BiPAP.     Past Medical History:   Diagnosis Date    CAD (coronary artery disease) 2019    stent D1 2019 ([facility] Quinones)    Hypertension     Pneumonia     MARY (renal artery stenosis) ([facility] [address] 2019    s/p right renal stent 2019 Alverimary Ugalde)       Past Surgical History:   Procedure Laterality Date    CARDIAC SURG PROCEDURE UNLIST  2019    cardiac stents     HX APPENDECTOMY      HX BACK SURGERY      LUMBAR FUSION X2 SURGERIES    HX BACK SURGERY      CERVICAL X2    HX GI  2019    arterial stent in right kidney    HX HYSTERECTOMY      HX TONSILLECTOMY Family History   Problem Relation Age of Onset    Heart Attack Father 47       Social History     Tobacco Use    Smoking status: Former Smoker     Packs/day: 0.50    Smokeless tobacco: Never Used   Substance Use Topics    Alcohol use:  Yes     Alcohol/week: 6.0 standard drinks     Types: 6 Glasses of wine per week       No Known Allergies    Current Facility-Administered Medications   Medication Dose Route Frequency    albuterol-ipratropium (DUO-NEB) 2.5 MG-0.5 MG/3 ML  3 mL Nebulization Q4H RT    benzonatate (TESSALON) capsule 100 mg  100 mg Oral TID    acetylcysteine (MUCOMYST) 200 mg/mL (20 %) solution 400 mg  400 mg Nebulization TID RT    lactated Ringers infusion  100 mL/hr IntraVENous CONTINUOUS    calcium carbonate (TUMS) chewable tablet 200 mg [elemental]  200 mg Oral QID PRN    pantoprazole (PROTONIX) tablet 40 mg  40 mg Oral ACB    guaiFENesin-codeine (ROBITUSSIN AC) 100-10 mg/5 mL solution 10 mL  10 mL Oral Q6H PRN    predniSONE (DELTASONE) tablet 40 mg  40 mg Oral DAILY WITH BREAKFAST    polyethylene glycol (MIRALAX) packet 17 g  17 g Oral DAILY    polyvinyl alcohol-povidone (NATURAL TEARS) 0.5-0.6 % ophthalmic solution 1 Drop  1 Drop Both Eyes PRN    zolpidem (AMBIEN) tablet 5 mg  5 mg Oral QHS PRN    arformoteroL (BROVANA) neb solution 15 mcg  15 mcg Nebulization BID RT    And    budesonide (PULMICORT) 500 mcg/2 ml nebulizer suspension  500 mcg Nebulization BID RT    insulin lispro (HUMALOG) injection   SubCUTAneous AC&HS    glucose chewable tablet 16 g  4 Tab Oral PRN    glucagon (GLUCAGEN) injection 1 mg  1 mg IntraMUSCular PRN    dextrose 10% infusion 0-250 mL  0-250 mL IntraVENous PRN    lisinopril (PRINIVIL, ZESTRIL) tablet 5 mg  5 mg Oral DAILY    acetaminophen (TYLENOL) tablet 650 mg  650 mg Oral Q4H PRN    albuterol-ipratropium (DUO-NEB) 2.5 MG-0.5 MG/3 ML  3 mL Nebulization Q4H PRN    aspirin delayed-release tablet 81 mg  81 mg Oral DAILY    atorvastatin (LIPITOR) tablet 80 mg  80 mg Oral DAILY    clopidogreL (PLAVIX) tablet 75 mg  75 mg Oral DAILY    NIFEdipine ER (PROCARDIA XL) tablet 90 mg  90 mg Oral DAILY    sertraline (ZOLOFT) tablet 100 mg  100 mg Oral QHS    sodium chloride (NS) flush 5-40 mL  5-40 mL IntraVENous Q8H    sodium chloride (NS) flush 5-40 mL  5-40 mL IntraVENous PRN    ondansetron (ZOFRAN) injection 4 mg  4 mg IntraVENous Q4H PRN    bisacodyL (DULCOLAX) tablet 5 mg  5 mg Oral DAILY PRN    heparin (porcine) injection 5,000 Units  5,000 Units SubCUTAneous Q8H    lactobac ac& pc-s.therm-b.anim (PETRA Q/RISAQUAD)  1 Cap Oral DAILY    cefTRIAXone (ROCEPHIN) 1 g in 0.9% sodium chloride (MBP/ADV) 50 mL  1 g IntraVENous Q24H    hydrALAZINE (APRESOLINE) 20 mg/mL injection 10 mg  10 mg IntraVENous Q6H PRN    carvediloL (COREG) tablet 6.25 mg  6.25 mg Oral BID WITH MEALS         REVIEW OF SYSTEMS   Negative except as stated in the HPI. Physical Exam:   Visit Vitals  /64 (BP 1 Location: Left arm, BP Patient Position: At rest)   Pulse 65   Temp 97.8 °F (36.6 °C)   Resp 18   Ht 5' 3\" (1.6 m)   Wt 68.3 kg (150 lb 8 oz)   SpO2 95%   BMI 26.66 kg/m²       General:  Alert, cooperative, no distress, appears stated age. Head:  Normocephalic, without obvious abnormality, atraumatic. Eyes:  Conjunctivae/corneas clear. PERRL, EOMs intact. Nose: Nares normal. Septum midline. Mucosa normal. No drainage or sinus tenderness. Throat: Lips, mucosa, and tongue normal. Teeth and gums normal.   Neck: Supple, symmetrical, trachea midline, no adenopathy. Lungs:    coarse    Heart:  . Abdomen:   Soft, non-tender. Bowel sounds normal.   Extremities: Extremities normal, atraumatic, no cyanosis or edema. Pulses: symmetric all extremities.            Neurologic: Grossly nonfocal       Lab Results   Component Value Date/Time    Sodium 139 02/28/2020 04:48 AM    Potassium 3.9 02/28/2020 04:48 AM    Chloride 109 (H) 02/28/2020 04:48 AM    CO2 26 02/28/2020 04:48 AM    BUN 28 (H) 02/28/2020 04:48 AM    Creatinine 1.04 (H) 02/28/2020 04:48 AM    Glucose 84 02/28/2020 04:48 AM    Calcium 8.8 02/28/2020 04:48 AM    Magnesium 2.1 02/21/2020 10:25 PM    Phosphorus 3.0 02/22/2020 05:32 AM       Lab Results   Component Value Date/Time    WBC 11.0 02/28/2020 04:48 AM    HGB 9.7 (L) 02/28/2020 04:48 AM    PLATELET 029 62/44/2197 04:48 AM    .3 (H) 02/28/2020 04:48 AM       Lab Results   Component Value Date/Time    INR 1.0 06/24/2019 01:49 PM    aPTT 25.4 06/24/2019 01:49 PM    AST (SGOT) 23 02/23/2020 03:28 AM    Alk. phosphatase 71 02/23/2020 03:28 AM    Protein, total 7.8 02/23/2020 03:28 AM    Albumin 4.2 02/23/2020 03:28 AM    Globulin 3.6 02/23/2020 03:28 AM       Lab Results   Component Value Date/Time    Iron 87 09/13/2019 04:33 AM    TIBC 264 09/13/2019 04:33 AM    Iron % saturation 33 09/13/2019 04:33 AM    Ferritin 262 09/13/2019 04:33 AM       Lab Results   Component Value Date/Time    C-Reactive protein <0.29 07/18/2019 04:58 PM    TSH 4.10 (H) 02/22/2020 05:32 AM       Lab Results   Component Value Date/Time    Troponin-I, Qt. 0.06 (H) 02/22/2020 12:07 PM        Lab Results   Component Value Date/Time    Color YELLOW/STRAW 02/22/2020 06:17 AM    Appearance CLEAR 02/22/2020 06:17 AM    Specific gravity 1.015 02/22/2020 06:17 AM    pH (UA) 5.0 02/22/2020 06:17 AM    Protein NEGATIVE  02/22/2020 06:17 AM    Glucose NEGATIVE  02/22/2020 06:17 AM    Ketone NEGATIVE  02/22/2020 06:17 AM    Bilirubin NEGATIVE  02/22/2020 06:17 AM    Blood NEGATIVE  02/22/2020 06:17 AM    Urobilinogen 0.2 02/22/2020 06:17 AM    Nitrites NEGATIVE  02/22/2020 06:17 AM    Leukocyte Esterase NEGATIVE  02/22/2020 06:17 AM    WBC 0-4 02/22/2020 06:17 AM    RBC 0-5 02/22/2020 06:17 AM    Bacteria NEGATIVE  02/22/2020 06:17 AM       IMPRESSION:  ===========  -COPD exacerbation from RSV.  -RSV bronchiolitis. -Hypoxemic respiratory failure.   -CAD with hx of stent.  -Renal artery stenosis with hx of stent.  -Hx of flash pulmonary edema with htn crises. PLAN:  =====  -Katlyn/ Nacho Lim / Alec Santos. May benefit from these nebs at discharge. Mucomyst now  -PO prednisone   -Incentive spirometry. -PT/OT.  -O2 supplementation with saturations above 90%   -follow up with Dr Lisa Quesada in 1-2 wks.  Can get full PFTs following discharge   PRN over the weekend       Jihan Whitfield PA-C

## 2020-02-28 NOTE — PROGRESS NOTES
KELLEN:  Per MD, pt not medically ready for discharge, may remain hospitalized over weekend. Pt is working with therapy and plan remains for home at discharge. Pt did share with therapy that she has used oxygen in the past at home and still has it.     NASIM Cooper

## 2020-02-28 NOTE — PROGRESS NOTES
Problem: Self Care Deficits Care Plan (Adult)  Goal: *Acute Goals and Plan of Care (Insert Text)  Description    FUNCTIONAL STATUS PRIOR TO ADMISSION: Patient was independent and active without use of DME.     HOME SUPPORT: The patient lived alone with friend to provide assistance. Occupational Therapy Goals  Initiated 2/25/2020  1. Patient will perform standing ADLs x 10 min with modified independence within 7 day(s). 2.  Patient will perform lower body dressing with modified independence within 7 day(s). 3.  Patient will perform bathing with modified independence within 7 day(s). 4.  Patient will perform toilet transfers with modified independence within 7 day(s). MET 2/27/2020  5. Patient will perform all aspects of toileting with modified independence within 7 day(s). MET 2/27/2020  6. Patient will utilize energy conservation techniques during functional activities with verbal cues within 7 day(s). Outcome: Progressing Towards Goal   OCCUPATIONAL THERAPY TREATMENT  Patient: Ender Medellin (85 y.o. female)  Date: 2/28/2020  Diagnosis: Bacterial pneumonia [J15.9]   Bacterial pneumonia       Precautions: Contact(and droplet)  Chart, occupational therapy assessment, plan of care, and goals were reviewed. ASSESSMENT  Patient continues with skilled OT services and is slowly progressing towards goals. Patient highly motivated to return to [facility] however is limited secondary to KIRAN, impaired balance and impaired activity tolerance. Ambulated to bathroom with CGA-min A secondary to posterior LOB initial standing. Requiring sitting to complete grooming tasks at sink secondary to impaired activity tolerance. BP stable sitting pre and post activity. Increased O2 from 2 to 3L with activity to maintain SpO2 >90%. 89% on 2L with extensive KIRAN. Increased to 94% with 3 L. Recommend IPR pulmonary however patient resistant and reports she will dc home.   If home she is high fall risk and recommend [facility] and [facility] services. Current Level of Function Impacting Discharge (ADLs): grooming seated with set up, toileting hygiene supervision    Other factors to consider for discharge: Patient lives home alone and was indep with ADL tasks PTA. At this time she is highly limited by activity tolerance with pulmonary function. Requiring multiple rest breaks and CGA-min A for ADL tasks. PLAN :  Patient continues to benefit from skilled intervention to address the above impairments. Continue treatment per established plan of care. to address goals. Recommend with staff: Up in chair with CGA-min A 2x/day, toileting in bathroom with min A. Recommend next OT session: standing ADL tasks, ECT ed    Recommendation for discharge: (in order for the patient to meet his/her long term goals)  Therapy 3 hours per day 5-7 days per week    This discharge recommendation:  Has been made in collaboration with the attending provider and/or case management    IF patient discharges home will need the following DME: oxygen?, BSC?, shower chair? SUBJECTIVE:   Patient stated I am not going to rehab, I am going home.     OBJECTIVE DATA SUMMARY:   Cognitive/Behavioral Status:                      Functional Mobility and Transfers for ADLs:  Bed Mobility:  Supine to Sit: Supervision  Sit to Supine: Supervision    Transfers:  Sit to Stand: Contact guard assistance(initial posterior LOB)  Functional Transfers  Toilet Transfer : Minimum assistance(impaired balance)       Balance:  Sitting: Intact; Without support  Standing: Impaired; With support  Standing - Static: Constant support; Fair  Standing - Dynamic : Constant support; Fair    ADL Intervention:   Patient instructed and indicated understanding the benefits of maintaining activity tolerance, functional mobility, and independence with self care tasks during acute stay  to ensure safe return home and to baseline.  Encouraged patient to increase frequency and duration OOB, be out of bed for all meals, perform daily ADLs (as approved by RN/MD regarding bathing etc), and performing functional mobility to/from bathroom. Provided education on energy conservation techniques in regards to ADL/IADL tasks. Discussion with examples of pacing, planning, completion of heavy activity during strongest part of day, seated vs standing, and asking for assistance as needed. Patient with good participation in discussion and fair understanding. Grooming  Washing Hands: Set-up; Supervision(sitting)  Brushing Teeth: Set-up; Supervision(sitting)    Lower Body Dressing Assistance  Shoes with Velcro: Supervision  Leg Crossed Method Used: Yes    Toileting  Bladder Hygiene: Supervision  Clothing Management: Contact guard assistance    Pain:  Denies pain    Activity Tolerance:   Fair, Poor, desaturates with exertion and requires oxygen, requires frequent rest breaks, and observed SOB with activity  Please refer to the flowsheet for vital signs taken during this treatment.     After treatment patient left in no apparent distress:   Sitting in chair, Call bell within reach, and Caregiver / family present    COMMUNICATION/COLLABORATION:   The patients plan of care was discussed with: Physical Therapist and Registered Nurse    Fredi Gallagher OT  Time Calculation: 34 mins

## 2020-02-28 NOTE — PROGRESS NOTES
Bedside shift change report given to Liberty Sosa RN (oncoming nurse) by Roselyn Gonzalez RN (offgoing nurse). Report included the following information SBAR, Kardex, MAR and Recent Results.

## 2020-02-29 ENCOUNTER — APPOINTMENT (OUTPATIENT)
Dept: GENERAL RADIOLOGY | Age: 75
DRG: 193 | End: 2020-02-29
Attending: HOSPITALIST
Payer: MEDICARE

## 2020-02-29 LAB
ALBUMIN SERPL-MCNC: 3.3 G/DL (ref 3.5–5)
ALBUMIN/GLOB SERPL: 1.1 {RATIO} (ref 1.1–2.2)
ALP SERPL-CCNC: 61 U/L (ref 45–117)
ALT SERPL-CCNC: 29 U/L (ref 12–78)
ANION GAP SERPL CALC-SCNC: 5 MMOL/L (ref 5–15)
AST SERPL-CCNC: 15 U/L (ref 15–37)
ATRIAL RATE: 66 BPM
BASOPHILS # BLD: 0 K/UL (ref 0–0.1)
BASOPHILS NFR BLD: 0 % (ref 0–1)
BILIRUB SERPL-MCNC: 0.3 MG/DL (ref 0.2–1)
BUN SERPL-MCNC: 23 MG/DL (ref 6–20)
BUN/CREAT SERPL: 23 (ref 12–20)
CALCIUM SERPL-MCNC: 8.9 MG/DL (ref 8.5–10.1)
CALCULATED P AXIS, ECG09: 39 DEGREES
CALCULATED R AXIS, ECG10: 26 DEGREES
CALCULATED T AXIS, ECG11: 35 DEGREES
CHLORIDE SERPL-SCNC: 108 MMOL/L (ref 97–108)
CK MB CFR SERPL CALC: 3 % (ref 0–2.5)
CK MB SERPL-MCNC: 1.4 NG/ML (ref 5–25)
CK SERPL-CCNC: 47 U/L (ref 26–192)
CO2 SERPL-SCNC: 25 MMOL/L (ref 21–32)
COMMENT, HOLDF: NORMAL
CREAT SERPL-MCNC: 0.99 MG/DL (ref 0.55–1.02)
DIAGNOSIS, 93000: NORMAL
DIFFERENTIAL METHOD BLD: ABNORMAL
EOSINOPHIL # BLD: 0.3 K/UL (ref 0–0.4)
EOSINOPHIL NFR BLD: 2 % (ref 0–7)
ERYTHROCYTE [DISTWIDTH] IN BLOOD BY AUTOMATED COUNT: 13.8 % (ref 11.5–14.5)
GLOBULIN SER CALC-MCNC: 2.9 G/DL (ref 2–4)
GLUCOSE BLD STRIP.AUTO-MCNC: 219 MG/DL (ref 65–100)
GLUCOSE BLD STRIP.AUTO-MCNC: 251 MG/DL (ref 65–100)
GLUCOSE BLD STRIP.AUTO-MCNC: 93 MG/DL (ref 65–100)
GLUCOSE SERPL-MCNC: 81 MG/DL (ref 65–100)
HCT VFR BLD AUTO: 29.7 % (ref 35–47)
HGB BLD-MCNC: 9.9 G/DL (ref 11.5–16)
IMM GRANULOCYTES # BLD AUTO: 0.2 K/UL (ref 0–0.04)
IMM GRANULOCYTES NFR BLD AUTO: 2 % (ref 0–0.5)
LYMPHOCYTES # BLD: 2.3 K/UL (ref 0.8–3.5)
LYMPHOCYTES NFR BLD: 19 % (ref 12–49)
MAGNESIUM SERPL-MCNC: 2.1 MG/DL (ref 1.6–2.4)
MCH RBC QN AUTO: 35.1 PG (ref 26–34)
MCHC RBC AUTO-ENTMCNC: 33.3 G/DL (ref 30–36.5)
MCV RBC AUTO: 105.3 FL (ref 80–99)
MONOCYTES # BLD: 1 K/UL (ref 0–1)
MONOCYTES NFR BLD: 8 % (ref 5–13)
NEUTS SEG # BLD: 8.2 K/UL (ref 1.8–8)
NEUTS SEG NFR BLD: 69 % (ref 32–75)
NRBC # BLD: 0 K/UL (ref 0–0.01)
NRBC BLD-RTO: 0 PER 100 WBC
P-R INTERVAL, ECG05: 154 MS
PHOSPHATE SERPL-MCNC: 3.9 MG/DL (ref 2.6–4.7)
PLATELET # BLD AUTO: 209 K/UL (ref 150–400)
PMV BLD AUTO: 9.2 FL (ref 8.9–12.9)
POTASSIUM SERPL-SCNC: 3.8 MMOL/L (ref 3.5–5.1)
PROT SERPL-MCNC: 6.2 G/DL (ref 6.4–8.2)
Q-T INTERVAL, ECG07: 410 MS
QRS DURATION, ECG06: 64 MS
QTC CALCULATION (BEZET), ECG08: 429 MS
RBC # BLD AUTO: 2.82 M/UL (ref 3.8–5.2)
SAMPLES BEING HELD,HOLD: NORMAL
SERVICE CMNT-IMP: ABNORMAL
SERVICE CMNT-IMP: ABNORMAL
SERVICE CMNT-IMP: NORMAL
SODIUM SERPL-SCNC: 138 MMOL/L (ref 136–145)
TROPONIN I SERPL-MCNC: <0.05 NG/ML
TROPONIN I SERPL-MCNC: <0.05 NG/ML
VENTRICULAR RATE, ECG03: 66 BPM
WBC # BLD AUTO: 12.1 K/UL (ref 3.6–11)

## 2020-02-29 PROCEDURE — 83735 ASSAY OF MAGNESIUM: CPT

## 2020-02-29 PROCEDURE — 74011000250 HC RX REV CODE- 250: Performed by: HOSPITALIST

## 2020-02-29 PROCEDURE — 93005 ELECTROCARDIOGRAM TRACING: CPT

## 2020-02-29 PROCEDURE — 71045 X-RAY EXAM CHEST 1 VIEW: CPT

## 2020-02-29 PROCEDURE — 82962 GLUCOSE BLOOD TEST: CPT

## 2020-02-29 PROCEDURE — 94640 AIRWAY INHALATION TREATMENT: CPT

## 2020-02-29 PROCEDURE — 77010033678 HC OXYGEN DAILY

## 2020-02-29 PROCEDURE — 74011636637 HC RX REV CODE- 636/637: Performed by: NURSE PRACTITIONER

## 2020-02-29 PROCEDURE — 74011636637 HC RX REV CODE- 636/637: Performed by: HOSPITALIST

## 2020-02-29 PROCEDURE — 82550 ASSAY OF CK (CPK): CPT

## 2020-02-29 PROCEDURE — 74011250637 HC RX REV CODE- 250/637: Performed by: HOSPITALIST

## 2020-02-29 PROCEDURE — 85025 COMPLETE CBC W/AUTO DIFF WBC: CPT

## 2020-02-29 PROCEDURE — 74011000258 HC RX REV CODE- 258: Performed by: HOSPITALIST

## 2020-02-29 PROCEDURE — 36415 COLL VENOUS BLD VENIPUNCTURE: CPT

## 2020-02-29 PROCEDURE — 80053 COMPREHEN METABOLIC PANEL: CPT

## 2020-02-29 PROCEDURE — 74011250637 HC RX REV CODE- 250/637: Performed by: INTERNAL MEDICINE

## 2020-02-29 PROCEDURE — 74011250636 HC RX REV CODE- 250/636: Performed by: HOSPITALIST

## 2020-02-29 PROCEDURE — 74011250637 HC RX REV CODE- 250/637: Performed by: NURSE PRACTITIONER

## 2020-02-29 PROCEDURE — 65660000000 HC RM CCU STEPDOWN

## 2020-02-29 PROCEDURE — 84484 ASSAY OF TROPONIN QUANT: CPT

## 2020-02-29 PROCEDURE — 84100 ASSAY OF PHOSPHORUS: CPT

## 2020-02-29 PROCEDURE — 74011250636 HC RX REV CODE- 250/636: Performed by: NURSE PRACTITIONER

## 2020-02-29 RX ORDER — MORPHINE SULFATE 2 MG/ML
1 INJECTION, SOLUTION INTRAMUSCULAR; INTRAVENOUS
Status: DISCONTINUED | OUTPATIENT
Start: 2020-02-29 | End: 2020-02-29

## 2020-02-29 RX ORDER — NITROGLYCERIN 0.4 MG/1
0.4 TABLET SUBLINGUAL AS NEEDED
Status: DISCONTINUED | OUTPATIENT
Start: 2020-02-29 | End: 2020-03-04 | Stop reason: HOSPADM

## 2020-02-29 RX ORDER — ALPRAZOLAM 0.5 MG/1
1 TABLET ORAL
Status: DISCONTINUED | OUTPATIENT
Start: 2020-02-29 | End: 2020-03-04 | Stop reason: HOSPADM

## 2020-02-29 RX ORDER — MORPHINE SULFATE 2 MG/ML
2 INJECTION, SOLUTION INTRAMUSCULAR; INTRAVENOUS
Status: DISCONTINUED | OUTPATIENT
Start: 2020-02-29 | End: 2020-03-04 | Stop reason: HOSPADM

## 2020-02-29 RX ADMIN — Medication 10 ML: at 23:34

## 2020-02-29 RX ADMIN — IPRATROPIUM BROMIDE AND ALBUTEROL SULFATE 3 ML: .5; 3 SOLUTION RESPIRATORY (INHALATION) at 15:39

## 2020-02-29 RX ADMIN — MORPHINE SULFATE 2 MG: 2 INJECTION, SOLUTION INTRAMUSCULAR; INTRAVENOUS at 23:34

## 2020-02-29 RX ADMIN — LISINOPRIL 5 MG: 5 TABLET ORAL at 11:13

## 2020-02-29 RX ADMIN — Medication 10 ML: at 18:33

## 2020-02-29 RX ADMIN — ENOXAPARIN SODIUM 40 MG: 40 INJECTION SUBCUTANEOUS at 23:34

## 2020-02-29 RX ADMIN — ATORVASTATIN CALCIUM 80 MG: 40 TABLET, FILM COATED ORAL at 11:14

## 2020-02-29 RX ADMIN — MORPHINE SULFATE 1 MG: 2 INJECTION, SOLUTION INTRAMUSCULAR; INTRAVENOUS at 14:07

## 2020-02-29 RX ADMIN — GUAIFENESIN AND CODEINE PHOSPHATE 10 ML: 100; 10 SOLUTION ORAL at 20:08

## 2020-02-29 RX ADMIN — CARVEDILOL 6.25 MG: 6.25 TABLET, FILM COATED ORAL at 07:02

## 2020-02-29 RX ADMIN — CEFTRIAXONE 1 G: 1 INJECTION, POWDER, FOR SOLUTION INTRAMUSCULAR; INTRAVENOUS at 07:03

## 2020-02-29 RX ADMIN — IPRATROPIUM BROMIDE AND ALBUTEROL SULFATE 3 ML: .5; 3 SOLUTION RESPIRATORY (INHALATION) at 13:16

## 2020-02-29 RX ADMIN — FLUTICASONE PROPIONATE 2 SPRAY: 50 SPRAY, METERED NASAL at 09:30

## 2020-02-29 RX ADMIN — Medication 10 ML: at 14:08

## 2020-02-29 RX ADMIN — Medication 10 ML: at 07:04

## 2020-02-29 RX ADMIN — IPRATROPIUM BROMIDE AND ALBUTEROL SULFATE 3 ML: .5; 3 SOLUTION RESPIRATORY (INHALATION) at 04:27

## 2020-02-29 RX ADMIN — PREDNISONE 40 MG: 20 TABLET ORAL at 07:02

## 2020-02-29 RX ADMIN — INSULIN LISPRO 4 UNITS: 100 INJECTION, SOLUTION INTRAVENOUS; SUBCUTANEOUS at 13:15

## 2020-02-29 RX ADMIN — ASPIRIN 81 MG: 81 TABLET, COATED ORAL at 11:13

## 2020-02-29 RX ADMIN — INSULIN LISPRO 7 UNITS: 100 INJECTION, SOLUTION INTRAVENOUS; SUBCUTANEOUS at 17:10

## 2020-02-29 RX ADMIN — METHYLPREDNISOLONE SODIUM SUCCINATE 40 MG: 40 INJECTION, POWDER, FOR SOLUTION INTRAMUSCULAR; INTRAVENOUS at 11:13

## 2020-02-29 RX ADMIN — ACETYLCYSTEINE 400 MG: 200 SOLUTION ORAL; RESPIRATORY (INHALATION) at 15:39

## 2020-02-29 RX ADMIN — CLOPIDOGREL BISULFATE 75 MG: 75 TABLET, FILM COATED ORAL at 11:13

## 2020-02-29 RX ADMIN — ALPRAZOLAM 1 MG: 0.5 TABLET ORAL at 18:32

## 2020-02-29 RX ADMIN — NIFEDIPINE 90 MG: 60 TABLET, FILM COATED, EXTENDED RELEASE ORAL at 11:14

## 2020-02-29 RX ADMIN — PANTOPRAZOLE SODIUM 40 MG: 40 TABLET, DELAYED RELEASE ORAL at 07:03

## 2020-02-29 RX ADMIN — CARVEDILOL 6.25 MG: 6.25 TABLET, FILM COATED ORAL at 17:10

## 2020-02-29 RX ADMIN — ACETAMINOPHEN 650 MG: 325 TABLET ORAL at 07:11

## 2020-02-29 RX ADMIN — GUAIFENESIN AND CODEINE PHOSPHATE 10 ML: 100; 10 SOLUTION ORAL at 13:14

## 2020-02-29 RX ADMIN — CALCIUM CARBONATE (ANTACID) CHEW TAB 500 MG 200 MG: 500 CHEW TAB at 02:18

## 2020-02-29 RX ADMIN — MORPHINE SULFATE 2 MG: 2 INJECTION, SOLUTION INTRAMUSCULAR; INTRAVENOUS at 18:32

## 2020-02-29 RX ADMIN — SODIUM CHLORIDE, SODIUM LACTATE, POTASSIUM CHLORIDE, AND CALCIUM CHLORIDE 100 ML/HR: 600; 310; 30; 20 INJECTION, SOLUTION INTRAVENOUS at 03:32

## 2020-02-29 RX ADMIN — IPRATROPIUM BROMIDE AND ALBUTEROL SULFATE 3 ML: .5; 3 SOLUTION RESPIRATORY (INHALATION) at 07:58

## 2020-02-29 RX ADMIN — METHYLPREDNISOLONE SODIUM SUCCINATE 40 MG: 40 INJECTION, POWDER, FOR SOLUTION INTRAMUSCULAR; INTRAVENOUS at 14:06

## 2020-02-29 RX ADMIN — BUDESONIDE INHALATION 500 MCG: 0.5 SUSPENSION RESPIRATORY (INHALATION) at 07:58

## 2020-02-29 RX ADMIN — Medication 1 CAPSULE: at 11:13

## 2020-02-29 RX ADMIN — POLYETHYLENE GLYCOL 3350 17 G: 17 POWDER, FOR SOLUTION ORAL at 11:13

## 2020-02-29 RX ADMIN — GUAIFENESIN AND CODEINE PHOSPHATE 5 ML: 100; 10 SOLUTION ORAL at 07:02

## 2020-02-29 RX ADMIN — METHYLPREDNISOLONE SODIUM SUCCINATE 40 MG: 40 INJECTION, POWDER, FOR SOLUTION INTRAMUSCULAR; INTRAVENOUS at 23:34

## 2020-02-29 RX ADMIN — SODIUM CHLORIDE, SODIUM LACTATE, POTASSIUM CHLORIDE, AND CALCIUM CHLORIDE 100 ML/HR: 600; 310; 30; 20 INJECTION, SOLUTION INTRAVENOUS at 23:25

## 2020-02-29 RX ADMIN — ZOLPIDEM TARTRATE 5 MG: 5 TABLET ORAL at 20:08

## 2020-02-29 RX ADMIN — ACETYLCYSTEINE 400 MG: 200 SOLUTION ORAL; RESPIRATORY (INHALATION) at 07:58

## 2020-02-29 NOTE — PROGRESS NOTES
@1430 Pt c/o Chest pain. She stated that it was crushing, stabbing, and radiating to her jaw. Rated pain 8/10. Pt states that she has received nitropaste in the past while in the hospital.  PTA med list does not contain this medication. RN initiated Rapid Response as patient's sx were different than her previous complaints. Received orders for EKG and labs. Will continue to monitor.

## 2020-02-29 NOTE — PROGRESS NOTES
Rapid Response Documentation    Name: Chantal Husain  YOB: 1945  MRN: [id number]  Admission Date: 2/21/2020 10:12 PM      Date of service: 2/29/2020    Active Diagnoses:    Hospital Problems  Date Reviewed: 2/22/2020          Codes Class Noted POA    Coronary artery disease ICD-10-CM: I25.10  ICD-9-CM: 414.00  9/12/2019 Yes        * (Principal) Bacterial pneumonia ICD-10-CM: J15.9  ICD-9-CM: 482.9  2/22/2020 Yes        Renal artery stenosis ([facility] ICD-10-CM: I70.1  ICD-9-CM: 440.1  8/20/2019 Yes        Flash pulmonary edema ([facility] ICD-10-CM: J81.0  ICD-9-CM: 518.4  8/20/2019 Yes        Mixed hyperlipidemia ICD-10-CM: E78.2  ICD-9-CM: 272.2  11/16/2018 Yes        Renovascular hypertension ICD-10-CM: I15.0  ICD-9-CM: 405.91  11/16/2018 Yes        SOB (shortness of breath) ICD-10-CM: R06.02  ICD-9-CM: 786.05  11/16/2018 Yes              Chief Complaint:  Chest pain    Clinical Presentation:  Rapid response called for epigastric abdominal pain with sudden onset ~ 0630 this morning. States pain is pressure, radiating to right side jaw, improving. Denies worsening shortness of breath, nausea, vomiting, diaphoresis, or palpitations. Indigestion earlier this evening, improved with Tums. Prior cardiac history includes stent placement in August, HTN. Physical Exam:   Physical Exam  Vitals signs and nursing note reviewed. Constitutional:       General: She is not in acute distress. Appearance: She is well-developed. She is not diaphoretic. Cardiovascular:      Rate and Rhythm: Normal rate and regular rhythm. Heart sounds: Normal heart sounds. No murmur. No friction rub. No gallop. Pulmonary:      Effort: Pulmonary effort is normal. No respiratory distress. Breath sounds: Wheezing present. No rales. Abdominal:      General: Bowel sounds are normal.      Palpations: Abdomen is soft. Tenderness: There is no abdominal tenderness. Skin:     General: Skin is warm and dry.       Capillary Refill: Capillary refill takes less than 2 seconds. Coloration: Skin is not pale. Findings: No erythema or rash. Neurological:      Mental Status: She is alert and oriented to person, place, and time. Data Reviewed: All diagnostic labs and studies have been reviewed. /72   Pulse 72   Temp 98 °F (36.7 °C)   Resp 20   Ht 5' 3\" (1.6 m)   Wt 68.3 kg (150 lb 8 oz)   SpO2 92%   BMI 26.66 kg/m²    EKG: normal EKG, normal sinus rhythm, unchanged from previous tracings.  Rate 66    Assessment and Plan:  Epigastric chest pain  - Significant cardiac history, will check troponin, EKG unchanged from prior  - Continue remote tele monitoring  - Pain resolved without intervention    Simba CRUZ-C, PA-C  2/29/2020

## 2020-02-29 NOTE — PROGRESS NOTES
Pt c/o chest discomfort. She states that it started in her stomach then moved to her chest.  Denied burning, but states that she has felt this type of discomfort at home, and she waits until it subsides. RN asked if she felt as though her stomach was upset or she needed to burp. She denied upset stomach, but stated that she felt like she had to burp. Within seconds, discomfort subsided, and RN suggested that pt take Tums. Pt agreed, medication administered. Will continue to monitor.

## 2020-02-29 NOTE — PROGRESS NOTES
I have seen and examined patient along with ANNIA Ho. Patient still with significant dyspnea,cough. Increased duo neb to [id number]. Tessalon pears did not help,guaifenesin w/codeine helping. See the note by ANNIA Ho for other details.

## 2020-02-29 NOTE — PROGRESS NOTES
[facility]  Hospitalist Group                                                                                          Hospitalist Progress Note  Namita Mckenna MD  Answering service: [phone] OR [phone] from in house phone        Date of Service:  2020  NAME:  Rosalea Bloch  :  1945  MRN:  [id number]      Admission Summary:   69 y.o. female with past medical history significant for s/p stent placement (August), HTN, and recurrent pneumonia (most recently in Dec 2019) presented to the ED from home via EMS with chief complaint of SOB, increased exertional and non exertional dyspnea w accessory muscle use and cough x three days despite use of albuterol and oral prednisone prescribed by another facility  On  for presumed bronchitis. CPAP was initiated in the ED. She tested + for RSV B. Pt is a former smoker. Interval history / Subjective:   Patient seen and examined. Over night events noted. Patient had chest pain and RRT was called. Pain resolved w/o intervention. EKG unchanged,initila trop negative  -complains of lower mid chest/epigastric pain,still with significant SOB especially on exertion.  -         Assessment & Plan:     # Acute hypoxemia respiratory failure Improving: likely from acute bronchitis /bronchiolitis,superimposed bacterial pnemuonia  -Slow progress thus far. Switch prednisone to solumedrol. Intensify bronchodilators,  -On oxygen with NC  -echo normal EF and grade 1 diastolic CHF  -on ceftriaxone (end 3/1); end azithromycin  for presumed bacterial etiology based on CT findings :  \"Mild patchy airspace disease in the left lower lobe may represent early or  resolving pneumonia. \"  -Pulmonologist following:  will need OP PFTs and follow up. #Chest pain,could likely be msk due to severe cough,however ,she has cardiac risk,would cycle enzymes and EKG. Pain resolved w/o intervention. First set of cardiac enzymes and ekg unremarkable.   -CXR     #HTN:  -BP controlled with nifedipine,coreg and lisinorpil. #History of renal artery stenosis s/p rt renal artery stenting    #Coronary artery disease  -on aspirin,statin,Plavix,Coreg    #Chronic macrocytic anemia:  -Hb stable  -OP f/up    #CKD   -GFR fluctuating, likely r/to DM and HTN. Continue to observe and follow. OP f/up recommended. #Hyperglycemia:  POC glucose consistently <170mg/dl  -Likely steroid induced. -A1C 5.4 on 2/22  -Humalog SS modified to insulin resistant protocol. Code status: full    DVT prophylaxis: Lovenox     Care Plan discussed with: patient,nurse    Anticipated Disposition: Pt's preference is to return home    Anticipated Discharge: 2-3 days     Hospital Problems  Date Reviewed: 2/22/2020          Codes Class Noted POA    Coronary artery disease ICD-10-CM: I25.10  ICD-9-CM: 414.00  9/12/2019 Yes        * (Principal) Bacterial pneumonia ICD-10-CM: J15.9  ICD-9-CM: 482.9  2/22/2020 Yes        Renal artery stenosis ([facility] ICD-10-CM: I70.1  ICD-9-CM: 440.1  8/20/2019 Yes        Flash pulmonary edema ([facility] ICD-10-CM: J81.0  ICD-9-CM: 518.4  8/20/2019 Yes        Mixed hyperlipidemia ICD-10-CM: E78.2  ICD-9-CM: 272.2  11/16/2018 Yes        Renovascular hypertension ICD-10-CM: I15.0  ICD-9-CM: 405.91  11/16/2018 Yes        SOB (shortness of breath) ICD-10-CM: R06.02  ICD-9-CM: 786.05  11/16/2018 Yes                Review of Systems:   +sob,chest pain. Vital Signs:    Last 24hrs VS reviewed since prior progress note. Most recent are:  Visit Vitals  /53 (BP 1 Location: Right arm, BP Patient Position: At rest)   Pulse 65   Temp 97.8 °F (36.6 °C)   Resp 18   Ht 5' 3\" (1.6 m)   Wt 68.3 kg (150 lb 8 oz)   SpO2 94%   BMI 26.66 kg/m²       No intake or output data in the 24 hours ending 02/29/20 0968     Physical Examination:             Constitutional: Sitting up in the bed. Mildly tachy. ENT:  Oral mucosa still dry but improved from yesterday. EOMI   Resp: Mild tachy. On oxygen via nasal canula. Bibasilar crackles, slight exp wheeze. No diminished breath sounds   CV:  Regular rhythm, normal rate,S1,S2     GI:  Soft, non distended, non tender. normoactive bowel sounds  Sc bruises from sc heparin injections. Musculoskeletal:  No  LE edema    Neurologic:  Moves all extremities. AAOx3, CN II-XII reviewed     Psych: Not agitated; pleasant  Skin:  Poor turgor, no rashes or ulcers       Data Review:    Review and/or order of clinical lab test  Review and/or order of tests in the medicine section of [facility]      Labs:     Recent Labs     02/29/20  0655 02/28/20 0448   WBC 12.1* 11.0   HGB 9.9* 9.7*   HCT 29.7* 29.3*    211     Recent Labs     02/29/20  0656 02/28/20 0448 02/27/20  0458    139 139   K 3.8 3.9 3.9    109* 109*   CO2 25 26 26   BUN 23* 28* 33*   CREA 0.99 1.04* 0.97   GLU 81 84 91   CA 8.9 8.8 9.0   MG 2.1  --   --    PHOS 3.9  --   --      Recent Labs     02/29/20 0656   SGOT 15   ALT 29   AP 61   TBILI 0.3   TP 6.2*   ALB 3.3*   GLOB 2.9     No results for input(s): INR, PTP, APTT, INREXT, INREXT in the last 72 hours. No results for input(s): FE, TIBC, PSAT, FERR in the last 72 hours. Lab Results   Component Value Date/Time    Folate 10.9 07/24/2019 03:41 AM      No results for input(s): PH, PCO2, PO2 in the last 72 hours.   Recent Labs     02/29/20 0656   TROIQ <0.05     Lab Results   Component Value Date/Time    Cholesterol, total 111 02/23/2020 03:28 AM    HDL Cholesterol 27 02/23/2020 03:28 AM    LDL, calculated 35.4 02/23/2020 03:28 AM    Triglyceride 243 (H) 02/23/2020 03:28 AM    CHOL/HDL Ratio 4.1 02/23/2020 03:28 AM     Lab Results   Component Value Date/Time    Glucose (POC) 93 02/29/2020 06:45 AM    Glucose (POC) 197 (H) 02/28/2020 03:52 PM    Glucose (POC) 160 (H) 02/28/2020 11:21 AM    Glucose (POC) 93 02/28/2020 07:16 AM    Glucose (POC) 190 (H) 02/27/2020 08:32 PM     Lab Results   Component Value Date/Time    Color YELLOW/STRAW 02/22/2020 06:17 AM Appearance CLEAR 02/22/2020 06:17 AM    Specific gravity 1.015 02/22/2020 06:17 AM    pH (UA) 5.0 02/22/2020 06:17 AM    Protein NEGATIVE  02/22/2020 06:17 AM    Glucose NEGATIVE  02/22/2020 06:17 AM    Ketone NEGATIVE  02/22/2020 06:17 AM    Bilirubin NEGATIVE  02/22/2020 06:17 AM    Urobilinogen 0.2 02/22/2020 06:17 AM    Nitrites NEGATIVE  02/22/2020 06:17 AM    Leukocyte Esterase NEGATIVE  02/22/2020 06:17 AM    Epithelial cells FEW 02/22/2020 06:17 AM    Bacteria NEGATIVE  02/22/2020 06:17 AM    WBC 0-4 02/22/2020 06:17 AM    RBC 0-5 02/22/2020 06:17 AM         Medications Reviewed:     Current Facility-Administered Medications   Medication Dose Route Frequency    methylPREDNISolone (PF) (SOLU-MEDROL) injection 40 mg  40 mg IntraVENous Q8H    nitroglycerin (NITROSTAT) tablet 0.4 mg  0.4 mg SubLINGual PRN    morphine injection 1 mg  1 mg IntraVENous Q4H PRN    albuterol-ipratropium (DUO-NEB) 2.5 MG-0.5 MG/3 ML  3 mL Nebulization Q4H RT    fluticasone propionate (FLONASE) 50 mcg/actuation nasal spray 2 Spray  2 Spray Both Nostrils DAILY    insulin lispro (HUMALOG) injection   SubCUTAneous AC&HS    glucose chewable tablet 16 g  4 Tab Oral PRN    enoxaparin (LOVENOX) injection 40 mg  40 mg SubCUTAneous Q24H    acetylcysteine (MUCOMYST) 200 mg/mL (20 %) solution 400 mg  400 mg Nebulization TID RT    lactated Ringers infusion  100 mL/hr IntraVENous CONTINUOUS    calcium carbonate (TUMS) chewable tablet 200 mg [elemental]  200 mg Oral QID PRN    pantoprazole (PROTONIX) tablet 40 mg  40 mg Oral ACB    guaiFENesin-codeine (ROBITUSSIN AC) 100-10 mg/5 mL solution 10 mL  10 mL Oral Q6H PRN    polyethylene glycol (MIRALAX) packet 17 g  17 g Oral DAILY    polyvinyl alcohol-povidone (NATURAL TEARS) 0.5-0.6 % ophthalmic solution 1 Drop  1 Drop Both Eyes PRN    zolpidem (AMBIEN) tablet 5 mg  5 mg Oral QHS PRN    arformoteroL (BROVANA) neb solution 15 mcg  15 mcg Nebulization BID RT    And    budesonide (PULMICORT) 500 mcg/2 ml nebulizer suspension  500 mcg Nebulization BID RT    glucagon (GLUCAGEN) injection 1 mg  1 mg IntraMUSCular PRN    dextrose 10% infusion 0-250 mL  0-250 mL IntraVENous PRN    lisinopril (PRINIVIL, ZESTRIL) tablet 5 mg  5 mg Oral DAILY    acetaminophen (TYLENOL) tablet 650 mg  650 mg Oral Q4H PRN    albuterol-ipratropium (DUO-NEB) 2.5 MG-0.5 MG/3 ML  3 mL Nebulization Q4H PRN    aspirin delayed-release tablet 81 mg  81 mg Oral DAILY    atorvastatin (LIPITOR) tablet 80 mg  80 mg Oral DAILY    clopidogreL (PLAVIX) tablet 75 mg  75 mg Oral DAILY    NIFEdipine ER (PROCARDIA XL) tablet 90 mg  90 mg Oral DAILY    sertraline (ZOLOFT) tablet 100 mg  100 mg Oral QHS    sodium chloride (NS) flush 5-40 mL  5-40 mL IntraVENous Q8H    sodium chloride (NS) flush 5-40 mL  5-40 mL IntraVENous PRN    ondansetron (ZOFRAN) injection 4 mg  4 mg IntraVENous Q4H PRN    bisacodyL (DULCOLAX) tablet 5 mg  5 mg Oral DAILY PRN    lactobac ac& pc-s.therm-b.anim (PETRA Q/RISAQUAD)  1 Cap Oral DAILY    cefTRIAXone (ROCEPHIN) 1 g in 0.9% sodium chloride (MBP/ADV) 50 mL  1 g IntraVENous Q24H    hydrALAZINE (APRESOLINE) 20 mg/mL injection 10 mg  10 mg IntraVENous Q6H PRN    carvediloL (COREG) tablet 6.25 mg  6.25 mg Oral BID WITH MEALS     ______________________________________________________________________  EXPECTED LENGTH OF STAY: 3d 19h  ACTUAL LENGTH OF STAY:          7                 Mana Andersen MD

## 2020-02-29 NOTE — PROGRESS NOTES
Problem: Falls - Risk of  Goal: *Absence of Falls  Description  Document [facility] Fall Risk and appropriate interventions in the flowsheet.   Outcome: Progressing Towards Goal  Note: Fall Risk Interventions:  Mobility Interventions: Patient to call before getting OOB, Strengthening exercises (ROM-active/passive)    Mentation Interventions: Bed/chair exit alarm    Medication Interventions: Bed/chair exit alarm    Elimination Interventions: Call light in reach              Problem: Patient Education: Go to Patient Education Activity  Goal: Patient/Family Education  Outcome: Progressing Towards Goal     Problem: Gas Exchange - Impaired  Goal: *Absence of hypoxia  Outcome: Progressing Towards Goal     Problem: Patient Education: Go to Patient Education Activity  Goal: Patient/Family Education  Outcome: Progressing Towards Goal     Problem: Pneumonia: Day 1  Goal: Off Pathway (Use only if patient is Off Pathway)  Outcome: Progressing Towards Goal  Goal: Activity/Safety  Outcome: Progressing Towards Goal  Goal: Consults, if ordered  Outcome: Progressing Towards Goal  Goal: Diagnostic Test/Procedures  Outcome: Progressing Towards Goal  Goal: Nutrition/Diet  Outcome: Progressing Towards Goal  Goal: Medications  Outcome: Progressing Towards Goal  Goal: Respiratory  Outcome: Progressing Towards Goal  Goal: Treatments/Interventions/Procedures  Outcome: Progressing Towards Goal  Goal: Psychosocial  Outcome: Progressing Towards Goal  Goal: *Oxygen saturation within defined limits  Outcome: Progressing Towards Goal  Goal: *Influenza vaccine administered (October-March)  Outcome: Progressing Towards Goal  Goal: *Pneumoccocal vaccine administered  Outcome: Progressing Towards Goal  Goal: *Hemodynamically stable  Outcome: Progressing Towards Goal  Goal: *Demonstrates progressive activity  Outcome: Progressing Towards Goal  Goal: *Tolerating diet  Outcome: Progressing Towards Goal     Problem: Pneumonia: Day 2  Goal: Off Pathway (Use only if patient is Off Pathway)  Outcome: Progressing Towards Goal  Goal: Activity/Safety  Outcome: Progressing Towards Goal  Goal: Consults, if ordered  Outcome: Progressing Towards Goal  Goal: Diagnostic Test/Procedures  Outcome: Progressing Towards Goal  Goal: Nutrition/Diet  Outcome: Progressing Towards Goal  Goal: Discharge Planning  Outcome: Progressing Towards Goal  Goal: Medications  Outcome: Progressing Towards Goal  Goal: Respiratory  Outcome: Progressing Towards Goal  Goal: Treatments/Interventions/Procedures  Outcome: Progressing Towards Goal  Goal: Psychosocial  Outcome: Progressing Towards Goal  Goal: *Oxygen saturation within defined limits  Outcome: Progressing Towards Goal  Goal: *Hemodynamically stable  Outcome: Progressing Towards Goal  Goal: *Demonstrates progressive activity  Outcome: Progressing Towards Goal  Goal: *Tolerating diet  Outcome: Progressing Towards Goal  Goal: *Optimal pain control at patient's stated goal  Outcome: Progressing Towards Goal     Problem: Pneumonia: Day 3  Goal: Off Pathway (Use only if patient is Off Pathway)  Outcome: Progressing Towards Goal  Goal: Activity/Safety  Outcome: Progressing Towards Goal  Goal: Consults, if ordered  Outcome: Progressing Towards Goal  Goal: Diagnostic Test/Procedures  Outcome: Progressing Towards Goal  Goal: Nutrition/Diet  Outcome: Progressing Towards Goal  Goal: Discharge Planning  Outcome: Progressing Towards Goal  Goal: Medications  Outcome: Progressing Towards Goal  Goal: Respiratory  Outcome: Progressing Towards Goal  Goal: Treatments/Interventions/Procedures  Outcome: Progressing Towards Goal  Goal: Psychosocial  Outcome: Progressing Towards Goal  Goal: *Oxygen saturation within defined limits  Outcome: Progressing Towards Goal  Goal: *Hemodynamically stable  Outcome: Progressing Towards Goal  Goal: *Demonstrates progressive activity  Outcome: Progressing Towards Goal  Goal: *Tolerating diet  Outcome: Progressing Towards Goal  Goal: *Describes available resources and support systems  Outcome: Progressing Towards Goal  Goal: *Optimal pain control at patient's stated goal  Outcome: Progressing Towards Goal     Problem: Pneumonia: Day 4  Goal: Off Pathway (Use only if patient is Off Pathway)  Outcome: Progressing Towards Goal  Goal: Activity/Safety  Outcome: Progressing Towards Goal  Goal: Nutrition/Diet  Outcome: Progressing Towards Goal  Goal: Discharge Planning  Outcome: Progressing Towards Goal  Goal: Medications  Outcome: Progressing Towards Goal  Goal: Respiratory  Outcome: Progressing Towards Goal  Goal: Treatments/Interventions/Procedures  Outcome: Progressing Towards Goal  Goal: Psychosocial  Outcome: Progressing Towards Goal     Problem: Pneumonia: Discharge Outcomes  Goal: *Demonstrates progressive activity  Outcome: Progressing Towards Goal  Goal: *Describes follow-up/return visits to physicians  Outcome: Progressing Towards Goal  Goal: *Tolerating diet  Outcome: Progressing Towards Goal  Goal: *Verbalizes name, dosage, time, side effects, and number of days to continue medications  Outcome: Progressing Towards Goal  Goal: *Influenza immunization  Outcome: Progressing Towards Goal  Goal: *Pneumococcal immunization  Outcome: Progressing Towards Goal  Goal: *Respiratory status at baseline  Outcome: Progressing Towards Goal  Goal: *Vital signs within defined limits  Outcome: Progressing Towards Goal  Goal: *Describes available resources and support systems  Outcome: Progressing Towards Goal  Goal: *Optimal pain control at patient's stated goal  Outcome: Progressing Towards Goal     Problem: Gas Exchange - Impaired  Goal: *Absence of hypoxia  Outcome: Progressing Towards Goal

## 2020-02-29 NOTE — PROGRESS NOTES
Problem: Falls - Risk of  Goal: *Absence of Falls  Description  Document [facility] President Fall Risk and appropriate interventions in the flowsheet. Outcome: Progressing Towards Goal  Note: Fall Risk Interventions:  Mobility Interventions: Communicate number of staff needed for ambulation/transfer    Mentation Interventions: Bed/chair exit alarm, Adequate sleep, hydration, pain control    Medication Interventions: Bed/chair exit alarm, Patient to call before getting OOB    Elimination Interventions: Call light in reach              Problem: Gas Exchange - Impaired  Goal: *Absence of hypoxia  Outcome: Progressing Towards Goal     Problem: Pneumonia: Day 1  Goal: Nutrition/Diet  Outcome: Progressing Towards Goal  Goal: Medications  Outcome: Progressing Towards Goal  Goal: Respiratory  Outcome: Progressing Towards Goal     Problem: Risk for Spread of Infection  Goal: Prevent transmission of infectious organism to others  Description  Prevent the transmission of infectious organisms to other patients, staff members, and visitors.   Outcome: Progressing Towards Goal     Problem: Pain  Goal: *Control of Pain  Outcome: Progressing Towards Goal

## 2020-03-01 ENCOUNTER — APPOINTMENT (OUTPATIENT)
Dept: CT IMAGING | Age: 75
DRG: 193 | End: 2020-03-01
Attending: HOSPITALIST
Payer: MEDICARE

## 2020-03-01 LAB
ANION GAP SERPL CALC-SCNC: 7 MMOL/L (ref 5–15)
BASOPHILS # BLD: 0 K/UL (ref 0–0.1)
BASOPHILS NFR BLD: 0 % (ref 0–1)
BUN SERPL-MCNC: 27 MG/DL (ref 6–20)
BUN/CREAT SERPL: 24 (ref 12–20)
CALCIUM SERPL-MCNC: 8.7 MG/DL (ref 8.5–10.1)
CHLORIDE SERPL-SCNC: 108 MMOL/L (ref 97–108)
CK MB CFR SERPL CALC: 4.5 % (ref 0–2.5)
CK MB SERPL-MCNC: 1.5 NG/ML (ref 5–25)
CK SERPL-CCNC: 33 U/L (ref 26–192)
CO2 SERPL-SCNC: 23 MMOL/L (ref 21–32)
CREAT SERPL-MCNC: 1.14 MG/DL (ref 0.55–1.02)
DIFFERENTIAL METHOD BLD: ABNORMAL
EOSINOPHIL # BLD: 0 K/UL (ref 0–0.4)
EOSINOPHIL NFR BLD: 0 % (ref 0–7)
ERYTHROCYTE [DISTWIDTH] IN BLOOD BY AUTOMATED COUNT: 13.4 % (ref 11.5–14.5)
GLUCOSE BLD STRIP.AUTO-MCNC: 141 MG/DL (ref 65–100)
GLUCOSE BLD STRIP.AUTO-MCNC: 158 MG/DL (ref 65–100)
GLUCOSE BLD STRIP.AUTO-MCNC: 212 MG/DL (ref 65–100)
GLUCOSE BLD STRIP.AUTO-MCNC: 268 MG/DL (ref 65–100)
GLUCOSE SERPL-MCNC: 172 MG/DL (ref 65–100)
HCT VFR BLD AUTO: 27.3 % (ref 35–47)
HGB BLD-MCNC: 9.1 G/DL (ref 11.5–16)
IMM GRANULOCYTES # BLD AUTO: 0.2 K/UL (ref 0–0.04)
IMM GRANULOCYTES NFR BLD AUTO: 2 % (ref 0–0.5)
LYMPHOCYTES # BLD: 0.8 K/UL (ref 0.8–3.5)
LYMPHOCYTES NFR BLD: 8 % (ref 12–49)
MCH RBC QN AUTO: 35 PG (ref 26–34)
MCHC RBC AUTO-ENTMCNC: 33.3 G/DL (ref 30–36.5)
MCV RBC AUTO: 105 FL (ref 80–99)
MONOCYTES # BLD: 0.5 K/UL (ref 0–1)
MONOCYTES NFR BLD: 5 % (ref 5–13)
NEUTS SEG # BLD: 8.2 K/UL (ref 1.8–8)
NEUTS SEG NFR BLD: 85 % (ref 32–75)
NRBC # BLD: 0 K/UL (ref 0–0.01)
NRBC BLD-RTO: 0 PER 100 WBC
PLATELET # BLD AUTO: 175 K/UL (ref 150–400)
PMV BLD AUTO: 9.2 FL (ref 8.9–12.9)
POTASSIUM SERPL-SCNC: 4.3 MMOL/L (ref 3.5–5.1)
RBC # BLD AUTO: 2.6 M/UL (ref 3.8–5.2)
SERVICE CMNT-IMP: ABNORMAL
SODIUM SERPL-SCNC: 138 MMOL/L (ref 136–145)
TROPONIN I SERPL-MCNC: <0.05 NG/ML
WBC # BLD AUTO: 9.7 K/UL (ref 3.6–11)

## 2020-03-01 PROCEDURE — 74011636637 HC RX REV CODE- 636/637: Performed by: NURSE PRACTITIONER

## 2020-03-01 PROCEDURE — 74011250637 HC RX REV CODE- 250/637: Performed by: HOSPITALIST

## 2020-03-01 PROCEDURE — 94640 AIRWAY INHALATION TREATMENT: CPT

## 2020-03-01 PROCEDURE — 94760 N-INVAS EAR/PLS OXIMETRY 1: CPT

## 2020-03-01 PROCEDURE — 74011000250 HC RX REV CODE- 250: Performed by: HOSPITALIST

## 2020-03-01 PROCEDURE — 84484 ASSAY OF TROPONIN QUANT: CPT

## 2020-03-01 PROCEDURE — 71275 CT ANGIOGRAPHY CHEST: CPT

## 2020-03-01 PROCEDURE — 82550 ASSAY OF CK (CPK): CPT

## 2020-03-01 PROCEDURE — 74011000258 HC RX REV CODE- 258: Performed by: RADIOLOGY

## 2020-03-01 PROCEDURE — 74011250636 HC RX REV CODE- 250/636: Performed by: NURSE PRACTITIONER

## 2020-03-01 PROCEDURE — 80048 BASIC METABOLIC PNL TOTAL CA: CPT

## 2020-03-01 PROCEDURE — 74011250636 HC RX REV CODE- 250/636: Performed by: HOSPITALIST

## 2020-03-01 PROCEDURE — 77010033678 HC OXYGEN DAILY

## 2020-03-01 PROCEDURE — 82962 GLUCOSE BLOOD TEST: CPT

## 2020-03-01 PROCEDURE — 65660000000 HC RM CCU STEPDOWN

## 2020-03-01 PROCEDURE — 74011000258 HC RX REV CODE- 258: Performed by: HOSPITALIST

## 2020-03-01 PROCEDURE — 85025 COMPLETE CBC W/AUTO DIFF WBC: CPT

## 2020-03-01 PROCEDURE — 74011250637 HC RX REV CODE- 250/637: Performed by: INTERNAL MEDICINE

## 2020-03-01 PROCEDURE — 74011636320 HC RX REV CODE- 636/320: Performed by: RADIOLOGY

## 2020-03-01 RX ORDER — SODIUM CHLORIDE 0.9 % (FLUSH) 0.9 %
10 SYRINGE (ML) INJECTION
Status: COMPLETED | OUTPATIENT
Start: 2020-03-01 | End: 2020-03-01

## 2020-03-01 RX ADMIN — Medication 10 ML: at 13:39

## 2020-03-01 RX ADMIN — MORPHINE SULFATE 2 MG: 2 INJECTION, SOLUTION INTRAMUSCULAR; INTRAVENOUS at 22:40

## 2020-03-01 RX ADMIN — Medication 10 ML: at 23:03

## 2020-03-01 RX ADMIN — NIFEDIPINE 90 MG: 60 TABLET, FILM COATED, EXTENDED RELEASE ORAL at 09:50

## 2020-03-01 RX ADMIN — FLUTICASONE PROPIONATE 2 SPRAY: 50 SPRAY, METERED NASAL at 09:55

## 2020-03-01 RX ADMIN — ALPRAZOLAM 1 MG: 0.5 TABLET ORAL at 15:07

## 2020-03-01 RX ADMIN — Medication 10 ML: at 21:29

## 2020-03-01 RX ADMIN — SERTRALINE HYDROCHLORIDE 100 MG: 50 TABLET ORAL at 21:25

## 2020-03-01 RX ADMIN — MORPHINE SULFATE 2 MG: 2 INJECTION, SOLUTION INTRAMUSCULAR; INTRAVENOUS at 06:35

## 2020-03-01 RX ADMIN — PANTOPRAZOLE SODIUM 40 MG: 40 TABLET, DELAYED RELEASE ORAL at 06:35

## 2020-03-01 RX ADMIN — SODIUM CHLORIDE 100 ML: 900 INJECTION, SOLUTION INTRAVENOUS at 23:03

## 2020-03-01 RX ADMIN — CARVEDILOL 6.25 MG: 6.25 TABLET, FILM COATED ORAL at 07:01

## 2020-03-01 RX ADMIN — ATORVASTATIN CALCIUM 80 MG: 40 TABLET, FILM COATED ORAL at 09:51

## 2020-03-01 RX ADMIN — IPRATROPIUM BROMIDE AND ALBUTEROL SULFATE 3 ML: .5; 3 SOLUTION RESPIRATORY (INHALATION) at 00:35

## 2020-03-01 RX ADMIN — LISINOPRIL 5 MG: 5 TABLET ORAL at 09:50

## 2020-03-01 RX ADMIN — BUDESONIDE INHALATION 500 MCG: 0.5 SUSPENSION RESPIRATORY (INHALATION) at 08:00

## 2020-03-01 RX ADMIN — ACETYLCYSTEINE 400 MG: 200 SOLUTION ORAL; RESPIRATORY (INHALATION) at 08:00

## 2020-03-01 RX ADMIN — MORPHINE SULFATE 2 MG: 2 INJECTION, SOLUTION INTRAMUSCULAR; INTRAVENOUS at 18:36

## 2020-03-01 RX ADMIN — MORPHINE SULFATE 2 MG: 2 INJECTION, SOLUTION INTRAMUSCULAR; INTRAVENOUS at 13:36

## 2020-03-01 RX ADMIN — INSULIN LISPRO 4 UNITS: 100 INJECTION, SOLUTION INTRAVENOUS; SUBCUTANEOUS at 13:36

## 2020-03-01 RX ADMIN — GUAIFENESIN AND CODEINE PHOSPHATE 10 ML: 100; 10 SOLUTION ORAL at 13:35

## 2020-03-01 RX ADMIN — IPRATROPIUM BROMIDE AND ALBUTEROL SULFATE 3 ML: .5; 3 SOLUTION RESPIRATORY (INHALATION) at 08:00

## 2020-03-01 RX ADMIN — Medication 10 ML: at 18:36

## 2020-03-01 RX ADMIN — ENOXAPARIN SODIUM 40 MG: 40 INJECTION SUBCUTANEOUS at 23:54

## 2020-03-01 RX ADMIN — CLOPIDOGREL BISULFATE 75 MG: 75 TABLET, FILM COATED ORAL at 09:51

## 2020-03-01 RX ADMIN — Medication 10 ML: at 06:36

## 2020-03-01 RX ADMIN — ALPRAZOLAM 1 MG: 0.5 TABLET ORAL at 06:53

## 2020-03-01 RX ADMIN — POLYETHYLENE GLYCOL 3350 17 G: 17 POWDER, FOR SOLUTION ORAL at 09:50

## 2020-03-01 RX ADMIN — SODIUM CHLORIDE, SODIUM LACTATE, POTASSIUM CHLORIDE, AND CALCIUM CHLORIDE 100 ML/HR: 600; 310; 30; 20 INJECTION, SOLUTION INTRAVENOUS at 21:30

## 2020-03-01 RX ADMIN — IPRATROPIUM BROMIDE AND ALBUTEROL SULFATE 3 ML: .5; 3 SOLUTION RESPIRATORY (INHALATION) at 15:30

## 2020-03-01 RX ADMIN — ARFORMOTEROL TARTRATE 15 MCG: 15 SOLUTION RESPIRATORY (INHALATION) at 21:56

## 2020-03-01 RX ADMIN — ASPIRIN 81 MG: 81 TABLET, COATED ORAL at 09:50

## 2020-03-01 RX ADMIN — INSULIN LISPRO 3 UNITS: 100 INJECTION, SOLUTION INTRAVENOUS; SUBCUTANEOUS at 08:00

## 2020-03-01 RX ADMIN — ACETYLCYSTEINE: 200 SOLUTION ORAL; RESPIRATORY (INHALATION) at 22:05

## 2020-03-01 RX ADMIN — METHYLPREDNISOLONE SODIUM SUCCINATE 40 MG: 40 INJECTION, POWDER, FOR SOLUTION INTRAMUSCULAR; INTRAVENOUS at 13:35

## 2020-03-01 RX ADMIN — CEFTRIAXONE 1 G: 1 INJECTION, POWDER, FOR SOLUTION INTRAMUSCULAR; INTRAVENOUS at 06:35

## 2020-03-01 RX ADMIN — IPRATROPIUM BROMIDE AND ALBUTEROL SULFATE 3 ML: .5; 3 SOLUTION RESPIRATORY (INHALATION) at 21:57

## 2020-03-01 RX ADMIN — CARVEDILOL 6.25 MG: 6.25 TABLET, FILM COATED ORAL at 18:36

## 2020-03-01 RX ADMIN — METHYLPREDNISOLONE SODIUM SUCCINATE 40 MG: 40 INJECTION, POWDER, FOR SOLUTION INTRAMUSCULAR; INTRAVENOUS at 21:25

## 2020-03-01 RX ADMIN — IPRATROPIUM BROMIDE AND ALBUTEROL SULFATE 3 ML: .5; 3 SOLUTION RESPIRATORY (INHALATION) at 11:28

## 2020-03-01 RX ADMIN — Medication 1 CAPSULE: at 09:52

## 2020-03-01 RX ADMIN — METHYLPREDNISOLONE SODIUM SUCCINATE 40 MG: 40 INJECTION, POWDER, FOR SOLUTION INTRAMUSCULAR; INTRAVENOUS at 07:02

## 2020-03-01 RX ADMIN — ACETYLCYSTEINE 400 MG: 200 SOLUTION ORAL; RESPIRATORY (INHALATION) at 15:29

## 2020-03-01 RX ADMIN — IOPAMIDOL 100 ML: 755 INJECTION, SOLUTION INTRAVENOUS at 23:03

## 2020-03-01 NOTE — PROGRESS NOTES
Problem: Falls - Risk of  Goal: *Absence of Falls  Description  Document Alex Denny Fall Risk and appropriate interventions in the flowsheet.   Outcome: Progressing Towards Goal  Note: Fall Risk Interventions:  Mobility Interventions: Strengthening exercises (ROM-active/passive)    Mentation Interventions: Increase mobility    Medication Interventions: Teach patient to arise slowly    Elimination Interventions: Call light in reach              Problem: Patient Education: Go to Patient Education Activity  Goal: Patient/Family Education  Outcome: Progressing Towards Goal     Problem: Gas Exchange - Impaired  Goal: *Absence of hypoxia  Outcome: Progressing Towards Goal     Problem: Patient Education: Go to Patient Education Activity  Goal: Patient/Family Education  Outcome: Progressing Towards Goal     Problem: Pneumonia: Day 1  Goal: Off Pathway (Use only if patient is Off Pathway)  Outcome: Progressing Towards Goal  Goal: Activity/Safety  Outcome: Progressing Towards Goal  Goal: Consults, if ordered  Outcome: Progressing Towards Goal  Goal: Diagnostic Test/Procedures  Outcome: Progressing Towards Goal  Goal: Nutrition/Diet  Outcome: Progressing Towards Goal  Goal: Medications  Outcome: Progressing Towards Goal  Goal: Respiratory  Outcome: Progressing Towards Goal  Goal: Treatments/Interventions/Procedures  Outcome: Progressing Towards Goal  Goal: Psychosocial  Outcome: Progressing Towards Goal  Goal: *Oxygen saturation within defined limits  Outcome: Progressing Towards Goal  Goal: *Influenza vaccine administered (October-March)  Outcome: Progressing Towards Goal  Goal: *Pneumoccocal vaccine administered  Outcome: Progressing Towards Goal  Goal: *Hemodynamically stable  Outcome: Progressing Towards Goal  Goal: *Demonstrates progressive activity  Outcome: Progressing Towards Goal  Goal: *Tolerating diet  Outcome: Progressing Towards Goal     Problem: Pneumonia: Day 2  Goal: Off Pathway (Use only if patient is Off Pathway)  Outcome: Progressing Towards Goal  Goal: Activity/Safety  Outcome: Progressing Towards Goal  Goal: Consults, if ordered  Outcome: Progressing Towards Goal  Goal: Diagnostic Test/Procedures  Outcome: Progressing Towards Goal  Goal: Nutrition/Diet  Outcome: Progressing Towards Goal  Goal: Discharge Planning  Outcome: Progressing Towards Goal  Goal: Medications  Outcome: Progressing Towards Goal  Goal: Respiratory  Outcome: Progressing Towards Goal  Goal: Treatments/Interventions/Procedures  Outcome: Progressing Towards Goal  Goal: Psychosocial  Outcome: Progressing Towards Goal  Goal: *Oxygen saturation within defined limits  Outcome: Progressing Towards Goal  Goal: *Hemodynamically stable  Outcome: Progressing Towards Goal  Goal: *Demonstrates progressive activity  Outcome: Progressing Towards Goal  Goal: *Tolerating diet  Outcome: Progressing Towards Goal  Goal: *Optimal pain control at patient's stated goal  Outcome: Progressing Towards Goal     Problem: Pneumonia: Day 3  Goal: Off Pathway (Use only if patient is Off Pathway)  Outcome: Progressing Towards Goal  Goal: Activity/Safety  Outcome: Progressing Towards Goal  Goal: Consults, if ordered  Outcome: Progressing Towards Goal  Goal: Diagnostic Test/Procedures  Outcome: Progressing Towards Goal  Goal: Nutrition/Diet  Outcome: Progressing Towards Goal  Goal: Discharge Planning  Outcome: Progressing Towards Goal  Goal: Medications  Outcome: Progressing Towards Goal  Goal: Respiratory  Outcome: Progressing Towards Goal  Goal: Treatments/Interventions/Procedures  Outcome: Progressing Towards Goal  Goal: Psychosocial  Outcome: Progressing Towards Goal  Goal: *Oxygen saturation within defined limits  Outcome: Progressing Towards Goal  Goal: *Hemodynamically stable  Outcome: Progressing Towards Goal  Goal: *Demonstrates progressive activity  Outcome: Progressing Towards Goal  Goal: *Tolerating diet  Outcome: Progressing Towards Goal  Goal: *Describes available resources and support systems  Outcome: Progressing Towards Goal  Goal: *Optimal pain control at patient's stated goal  Outcome: Progressing Towards Goal     Problem: Pneumonia: Day 4  Goal: Off Pathway (Use only if patient is Off Pathway)  Outcome: Progressing Towards Goal  Goal: Activity/Safety  Outcome: Progressing Towards Goal  Goal: Nutrition/Diet  Outcome: Progressing Towards Goal  Goal: Discharge Planning  Outcome: Progressing Towards Goal  Goal: Medications  Outcome: Progressing Towards Goal  Goal: Respiratory  Outcome: Progressing Towards Goal  Goal: Treatments/Interventions/Procedures  Outcome: Progressing Towards Goal  Goal: Psychosocial  Outcome: Progressing Towards Goal     Problem: Pneumonia: Discharge Outcomes  Goal: *Demonstrates progressive activity  Outcome: Progressing Towards Goal  Goal: *Describes follow-up/return visits to physicians  Outcome: Progressing Towards Goal  Goal: *Tolerating diet  Outcome: Progressing Towards Goal  Goal: *Verbalizes name, dosage, time, side effects, and number of days to continue medications  Outcome: Progressing Towards Goal  Goal: *Influenza immunization  Outcome: Progressing Towards Goal  Goal: *Pneumococcal immunization  Outcome: Progressing Towards Goal  Goal: *Respiratory status at baseline  Outcome: Progressing Towards Goal  Goal: *Vital signs within defined limits  Outcome: Progressing Towards Goal  Goal: *Describes available resources and support systems  Outcome: Progressing Towards Goal  Goal: *Optimal pain control at patient's stated goal  Outcome: Progressing Towards Goal     Problem: Gas Exchange - Impaired  Goal: *Absence of hypoxia  Outcome: Progressing Towards Goal

## 2020-03-01 NOTE — PROGRESS NOTES
[address] [facility]  Hospitalist Group                                                                                          Hospitalist Progress Note  Angelique France MD  Answering service: [phone] OR [phone] from in house phone        Date of Service:  3/1/2020  NAME:  Amber Gorman  :  1945  MRN:  [id number]      Admission Summary:   69 y.o. female with past medical history significant for s/p stent placement (August), HTN, and recurrent pneumonia (most recently in Dec 2019) presented to the ED from home via EMS with chief complaint of SOB, increased exertional and non exertional dyspnea w accessory muscle use and cough x three days despite use of albuterol and oral prednisone prescribed by another facility  On  for presumed bronchitis. CPAP was initiated in the ED. She tested + for RSV B. Pt is a former smoker. Interval history / Subjective:   Patient seen and examined.  -She says she got some rest last night,the xanax is helping and reports generally feeling better. She still has dyspnea,pronounced with minimal activity such as walking to the bathroom. Assessment & Plan:     # Acute hypoxemia respiratory failure Improving: likely from acute RSV B bronchitis /bronchiolitis,superimposed bacterial pnemuonia  -Slow progress thus far. Switch prednisone to solumedrol. Intensify bronchodilators,  -On oxygen with NC  -echo normal EF and grade 1 diastolic CHF  -on ceftriaxone (end 3/1); end azithromycin  for presumed bacterial etiology based on CT findings :  \"Mild patchy airspace disease in the left lower lobe may represent early or  resolving pneumonia. \"  -Pulmonologist following:  will need OP PFTs and follow up.  -Dyspnea still significant with minimal exertion,check CTA to r/o PE and other acute issues. #Chest pain,could likely be msk due to severe cough,however ,she has cardiac risk,would cycle enzymes and EKG. Pain resolved w/o intervention.  -Cardiac enzymes negative x3 and ekg unremarkable. -  -CXR w/minimal atelectasis. #HTN:  -BP controlled with nifedipine,coreg and lisinorpil. #History of renal artery stenosis s/p rt renal artery stenting    #Coronary artery disease  -on aspirin,statin,Plavix,Coreg    #Chronic macrocytic anemia:  -Hb stable  -OP f/up    #CKD   -GFR fluctuating, likely r/to DM and HTN. Continue to observe and follow. OP f/up recommended. #Hyperglycemia:  POC glucose consistently <170mg/dl  -Likely steroid induced. -A1C 5.4 on 2/22  -Humalog SS modified to insulin resistant protocol. Code status: full    DVT prophylaxis: Lovenox     Care Plan discussed with: patient,nurse    Anticipated Disposition: Pt's preference is to return home    Anticipated Discharge: 2-3 days     Hospital Problems  Date Reviewed: 2/22/2020          Codes Class Noted POA    Coronary artery disease ICD-10-CM: I25.10  ICD-9-CM: 414.00  9/12/2019 Yes        * (Principal) Bacterial pneumonia ICD-10-CM: J15.9  ICD-9-CM: 482.9  2/22/2020 Yes        Renal artery stenosis ([facility] [address] ICD-10-CM: I70.1  ICD-9-CM: 440.1  8/20/2019 Yes        Flash pulmonary edema ([facility] [address] ICD-10-CM: J81.0  ICD-9-CM: 518.4  8/20/2019 Yes        Mixed hyperlipidemia ICD-10-CM: E78.2  ICD-9-CM: 272.2  11/16/2018 Yes        Renovascular hypertension ICD-10-CM: I15.0  ICD-9-CM: 405.91  11/16/2018 Yes        SOB (shortness of breath) ICD-10-CM: R06.02  ICD-9-CM: 786.05  11/16/2018 Yes                Review of Systems:   +sob,chest pain. Vital Signs:    Last 24hrs VS reviewed since prior progress note.  Most recent are:  Visit Vitals  /61 (BP 1 Location: Right arm, BP Patient Position: At rest)   Pulse 69   Temp 97.7 °F (36.5 °C)   Resp 18   Ht 5' 3\" (1.6 m)   Wt 68.7 kg (151 lb 8 oz)   SpO2 91%   BMI 26.84 kg/m²         Intake/Output Summary (Last 24 hours) at 3/1/2020 1722  Last data filed at 3/1/2020 0908  Gross per 24 hour   Intake 240 ml   Output    Net 240 ml        Physical Examination: Constitutional: Calm today. mildly tachy    ENT:  Oral mucosa still dry but improved from yesterday. EOMI   Resp: Mild tachy. On oxygen via nasal canula. Bibasilar crackles, slight exp wheeze. No diminished breath sounds   CV:  Regular rhythm, normal rate,S1,S2     GI:  Soft, non distended, non tender. normoactive bowel sounds  Sc bruises from sc heparin injections. Musculoskeletal:  No  LE edema    Neurologic:  Moves all extremities. AAOx3, CN II-XII reviewed     Psych: Not agitated; pleasant  Skin:  Poor turgor, no rashes or ulcers       Data Review:    Review and/or order of clinical lab test  Review and/or order of tests in the medicine section of [facility]      Labs:     Recent Labs     03/01/20  0041 02/29/20  0655   WBC 9.7 12.1*   HGB 9.1* 9.9*   HCT 27.3* 29.7*    209     Recent Labs     03/01/20  0041 02/29/20  0656 02/28/20  0448    138 139   K 4.3 3.8 3.9    108 109*   CO2 23 25 26   BUN 27* 23* 28*   CREA 1.14* 0.99 1.04*   * 81 84   CA 8.7 8.9 8.8   MG  --  2.1  --    PHOS  --  3.9  --      Recent Labs     02/29/20  0656   SGOT 15   ALT 29   AP 61   TBILI 0.3   TP 6.2*   ALB 3.3*   GLOB 2.9     No results for input(s): INR, PTP, APTT, INREXT, INREXT in the last 72 hours. No results for input(s): FE, TIBC, PSAT, FERR in the last 72 hours. Lab Results   Component Value Date/Time    Folate 10.9 07/24/2019 03:41 AM      No results for input(s): PH, PCO2, PO2 in the last 72 hours.   Recent Labs     03/01/20  0041 02/29/20  1339 02/29/20  0656   CPK 33 47  --    CKNDX 4.5* 3.0*  --    TROIQ <0.05 <0.05 <0.05     Lab Results   Component Value Date/Time    Cholesterol, total 111 02/23/2020 03:28 AM    HDL Cholesterol 27 02/23/2020 03:28 AM    LDL, calculated 35.4 02/23/2020 03:28 AM    Triglyceride 243 (H) 02/23/2020 03:28 AM    CHOL/HDL Ratio 4.1 02/23/2020 03:28 AM     Lab Results   Component Value Date/Time    Glucose (POC) 268 (H) 03/01/2020 04:09 PM    Glucose (POC) 212 (H) 03/01/2020 11:21 AM    Glucose (POC) 158 (H) 03/01/2020 07:03 AM    Glucose (POC) 251 (H) 02/29/2020 04:40 PM    Glucose (POC) 219 (H) 02/29/2020 12:09 PM     Lab Results   Component Value Date/Time    Color YELLOW/STRAW 02/22/2020 06:17 AM    Appearance CLEAR 02/22/2020 06:17 AM    Specific gravity 1.015 02/22/2020 06:17 AM    pH (UA) 5.0 02/22/2020 06:17 AM    Protein NEGATIVE  02/22/2020 06:17 AM    Glucose NEGATIVE  02/22/2020 06:17 AM    Ketone NEGATIVE  02/22/2020 06:17 AM    Bilirubin NEGATIVE  02/22/2020 06:17 AM    Urobilinogen 0.2 02/22/2020 06:17 AM    Nitrites NEGATIVE  02/22/2020 06:17 AM    Leukocyte Esterase NEGATIVE  02/22/2020 06:17 AM    Epithelial cells FEW 02/22/2020 06:17 AM    Bacteria NEGATIVE  02/22/2020 06:17 AM    WBC 0-4 02/22/2020 06:17 AM    RBC 0-5 02/22/2020 06:17 AM         Medications Reviewed:     Current Facility-Administered Medications   Medication Dose Route Frequency    sodium chloride 0.9 % bolus infusion 100 mL  100 mL IntraVENous RAD ONCE    iopamidoL (ISOVUE-370) 76 % injection 100 mL  100 mL IntraVENous RAD ONCE    sodium chloride (NS) flush 10 mL  10 mL IntraVENous RAD ONCE    methylPREDNISolone (PF) (SOLU-MEDROL) injection 40 mg  40 mg IntraVENous Q8H    nitroglycerin (NITROSTAT) tablet 0.4 mg  0.4 mg SubLINGual PRN    morphine injection 2 mg  2 mg IntraVENous Q4H PRN    ALPRAZolam (XANAX) tablet 1 mg  1 mg Oral TID PRN    albuterol-ipratropium (DUO-NEB) 2.5 MG-0.5 MG/3 ML  3 mL Nebulization Q4H RT    fluticasone propionate (FLONASE) 50 mcg/actuation nasal spray 2 Spray  2 Spray Both Nostrils DAILY    insulin lispro (HUMALOG) injection   SubCUTAneous AC&HS    glucose chewable tablet 16 g  4 Tab Oral PRN    enoxaparin (LOVENOX) injection 40 mg  40 mg SubCUTAneous Q24H    acetylcysteine (MUCOMYST) 200 mg/mL (20 %) solution 400 mg  400 mg Nebulization TID RT    lactated Ringers infusion  100 mL/hr IntraVENous CONTINUOUS    calcium carbonate (TUMS) chewable tablet 200 mg [elemental]  200 mg Oral QID PRN    pantoprazole (PROTONIX) tablet 40 mg  40 mg Oral ACB    guaiFENesin-codeine (ROBITUSSIN AC) 100-10 mg/5 mL solution 10 mL  10 mL Oral Q6H PRN    polyethylene glycol (MIRALAX) packet 17 g  17 g Oral DAILY    polyvinyl alcohol-povidone (NATURAL TEARS) 0.5-0.6 % ophthalmic solution 1 Drop  1 Drop Both Eyes PRN    zolpidem (AMBIEN) tablet 5 mg  5 mg Oral QHS PRN    arformoteroL (BROVANA) neb solution 15 mcg  15 mcg Nebulization BID RT    And    budesonide (PULMICORT) 500 mcg/2 ml nebulizer suspension  500 mcg Nebulization BID RT    glucagon (GLUCAGEN) injection 1 mg  1 mg IntraMUSCular PRN    dextrose 10% infusion 0-250 mL  0-250 mL IntraVENous PRN    lisinopril (PRINIVIL, ZESTRIL) tablet 5 mg  5 mg Oral DAILY    acetaminophen (TYLENOL) tablet 650 mg  650 mg Oral Q4H PRN    albuterol-ipratropium (DUO-NEB) 2.5 MG-0.5 MG/3 ML  3 mL Nebulization Q4H PRN    aspirin delayed-release tablet 81 mg  81 mg Oral DAILY    atorvastatin (LIPITOR) tablet 80 mg  80 mg Oral DAILY    clopidogreL (PLAVIX) tablet 75 mg  75 mg Oral DAILY    NIFEdipine ER (PROCARDIA XL) tablet 90 mg  90 mg Oral DAILY    sertraline (ZOLOFT) tablet 100 mg  100 mg Oral QHS    sodium chloride (NS) flush 5-40 mL  5-40 mL IntraVENous Q8H    sodium chloride (NS) flush 5-40 mL  5-40 mL IntraVENous PRN    ondansetron (ZOFRAN) injection 4 mg  4 mg IntraVENous Q4H PRN    bisacodyL (DULCOLAX) tablet 5 mg  5 mg Oral DAILY PRN    lactobac ac& pc-s.therm-b.anim (PETRA Q/RISAQUAD)  1 Cap Oral DAILY    hydrALAZINE (APRESOLINE) 20 mg/mL injection 10 mg  10 mg IntraVENous Q6H PRN    carvediloL (COREG) tablet 6.25 mg  6.25 mg Oral BID WITH MEALS     ______________________________________________________________________  EXPECTED LENGTH OF STAY: 3d 19h  ACTUAL LENGTH OF STAY:          8                 Mana Andersen MD

## 2020-03-01 NOTE — PROGRESS NOTES
Bedside shift change report given to Sharlene Mccormick (oncoming nurse) by Kam Zepeda RN (offgoing nurse). Report included the following information SBAR, Kardex and MAR.

## 2020-03-02 LAB
GLUCOSE BLD STRIP.AUTO-MCNC: 106 MG/DL (ref 65–100)
GLUCOSE BLD STRIP.AUTO-MCNC: 126 MG/DL (ref 65–100)
GLUCOSE BLD STRIP.AUTO-MCNC: 254 MG/DL (ref 65–100)
GLUCOSE BLD STRIP.AUTO-MCNC: 297 MG/DL (ref 65–100)
SERVICE CMNT-IMP: ABNORMAL

## 2020-03-02 PROCEDURE — 94640 AIRWAY INHALATION TREATMENT: CPT

## 2020-03-02 PROCEDURE — 74011000250 HC RX REV CODE- 250: Performed by: HOSPITALIST

## 2020-03-02 PROCEDURE — 97530 THERAPEUTIC ACTIVITIES: CPT

## 2020-03-02 PROCEDURE — 94760 N-INVAS EAR/PLS OXIMETRY 1: CPT

## 2020-03-02 PROCEDURE — 74011250636 HC RX REV CODE- 250/636: Performed by: PHYSICIAN ASSISTANT

## 2020-03-02 PROCEDURE — 74011250636 HC RX REV CODE- 250/636: Performed by: HOSPITALIST

## 2020-03-02 PROCEDURE — 74011250637 HC RX REV CODE- 250/637: Performed by: HOSPITALIST

## 2020-03-02 PROCEDURE — 65660000000 HC RM CCU STEPDOWN

## 2020-03-02 PROCEDURE — 74011250637 HC RX REV CODE- 250/637: Performed by: INTERNAL MEDICINE

## 2020-03-02 PROCEDURE — 74011250637 HC RX REV CODE- 250/637: Performed by: NURSE PRACTITIONER

## 2020-03-02 PROCEDURE — 97535 SELF CARE MNGMENT TRAINING: CPT

## 2020-03-02 PROCEDURE — 74011636637 HC RX REV CODE- 636/637: Performed by: NURSE PRACTITIONER

## 2020-03-02 PROCEDURE — 74011250636 HC RX REV CODE- 250/636: Performed by: NURSE PRACTITIONER

## 2020-03-02 PROCEDURE — 82962 GLUCOSE BLOOD TEST: CPT

## 2020-03-02 PROCEDURE — 97116 GAIT TRAINING THERAPY: CPT

## 2020-03-02 PROCEDURE — 77010033678 HC OXYGEN DAILY

## 2020-03-02 RX ORDER — IPRATROPIUM BROMIDE AND ALBUTEROL SULFATE 2.5; .5 MG/3ML; MG/3ML
3 SOLUTION RESPIRATORY (INHALATION)
Status: DISCONTINUED | OUTPATIENT
Start: 2020-03-02 | End: 2020-03-04 | Stop reason: HOSPADM

## 2020-03-02 RX ORDER — PREDNISONE 20 MG/1
40 TABLET ORAL
Status: DISCONTINUED | OUTPATIENT
Start: 2020-03-03 | End: 2020-03-04 | Stop reason: HOSPADM

## 2020-03-02 RX ORDER — BENZONATATE 100 MG/1
100 CAPSULE ORAL
Status: DISCONTINUED | OUTPATIENT
Start: 2020-03-02 | End: 2020-03-04 | Stop reason: HOSPADM

## 2020-03-02 RX ADMIN — INSULIN LISPRO 4 UNITS: 100 INJECTION, SOLUTION INTRAVENOUS; SUBCUTANEOUS at 21:32

## 2020-03-02 RX ADMIN — CLOPIDOGREL BISULFATE 75 MG: 75 TABLET, FILM COATED ORAL at 09:06

## 2020-03-02 RX ADMIN — METHYLPREDNISOLONE SODIUM SUCCINATE 40 MG: 40 INJECTION, POWDER, FOR SOLUTION INTRAMUSCULAR; INTRAVENOUS at 13:34

## 2020-03-02 RX ADMIN — ZOLPIDEM TARTRATE 5 MG: 5 TABLET ORAL at 01:52

## 2020-03-02 RX ADMIN — ATORVASTATIN CALCIUM 80 MG: 40 TABLET, FILM COATED ORAL at 09:06

## 2020-03-02 RX ADMIN — POLYETHYLENE GLYCOL 3350 17 G: 17 POWDER, FOR SOLUTION ORAL at 09:06

## 2020-03-02 RX ADMIN — ACETYLCYSTEINE 400 MG: 200 SOLUTION ORAL; RESPIRATORY (INHALATION) at 19:47

## 2020-03-02 RX ADMIN — MORPHINE SULFATE 2 MG: 2 INJECTION, SOLUTION INTRAMUSCULAR; INTRAVENOUS at 02:19

## 2020-03-02 RX ADMIN — INSULIN LISPRO 7 UNITS: 100 INJECTION, SOLUTION INTRAVENOUS; SUBCUTANEOUS at 12:19

## 2020-03-02 RX ADMIN — ACETYLCYSTEINE 400 MG: 200 SOLUTION ORAL; RESPIRATORY (INHALATION) at 13:05

## 2020-03-02 RX ADMIN — SERTRALINE HYDROCHLORIDE 100 MG: 50 TABLET ORAL at 21:23

## 2020-03-02 RX ADMIN — ASPIRIN 81 MG: 81 TABLET, COATED ORAL at 09:06

## 2020-03-02 RX ADMIN — PANTOPRAZOLE SODIUM 40 MG: 40 TABLET, DELAYED RELEASE ORAL at 07:12

## 2020-03-02 RX ADMIN — SODIUM CHLORIDE, SODIUM LACTATE, POTASSIUM CHLORIDE, AND CALCIUM CHLORIDE 100 ML/HR: 600; 310; 30; 20 INJECTION, SOLUTION INTRAVENOUS at 22:30

## 2020-03-02 RX ADMIN — IPRATROPIUM BROMIDE AND ALBUTEROL SULFATE 3 ML: .5; 3 SOLUTION RESPIRATORY (INHALATION) at 03:59

## 2020-03-02 RX ADMIN — MORPHINE SULFATE 2 MG: 2 INJECTION, SOLUTION INTRAMUSCULAR; INTRAVENOUS at 21:33

## 2020-03-02 RX ADMIN — Medication 10 ML: at 12:24

## 2020-03-02 RX ADMIN — GUAIFENESIN AND CODEINE PHOSPHATE 10 ML: 100; 10 SOLUTION ORAL at 01:59

## 2020-03-02 RX ADMIN — ACETYLCYSTEINE 400 MG: 200 SOLUTION ORAL; RESPIRATORY (INHALATION) at 08:02

## 2020-03-02 RX ADMIN — BUDESONIDE INHALATION 500 MCG: 0.5 SUSPENSION RESPIRATORY (INHALATION) at 19:47

## 2020-03-02 RX ADMIN — Medication 10 ML: at 06:14

## 2020-03-02 RX ADMIN — NIFEDIPINE 90 MG: 60 TABLET, FILM COATED, EXTENDED RELEASE ORAL at 09:26

## 2020-03-02 RX ADMIN — MORPHINE SULFATE 2 MG: 2 INJECTION, SOLUTION INTRAMUSCULAR; INTRAVENOUS at 12:23

## 2020-03-02 RX ADMIN — METHYLPREDNISOLONE SODIUM SUCCINATE 40 MG: 40 INJECTION, POWDER, FOR SOLUTION INTRAMUSCULAR; INTRAVENOUS at 21:24

## 2020-03-02 RX ADMIN — IPRATROPIUM BROMIDE AND ALBUTEROL SULFATE 3 ML: .5; 3 SOLUTION RESPIRATORY (INHALATION) at 13:05

## 2020-03-02 RX ADMIN — IPRATROPIUM BROMIDE AND ALBUTEROL SULFATE 3 ML: .5; 3 SOLUTION RESPIRATORY (INHALATION) at 08:02

## 2020-03-02 RX ADMIN — BENZONATATE 100 MG: 100 CAPSULE ORAL at 03:28

## 2020-03-02 RX ADMIN — ENOXAPARIN SODIUM 40 MG: 40 INJECTION SUBCUTANEOUS at 22:28

## 2020-03-02 RX ADMIN — METHYLPREDNISOLONE SODIUM SUCCINATE 40 MG: 40 INJECTION, POWDER, FOR SOLUTION INTRAMUSCULAR; INTRAVENOUS at 06:13

## 2020-03-02 RX ADMIN — Medication 10 ML: at 21:33

## 2020-03-02 RX ADMIN — ZOLPIDEM TARTRATE 5 MG: 5 TABLET ORAL at 21:23

## 2020-03-02 RX ADMIN — Medication 1 CAPSULE: at 09:06

## 2020-03-02 RX ADMIN — CARVEDILOL 6.25 MG: 6.25 TABLET, FILM COATED ORAL at 17:26

## 2020-03-02 RX ADMIN — ALPRAZOLAM 1 MG: 0.5 TABLET ORAL at 12:23

## 2020-03-02 RX ADMIN — ALPRAZOLAM 1 MG: 0.5 TABLET ORAL at 00:03

## 2020-03-02 RX ADMIN — LISINOPRIL 5 MG: 5 TABLET ORAL at 09:06

## 2020-03-02 RX ADMIN — BUDESONIDE INHALATION 500 MCG: 0.5 SUSPENSION RESPIRATORY (INHALATION) at 08:01

## 2020-03-02 RX ADMIN — FLUTICASONE PROPIONATE 2 SPRAY: 50 SPRAY, METERED NASAL at 09:07

## 2020-03-02 RX ADMIN — CARVEDILOL 6.25 MG: 6.25 TABLET, FILM COATED ORAL at 07:12

## 2020-03-02 RX ADMIN — ACETAMINOPHEN 650 MG: 325 TABLET ORAL at 09:26

## 2020-03-02 RX ADMIN — SODIUM CHLORIDE, SODIUM LACTATE, POTASSIUM CHLORIDE, AND CALCIUM CHLORIDE 100 ML/HR: 600; 310; 30; 20 INJECTION, SOLUTION INTRAVENOUS at 11:41

## 2020-03-02 RX ADMIN — IPRATROPIUM BROMIDE AND ALBUTEROL SULFATE 3 ML: .5; 3 SOLUTION RESPIRATORY (INHALATION) at 19:47

## 2020-03-02 NOTE — PROGRESS NOTES
Problem: Mobility Impaired (Adult and Pediatric)  Goal: *Acute Goals and Plan of Care (Insert Text)  Description  FUNCTIONAL STATUS PRIOR TO ADMISSION: Patient was independent and active without use of DME, but reports her portable owygen concentrator just prior to admission because of difficulty breathing. [facility] HOME SUPPORT PRIOR TO ADMISSION: The patient lived alone with one local friend, she reports. Physical Therapy Goals  Initiated 2/25/2020  1. Patient will move from supine to sit and sit to supine , scoot up and down and roll side to side in bed with independence within 7 day(s). 2.  Patient will transfer from bed to chair and chair to bed with independence using the least restrictive device within 7 day(s). 3.  Patient will perform sit to stand with independence within 7 day(s). 4.  Patient will ambulate with independence for 300 feet with the least restrictive device within 7 day(s). 5.  Patient will ascend/descend 2 stairs with no handrail(s) with independence within 7 day(s). Outcome: Progressing Towards Goal   PHYSICAL THERAPY TREATMENT  Patient: Genesis Gibson (47 y.o. female)  Date: 3/2/2020  Diagnosis: Bacterial pneumonia [J15.9]   Bacterial pneumonia       Precautions: Contact(and droplet)  Chart, physical therapy assessment, plan of care and goals were reviewed. ASSESSMENT  Patient continues with skilled PT services and is progressing towards goals. Pt continues to display SOB, impaired balance, decreased activity tolerance, and decreased functional mobility s/p admission for PNA. Pt displays LOB x 2 (posteriorly) during ambulation and requires min assist x 1 to correct. Therapist will trial RW at next session. Pt displays appropriate weight shifting and balance reactions during dynamic balance training w/ ball today w/ CGA x 1. Pt SpO2 >96% during session on 3L via NC and observably SOB after standing activity.     Current Level of Function Impacting Discharge (mobility/balance): min assist x 1 to correct LOB during ambulation     Other factors to consider for discharge: does not own DME, lives alone in multi floor home (does have elevator)          PLAN :  Patient continues to benefit from skilled intervention to address the above impairments. Continue treatment per established plan of care. to address goals. Recommendation for discharge: (in order for the patient to meet his/her long term goals)  Physical therapy at least 2 days/week in the home     This discharge recommendation:  Has been made in collaboration with the attending provider and/or case management    IF patient discharges home will need the following DME: rolling walker- pending improvement w/ PT        SUBJECTIVE:   Patient stated I haven't been able to get up all I want to do is move around.     OBJECTIVE DATA SUMMARY:   Critical Behavior:  Neurologic State: Alert  Orientation Level: Oriented X4  Cognition: Follows commands, Appropriate decision making, Appropriate for age attention/concentration  Safety/Judgement: Awareness of environment, Home safety  Functional Mobility Training:  Bed Mobility:   Pt received up in chair                  Transfers:  Sit to Stand: Stand-by assistance - initially has LOB posteriorly but can recover w/o assist, can perform from bedside chair and toilet in bathroom   Stand to Sit: Stand-by assistance        Bed to Chair: Contact guard assistance                    Balance:  Sitting: Intact  Standing: Intact  Standing - Static: Good  Standing - Dynamic : Fair  Ambulation/Gait Training:  Distance (ft): 190 Feet (ft)  Assistive Device: Gait belt  Ambulation - Level of Assistance: Minimal assistance;Assist x1(to correct LOB x 2 )   LOB x 2 posteriorly   Gait Description (WDL): Exceptions to WDL  Gait Abnormalities: Decreased step clearance;Trunk sway increased        Base of Support: Widened     Speed/Nell: Slow  Step Length: Right shortened;Left shortened                Therapeutic Exercises:   Standing reaching w/ narrow WILMA  Standing dynamic balance w/ ball toss  Standing balance w/ ball kicking    Pain Rating:  Denies during treatment session - reports increased ease of breathing today    Activity Tolerance:   Fair - SpO2 and pulse stable throughout session, observed SOB at end of session  Please refer to the flowsheet for vital signs taken during this treatment. After treatment patient left in no apparent distress:   Sitting in chair, Call bell within reach, and Bed / chair alarm activated    COMMUNICATION/COLLABORATION:   The patients plan of care was discussed with: Occupational therapist and Registered nurse. Regarding student involvement in patient care:  A student participated in this treatment session. Per CMS Medicare statements and APTA guidelines I certify that the following was true:  1. I was present and directly observed the entire session. 2. I made all skilled judgments and clinical decisions regarding care. 3. I am the practitioner responsible for assessment, treatment, and documentation.       Baljeet Leal

## 2020-03-02 NOTE — PROGRESS NOTES
KELLEN:  1. HH at discharge. 2. Transportation; Friend. Cm discussed discharge plan with patient, she would like home health and feels that she can manage her therapy needs at home. Patient currently wearing oxygen, and has everything she needs at home. She could not recall the name of the oxygen company she uses. Cm to follow-up this afternoon for home health choice.     Helio Pinto, [facility]

## 2020-03-02 NOTE — PROGRESS NOTES
Problem: Falls - Risk of  Goal: *Absence of Falls  Description  Document Gino Roderick Fall Risk and appropriate interventions in the flowsheet.   Outcome: Progressing Towards Goal  Note: Fall Risk Interventions:  Mobility Interventions: Patient to call before getting OOB, PT Consult for mobility concerns, Strengthening exercises (ROM-active/passive), Utilize walker, cane, or other assistive device    Mentation Interventions: Increase mobility    Medication Interventions: Patient to call before getting OOB, Teach patient to arise slowly    Elimination Interventions: Call light in reach, Patient to call for help with toileting needs              Problem: Gas Exchange - Impaired  Goal: *Absence of hypoxia  Outcome: Progressing Towards Goal

## 2020-03-02 NOTE — PROGRESS NOTES
Bedside shift change report given to Long Gutierrez (oncoming nurse) by Jose Armando Ragland (offgoing nurse). Report included the following information SBAR, Kardex and MAR.

## 2020-03-02 NOTE — PROGRESS NOTES
Name: [facility]: [address]   : 1945 Admit Date: 2020   Phone: [phone]  Room: 211/01   PCP: Rishabh Robb MD  MRN: [id number]   Date: 3/2/2020  Code: Full Code        HPI:    3/2:   Feeling better since the weekend     : Up walking around with PT  + cough and KIRAN      Breathing is better       2:41 PM       History was obtained from patient. I was asked by Esvin Wilson MD to see Jens Valero in consultation for a chief complaint of shortness of breath. History of Present Illness: 69year old female with past medical history of copd who presented to [facility] with increasing shortness of breath and coughing. As per patient she went to [place] in 2019 where she developed double pneumonia and was admitted to [facility] and treated with abx. She came back on jannette day. She was slowly recovering well over 4 weeks this. 2 weeks back she went to a party where some people were sick with productive cough and a 3month old child was sick with RSV. Few days later she developed cough - dry, chest pain from coughing, shortness of breath - worsening but no fever, chills, night sweats. She was on her couch for 3 days hardly able to move and end of calling 911. Data reviewed:  CT Chest - clear parenchyma. Some right base bronchial thickening. Cr up. Pro BNP up  Echo normal ef.  RSV positive. Initially required BiPAP.     Past Medical History:   Diagnosis Date    CAD (coronary artery disease) 2019    stent D1 2019 (Mila Proszahra)    Hypertension     Pneumonia     MARY (renal artery stenosis) ([facility] [address] 2019    s/p right renal stent 2019 Lang Rahul)       Past Surgical History:   Procedure Laterality Date    CARDIAC SURG PROCEDURE UNLIST  2019    cardiac stents     HX APPENDECTOMY      HX BACK SURGERY      LUMBAR FUSION X2 SURGERIES    HX BACK SURGERY      CERVICAL X2    HX GI  2019    arterial stent in right kidney    HX HYSTERECTOMY      HX TONSILLECTOMY         Family History   Problem Relation Age of Onset    Heart Attack Father 47       Social History     Tobacco Use    Smoking status: Former Smoker     Packs/day: 0.50    Smokeless tobacco: Never Used   Substance Use Topics    Alcohol use:  Yes     Alcohol/week: 6.0 standard drinks     Types: 6 Glasses of wine per week       No Known Allergies    Current Facility-Administered Medications   Medication Dose Route Frequency    benzonatate (TESSALON) capsule 100 mg  100 mg Oral TID PRN    methylPREDNISolone (PF) (SOLU-MEDROL) injection 40 mg  40 mg IntraVENous Q8H    nitroglycerin (NITROSTAT) tablet 0.4 mg  0.4 mg SubLINGual PRN    morphine injection 2 mg  2 mg IntraVENous Q4H PRN    ALPRAZolam (XANAX) tablet 1 mg  1 mg Oral TID PRN    albuterol-ipratropium (DUO-NEB) 2.5 MG-0.5 MG/3 ML  3 mL Nebulization Q4H RT    fluticasone propionate (FLONASE) 50 mcg/actuation nasal spray 2 Spray  2 Spray Both Nostrils DAILY    insulin lispro (HUMALOG) injection   SubCUTAneous AC&HS    glucose chewable tablet 16 g  4 Tab Oral PRN    enoxaparin (LOVENOX) injection 40 mg  40 mg SubCUTAneous Q24H    acetylcysteine (MUCOMYST) 200 mg/mL (20 %) solution 400 mg  400 mg Nebulization TID RT    lactated Ringers infusion  100 mL/hr IntraVENous CONTINUOUS    calcium carbonate (TUMS) chewable tablet 200 mg [elemental]  200 mg Oral QID PRN    pantoprazole (PROTONIX) tablet 40 mg  40 mg Oral ACB    guaiFENesin-codeine (ROBITUSSIN AC) 100-10 mg/5 mL solution 10 mL  10 mL Oral Q6H PRN    polyethylene glycol (MIRALAX) packet 17 g  17 g Oral DAILY    polyvinyl alcohol-povidone (NATURAL TEARS) 0.5-0.6 % ophthalmic solution 1 Drop  1 Drop Both Eyes PRN    zolpidem (AMBIEN) tablet 5 mg  5 mg Oral QHS PRN    arformoteroL (BROVANA) neb solution 15 mcg  15 mcg Nebulization BID RT    And    budesonide (PULMICORT) 500 mcg/2 ml nebulizer suspension  500 mcg Nebulization BID RT    glucagon (GLUCAGEN) injection 1 mg  1 mg IntraMUSCular PRN    dextrose 10% infusion 0-250 mL  0-250 mL IntraVENous PRN    lisinopril (PRINIVIL, ZESTRIL) tablet 5 mg  5 mg Oral DAILY    acetaminophen (TYLENOL) tablet 650 mg  650 mg Oral Q4H PRN    albuterol-ipratropium (DUO-NEB) 2.5 MG-0.5 MG/3 ML  3 mL Nebulization Q4H PRN    aspirin delayed-release tablet 81 mg  81 mg Oral DAILY    atorvastatin (LIPITOR) tablet 80 mg  80 mg Oral DAILY    clopidogreL (PLAVIX) tablet 75 mg  75 mg Oral DAILY    NIFEdipine ER (PROCARDIA XL) tablet 90 mg  90 mg Oral DAILY    sertraline (ZOLOFT) tablet 100 mg  100 mg Oral QHS    sodium chloride (NS) flush 5-40 mL  5-40 mL IntraVENous Q8H    sodium chloride (NS) flush 5-40 mL  5-40 mL IntraVENous PRN    ondansetron (ZOFRAN) injection 4 mg  4 mg IntraVENous Q4H PRN    bisacodyL (DULCOLAX) tablet 5 mg  5 mg Oral DAILY PRN    lactobac ac& pc-s.therm-b.anim (PETRA Q/RISAQUAD)  1 Cap Oral DAILY    hydrALAZINE (APRESOLINE) 20 mg/mL injection 10 mg  10 mg IntraVENous Q6H PRN    carvediloL (COREG) tablet 6.25 mg  6.25 mg Oral BID WITH MEALS         REVIEW OF SYSTEMS   Negative except as stated in the HPI. Physical Exam:   Visit Vitals  /85 (BP 1 Location: Right arm, BP Patient Position: At rest)   Pulse 75   Temp 98.8 °F (37.1 °C)   Resp 16   Ht 5' 3\" (1.6 m)   Wt 70.3 kg (155 lb)   SpO2 92%   BMI 27.46 kg/m²       General:  Alert, cooperative, no distress, appears stated age. Head:  Normocephalic, without obvious abnormality, atraumatic. Eyes:  Conjunctivae/corneas clear. PERRL, EOMs intact. Nose: Nares normal. Septum midline. Mucosa normal. No drainage or sinus tenderness. Throat: Lips, mucosa, and tongue normal. Teeth and gums normal.   Neck: Supple, symmetrical, trachea midline, no adenopathy. Lungs:    coarse    Heart:  . Abdomen:   Soft, non-tender. Bowel sounds normal.   Extremities: Extremities normal, atraumatic, no cyanosis or edema.    Pulses: symmetric all extremities. Neurologic: Grossly nonfocal       Lab Results   Component Value Date/Time    Sodium 138 03/01/2020 12:41 AM    Potassium 4.3 03/01/2020 12:41 AM    Chloride 108 03/01/2020 12:41 AM    CO2 23 03/01/2020 12:41 AM    BUN 27 (H) 03/01/2020 12:41 AM    Creatinine 1.14 (H) 03/01/2020 12:41 AM    Glucose 172 (H) 03/01/2020 12:41 AM    Calcium 8.7 03/01/2020 12:41 AM    Magnesium 2.1 02/29/2020 06:56 AM    Phosphorus 3.9 02/29/2020 06:56 AM       Lab Results   Component Value Date/Time    WBC 9.7 03/01/2020 12:41 AM    HGB 9.1 (L) 03/01/2020 12:41 AM    PLATELET 497 72/89/2756 12:41 AM    .0 (H) 03/01/2020 12:41 AM       Lab Results   Component Value Date/Time    INR 1.0 06/24/2019 01:49 PM    aPTT 25.4 06/24/2019 01:49 PM    AST (SGOT) 15 02/29/2020 06:56 AM    Alk.  phosphatase 61 02/29/2020 06:56 AM    Protein, total 6.2 (L) 02/29/2020 06:56 AM    Albumin 3.3 (L) 02/29/2020 06:56 AM    Globulin 2.9 02/29/2020 06:56 AM       Lab Results   Component Value Date/Time    Iron 87 09/13/2019 04:33 AM    TIBC 264 09/13/2019 04:33 AM    Iron % saturation 33 09/13/2019 04:33 AM    Ferritin 262 09/13/2019 04:33 AM       Lab Results   Component Value Date/Time    C-Reactive protein <0.29 07/18/2019 04:58 PM    TSH 4.10 (H) 02/22/2020 05:32 AM       Lab Results   Component Value Date/Time    CK 33 03/01/2020 12:41 AM    CK-MB Index 4.5 (H) 03/01/2020 12:41 AM    Troponin-I, Qt. <0.05 03/01/2020 12:41 AM        Lab Results   Component Value Date/Time    Color YELLOW/STRAW 02/22/2020 06:17 AM    Appearance CLEAR 02/22/2020 06:17 AM    Specific gravity 1.015 02/22/2020 06:17 AM    pH (UA) 5.0 02/22/2020 06:17 AM    Protein NEGATIVE  02/22/2020 06:17 AM    Glucose NEGATIVE  02/22/2020 06:17 AM    Ketone NEGATIVE  02/22/2020 06:17 AM    Bilirubin NEGATIVE  02/22/2020 06:17 AM    Blood NEGATIVE  02/22/2020 06:17 AM    Urobilinogen 0.2 02/22/2020 06:17 AM    Nitrites NEGATIVE  02/22/2020 06:17 AM    Leukocyte Esterase NEGATIVE  02/22/2020 06:17 AM    WBC 0-4 02/22/2020 06:17 AM    RBC 0-5 02/22/2020 06:17 AM    Bacteria NEGATIVE  02/22/2020 06:17 AM       IMPRESSION:  ===========  -COPD exacerbation from RSV.  -RSV bronchiolitis. -Hypoxemic respiratory failure. -CAD with hx of stent.  -Renal artery stenosis with hx of stent.  -Hx of flash pulmonary edema with htn crises. PLAN:  =====  -Katlyn/ Fransisca Candelario / Anna Marie Reina. May benefit from these nebs at discharge. Mucomyst now  -PO prednisone   -Incentive spirometry. -PT/OT.  -O2 supplementation with saturations above 90%   -follow up with Dr Oc Rush in 1-2 wks.  Can get full PFTs following discharge   -we will be available again to see if needed     Daniel Pantoja PA-C

## 2020-03-02 NOTE — PROGRESS NOTES
NUTRITION  Reason for Rescreen: LOS        RECOMMENDATIONS:   None at this time  Interventions   - none at this time       Information obtained from:   patient      Past Medical History:   Diagnosis Date    CAD (coronary artery disease) 09/12/2019    stent D1 9/12/2019 (Praneeth Garden)    Hypertension     Pneumonia     MARY (renal artery stenosis) ([facility] [address] 07/24/2019    s/p right renal stent 7/24/2019 (Praneeth Garden)     Pt admitted for PNA. Pt visited. Eating well, no concerns or questions. Declining snacks. Diet:  regular  Supplements: None  Intake: Good  Patient Vitals for the past 100 hrs:   % Diet Eaten   03/01/20 0908 100 %   02/27/20 1826 75 %     Weight Changes:   Stable  Wt Readings from Last 10 Encounters:   03/02/20 70.3 kg (155 lb)   02/20/20 64.6 kg (142 lb 6.7 oz)   10/15/19 66.2 kg (146 lb)   09/13/19 68.5 kg (151 lb 0.2 oz)   08/20/19 66.7 kg (147 lb)   07/26/19 67.8 kg (149 lb 7.6 oz)   06/24/19 67.2 kg (148 lb 2.4 oz)   01/26/19 65.6 kg (144 lb 10 oz)   12/11/18 64 kg (141 lb)   11/16/18 61.2 kg (135 lb)     Nutrition-Related Concerns Identified:  None    Estimated Nutrition Needs:   Kcals/day: 7307 Kcals/day(1410-1526kcal)  Protein: 70 g(1g/kg)  Fluid: 1526 ml(1ml/kcal)  Based On:  [address] [place](x 1.2- 1.3)  Weight Used: Actual wt(70.2kg)    PLAN:   - Continue current diet    Rescreen:  []            At Nutrition Risk - will follow          [x]            Rescreen per screening protocol    Ewa Reddy RD [facility], Pager #[phone] or via [employer]

## 2020-03-02 NOTE — PROGRESS NOTES
Pt has been coughing for one hour, non stop since receiving robitussin w/codeine. O2 @5L/NC for comfort during coughing spell. Sats @96%. Tessalon pearls ordered per Dr Edward Zuñiga. Pt also received morphine IV and ambien, but has been unable to sleep.

## 2020-03-02 NOTE — PROGRESS NOTES
ADULT PROTOCOL: JET AEROSOL ASSESSMENT    Patient  Chantal Husain     69 y.o.   female     3/2/2020  2:59 PM    Breath Sounds Pre Procedure: Right Breath Sounds: Diminished                               Left Breath Sounds: Diminished    Breath Sounds Post Procedure: Right Breath Sounds: Diminished                                 Left Breath Sounds: Diminished    Breathing pattern: Pre procedure Breathing Pattern: Regular          Post procedure Breathing Pattern: Regular    Heart Rate: Pre procedure Pulse: 89           Post procedure Pulse: 67    Resp Rate: Pre procedure Respirations: 18           Post procedure Respirations: 22      Incentive Spirometry:  Actual Volume (ml): 1250 ml          Cough: Pre procedure Cough: Strong, Barking, Non-productive               Post procedure Cough: Non-productive      Oxygen: O2 Device: Nasal cannula       SpO2: Pre procedure SpO2: 96 %         Nebulizer Therapy: Current medications Aerosolized Medications: DuoNeb, Mucomyst      Changed: YES; [id number] order matching mucomyst order         Problem List:   Patient Active Problem List   Diagnosis Code    HNP (herniated nucleus pulposus), cervical M50.20    Mixed hyperlipidemia E78.2    Renovascular hypertension 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

## 2020-03-02 NOTE — PROGRESS NOTES
Problem: Self Care Deficits Care Plan (Adult)  Goal: *Acute Goals and Plan of Care (Insert Text)  Description    FUNCTIONAL STATUS PRIOR TO ADMISSION: Patient was independent and active without use of DME.     HOME SUPPORT: The patient lived alone with friend to provide assistance. Occupational Therapy Goals  Initiated 2/25/2020  1. Patient will perform standing ADLs x 10 min with modified independence within 7 day(s). 2.  Patient will perform lower body dressing with modified independence within 7 day(s). 3.  Patient will perform bathing with modified independence within 7 day(s). 4.  Patient will perform toilet transfers with modified independence within 7 day(s). MET 2/27/2020  5. Patient will perform all aspects of toileting with modified independence within 7 day(s). MET 2/27/2020  6. Patient will utilize energy conservation techniques during functional activities with verbal cues within 7 day(s). Outcome: Progressing Towards Goal   OCCUPATIONAL THERAPY TREATMENT  Patient: Virgilio Golden (61 y.o. female)  Date: 3/2/2020  Diagnosis: Bacterial pneumonia [J15.9] Bacterial pneumonia      Precautions: Contact(and droplet)  Chart, occupational therapy assessment, plan of care, and goals were reviewed. ASSESSMENT  Patient continues with skilled OT services and is progressing towards goals. Remains limited by fair endurance and activity tolerance, however tolerated washing hair at sink with 3L O2, with extended time allowed. Encouraged energy conservation and activity modification with ADLs at home. No HH OT needed. Current Level of Function Impacting Discharge (ADLs): SBA due to fair endurance    Other factors to consider for discharge: extended hospital stay          PLAN :  Patient continues to benefit from skilled intervention to address the above impairments. Continue treatment per established plan of care. to address goals.     Recommend with staff: bathroom with SBA    Recommend next OT session: HEP AROM exercises    Recommendation for discharge: (in order for the patient to meet his/her long term goals)  No skilled occupational therapy/ follow up rehabilitation needs identified at this time. This discharge recommendation:  Has been made in collaboration with the attending provider and/or case management    IF patient discharges home will need the following DME: patient owns DME required for discharge       SUBJECTIVE:   Patient stated I am so tired of this.     OBJECTIVE DATA SUMMARY:   Cognitive/Behavioral Status:  Neurologic State: Alert  Orientation Level: Oriented X4  Cognition: Follows commands; Appropriate decision making; Appropriate for age attention/concentration             Functional Mobility and Transfers for ADLs:  Bed Mobility:       Transfers:  Sit to Stand: Stand-by assistance  Functional Transfers  Toilet Transfer : Supervision  Bed to Chair: Contact guard assistance    Balance:  Sitting: Intact  Standing: Intact  Standing - Static: Good  Standing - Dynamic : Fair    ADL Intervention:       Grooming  Brushing/Combing Hair: Set-up    Upper Body Bathing  Bathing Assistance: Set-up  Position Performed: Standing  Stood at sink for 5 minutes in prep for washing hair, setup with items required, throughout task required 1 UE on sink for support for balance, fair endurance, pacing technique utilized and patient remained on 3L NC during task. Patient specific about how she wanted to complete task but did so with SPO2 at 96%. Patient instructed and indicated understanding the benefits of maintaining activity tolerance, functional mobility, and independence with self care tasks during acute stay  to ensure safe return home and to baseline. Encouraged patient to increase frequency and duration OOB, be out of bed for all meals, perform daily ADLs (as approved by RN/MD regarding bathing etc), and performing functional mobility to/from bathroom.     Patient instructed and indicated understanding energy conservation techniques to increase independence and safety during ADLs with no visual handout provided. Educated on activity modification, building strength and endurance with ADLs and fall prevention. Lower Body Dressing Assistance  Shoes with Velcro: Supervision    Toileting  Bladder Hygiene: Supervision  Clothing Management: Supervision         Neuro Re-Education:             Therapeutic Exercises:       Pain:  None noted    Activity Tolerance:   Fair, desaturates with exertion and requires oxygen and requires rest breaks  Please refer to the flowsheet for vital signs taken during this treatment. After treatment patient left in no apparent distress:   Supine in bed and Call bell within reach    COMMUNICATION/COLLABORATION:   The patients plan of care was discussed with: Physical therapist, Registered nurse and PT student.          Shawnee Nelson OT  Time Calculation: 40 mins

## 2020-03-03 LAB
ANION GAP SERPL CALC-SCNC: 5 MMOL/L (ref 5–15)
BUN SERPL-MCNC: 23 MG/DL (ref 6–20)
BUN/CREAT SERPL: 25 (ref 12–20)
CALCIUM SERPL-MCNC: 8.5 MG/DL (ref 8.5–10.1)
CHLORIDE SERPL-SCNC: 107 MMOL/L (ref 97–108)
CO2 SERPL-SCNC: 25 MMOL/L (ref 21–32)
CREAT SERPL-MCNC: 0.91 MG/DL (ref 0.55–1.02)
GLUCOSE BLD STRIP.AUTO-MCNC: 125 MG/DL (ref 65–100)
GLUCOSE BLD STRIP.AUTO-MCNC: 135 MG/DL (ref 65–100)
GLUCOSE BLD STRIP.AUTO-MCNC: 174 MG/DL (ref 65–100)
GLUCOSE BLD STRIP.AUTO-MCNC: 249 MG/DL (ref 65–100)
GLUCOSE SERPL-MCNC: 137 MG/DL (ref 65–100)
POTASSIUM SERPL-SCNC: 4.2 MMOL/L (ref 3.5–5.1)
SERVICE CMNT-IMP: ABNORMAL
SODIUM SERPL-SCNC: 137 MMOL/L (ref 136–145)

## 2020-03-03 PROCEDURE — 65270000029 HC RM PRIVATE

## 2020-03-03 PROCEDURE — 74011636637 HC RX REV CODE- 636/637: Performed by: PHYSICIAN ASSISTANT

## 2020-03-03 PROCEDURE — 80048 BASIC METABOLIC PNL TOTAL CA: CPT

## 2020-03-03 PROCEDURE — 94621 CARDIOPULM EXERCISE TESTING: CPT

## 2020-03-03 PROCEDURE — 74011636637 HC RX REV CODE- 636/637: Performed by: NURSE PRACTITIONER

## 2020-03-03 PROCEDURE — 74011250637 HC RX REV CODE- 250/637: Performed by: NURSE PRACTITIONER

## 2020-03-03 PROCEDURE — 94640 AIRWAY INHALATION TREATMENT: CPT

## 2020-03-03 PROCEDURE — 74011250637 HC RX REV CODE- 250/637: Performed by: INTERNAL MEDICINE

## 2020-03-03 PROCEDURE — 82962 GLUCOSE BLOOD TEST: CPT

## 2020-03-03 PROCEDURE — 74011250637 HC RX REV CODE- 250/637: Performed by: HOSPITALIST

## 2020-03-03 PROCEDURE — 74011000250 HC RX REV CODE- 250: Performed by: HOSPITALIST

## 2020-03-03 PROCEDURE — 74011250636 HC RX REV CODE- 250/636: Performed by: HOSPITALIST

## 2020-03-03 PROCEDURE — 36415 COLL VENOUS BLD VENIPUNCTURE: CPT

## 2020-03-03 PROCEDURE — 74011250636 HC RX REV CODE- 250/636: Performed by: NURSE PRACTITIONER

## 2020-03-03 PROCEDURE — 97530 THERAPEUTIC ACTIVITIES: CPT

## 2020-03-03 RX ADMIN — ATORVASTATIN CALCIUM 80 MG: 40 TABLET, FILM COATED ORAL at 09:02

## 2020-03-03 RX ADMIN — IPRATROPIUM BROMIDE AND ALBUTEROL SULFATE 3 ML: .5; 3 SOLUTION RESPIRATORY (INHALATION) at 21:04

## 2020-03-03 RX ADMIN — MORPHINE SULFATE 2 MG: 2 INJECTION, SOLUTION INTRAMUSCULAR; INTRAVENOUS at 04:56

## 2020-03-03 RX ADMIN — ALPRAZOLAM 1 MG: 0.5 TABLET ORAL at 13:19

## 2020-03-03 RX ADMIN — BUDESONIDE INHALATION 500 MCG: 0.5 SUSPENSION RESPIRATORY (INHALATION) at 21:04

## 2020-03-03 RX ADMIN — POLYETHYLENE GLYCOL 3350 17 G: 17 POWDER, FOR SOLUTION ORAL at 09:02

## 2020-03-03 RX ADMIN — ALPRAZOLAM 1 MG: 0.5 TABLET ORAL at 04:56

## 2020-03-03 RX ADMIN — MORPHINE SULFATE 2 MG: 2 INJECTION, SOLUTION INTRAMUSCULAR; INTRAVENOUS at 21:32

## 2020-03-03 RX ADMIN — INSULIN LISPRO 4 UNITS: 100 INJECTION, SOLUTION INTRAVENOUS; SUBCUTANEOUS at 16:57

## 2020-03-03 RX ADMIN — SERTRALINE HYDROCHLORIDE 100 MG: 50 TABLET ORAL at 21:32

## 2020-03-03 RX ADMIN — ALPRAZOLAM 1 MG: 0.5 TABLET ORAL at 21:31

## 2020-03-03 RX ADMIN — SODIUM CHLORIDE, SODIUM LACTATE, POTASSIUM CHLORIDE, AND CALCIUM CHLORIDE 100 ML/HR: 600; 310; 30; 20 INJECTION, SOLUTION INTRAVENOUS at 09:01

## 2020-03-03 RX ADMIN — ACETYLCYSTEINE 400 MG: 200 SOLUTION ORAL; RESPIRATORY (INHALATION) at 07:16

## 2020-03-03 RX ADMIN — ENOXAPARIN SODIUM 40 MG: 40 INJECTION SUBCUTANEOUS at 23:16

## 2020-03-03 RX ADMIN — PREDNISONE 40 MG: 20 TABLET ORAL at 07:13

## 2020-03-03 RX ADMIN — BUDESONIDE INHALATION 500 MCG: 0.5 SUSPENSION RESPIRATORY (INHALATION) at 07:15

## 2020-03-03 RX ADMIN — PANTOPRAZOLE SODIUM 40 MG: 40 TABLET, DELAYED RELEASE ORAL at 07:13

## 2020-03-03 RX ADMIN — NIFEDIPINE 90 MG: 60 TABLET, FILM COATED, EXTENDED RELEASE ORAL at 09:02

## 2020-03-03 RX ADMIN — ASPIRIN 81 MG: 81 TABLET, COATED ORAL at 09:02

## 2020-03-03 RX ADMIN — Medication 1 CAPSULE: at 09:02

## 2020-03-03 RX ADMIN — MORPHINE SULFATE 2 MG: 2 INJECTION, SOLUTION INTRAMUSCULAR; INTRAVENOUS at 13:15

## 2020-03-03 RX ADMIN — ACETYLCYSTEINE: 200 SOLUTION ORAL; RESPIRATORY (INHALATION) at 21:04

## 2020-03-03 RX ADMIN — ZOLPIDEM TARTRATE 5 MG: 5 TABLET ORAL at 23:16

## 2020-03-03 RX ADMIN — CARVEDILOL 6.25 MG: 6.25 TABLET, FILM COATED ORAL at 16:57

## 2020-03-03 RX ADMIN — CLOPIDOGREL BISULFATE 75 MG: 75 TABLET, FILM COATED ORAL at 09:03

## 2020-03-03 RX ADMIN — INSULIN LISPRO 3 UNITS: 100 INJECTION, SOLUTION INTRAVENOUS; SUBCUTANEOUS at 12:04

## 2020-03-03 RX ADMIN — IPRATROPIUM BROMIDE AND ALBUTEROL SULFATE 3 ML: .5; 3 SOLUTION RESPIRATORY (INHALATION) at 07:16

## 2020-03-03 RX ADMIN — CARVEDILOL 6.25 MG: 6.25 TABLET, FILM COATED ORAL at 07:13

## 2020-03-03 RX ADMIN — ACETAMINOPHEN 650 MG: 325 TABLET ORAL at 15:01

## 2020-03-03 RX ADMIN — FLUTICASONE PROPIONATE 2 SPRAY: 50 SPRAY, METERED NASAL at 09:03

## 2020-03-03 RX ADMIN — Medication 10 ML: at 07:13

## 2020-03-03 RX ADMIN — LISINOPRIL 5 MG: 5 TABLET ORAL at 09:02

## 2020-03-03 NOTE — PROGRESS NOTES
03/03/20 1440 03/03/20 1442 03/03/20 1444   RT Walking Oximetry   Stage Resting (Room Air) During Walk (on O2) During Walk (on O2)   SpO2 (!) 81 % 92 % 92 %   HR 89 bpm 90 bpm 95 bpm   Rate of Dyspnea  --  1 2   Symptoms  --  Shortness of Breath  --    O2 Flow Rate (L/min)  --  3 l/min 3 l/min   FIO2 (%)  --   --  32 %   Walk/Assistive Device  --   --   --    Distance Walked in Feet (ft)  --   --   --    Comments  --   --   --       03/03/20 1446   RT Walking Oximetry   Stage After Walk   SpO2 93 %   HR 97 bpm   Rate of Dyspnea 2   Symptoms Shortness of Breath  (coughing)   O2 Flow Rate (L/min) 3 l/min   FIO2 (%)  --    Walk/Assistive Device   (walked without device)   Distance Walked in Feet (ft) 512 ft   Comments   (pt requires oxygen during walk. )

## 2020-03-03 NOTE — PROGRESS NOTES
[facility] [facility]  Hospitalist Group                                                                                          Hospitalist Progress Note  Angi Ya MD  Answering service: [phone] OR [phone] from in house phone        Date of Service:  3/2/2020  NAME:  Radhika Man  :  1945  MRN:  [id number]      Admission Summary:   69 y.o. female with past medical history significant for s/p stent placement (August), HTN, and recurrent pneumonia (most recently in Dec 2019) presented to the ED from home via EMS with chief complaint of SOB, increased exertional and non exertional dyspnea w accessory muscle use and cough x three days despite use of albuterol and oral prednisone prescribed by another facility  On  for presumed bronchitis. CPAP was initiated in the ED. She tested + for RSV B. Pt is a former smoker. Interval history / Subjective:   Patient seen and examined.  -Ms. Janie Esparza says she is feeling much better. She hopes to go home in a day or 2. She worked with physical therapy today, home health physical therapy services recommended. Assessment & Plan:     # Acute hypoxemia respiratory failure Improving: likely from acute RSV B bronchitis /bronchiolitis,superimposed bacterial pnemuonia  -Slow progress thus far. Switch prednisone to solumedrol. Intensify bronchodilators,  -On oxygen with NC  -echo normal EF and grade 1 diastolic CHF  -on ceftriaxone (end 3/1); end azithromycin  for presumed bacterial etiology based on CT findings :  \"Mild patchy airspace disease in the left lower lobe may represent early or  resolving pneumonia. \"  -Pulmonologist following:  will need OP PFTs and follow up. -CTA negative for PE  -Patient noted significant improvement, 3/2    #Chest pain,could likely be msk due to severe cough,however ,she has cardiac risk,would cycle enzymes and EKG. Pain resolved w/o intervention.  -Cardiac enzymes negative x3 and ekg unremarkable.   -  -CXR w/minimal atelectasis. #HTN:  -BP controlled with nifedipine,coreg and lisinorpil. #History of renal artery stenosis s/p rt renal artery stenting    #Coronary artery disease  -on aspirin,statin,Plavix,Coreg    #Chronic macrocytic anemia:  -Hb stable  -OP f/up    #CKD   -GFR fluctuating, likely r/to DM and HTN. Continue to observe and follow. OP f/up recommended. #Hyperglycemia:  POC glucose consistently <170mg/dl  -Likely steroid induced. -A1C 5.4 on 2/22  -Humalog SS modified to insulin resistant protocol. Code status: full    DVT prophylaxis: Lovenox     Care Plan discussed with: patient,nurse    Anticipated Disposition: Pt's preference is to return home    Anticipated Discharge: Home with home health services in 24-48 hours. Hospital Problems  Date Reviewed: 2/22/2020          Codes Class Noted POA    Coronary artery disease ICD-10-CM: I25.10  ICD-9-CM: 414.00  9/12/2019 Yes        * (Principal) Bacterial pneumonia ICD-10-CM: J15.9  ICD-9-CM: 482.9  2/22/2020 Yes        Renal artery stenosis ([facility] [address] ICD-10-CM: I70.1  ICD-9-CM: 440.1  8/20/2019 Yes        Flash pulmonary edema ([facility] [address] ICD-10-CM: J81.0  ICD-9-CM: 518.4  8/20/2019 Yes        Mixed hyperlipidemia ICD-10-CM: E78.2  ICD-9-CM: 272.2  11/16/2018 Yes        Renovascular hypertension ICD-10-CM: I15.0  ICD-9-CM: 405.91  11/16/2018 Yes        SOB (shortness of breath) ICD-10-CM: R06.02  ICD-9-CM: 786.05  11/16/2018 Yes                Review of Systems:   +sob,chest pain. Vital Signs:    Last 24hrs VS reviewed since prior progress note. Most recent are:  Visit Vitals  /74   Pulse 64   Temp 97.3 °F (36.3 °C)   Resp 16   Ht 5' 3\" (1.6 m)   Wt 70.3 kg (155 lb)   SpO2 93%   BMI 27.46 kg/m²       No intake or output data in the 24 hours ending 03/02/20 1932     Physical Examination:             Constitutional:  Sitting in bed, quite comfortable. ENT:  Oral mucosa still dry but improved from yesterday.  EOMI   Resp: On oxygen via nasal canula. Bibasilar crackles, slight exp wheeze. No diminished breath sounds   CV:  Regular rhythm, normal rate,S1,S2     GI:  Soft, non distended, non tender. normoactive bowel sounds  Sc bruises from sc heparin injections. Musculoskeletal:  No  LE edema    Neurologic:  Moves all extremities. AAOx3, CN II-XII reviewed     Psych: Not agitated; pleasant  Skin:  Poor turgor, no rashes or ulcers       Data Review:    Review and/or order of clinical lab test  Review and/or order of tests in the medicine section of [facility]      Labs:     Recent Labs     03/01/20  0041 02/29/20  0655   WBC 9.7 12.1*   HGB 9.1* 9.9*   HCT 27.3* 29.7*    209     Recent Labs     03/01/20  0041 02/29/20  0656    138   K 4.3 3.8    108   CO2 23 25   BUN 27* 23*   CREA 1.14* 0.99   * 81   CA 8.7 8.9   MG  --  2.1   PHOS  --  3.9     Recent Labs     02/29/20  0656   SGOT 15   ALT 29   AP 61   TBILI 0.3   TP 6.2*   ALB 3.3*   GLOB 2.9     No results for input(s): INR, PTP, APTT, INREXT, INREXT in the last 72 hours. No results for input(s): FE, TIBC, PSAT, FERR in the last 72 hours. Lab Results   Component Value Date/Time    Folate 10.9 07/24/2019 03:41 AM      No results for input(s): PH, PCO2, PO2 in the last 72 hours.   Recent Labs     03/01/20  0041 02/29/20  1339 02/29/20  0656   CPK 33 47  --    CKNDX 4.5* 3.0*  --    TROIQ <0.05 <0.05 <0.05     Lab Results   Component Value Date/Time    Cholesterol, total 111 02/23/2020 03:28 AM    HDL Cholesterol 27 02/23/2020 03:28 AM    LDL, calculated 35.4 02/23/2020 03:28 AM    Triglyceride 243 (H) 02/23/2020 03:28 AM    CHOL/HDL Ratio 4.1 02/23/2020 03:28 AM     Lab Results   Component Value Date/Time    Glucose (POC) 126 (H) 03/02/2020 04:36 PM    Glucose (POC) 297 (H) 03/02/2020 11:03 AM    Glucose (POC) 106 (H) 03/02/2020 07:11 AM    Glucose (POC) 141 (H) 03/01/2020 09:30 PM    Glucose (POC) 268 (H) 03/01/2020 04:09 PM     Lab Results   Component Value Date/Time    Color YELLOW/STRAW 02/22/2020 06:17 AM    Appearance CLEAR 02/22/2020 06:17 AM    Specific gravity 1.015 02/22/2020 06:17 AM    pH (UA) 5.0 02/22/2020 06:17 AM    Protein NEGATIVE  02/22/2020 06:17 AM    Glucose NEGATIVE  02/22/2020 06:17 AM    Ketone NEGATIVE  02/22/2020 06:17 AM    Bilirubin NEGATIVE  02/22/2020 06:17 AM    Urobilinogen 0.2 02/22/2020 06:17 AM    Nitrites NEGATIVE  02/22/2020 06:17 AM    Leukocyte Esterase NEGATIVE  02/22/2020 06:17 AM    Epithelial cells FEW 02/22/2020 06:17 AM    Bacteria NEGATIVE  02/22/2020 06:17 AM    WBC 0-4 02/22/2020 06:17 AM    RBC 0-5 02/22/2020 06:17 AM         Medications Reviewed:     Current Facility-Administered Medications   Medication Dose Route Frequency    benzonatate (TESSALON) capsule 100 mg  100 mg Oral TID PRN    [START ON 3/3/2020] predniSONE (DELTASONE) tablet 40 mg  40 mg Oral DAILY WITH BREAKFAST    albuterol-ipratropium (DUO-NEB) 2.5 MG-0.5 MG/3 ML  3 mL Nebulization Q6HWA RT    methylPREDNISolone (PF) (SOLU-MEDROL) injection 40 mg  40 mg IntraVENous Q8H    nitroglycerin (NITROSTAT) tablet 0.4 mg  0.4 mg SubLINGual PRN    morphine injection 2 mg  2 mg IntraVENous Q4H PRN    ALPRAZolam (XANAX) tablet 1 mg  1 mg Oral TID PRN    fluticasone propionate (FLONASE) 50 mcg/actuation nasal spray 2 Spray  2 Spray Both Nostrils DAILY    insulin lispro (HUMALOG) injection   SubCUTAneous AC&HS    glucose chewable tablet 16 g  4 Tab Oral PRN    enoxaparin (LOVENOX) injection 40 mg  40 mg SubCUTAneous Q24H    acetylcysteine (MUCOMYST) 200 mg/mL (20 %) solution 400 mg  400 mg Nebulization TID RT    lactated Ringers infusion  100 mL/hr IntraVENous CONTINUOUS    calcium carbonate (TUMS) chewable tablet 200 mg [elemental]  200 mg Oral QID PRN    pantoprazole (PROTONIX) tablet 40 mg  40 mg Oral ACB    guaiFENesin-codeine (ROBITUSSIN AC) 100-10 mg/5 mL solution 10 mL  10 mL Oral Q6H PRN    polyethylene glycol (MIRALAX) packet 17 g  17 g Oral DAILY    polyvinyl alcohol-povidone (NATURAL TEARS) 0.5-0.6 % ophthalmic solution 1 Drop  1 Drop Both Eyes PRN    zolpidem (AMBIEN) tablet 5 mg  5 mg Oral QHS PRN    arformoteroL (BROVANA) neb solution 15 mcg  15 mcg Nebulization BID RT    And    budesonide (PULMICORT) 500 mcg/2 ml nebulizer suspension  500 mcg Nebulization BID RT    glucagon (GLUCAGEN) injection 1 mg  1 mg IntraMUSCular PRN    dextrose 10% infusion 0-250 mL  0-250 mL IntraVENous PRN    lisinopril (PRINIVIL, ZESTRIL) tablet 5 mg  5 mg Oral DAILY    acetaminophen (TYLENOL) tablet 650 mg  650 mg Oral Q4H PRN    albuterol-ipratropium (DUO-NEB) 2.5 MG-0.5 MG/3 ML  3 mL Nebulization Q4H PRN    aspirin delayed-release tablet 81 mg  81 mg Oral DAILY    atorvastatin (LIPITOR) tablet 80 mg  80 mg Oral DAILY    clopidogreL (PLAVIX) tablet 75 mg  75 mg Oral DAILY    NIFEdipine ER (PROCARDIA XL) tablet 90 mg  90 mg Oral DAILY    sertraline (ZOLOFT) tablet 100 mg  100 mg Oral QHS    sodium chloride (NS) flush 5-40 mL  5-40 mL IntraVENous Q8H    sodium chloride (NS) flush 5-40 mL  5-40 mL IntraVENous PRN    ondansetron (ZOFRAN) injection 4 mg  4 mg IntraVENous Q4H PRN    bisacodyL (DULCOLAX) tablet 5 mg  5 mg Oral DAILY PRN    lactobac ac& pc-s.therm-b.anim (PETRA Q/RISAQUAD)  1 Cap Oral DAILY    hydrALAZINE (APRESOLINE) 20 mg/mL injection 10 mg  10 mg IntraVENous Q6H PRN    carvediloL (COREG) tablet 6.25 mg  6.25 mg Oral BID WITH MEALS     ______________________________________________________________________  EXPECTED LENGTH OF STAY: 3d 19h  ACTUAL LENGTH OF STAY:          9                 Le Enriquez MD

## 2020-03-03 NOTE — PROGRESS NOTES
Chart reviewed. Pt cleared by nursing. Pt asking to not participate in therapy today after just having 6MWT and OT sessions back to back this afternoon. She reports \"no energy\" and \"completely exhausted\". Pt remains very agreeable to therapy services and wants to participate in order to return to PLOF. Of note pt agreeable to HHPT, has supplemental O2 at home but reports has never used it, and also has pulse ox at home.      Will continue to follow and plan to see tomorrow AM.    Werner Hill, SPT

## 2020-03-03 NOTE — PROGRESS NOTES
Bedside shift change report given to ΣΑΡΑΝΤΙ (oncoming nurse) by Moe Anthony (offgoing nurse). Report included the following information SBAR and Kardex.

## 2020-03-03 NOTE — PROGRESS NOTES
[facility] Adult  Hospitalist Group                                                                                          Hospitalist Progress Note  Le Enriquez MD  Answering service: [phone] or [phone] from in house phone        Date of Service:  3/3/2020  NAME:  Valentín Russell  :  1945  MRN:  [id number]      Admission Summary:   69 y.o. female with past medical history significant for s/p stent placement (August), HTN, and recurrent pneumonia (most recently in Dec 2019) presented to the ED from home via EMS with chief complaint of SOB, increased exertional and non exertional dyspnea w accessory muscle use and cough x three days despite use of albuterol and oral prednisone prescribed by another facility  On  for presumed bronchitis. CPAP was initiated in the ED. She tested + for RSV B. Pt is a former smoker. Interval history / Subjective:   Patient seen and examined.  -Ms. Ho Beckham says she is feeling much better. She has home oxygen,she usually uses intermittently  Six minute test completed. She needs oxygen at all times 2-3 l/min. I have talked to her face to face today in rounds,she is agreeable for home oxygen. Assessment & Plan:     # Acute hypoxemia respiratory failure Improving: likely from acute RSV B bronchitis /bronchiolitis,superimposed bacterial pnemuonia  -Slow progress thus far. Switch prednisone to solumedrol. Intensify bronchodilators,  -On oxygen with NC  -echo normal EF and grade 1 diastolic CHF  -on ceftriaxone (end 3/1); end azithromycin  for presumed bacterial etiology based on CT findings :  \"Mild patchy airspace disease in the left lower lobe may represent early or  resolving pneumonia. \"  -Pulmonologist following:  will need OP PFTs and follow up.   -CTA negative for PE  -Patient noted significant improvement, 3/2  -Home with home oxygen     #Chest pain,could likely be msk due to severe cough,however ,she has cardiac risk,would cycle enzymes and EKG.Pain resolved w/o intervention.  -Cardiac enzymes negative x3 and ekg unremarkable. -  -CXR w/minimal atelectasis. #HTN:  -BP controlled with nifedipine,coreg and lisinorpil. #History of renal artery stenosis s/p rt renal artery stenting    #Coronary artery disease  -on aspirin,statin,Plavix,Coreg    #Chronic macrocytic anemia:  -Hb stable  -OP f/up    #CKD   -GFR fluctuating, likely r/to DM and HTN. Continue to observe and follow. OP f/up recommended. #Hyperglycemia:  POC glucose consistently <170mg/dl  -Likely steroid induced. -A1C 5.4 on 2/22  -Humalog SS modified to insulin resistant protocol. Code status: full    DVT prophylaxis: Lovenox     Care Plan discussed with: patient,nurse    Anticipated Disposition: Pt's preference is to return home    Anticipated Discharge: Home with home health services in 24-48 hours. Hospital Problems  Date Reviewed: 2/22/2020          Codes Class Noted POA    Coronary artery disease ICD-10-CM: I25.10  ICD-9-CM: 414.00  9/12/2019 Yes        * (Principal) Bacterial pneumonia ICD-10-CM: J15.9  ICD-9-CM: 482.9  2/22/2020 Yes        Renal artery stenosis ([facility] [address] ICD-10-CM: I70.1  ICD-9-CM: 440.1  8/20/2019 Yes        Flash pulmonary edema ([facility] [address] ICD-10-CM: J81.0  ICD-9-CM: 518.4  8/20/2019 Yes        Mixed hyperlipidemia ICD-10-CM: E78.2  ICD-9-CM: 272.2  11/16/2018 Yes        Renovascular hypertension ICD-10-CM: I15.0  ICD-9-CM: 405.91  11/16/2018 Yes        SOB (shortness of breath) ICD-10-CM: R06.02  ICD-9-CM: 786.05  11/16/2018 Yes                Review of Systems:   +sob,chest pain. Vital Signs:    Last 24hrs VS reviewed since prior progress note.  Most recent are:  Visit Vitals  /71 (BP 1 Location: Left arm, BP Patient Position: Sitting)   Pulse 61   Temp 98 °F (36.7 °C)   Resp 16   Ht 5' 3\" (1.6 m)   Wt 71.1 kg (156 lb 11.2 oz)   SpO2 (!) 89%   BMI 27.76 kg/m²       No intake or output data in the 24 hours ending 03/03/20 0605     Physical Examination:             Constitutional:  Sitting in bed, quite comfortable. ENT:  Oral mucosa still dry but improved from yesterday. EOMI   Resp: On oxygen via nasal canula. Bibasilar crackles, slight exp wheeze. No diminished breath sounds   CV:  Regular rhythm, normal rate,S1,S2     GI:  Soft, non distended, non tender. normoactive bowel sounds  Sc bruises from sc heparin injections. Musculoskeletal:  No  LE edema    Neurologic:  Moves all extremities. AAOx3, CN II-XII reviewed     Psych: Not agitated; pleasant  Skin:  Poor turgor, no rashes or ulcers       Data Review:    Review and/or order of clinical lab test  Review and/or order of tests in the medicine section of [facility]      Labs:     Recent Labs     03/01/20  0041   WBC 9.7   HGB 9.1*   HCT 27.3*        Recent Labs     03/03/20  0501 03/01/20  0041    138   K 4.2 4.3    108   CO2 25 23   BUN 23* 27*   CREA 0.91 1.14*   * 172*   CA 8.5 8.7     No results for input(s): SGOT, GPT, ALT, AP, TBIL, TBILI, TP, ALB, GLOB, GGT, AML, LPSE in the last 72 hours. No lab exists for component: AMYP, HLPSE  No results for input(s): INR, PTP, APTT, INREXT, INREXT in the last 72 hours. No results for input(s): FE, TIBC, PSAT, FERR in the last 72 hours. Lab Results   Component Value Date/Time    Folate 10.9 07/24/2019 03:41 AM      No results for input(s): PH, PCO2, PO2 in the last 72 hours.   Recent Labs     03/01/20  0041   CPK 33   CKNDX 4.5*   TROIQ <0.05     Lab Results   Component Value Date/Time    Cholesterol, total 111 02/23/2020 03:28 AM    HDL Cholesterol 27 02/23/2020 03:28 AM    LDL, calculated 35.4 02/23/2020 03:28 AM    Triglyceride 243 (H) 02/23/2020 03:28 AM    CHOL/HDL Ratio 4.1 02/23/2020 03:28 AM     Lab Results   Component Value Date/Time    Glucose (POC) 249 (H) 03/03/2020 04:43 PM    Glucose (POC) 174 (H) 03/03/2020 11:31 AM    Glucose (POC) 125 (H) 03/03/2020 07:00 AM    Glucose (POC) 254 (H) 03/02/2020 09:22 PM Glucose (POC) 126 (H) 03/02/2020 04:36 PM     Lab Results   Component Value Date/Time    Color YELLOW/STRAW 02/22/2020 06:17 AM    Appearance CLEAR 02/22/2020 06:17 AM    Specific gravity 1.015 02/22/2020 06:17 AM    pH (UA) 5.0 02/22/2020 06:17 AM    Protein NEGATIVE  02/22/2020 06:17 AM    Glucose NEGATIVE  02/22/2020 06:17 AM    Ketone NEGATIVE  02/22/2020 06:17 AM    Bilirubin NEGATIVE  02/22/2020 06:17 AM    Urobilinogen 0.2 02/22/2020 06:17 AM    Nitrites NEGATIVE  02/22/2020 06:17 AM    Leukocyte Esterase NEGATIVE  02/22/2020 06:17 AM    Epithelial cells FEW 02/22/2020 06:17 AM    Bacteria NEGATIVE  02/22/2020 06:17 AM    WBC 0-4 02/22/2020 06:17 AM    RBC 0-5 02/22/2020 06:17 AM         Medications Reviewed:     Current Facility-Administered Medications   Medication Dose Route Frequency    benzonatate (TESSALON) capsule 100 mg  100 mg Oral TID PRN    predniSONE (DELTASONE) tablet 40 mg  40 mg Oral DAILY WITH BREAKFAST    albuterol-ipratropium (DUO-NEB) 2.5 MG-0.5 MG/3 ML  3 mL Nebulization Q6HWA RT    nitroglycerin (NITROSTAT) tablet 0.4 mg  0.4 mg SubLINGual PRN    morphine injection 2 mg  2 mg IntraVENous Q4H PRN    ALPRAZolam (XANAX) tablet 1 mg  1 mg Oral TID PRN    fluticasone propionate (FLONASE) 50 mcg/actuation nasal spray 2 Spray  2 Spray Both Nostrils DAILY    insulin lispro (HUMALOG) injection   SubCUTAneous AC&HS    glucose chewable tablet 16 g  4 Tab Oral PRN    enoxaparin (LOVENOX) injection 40 mg  40 mg SubCUTAneous Q24H    acetylcysteine (MUCOMYST) 200 mg/mL (20 %) solution 400 mg  400 mg Nebulization TID RT    lactated Ringers infusion  100 mL/hr IntraVENous CONTINUOUS    calcium carbonate (TUMS) chewable tablet 200 mg [elemental]  200 mg Oral QID PRN    pantoprazole (PROTONIX) tablet 40 mg  40 mg Oral ACB    guaiFENesin-codeine (ROBITUSSIN AC) 100-10 mg/5 mL solution 10 mL  10 mL Oral Q6H PRN    polyethylene glycol (MIRALAX) packet 17 g  17 g Oral DAILY    polyvinyl alcohol-povidone (NATURAL TEARS) 0.5-0.6 % ophthalmic solution 1 Drop  1 Drop Both Eyes PRN    zolpidem (AMBIEN) tablet 5 mg  5 mg Oral QHS PRN    arformoteroL (BROVANA) neb solution 15 mcg  15 mcg Nebulization BID RT    And    budesonide (PULMICORT) 500 mcg/2 ml nebulizer suspension  500 mcg Nebulization BID RT    glucagon (GLUCAGEN) injection 1 mg  1 mg IntraMUSCular PRN    dextrose 10% infusion 0-250 mL  0-250 mL IntraVENous PRN    lisinopril (PRINIVIL, ZESTRIL) tablet 5 mg  5 mg Oral DAILY    acetaminophen (TYLENOL) tablet 650 mg  650 mg Oral Q4H PRN    albuterol-ipratropium (DUO-NEB) 2.5 MG-0.5 MG/3 ML  3 mL Nebulization Q4H PRN    aspirin delayed-release tablet 81 mg  81 mg Oral DAILY    atorvastatin (LIPITOR) tablet 80 mg  80 mg Oral DAILY    clopidogreL (PLAVIX) tablet 75 mg  75 mg Oral DAILY    NIFEdipine ER (PROCARDIA XL) tablet 90 mg  90 mg Oral DAILY    sertraline (ZOLOFT) tablet 100 mg  100 mg Oral QHS    sodium chloride (NS) flush 5-40 mL  5-40 mL IntraVENous Q8H    sodium chloride (NS) flush 5-40 mL  5-40 mL IntraVENous PRN    ondansetron (ZOFRAN) injection 4 mg  4 mg IntraVENous Q4H PRN    bisacodyL (DULCOLAX) tablet 5 mg  5 mg Oral DAILY PRN    lactobac ac& pc-s.therm-b.anim (PETRA Q/RISAQUAD)  1 Cap Oral DAILY    hydrALAZINE (APRESOLINE) 20 mg/mL injection 10 mg  10 mg IntraVENous Q6H PRN    carvediloL (COREG) tablet 6.25 mg  6.25 mg Oral BID WITH MEALS     ______________________________________________________________________  EXPECTED LENGTH OF STAY: 3d 19h  ACTUAL LENGTH OF STAY:          10                 Patrick Ornelas MD

## 2020-03-03 NOTE — PROGRESS NOTES
Rounded on [facility] patients and provided Anointing of the Sick at request of patient    Fr. Pearl Gage

## 2020-03-03 NOTE — PROGRESS NOTES
Problem: Self Care Deficits Care Plan (Adult)  Goal: *Acute Goals and Plan of Care (Insert Text)  Description    FUNCTIONAL STATUS PRIOR TO ADMISSION: Patient was independent and active without use of DME.     HOME SUPPORT: The patient lived alone with friend to provide assistance. Occupational Therapy Goals  Initiated 2/25/2020  1. Patient will perform standing ADLs x 10 min with modified independence within 7 day(s). 2.  Patient will perform lower body dressing with modified independence within 7 day(s). 3.  Patient will perform bathing with modified independence within 7 day(s). 4.  Patient will perform toilet transfers with modified independence within 7 day(s). MET 2/27/2020 and discharged  5. Patient will perform all aspects of toileting with modified independence within 7 day(s). MET 2/27/2020 and discharged  6. Patient will utilize energy conservation techniques during functional activities with verbal cues within 7 day(s). Goals 4&5 Met on 2/27/20 and discharged, with all other goals continued as is as they remain appropriate at this time. Outcome: Progressing Towards Goal  OCCUPATIONAL THERAPY TREATMENT/WEEKLY RE-ASSESSMENT  Patient: Daniel Winston (35 y.o. female)  Date: 3/3/2020  Diagnosis: Bacterial pneumonia [J15.9]   Bacterial pneumonia       Precautions: Contact(and droplet)  Chart, occupational therapy assessment, plan of care, and goals were reviewed. ASSESSMENT  Patient continues with skilled OT services and is progressing towards goals. Pt has demonstrated improved toileting independence as evidenced by progression to Mod Independent level, with associated goals met and D/C'ed; however, she continues to be limited by decreased activity tolerance and c/o SOB during functional activity engagement.   OT attempted HEP training this date; however, pt requested to stop 2/2 being too tired from completing 6 minute walk test with RT just prior to OT's arrival of therapy; therefore, OT educated pt on and outfitted pt with handout regarding functional applications of learned energy conservation techniques. Pt demonstrated good understanding of PLB techniques, as well as, energy conservation, with patient independently verbalizing that she takes frequent seated rest breaks prior to reaching feelings of exhaustion during ADL engagement. Pt continues to benefit from skilled OT to address decreased higher level mobility/balance, strength/endurance and activity tolerance in an overall effort at maximizing pt's highest level of safe functional independence prior to discharge from acute OT services. Current Level of Function Impacting Discharge (ADLs): Supervision, with cues for energy conservation    Other factors to consider for discharge: 02, low activity tolerance         PLAN :  Goals have been updated based on progression since last assessment. Patient continues to benefit from skilled intervention to address the above impairments. Continue to follow patient 5 times a week to address goals. Recommend with staff: Active ADL engagement with frequent rest breaks, OOB for meals    Recommend next OT session: HEP training    Recommendation for discharge: (in order for the patient to meet his/her long term goals)  No skilled occupational therapy/ follow up rehabilitation needs identified at this time. This discharge recommendation:  Has not yet been discussed the attending provider and/or case management    IF patient discharges home will need the following DME: TBD       SUBJECTIVE:   Patient stated I just feel so weak.     OBJECTIVE DATA SUMMARY:   Cognitive/Behavioral Status:  Neurologic State: Alert  Orientation Level: Oriented X4  Cognition: Follows commands  Perception: Appears intact  Perseveration: No perseveration noted  Safety/Judgement: Awareness of environment;Good awareness of safety precautions; Insight into deficits      ADL Intervention:   Initiated HEP training; however, pt too fatigued from just completing 6 minute walk test; therefore, skilled OT changed with education/handout provided regarding functional applications of energy conservation techniques. Grooming  Cues: Verbal cues provided(energy conversation education)    Upper Body Bathing  Cues: Verbal cues provided(energy conversation education)    Lower Body Bathing  Cues: Verbal cues provided(energy conversation education)    Upper Body Dressing Assistance  Cues: Verbal cues provided(energy conversation education)    Lower Body Dressing Assistance  Cues: Verbal cues provided(energy conversation education)      Cognitive Retraining  Safety/Judgement: Awareness of environment;Good awareness of safety precautions; Insight into deficits    Activity Tolerance:   Poor, desaturates with exertion and requires oxygen and requires frequent rest breaks  Please refer to the flowsheet for vital signs taken during this treatment. After treatment patient left in no apparent distress:   Sitting in chair and Call bell within reach    COMMUNICATION/COLLABORATION:   The patients plan of care was discussed with: Registered nurse.      Santiago Mittal OT  Time Calculation: 14 mins

## 2020-03-03 NOTE — PROGRESS NOTES
KELLEN:  1. [facility]. 2. Transport; family. Cm sent referral to [facility], they can accept. Patient will need oxygen at discharge. CM sent referral to [facility]. CM to upload documentation when available.     [facility]

## 2020-03-04 ENCOUNTER — APPOINTMENT (OUTPATIENT)
Dept: VASCULAR SURGERY | Age: 75
DRG: 193 | End: 2020-03-04
Attending: NURSE PRACTITIONER
Payer: MEDICARE

## 2020-03-04 VITALS
HEIGHT: 63 IN | RESPIRATION RATE: 16 BRPM | BODY MASS INDEX: 28.1 KG/M2 | OXYGEN SATURATION: 99 % | WEIGHT: 158.6 LBS | TEMPERATURE: 98.1 F | HEART RATE: 56 BPM | DIASTOLIC BLOOD PRESSURE: 83 MMHG | SYSTOLIC BLOOD PRESSURE: 137 MMHG

## 2020-03-04 LAB
GLUCOSE BLD STRIP.AUTO-MCNC: 148 MG/DL (ref 65–100)
GLUCOSE BLD STRIP.AUTO-MCNC: 84 MG/DL (ref 65–100)
SERVICE CMNT-IMP: ABNORMAL
SERVICE CMNT-IMP: NORMAL

## 2020-03-04 PROCEDURE — 74011250637 HC RX REV CODE- 250/637: Performed by: HOSPITALIST

## 2020-03-04 PROCEDURE — 74011636637 HC RX REV CODE- 636/637: Performed by: NURSE PRACTITIONER

## 2020-03-04 PROCEDURE — 94640 AIRWAY INHALATION TREATMENT: CPT

## 2020-03-04 PROCEDURE — 82962 GLUCOSE BLOOD TEST: CPT

## 2020-03-04 PROCEDURE — 74011000250 HC RX REV CODE- 250: Performed by: HOSPITALIST

## 2020-03-04 PROCEDURE — 74011250636 HC RX REV CODE- 250/636: Performed by: HOSPITALIST

## 2020-03-04 PROCEDURE — 74011250637 HC RX REV CODE- 250/637: Performed by: INTERNAL MEDICINE

## 2020-03-04 PROCEDURE — 74011636637 HC RX REV CODE- 636/637: Performed by: PHYSICIAN ASSISTANT

## 2020-03-04 PROCEDURE — 94760 N-INVAS EAR/PLS OXIMETRY 1: CPT

## 2020-03-04 PROCEDURE — 93971 EXTREMITY STUDY: CPT

## 2020-03-04 RX ORDER — ALPRAZOLAM 1 MG/1
1 TABLET ORAL
Qty: 10 TAB | Refills: 0 | Status: SHIPPED | OUTPATIENT
Start: 2020-03-04

## 2020-03-04 RX ORDER — PREDNISONE 10 MG/1
TABLET ORAL
Qty: 20 TAB | Refills: 0 | Status: SHIPPED | OUTPATIENT
Start: 2020-03-05 | End: 2020-03-14

## 2020-03-04 RX ORDER — LISINOPRIL 5 MG/1
5 TABLET ORAL DAILY
Qty: 30 TAB | Refills: 0 | Status: SHIPPED | OUTPATIENT
Start: 2020-03-05 | End: 2020-04-04

## 2020-03-04 RX ORDER — CARVEDILOL 6.25 MG/1
6.25 TABLET ORAL 2 TIMES DAILY WITH MEALS
Qty: 60 TAB | Refills: 0 | Status: SHIPPED | OUTPATIENT
Start: 2020-03-04 | End: 2020-04-10 | Stop reason: SDUPTHER

## 2020-03-04 RX ORDER — BUDESONIDE AND FORMOTEROL FUMARATE DIHYDRATE 160; 4.5 UG/1; UG/1
2 AEROSOL RESPIRATORY (INHALATION) 2 TIMES DAILY
Qty: 1 INHALER | Refills: 0 | Status: SHIPPED | OUTPATIENT
Start: 2020-03-04

## 2020-03-04 RX ORDER — IPRATROPIUM BROMIDE AND ALBUTEROL SULFATE 2.5; .5 MG/3ML; MG/3ML
3 SOLUTION RESPIRATORY (INHALATION)
Qty: 30 NEBULE | Refills: 0 | Status: SHIPPED | OUTPATIENT
Start: 2020-03-04

## 2020-03-04 RX ADMIN — ALPRAZOLAM 1 MG: 0.5 TABLET ORAL at 05:40

## 2020-03-04 RX ADMIN — Medication 10 ML: at 13:56

## 2020-03-04 RX ADMIN — PANTOPRAZOLE SODIUM 40 MG: 40 TABLET, DELAYED RELEASE ORAL at 07:41

## 2020-03-04 RX ADMIN — INSULIN LISPRO 3 UNITS: 100 INJECTION, SOLUTION INTRAVENOUS; SUBCUTANEOUS at 13:02

## 2020-03-04 RX ADMIN — POLYETHYLENE GLYCOL 3350 17 G: 17 POWDER, FOR SOLUTION ORAL at 09:30

## 2020-03-04 RX ADMIN — NIFEDIPINE 90 MG: 60 TABLET, FILM COATED, EXTENDED RELEASE ORAL at 09:27

## 2020-03-04 RX ADMIN — ALPRAZOLAM 1 MG: 0.5 TABLET ORAL at 14:42

## 2020-03-04 RX ADMIN — MORPHINE SULFATE 2 MG: 2 INJECTION, SOLUTION INTRAMUSCULAR; INTRAVENOUS at 13:53

## 2020-03-04 RX ADMIN — ASPIRIN 81 MG: 81 TABLET, COATED ORAL at 09:27

## 2020-03-04 RX ADMIN — MORPHINE SULFATE 2 MG: 2 INJECTION, SOLUTION INTRAMUSCULAR; INTRAVENOUS at 05:32

## 2020-03-04 RX ADMIN — CARVEDILOL 6.25 MG: 6.25 TABLET, FILM COATED ORAL at 07:39

## 2020-03-04 RX ADMIN — PREDNISONE 40 MG: 20 TABLET ORAL at 07:40

## 2020-03-04 RX ADMIN — ACETYLCYSTEINE 400 MG: 200 SOLUTION ORAL; RESPIRATORY (INHALATION) at 07:29

## 2020-03-04 RX ADMIN — LISINOPRIL 5 MG: 5 TABLET ORAL at 09:27

## 2020-03-04 RX ADMIN — CLOPIDOGREL BISULFATE 75 MG: 75 TABLET, FILM COATED ORAL at 09:27

## 2020-03-04 RX ADMIN — IPRATROPIUM BROMIDE AND ALBUTEROL SULFATE 3 ML: .5; 3 SOLUTION RESPIRATORY (INHALATION) at 07:29

## 2020-03-04 RX ADMIN — BUDESONIDE INHALATION 500 MCG: 0.5 SUSPENSION RESPIRATORY (INHALATION) at 07:29

## 2020-03-04 RX ADMIN — Medication 1 CAPSULE: at 09:27

## 2020-03-04 RX ADMIN — ATORVASTATIN CALCIUM 80 MG: 40 TABLET, FILM COATED ORAL at 09:27

## 2020-03-04 RX ADMIN — FLUTICASONE PROPIONATE 2 SPRAY: 50 SPRAY, METERED NASAL at 09:29

## 2020-03-04 NOTE — PROGRESS NOTES
Hospital follow-up PCP transitional care appointment has been scheduled with Dr. Jay Orr for Tuesday, 3/10/20 at 2:45 p.m. Pending patient discharge.   Isabelle Alanis, Care Management Specialist.

## 2020-03-04 NOTE — DISCHARGE INSTRUCTIONS
Discharge Instructions       PATIENT ID: Genesis Gibson  MRN: [id number]   YOB: 1945    DATE OF ADMISSION: 2/21/2020 10:12 PM    DATE OF DISCHARGE: 3/4/2020    PRIMARY CARE PROVIDER: Severiano Bridegroom, MD     ATTENDING PHYSICIAN: Soco Aviles MD  DISCHARGING PROVIDER: Osbaldo Dye MD    To contact this individual call [phone] and ask the  to page. If unavailable ask to be transferred the Adult Hospitalist Department. DISCHARGE DIAGNOSES and CARE RECOMMENDATIONS:   COPD with chronic oxygen dependent respiratory failure  · Home oxygen  · Duo neb nebulizer and Pulmicort inhaler  · Prednisone to be tapered off  · Follow with your primary doctor  Within a week of discharge  · Follow with pulmonary doctor,you may need pulmonary function test   Acute respiratory failure: resolved    Acute exacerbation,resolved          DIET: Regular Diet and Cardiac Diet      ACTIVITY: Activity as tolerated        SERVICES and EQUIPMENT needed: home oxygen    PENDING TEST RESULTS:   At the time of discharge the following test results are still pending: none    FOLLOW UP APPOINTMENTS:   Follow-up Information     Follow up With Specialties Details Why Contact Sara Suarez MD Internal Medicine, Pulmonary Disease   [address] [address]  [address] [facility] [phone]               [address] [place] [address]:   IP CONSULT TO INTENSIVIST  IP CONSULT TO CARDIOLOGY  IP CONSULT TO PULMONOLOGY    YOU HAD THE FOLLOWING PROCEDURES/SURGERIES  :   * No surgery found *              DISCHARGE MEDICATIONS:   See Medication Reconciliation Form    · It is important that you take the medication exactly as they are prescribed. · Keep your medication in the bottles provided by the pharmacist and keep a list of the medication names, dosages, and times to be taken in your wallet. · Do not take other medications without consulting your doctor.        NOTIFY [facility] ANY OF THE FOLLOWING:   Fever over 101 degrees for 24 hours. Chest pain, shortness of breath, fever, chills, nausea, vomiting, diarrhea, change in mentation, falling, weakness, bleeding. Severe pain or pain not relieved by medications. Or, any other signs or symptoms that you may have questions about. Services you need on discharge:home oxygen       Signed:    Berenice Lester MD  3/4/2020  1:22 PM

## 2020-03-04 NOTE — PROGRESS NOTES
Occupational Therapy    Chart reviewed, attempted to see patient for skilled OT; however, she was off the floor for doppler to R groin. Will attempt to see later today as appropriate and able.      Thank you,  Suleman Araya, OT

## 2020-03-04 NOTE — PROGRESS NOTES
KELLEN: 1. [address]. 2. Transportation; Friend. 3. Oxygen setup; [employer]. CM met with patient to let her know oxygen will be arranged for her at discharge and a representative from [facility] will be bringing her the supplies needed. RYAN sent referral from [facility]. 1159: RYAN spoke with Uyen Schumacher from [facility], he will be by to see the patient this afternoon.     Indira Marcelo, [facility]

## 2020-03-04 NOTE — PROGRESS NOTES
[address] [facility] Adult  Hospitalist Group                                                                                          Hospitalist Progress Note  Le Enriquez MD  Answering service: [phone] OR [phone] from in house phone        Date of Service:  3/4/2020  NAME:  Valentín Russlel  :  1945  MRN:  [id number]      Admission Summary:   69 y.o. female with past medical history significant for s/p stent placement (August), HTN, and recurrent pneumonia (most recently in Dec 2019) presented to the ED from home via EMS with chief complaint of SOB, increased exertional and non exertional dyspnea w accessory muscle use and cough x three days despite use of albuterol and oral prednisone prescribed by another facility  On  for presumed bronchitis. CPAP was initiated in the ED. She tested + for RSV B. Pt is a former smoker. Interval history / Subjective:   Patient seen and examined.  -Ms. Burton feeling better. Ready to go home       Assessment & Plan:     # Acute hypoxemia respiratory failure due to copd exacerbation and pneumonia,resolved  -taper off prednisone    #Chronic oxygen dependent respiratory failure due to COPD   -Duoneb. Inhaled steroids  -She has been using home oxygen prior to admission,she needs home oxygen 2-3 l/min via nasal canula. I have a face to face discussion with pt,discussed need for supplemental oxygen,she is agreeable. -Need f/u with pulmonary       #Chest pain,could likely be msk due to severe cough,however ,she has cardiac risk,would cycle enzymes and EKG. Pain resolved w/o intervention.  -Cardiac enzymes negative x3 and ekg unremarkable. -  -CXR w/minimal atelectasis. #HTN:  -BP controlled with nifedipine,coreg and lisinorpil. #History of renal artery stenosis s/p rt renal artery stenting    #Coronary artery disease  -on aspirin,statin,Plavix,Coreg    #Chronic macrocytic anemia:  -Hb stable  -OP f/up    #CKD   -GFR fluctuating, likely r/to DM and HTN. Continue to observe and follow. OP f/up recommended. #Hyperglycemia:  POC glucose consistently <170mg/dl  -Likely steroid induced. -A1C 5.4 on 2/22  -Humalog SS modified to insulin resistant protocol. Code status: full    DVT prophylaxis: Lovenox     Care Plan discussed with: patient,nurse    Anticipated Disposition: Pt's preference is to return home    Anticipated Discharge: Home with home health services in 24-48 hours. Hospital Problems  Date Reviewed: 2/22/2020          Codes Class Noted POA    Coronary artery disease ICD-10-CM: I25.10  ICD-9-CM: 414.00  9/12/2019 Yes        * (Principal) Bacterial pneumonia ICD-10-CM: J15.9  ICD-9-CM: 482.9  2/22/2020 Yes        Renal artery stenosis ([facility] [address] ICD-10-CM: I70.1  ICD-9-CM: 440.1  8/20/2019 Yes        Flash pulmonary edema ([facility] [address] ICD-10-CM: J81.0  ICD-9-CM: 518.4  8/20/2019 Yes        Mixed hyperlipidemia ICD-10-CM: E78.2  ICD-9-CM: 272.2  11/16/2018 Yes        Renovascular hypertension ICD-10-CM: I15.0  ICD-9-CM: 405.91  11/16/2018 Yes        SOB (shortness of breath) ICD-10-CM: R06.02  ICD-9-CM: 786.05  11/16/2018 Yes                Review of Systems:   +sob,chest pain. Vital Signs:    Last 24hrs VS reviewed since prior progress note. Most recent are:  Visit Vitals  /83   Pulse (!) 56   Temp 98.1 °F (36.7 °C)   Resp 16   Ht 5' 3\" (1.6 m)   Wt 71.9 kg (158 lb 9.6 oz)   SpO2 99%   BMI 28.09 kg/m²         Intake/Output Summary (Last 24 hours) at 3/4/2020 1429  Last data filed at 3/3/2020 1739  Gross per 24 hour   Intake 240 ml   Output    Net 240 ml        Physical Examination:             Constitutional:  Sitting in bed, quite comfortable. ENT:  Oral mucosa still dry but improved from yesterday. EOMI   Resp: On oxygen via nasal canula. Bibasilar crackles, slight exp wheeze. No diminished breath sounds   CV:  Regular rhythm, normal rate,S1,S2     GI:  Soft, non distended, non tender.  normoactive bowel sounds  Sc bruises from sc heparin injections. Musculoskeletal:  No  LE edema    Neurologic:  Moves all extremities. AAOx3, CN II-XII reviewed     Psych: Not agitated; pleasant  Skin:  Poor turgor, no rashes or ulcers       Data Review:    Review and/or order of clinical lab test  Review and/or order of tests in the medicine section of [facility]      Labs:     No results for input(s): WBC, HGB, HCT, PLT, HGBEXT, HCTEXT, PLTEXT, HGBEXT, HCTEXT, PLTEXT in the last 72 hours. Recent Labs     03/03/20  0501      K 4.2      CO2 25   BUN 23*   CREA 0.91   *   CA 8.5     No results for input(s): SGOT, GPT, ALT, AP, TBIL, TBILI, TP, ALB, GLOB, GGT, AML, LPSE in the last 72 hours. No lab exists for component: AMYP, HLPSE  No results for input(s): INR, PTP, APTT, INREXT, INREXT in the last 72 hours. No results for input(s): FE, TIBC, PSAT, FERR in the last 72 hours. Lab Results   Component Value Date/Time    Folate 10.9 07/24/2019 03:41 AM      No results for input(s): PH, PCO2, PO2 in the last 72 hours. No results for input(s): CPK, CKNDX, TROIQ in the last 72 hours.     No lab exists for component: CPKMB  Lab Results   Component Value Date/Time    Cholesterol, total 111 02/23/2020 03:28 AM    HDL Cholesterol 27 02/23/2020 03:28 AM    LDL, calculated 35.4 02/23/2020 03:28 AM    Triglyceride 243 (H) 02/23/2020 03:28 AM    CHOL/HDL Ratio 4.1 02/23/2020 03:28 AM     Lab Results   Component Value Date/Time    Glucose (POC) 148 (H) 03/04/2020 11:29 AM    Glucose (POC) 84 03/04/2020 07:13 AM    Glucose (POC) 135 (H) 03/03/2020 09:21 PM    Glucose (POC) 249 (H) 03/03/2020 04:43 PM    Glucose (POC) 174 (H) 03/03/2020 11:31 AM     Lab Results   Component Value Date/Time    Color YELLOW/STRAW 02/22/2020 06:17 AM    Appearance CLEAR 02/22/2020 06:17 AM    Specific gravity 1.015 02/22/2020 06:17 AM    pH (UA) 5.0 02/22/2020 06:17 AM    Protein NEGATIVE  02/22/2020 06:17 AM    Glucose NEGATIVE  02/22/2020 06:17 AM    Ketone NEGATIVE  02/22/2020 06:17 AM    Bilirubin NEGATIVE  02/22/2020 06:17 AM    Urobilinogen 0.2 02/22/2020 06:17 AM    Nitrites NEGATIVE  02/22/2020 06:17 AM    Leukocyte Esterase NEGATIVE  02/22/2020 06:17 AM    Epithelial cells FEW 02/22/2020 06:17 AM    Bacteria NEGATIVE  02/22/2020 06:17 AM    WBC 0-4 02/22/2020 06:17 AM    RBC 0-5 02/22/2020 06:17 AM         Medications Reviewed:     Current Facility-Administered Medications   Medication Dose Route Frequency    benzonatate (TESSALON) capsule 100 mg  100 mg Oral TID PRN    predniSONE (DELTASONE) tablet 40 mg  40 mg Oral DAILY WITH BREAKFAST    albuterol-ipratropium (DUO-NEB) 2.5 MG-0.5 MG/3 ML  3 mL Nebulization Q6HWA RT    nitroglycerin (NITROSTAT) tablet 0.4 mg  0.4 mg SubLINGual PRN    morphine injection 2 mg  2 mg IntraVENous Q4H PRN    ALPRAZolam (XANAX) tablet 1 mg  1 mg Oral TID PRN    fluticasone propionate (FLONASE) 50 mcg/actuation nasal spray 2 Spray  2 Spray Both Nostrils DAILY    insulin lispro (HUMALOG) injection   SubCUTAneous AC&HS    glucose chewable tablet 16 g  4 Tab Oral PRN    enoxaparin (LOVENOX) injection 40 mg  40 mg SubCUTAneous Q24H    acetylcysteine (MUCOMYST) 200 mg/mL (20 %) solution 400 mg  400 mg Nebulization TID RT    calcium carbonate (TUMS) chewable tablet 200 mg [elemental]  200 mg Oral QID PRN    pantoprazole (PROTONIX) tablet 40 mg  40 mg Oral ACB    guaiFENesin-codeine (ROBITUSSIN AC) 100-10 mg/5 mL solution 10 mL  10 mL Oral Q6H PRN    polyethylene glycol (MIRALAX) packet 17 g  17 g Oral DAILY    polyvinyl alcohol-povidone (NATURAL TEARS) 0.5-0.6 % ophthalmic solution 1 Drop  1 Drop Both Eyes PRN    zolpidem (AMBIEN) tablet 5 mg  5 mg Oral QHS PRN    arformoteroL (BROVANA) neb solution 15 mcg  15 mcg Nebulization BID RT    And    budesonide (PULMICORT) 500 mcg/2 ml nebulizer suspension  500 mcg Nebulization BID RT    glucagon (GLUCAGEN) injection 1 mg  1 mg IntraMUSCular PRN    dextrose 10% infusion 0-250 mL  0-250 mL IntraVENous PRN    lisinopril (PRINIVIL, ZESTRIL) tablet 5 mg  5 mg Oral DAILY    acetaminophen (TYLENOL) tablet 650 mg  650 mg Oral Q4H PRN    albuterol-ipratropium (DUO-NEB) 2.5 MG-0.5 MG/3 ML  3 mL Nebulization Q4H PRN    aspirin delayed-release tablet 81 mg  81 mg Oral DAILY    atorvastatin (LIPITOR) tablet 80 mg  80 mg Oral DAILY    clopidogreL (PLAVIX) tablet 75 mg  75 mg Oral DAILY    NIFEdipine ER (PROCARDIA XL) tablet 90 mg  90 mg Oral DAILY    sertraline (ZOLOFT) tablet 100 mg  100 mg Oral QHS    sodium chloride (NS) flush 5-40 mL  5-40 mL IntraVENous Q8H    sodium chloride (NS) flush 5-40 mL  5-40 mL IntraVENous PRN    ondansetron (ZOFRAN) injection 4 mg  4 mg IntraVENous Q4H PRN    bisacodyL (DULCOLAX) tablet 5 mg  5 mg Oral DAILY PRN    lactobac ac& pc-s.therm-b.anim (PETRA Q/RISAQUAD)  1 Cap Oral DAILY    hydrALAZINE (APRESOLINE) 20 mg/mL injection 10 mg  10 mg IntraVENous Q6H PRN    carvediloL (COREG) tablet 6.25 mg  6.25 mg Oral BID WITH MEALS     ______________________________________________________________________  EXPECTED LENGTH OF STAY: 3d 19h  ACTUAL LENGTH OF STAY:          11                 Christopher Hutton MD

## 2020-03-04 NOTE — PROGRESS NOTES
Bedside shift change report given to ye (oncoming nurse) by Megan Rosales (offgoing nurse). Report included the following information SBAR, Kardex and MAR.

## 2020-03-04 NOTE — PROGRESS NOTES
[address] visit. Unable to offer communion. Mrs. Chanel Horn was not in her room. Prayer offered for her.     CAIN Dejesus, RN, ACSW, LCSW   Page:  [phone]

## 2020-03-04 NOTE — DISCHARGE SUMMARY
Discharge Summary       PATIENT ID: Percy Victor  MRN: [id number]   YOB: 1945    DATE OF ADMISSION: 2/21/2020 10:12 PM    DATE OF DISCHARGE: 03/04/20     PRIMARY CARE PROVIDER: Rosalind James MD     ATTENDING PHYSICIAN: Audrey Moralez MD    DISCHARGING PROVIDER: Audrey Moralez MD    To contact this individual call [phone] and ask the  to page. If unavailable ask to be transferred the Adult Hospitalist Department. CONSULTATIONS: IP CONSULT TO INTENSIVIST  IP CONSULT TO CARDIOLOGY  IP CONSULT TO PULMONOLOGY    PROCEDURES/SURGERIES: * No surgery found *    ADMITTING DIAGNOSES & HOSPITAL COURSE:   69 y.o. female with past medical history significant for s/p stent placement (August), HTN, and recurrent pneumonia (most recently in Dec 2019) presented to the ED from home via EMS with chief complaint of SOB, increased exertional and non exertional dyspnea w accessory muscle use and cough x three days despite use of albuterol and oral prednisone prescribed by another facility On 02/20 for presumed bronchitis. CPAP was initiated in the ED. She tested + for RSV B. Pt is a former smoker. # Acute hypoxemia respiratory failure Improving: likely from acute RSV B bronchitis /bronchiolitis,superimposed bacterial pnemuonia  -PE was ruled out with negative chest angiogram.  She slowly improved. She was assessed for oxygen requirement. She had oxygen both at rest and on exertion. Patient was recently prescribed home oxygen when [place].  I had a face-to-face encounter and discussion with patient, she is agreeable for home oxygen.  -Pulmonary was consulted and followed along with us. She will need to have outpatient PFTs. #Chest pain due to severe cough. No MI  s.      #HTN: Resume antihypertensive regimen     #History of renal artery stenosis s/p rt renal artery stenting     #Coronary artery disease  -on aspirin,statin,Plavix,Coreg     #Chronic macrocytic anemia:  -Hb stable  -OP f/up     #CKD   -GFR fluctuating, likely r/to DM and HTN. Continue to observe and follow. OP f/up recommended. #Hyperglycemia:  POC glucose consistently <170mg/dl  -Likely steroid induced. -A1C 5.4 on 2/22  -Humalog SS modified to insulin resistant protocol. PENDING TEST RESULTS:   At the time of discharge the following test results are still pending: None    FOLLOW UP APPOINTMENTS:    Follow-up Information     Follow up With Specialties Details Why Contact Info    Griselda Chime, MD Internal Medicine, Pulmonary Disease   [facility] [address] [phone]      [facility], [address] [place] [address]   [address] [place] [address]  [address] [phone]               DIET: Regular Diet and Cardiac Diet    ACTIVITY: Activity as tolerated, home health services arranged    WOUND CARE: Not applicable    EQUIPMENT needed: Does not need      DISCHARGE MEDICATIONS:  Discharge Medication List as of 3/4/2020  2:07 PM      START taking these medications    Details   ALPRAZolam (XANAX) 1 mg tablet Take 1 Tab by mouth two (2) times daily as needed for Anxiety. Max Daily Amount: 2 mg., Normal, Disp-10 Tab, R-0      carvediloL (COREG) 6.25 mg tablet Take 1 Tab by mouth two (2) times daily (with meals) for 30 days. , Normal, Disp-60 Tab, R-0      lisinopril (PRINIVIL, ZESTRIL) 5 mg tablet Take 1 Tab by mouth daily for 30 days. , Normal, Disp-30 Tab, R-0      albuterol-ipratropium (DUO-NEB) 2.5 mg-0.5 mg/3 ml nebu 3 mL by Nebulization route every four (4) hours as needed for Wheezing, Shortness of Breath or Respiratory Distress., Normal, Disp-30 Nebule, R-0      budesonide-formoteroL (SYMBICORT) 160-4.5 mcg/actuation HFAA Take 2 Puffs by inhalation two (2) times a day., Normal, Disp-1 Inhaler, R-0         CONTINUE these medications which have CHANGED    Details   albuterol sulfate 90 mcg/actuation aebs Take 2 Puffs by inhalation every four (4) hours for 3 days. , Normal, Disp-1 Units, R-0      predniSONE (DELTASONE) 10 mg tablet Take 30 mg by mouth daily (with breakfast) for 3 days, THEN 20 mg daily (with breakfast) for 3 days, THEN 10 mg daily (with breakfast) for 3 days. , Print, Disp-20 Tab, R-0         CONTINUE these medications which have NOT CHANGED    Details   furosemide (LASIX) 20 mg tablet TAKE ONE TABLET BY MOUTH ONE TIME DAILY, Normal, Disp-30 Tab, R-5      atorvastatin (LIPITOR) 40 mg tablet Take 2 Tabs by mouth daily for 360 days. , Normal, Disp-60 Tab, R-11      clopidogrel (PLAVIX) 75 mg tab Take 1 Tab by mouth daily for 180 days. , Normal, Disp-90 Tab, R-1      NIFEdipine ER (PROCARDIA XL) 90 mg ER tablet Take 1 Tab by mouth daily for 180 days. , Normal, Disp-90 Tab, R-1      aspirin delayed-release 81 mg tablet Take 1 Tab by mouth daily. , OTC, Disp-30 Tab, R-3      meloxicam (MOBIC) 15 mg tablet Take 15 mg by mouth daily. , Historical Med      sertraline (ZOLOFT) 100 mg tablet Take 100 mg by mouth nightly., Historical Med      zolpidem (AMBIEN) 10 mg tablet Take  by mouth nightly as needed for Sleep., Historical Med               NOTIFY YOUR PHYSICIAN FOR ANY OF THE FOLLOWING:   Fever over 101 degrees for 24 hours. Chest pain, shortness of breath, fever, chills, nausea, vomiting, diarrhea, change in mentation, falling, weakness, bleeding. Severe pain or pain not relieved by medications. Or, any other signs or symptoms that you may have questions about.     DISPOSITION:   x Home With:   OT  PT  HH  RN       Long term SNF/Inpatient Rehab    Independent/assisted living    Hospice    Other:       PATIENT CONDITION AT DISCHARGE:     Functional status    Poor    x Deconditioned     Independent      Cognition    x Lucid     Forgetful     Dementia      Catheters/lines (plus indication)    Silva     PICC     PEG    x None      Code status   x  Full code     DNR      PHYSICAL EXAMINATION AT DISCHARGE:  General:          Alert, cooperative, no distress, appears stated age. HEENT:           Atraumatic, anicteric sclerae, pink conjunctivae                          No oral ulcers, mucosa moist, throat clear, dentition fair  Neck:               Supple, symmetrical  Lungs:              Scattered expiratory wheezing. Chest wall:      No tenderness  No Accessory muscle use. Heart:              Regular  rhythm,  No  murmur   No edema  Abdomen:        Soft, non-tender. Not distended. Bowel sounds normal  Extremities:     No cyanosis. No clubbing,                            Skin turgor normal, Capillary refill normal  Skin:                Not pale. Not Jaundiced  No rashes   Psych:             Not anxious or agitated. Neurologic:      Alert oriented x4. Cranial nerves are intact.   Motor exam normal.      CHRONIC MEDICAL DIAGNOSES:  Problem List as of 3/4/2020 Date Reviewed: 2/22/2020          Codes Class Noted - Resolved    Coronary artery disease ICD-10-CM: I25.10  ICD-9-CM: 414.00  9/12/2019 - Present        * (Principal) Bacterial pneumonia ICD-10-CM: J15.9  ICD-9-CM: 482.9  2/22/2020 - Present        Anemia ICD-10-CM: D64.9  ICD-9-CM: 285.9  10/15/2019 - Present        Renal artery stenosis ([email or web address] ICD-10-CM: I70.1  ICD-9-CM: 440.1  8/20/2019 - Present        Flash pulmonary edema ([email or web address] ICD-10-CM: J81.0  ICD-9-CM: 518.4  8/20/2019 - Present        Atherosclerotic cardiovascular disease ICD-10-CM: I25.10  ICD-9-CM: 429.2  8/20/2019 - Present        Mixed hyperlipidemia ICD-10-CM: E78.2  ICD-9-CM: 272.2  11/16/2018 - Present        Renovascular hypertension ICD-10-CM: I15.0  ICD-9-CM: 405.91  11/16/2018 - Present        SOB (shortness of breath) ICD-10-CM: R06.02  ICD-9-CM: 786.05  11/16/2018 - Present        Bilateral leg edema ICD-10-CM: R60.0  ICD-9-CM: 782.3  11/16/2018 - Present        Family history of premature CAD ICD-10-CM: Z82.49  ICD-9-CM: V17.3  11/16/2018 - Present        HNP (herniated nucleus pulposus), cervical ICD-10-CM: M50.20  ICD-9-CM: 722.0  12/27/2016 - Present        RESOLVED: Hypertensive urgency ICD-10-CM: I16.0  ICD-9-CM: 401.9  7/19/2019 - 7/26/2019        RESOLVED: Chest pain ICD-10-CM: R07.9  ICD-9-CM: 786.50  7/18/2019 - 7/26/2019              Greater than 30 minutes were spent with the patient on counseling and coordination of care    Signed:    Ethan Snow MD  3/4/2020  1:36 PM

## 2020-03-05 ENCOUNTER — PATIENT OUTREACH (OUTPATIENT)
Dept: INTERNAL MEDICINE CLINIC | Age: 75
End: 2020-03-05

## 2020-03-05 NOTE — PROGRESS NOTES
Hospital Discharge Follow-Up      Date/Time:  3/5/2020 10:10 AM  Gabrielle Matthew RN, [facility]  Care Transitions Nurse   [phone]    Patient was admitted to [facility] on 2/21/20 and discharged on 3/4/20 for PNA/COPD exac. The physician discharge summary was not available at the time of outreach. Patient contact attempted within 2 business days of discharge. 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 no     Disease Specific:   N/A    Patients top risk factors for readmission:  NORA until speaking with pt    Home Health orders at discharge: PT, OT, [address]: [facility]  Date of initial visit: Scheduled [facility] 3/6/20    Durable Medical Equipment ordered at discharge: [address]: [address] received: NORA    Medication(s):   New Medications at Discharge:   Duonebs  Xanax  Symbicort  Carvedilol  Lisinopril    Changed Medications at Discharge:   Prednisone    Discontinued Medications at Discharge: none    Referral to Pharm D needed: n/a     Current Outpatient Medications   Medication Sig    albuterol sulfate 90 mcg/actuation aebs Take 2 Puffs by inhalation every four (4) hours for 3 days.  predniSONE (DELTASONE) 10 mg tablet Take 30 mg by mouth daily (with breakfast) for 3 days, THEN 20 mg daily (with breakfast) for 3 days, THEN 10 mg daily (with breakfast) for 3 days.  ALPRAZolam (XANAX) 1 mg tablet Take 1 Tab by mouth two (2) times daily as needed for Anxiety. Max Daily Amount: 2 mg.     carvediloL (COREG) 6.25 mg tablet Take 1 Tab by mouth two (2) times daily (with meals) for 30 days.  lisinopril (PRINIVIL, ZESTRIL) 5 mg tablet Take 1 Tab by mouth daily for 30 days.  albuterol-ipratropium (DUO-NEB) 2.5 mg-0.5 mg/3 ml nebu 3 mL by Nebulization route every four (4) hours as needed for Wheezing, Shortness of Breath or Respiratory Distress.  budesonide-formoteroL (SYMBICORT) 160-4.5 mcg/actuation HFAA Take 2 Puffs by inhalation two (2) times a day.  furosemide (LASIX) 20 mg tablet TAKE ONE TABLET BY MOUTH ONE TIME DAILY    atorvastatin (LIPITOR) 40 mg tablet Take 2 Tabs by mouth daily for 360 days.  clopidogrel (PLAVIX) 75 mg tab Take 1 Tab by mouth daily for 180 days.  NIFEdipine ER (PROCARDIA XL) 90 mg ER tablet Take 1 Tab by mouth daily for 180 days.  aspirin delayed-release 81 mg tablet Take 1 Tab by mouth daily.  meloxicam (MOBIC) 15 mg tablet Take 15 mg by mouth daily.  sertraline (ZOLOFT) 100 mg tablet Take 100 mg by mouth nightly.  zolpidem (AMBIEN) 10 mg tablet Take  by mouth nightly as needed for Sleep. No current facility-administered medications for this visit. There are no discontinued medications.     [facility] follow up appointment(s):   Future Appointments   Date Time Provider Andi Slater   4/14/2020 10:40 AM Katie Le  [address]      Non-[facility] follow up appointment(s):   Dr. Sil Ford PCP on 3/10/20    [facility]  [facility]

## 2020-03-06 ENCOUNTER — APPOINTMENT (OUTPATIENT)
Dept: VASCULAR SURGERY | Age: 75
End: 2020-03-06
Attending: STUDENT IN AN ORGANIZED HEALTH CARE EDUCATION/TRAINING PROGRAM
Payer: MEDICARE

## 2020-03-06 ENCOUNTER — HOSPITAL ENCOUNTER (EMERGENCY)
Age: 75
Discharge: HOME | End: 2020-03-06
Attending: STUDENT IN AN ORGANIZED HEALTH CARE EDUCATION/TRAINING PROGRAM | Admitting: STUDENT IN AN ORGANIZED HEALTH CARE EDUCATION/TRAINING PROGRAM
Payer: MEDICARE

## 2020-03-06 VITALS
SYSTOLIC BLOOD PRESSURE: 151 MMHG | RESPIRATION RATE: 18 BRPM | HEART RATE: 66 BPM | OXYGEN SATURATION: 98 % | TEMPERATURE: 97.6 F | DIASTOLIC BLOOD PRESSURE: 79 MMHG

## 2020-03-06 DIAGNOSIS — R60.9 PERIPHERAL EDEMA: Primary | ICD-10-CM

## 2020-03-06 PROCEDURE — 93970 EXTREMITY STUDY: CPT

## 2020-03-06 PROCEDURE — 74011250637 HC RX REV CODE- 250/637: Performed by: STUDENT IN AN ORGANIZED HEALTH CARE EDUCATION/TRAINING PROGRAM

## 2020-03-06 PROCEDURE — 99284 EMERGENCY DEPT VISIT MOD MDM: CPT

## 2020-03-06 RX ORDER — FUROSEMIDE 40 MG/1
40 TABLET ORAL DAILY
Status: DISCONTINUED | OUTPATIENT
Start: 2020-03-06 | End: 2020-03-06 | Stop reason: HOSPADM

## 2020-03-06 RX ORDER — FUROSEMIDE 40 MG/1
40 TABLET ORAL DAILY
Status: DISCONTINUED | OUTPATIENT
Start: 2020-03-07 | End: 2020-03-06

## 2020-03-06 RX ADMIN — FUROSEMIDE 40 MG: 40 TABLET ORAL at 17:40

## 2020-03-06 NOTE — PROGRESS NOTES
Date of previous inpatient admission/ ED visit? 2/21/20-3/4/20 Bacterial Pneumonia    What brought the patient back to ED? Chart reviewed: Patient arrives with c/o bilateral leg swelling. Patient recently discharged from hospital, family reports swelling started while in the hospital in the left leg and had negative doppler at that time    Did patient decline recommended services during last admission/ ED visit (if yes, what)? NO    Has patient seen a provider since their last inpatient admission/ED visit (if yes, when)? No recently discharged from hospital on 3/4/20    PCP: Yes First and Last name: Chery Raymond   Name of Practice:  [facility] you a current patient: Yes/No:  Yes   Approximate date of last visit: 12/24/2019, upcoming PCP appointment scheduled for 3/10/20 at 2:45 pm.    CM Interventions:  From previous inpatient admission/ED visit: Patient transitioned home with home health [facility]) and family assistance. Oxygen set up and provided by [facility]. Family assisted with transport home. From current inpatient admission/ED visit: Patient currently in the ED being evaluated and needs assessed. There are no current CM consults or needs. CM will continue to follow and assist with KELLEN needs as they arise.     NASIM Young/TED  Care Management  3:27 PM

## 2020-03-06 NOTE — ED TRIAGE NOTES
TRIAGE NOTE:  Patient arrives with c/o bilateral leg swelling. Patient recently discharged from hospital, family reports swelling started while in the hospital in the left leg and had negative doppler at that time.

## 2020-03-06 NOTE — DISCHARGE INSTRUCTIONS
Patient Education        Leg and Ankle Edema: Care Instructions  Your Care Instructions  Swelling in the legs, ankles, and feet is called edema. It is common after you sit or stand for a while. Long plane flights or car rides often cause swelling in the legs and feet. You may also have swelling if you have to stand for long periods of time at your job. Problems with the veins in the legs (varicose veins) and changes in hormones can also cause swelling. Sometimes the swelling in the ankles and feet is caused by a more serious problem, such as heart failure, infection, blood clots, or liver or kidney disease. Follow-up care is a key part of your treatment and safety. Be sure to make and go to all appointments, and call your doctor if you are having problems. It's also a good idea to know your test results and keep a list of the medicines you take. How can you care for yourself at home? · If your doctor gave you medicine, take it as prescribed. Call your doctor if you think you are having a problem with your medicine. · Whenever you are resting, raise your legs up. Try to keep the swollen area higher than the level of your heart. · Take breaks from standing or sitting in one position. ? Walk around to increase the blood flow in your lower legs. ? Move your feet and ankles often while you stand, or tighten and relax your leg muscles. · Wear support stockings. Put them on in the morning, before swelling gets worse. · Eat a balanced diet. Lose weight if you need to. · Limit the amount of salt (sodium) in your diet. Salt holds fluid in the body and may increase swelling. When should you call for help? Call 911 anytime you think you may need emergency care. For example, call if:    · You have symptoms of a blood clot in your lung (called a pulmonary embolism). These may include:  ? Sudden chest pain. ? Trouble breathing. ?  Coughing up blood.    Call your doctor now or seek immediate medical care if:    · You have signs of a blood clot, such as:  ? Pain in your calf, back of the knee, thigh, or groin. ? Redness and swelling in your leg or groin.     · You have symptoms of infection, such as:  ? Increased pain, swelling, warmth, or redness. ? Red streaks or pus. ? A fever.    Watch closely for changes in your health, and be sure to contact your doctor if:    · Your swelling is getting worse.     · You have new or worsening pain in your legs.     · You do not get better as expected. Where can you learn more? Go to http://cody-akanksha.info/. Enter F953 in the search box to learn more about \"Leg and Ankle Edema: Care Instructions. \"  Current as of: June 26, 2019  Content Version: 12.2  © 2735-5858 [employer]. Care instructions adapted under license by [employer] (which disclaims liability or warranty for this information). If you have questions about a medical condition or this instruction, always ask your healthcare professional. [place] [address] any warranty or liability for your use of this information.

## 2020-03-06 NOTE — ED PROVIDER NOTES
69 y.o. female with past medical history significant for CAD, HTN, JONATHAN, and pneumonia who presents from home via personla vehicle with chief complaint of leg swelling. Pt reports she has had the onset of bilateral leg swelling for the past 2 days. Pt notes she was just admitted to [facility] from (2/21-3/4) for her SOB but states that some of the swelling initially began in her left thigh with accompanying pain. She says she had a doppler done on her left leg which had come back negative. Otherwise, pt says she left the hospital with normal legs and noted they have only gotten increasingly swollen within the past 2 days. Today, pt says her home health nurse came by to [facility] pt for PT when she noticed the ts legs and called her PCP. PCP told pt to come in to have DVT ruled out. Upon examination, pt says she feels the same pain she felt in her left thigh now in her right thigh. Additonally, pt added she is on Plavix and 20mg fluid pills. She says she has been urinating normally. Pt notes she has been on oxygen at home since she was discgarged. Pt denies travel and any injury. Pt specifically affirms: leg swelling and leg pain. Pt specifically denies: urination problems. There are no other acute medical concerns at this time. Social hx: Former smoker. Active alcohol use. PCP: Uriel Latham MD      Note written by Charlanne Bumpers, Scribe, as dictated by Sultana Paredes MD 3:38 PM       The history is provided by the patient. No  was used.         Past Medical History:   Diagnosis Date    CAD (coronary artery disease) 09/12/2019    stent D1 9/12/2019 (Claude Lowing)    Hypertension     Pneumonia     JONATHAN (renal artery stenosis) ([facility] [address] 07/24/2019    s/p right renal stent 7/24/2019 Beny Jonathan)       Past Surgical History:   Procedure Laterality Date    CARDIAC SURG PROCEDURE UNLIST  08/2019    cardiac stents     HX APPENDECTOMY      HX BACK SURGERY      LUMBAR FUSION X2 SURGERIES    HX BACK SURGERY CERVICAL X2    HX GI  2019    arterial stent in right kidney    HX HYSTERECTOMY      HX TONSILLECTOMY           Family History:   Problem Relation Age of Onset    Heart Attack Father 47       Social History     Socioeconomic History    Marital status:      Spouse name: Not on file    Number of children: Not on file    Years of education: Not on file    Highest education level: Not on file   Occupational History    Not on file   Social Needs    Financial resource strain: Not on file    Food insecurity:     Worry: Not on file     Inability: Not on file    Transportation needs:     Medical: Not on file     Non-medical: Not on file   Tobacco Use    Smoking status: Former Smoker     Packs/day: 0.50    Smokeless tobacco: Never Used   Substance and Sexual Activity    Alcohol use: Yes     Alcohol/week: 6.0 standard drinks     Types: 6 Glasses of wine per week    Drug use: Never    Sexual activity: Not on file   Lifestyle    Physical activity:     Days per week: Not on file     Minutes per session: Not on file    Stress: Not on file   Relationships    Social connections:     Talks on phone: Not on file     Gets together: Not on file     Attends Holiness service: Not on file     Active member of club or organization: Not on file     Attends meetings of clubs or organizations: Not on file     Relationship status: Not on file    Intimate partner violence:     Fear of current or ex partner: Not on file     Emotionally abused: Not on file     Physically abused: Not on file     Forced sexual activity: Not on file   Other Topics Concern    Not on file   Social History Narrative    Not on file         ALLERGIES: Patient has no known allergies. Review of Systems   Constitutional: Negative for fever. Respiratory: Positive for shortness of breath (improving since discharge from hospital). Negative for cough. Cardiovascular: Positive for leg swelling (with pain. ). Negative for chest pain. Gastrointestinal: Negative for abdominal pain and vomiting. Genitourinary: Negative for difficulty urinating and dysuria. Neurological: Negative for syncope and headaches. All other systems reviewed and are negative. Vitals:    03/06/20 1437   BP: 125/61   Pulse: 68   Resp: 18   Temp: 97.5 °F (36.4 °C)   SpO2: 97%            Physical Exam  Vitals signs and nursing note reviewed. Constitutional:       General: She is not in acute distress. Appearance: She is well-developed. Comments: Elderly appearing. HENT:      Head: Normocephalic and atraumatic. Eyes:      Conjunctiva/sclera: Conjunctivae normal.      Pupils: Pupils are equal, round, and reactive to light. Neck:      Musculoskeletal: Normal range of motion and neck supple. Cardiovascular:      Rate and Rhythm: Normal rate and regular rhythm. Heart sounds: Normal heart sounds. No murmur. Pulmonary:      Effort: Pulmonary effort is normal. Tachypnea (mild) present. No respiratory distress. Breath sounds: Normal breath sounds. Abdominal:      General: Bowel sounds are normal. There is no distension. Palpations: Abdomen is soft. Tenderness: There is no abdominal tenderness. There is no rebound. Musculoskeletal: Normal range of motion. Comments: 2+ pitting edema up to mid shins bilaterally with no erythema. Skin:     General: Skin is warm and dry. Findings: No rash. Neurological:      Mental Status: She is alert and oriented to person, place, and time. Cranial Nerves: No cranial nerve deficit. Motor: No abnormal muscle tone.       Coordination: Coordination normal.   Psychiatric:         Behavior: Behavior normal.     Note written by Adore Keys, as dictated by Marvie Soulier, MD 3:38 PM       [facility]       Procedures

## 2020-03-09 ENCOUNTER — PATIENT OUTREACH (OUTPATIENT)
Dept: INTERNAL MEDICINE CLINIC | Age: 75
End: 2020-03-09

## 2020-03-09 NOTE — PROGRESS NOTES
Hospital Discharge Follow-Up      Date/Time:  3/9/2020 1:00 PM  Elliott Christianson RN, [facility]  Care Transitions Nurse   [phone]    Patient was admitted to [facility] on 20 and discharged on 3/4/20 for PNA/COPD exac. The physician discharge summary was not available at the time of outreach. Attempt was made to reach pt on business day 2 post discharge- no answer, noted she was sent back to the ER that day, 3/6/20 by Home Health nurse. Therefore, patient was contacted within 3 business days of discharge. Top Challenges reviewed with the provider   - Pt cancelled her KELLEN appt with PCP office because she was not scheduled with her provider and states she has issues getting appointments with him. She is interested in establishing with [facility] PCP- lives in [facility] so provided her number for [place] [facility]CP. - Pt will call [facility] tomorrow to get scheduled for Pulm f/u  - Reports that today is first day that she isn't wearing oxygen and her sats have been 95-96% RA    Advance Care Planning:   Does patient have an Advance Directive:  not on file        Method of communication with provider :none    Was this a readmission? no     Care Transition Nurse (CTN) contacted the patient by telephone to perform post hospital discharge assessment. Verified name and  with patient as identifiers. Provided introduction to self, and explanation of the CTN role. Patient received hospital discharge instructions. CTN reviewed discharge instructions and red flags with patient who verbalized understanding. Patient given an opportunity to ask questions and does not have any further questions or concerns at this time. The patient agrees to contact the PCP office for questions related to their healthcare. CTN provided contact information for future reference.     Disease Specific: no    Patients top risk factors for readmission:  lack of knowledge about disease, medical condition, multi health system providers, PCP relationship    Home Health orders at discharge: PT, OT, [address]: [address]   Date of initial visit: 3/6/20    Durable Medical Equipment ordered at discharge: [address]: [address]Deborah Abdulkadirneda received: yes    Medication(s):   New Medications at Discharge:   ALPRAZolam Yfn Null) 1 mg tablet Take 1 Tab by mouth two (2) times daily as needed for Anxiety. Max Daily Amount: 2 mg., Normal, Disp-10 Tab, R-0       carvediloL (COREG) 6.25 mg tablet Take 1 Tab by mouth two (2) times daily (with meals) for 30 days. , Normal, Disp-60 Tab, R-0       lisinopril (PRINIVIL, ZESTRIL) 5 mg tablet Take 1 Tab by mouth daily for 30 days. , Normal, Disp-30 Tab, R-0       albuterol-ipratropium (DUO-NEB) 2.5 mg-0.5 mg/3 ml nebu 3 mL by Nebulization route every four (4) hours as needed for Wheezing, Shortness of Breath or Respiratory Distress., Normal, Disp-30 Nebule, R-0       budesonide-formoteroL (SYMBICORT) 160-4.5 mcg/actuation HFAA Take 2 Puffs by inhalation two (2) times a day., Normal, Disp-1 Inhaler, R-0       Changed Medications at Discharge:   albuterol sulfate 90 mcg/actuation aebs Take 2 Puffs by inhalation every four (4) hours for 3 days. , Normal, Disp-1 Units, R-0       predniSONE (DELTASONE) 10 mg tablet Take 30 mg by mouth daily (with breakfast) for 3 days, THEN 20 mg daily (with breakfast) for 3 days, THEN 10 mg daily (with breakfast) for 3 days. , Print, Disp-20 Tab,        Discontinued Medications at Discharge: none    Medication reconciliation was performed with patient, who verbalizes understanding of administration of home medications. There were no barriers to obtaining medications identified at this time.     Referral to Pharm D needed: no     Current Outpatient Medications   Medication Sig    predniSONE (DELTASONE) 10 mg tablet Take 30 mg by mouth daily (with breakfast) for 3 days, THEN 20 mg daily (with breakfast) for 3 days, THEN 10 mg daily (with breakfast) for 3 days.  ALPRAZolam (XANAX) 1 mg tablet Take 1 Tab by mouth two (2) times daily as needed for Anxiety. Max Daily Amount: 2 mg.  carvediloL (COREG) 6.25 mg tablet Take 1 Tab by mouth two (2) times daily (with meals) for 30 days.  lisinopril (PRINIVIL, ZESTRIL) 5 mg tablet Take 1 Tab by mouth daily for 30 days.  albuterol-ipratropium (DUO-NEB) 2.5 mg-0.5 mg/3 ml nebu 3 mL by Nebulization route every four (4) hours as needed for Wheezing, Shortness of Breath or Respiratory Distress.  budesonide-formoteroL (SYMBICORT) 160-4.5 mcg/actuation HFAA Take 2 Puffs by inhalation two (2) times a day.  furosemide (LASIX) 20 mg tablet TAKE ONE TABLET BY MOUTH ONE TIME DAILY    atorvastatin (LIPITOR) 40 mg tablet Take 2 Tabs by mouth daily for 360 days.  clopidogrel (PLAVIX) 75 mg tab Take 1 Tab by mouth daily for 180 days.  NIFEdipine ER (PROCARDIA XL) 90 mg ER tablet Take 1 Tab by mouth daily for 180 days.  aspirin delayed-release 81 mg tablet Take 1 Tab by mouth daily.  meloxicam (MOBIC) 15 mg tablet Take 15 mg by mouth daily.  sertraline (ZOLOFT) 100 mg tablet Take 100 mg by mouth nightly.  zolpidem (AMBIEN) 10 mg tablet Take  by mouth nightly as needed for Sleep. No current facility-administered medications for this visit. There are no discontinued medications. [facility] follow up appointment(s):   Future Appointments   Date Time Provider Andi Slater   4/14/2020 10:40 AM Sudha Raman  [address]      Non-[facility] follow up appointment(s): pt cancelled KELLEN appt with PCP for 3/10/20.  She needs to schedule Pulm f/u appt [facility]:  information provided as a resource

## 2020-03-10 ENCOUNTER — PATIENT OUTREACH (OUTPATIENT)
Dept: INTERNAL MEDICINE CLINIC | Age: 75
End: 2020-03-10

## 2020-03-10 NOTE — PROGRESS NOTES
KELLEN f/u call to see if she scheduled appts with Pulm and PCP- she had not as she was sleeping d/t not having an easy time of sleep last night. She was not able to specifically identify what her issue was but states that she took Xanax at 4am finally and that helped. She has been urinating well with her lasix and states oxygen level has been good. She did sleep with oxygen last night. Offered to do a three way call to schedule appts but she declined. She also said she would call back to Dr. Saavedra Likes office as she didn't feel this was the best time to make a PCP switch. Reinforced that she needed to call PCP and Pulm as soon as she was off phone with me and to call me with any barriers in getting timely appointments this week or next week.

## 2020-04-10 RX ORDER — CARVEDILOL 6.25 MG/1
6.25 TABLET ORAL 2 TIMES DAILY WITH MEALS
Qty: 60 TAB | Refills: 5 | Status: SHIPPED | OUTPATIENT
Start: 2020-04-10 | End: 2020-05-10

## 2020-04-10 NOTE — TELEPHONE ENCOUNTER
[facility] is calling to see if Dr. David Null will send a prescription for Carvedilol 6.25 mg 1 tab 2X/daily w/ meals. Stated that the patient was prescribed this in [facility]. Please advise.     Phone #: [phone]  Thanks

## 2020-04-27 RX ORDER — CLOPIDOGREL BISULFATE 75 MG/1
TABLET ORAL
Qty: 90 TAB | Refills: 1 | Status: SHIPPED | OUTPATIENT
Start: 2020-04-27 | End: 2020-04-28

## 2020-04-27 RX ORDER — NIFEDIPINE 90 MG/1
TABLET, EXTENDED RELEASE ORAL
Qty: 90 TAB | Refills: 1 | Status: SHIPPED | OUTPATIENT
Start: 2020-04-27 | End: 2020-10-26

## 2020-04-28 ENCOUNTER — VIRTUAL VISIT (OUTPATIENT)
Dept: CARDIOLOGY CLINIC | Age: 75
End: 2020-04-28

## 2020-04-28 VITALS — WEIGHT: 140 LBS | HEIGHT: 63 IN | BODY MASS INDEX: 24.8 KG/M2

## 2020-04-28 DIAGNOSIS — I70.1 RENAL ARTERY STENOSIS (HCC): Primary | ICD-10-CM

## 2020-04-30 NOTE — PROGRESS NOTES
[place] [address] [place]: Curtis Kinney  [phone]          Cardiology Consult/Progress Note      Requesting/referring provider: Dr. Maria C Ellis  Reason for Follow up: Renovascular hypertension/CAD       TELEPHONE VISIT DOCUMENTATION      Pursuant to the emergency declaration under the [facility], [facility], [facility] waiver authority and the [employer] Act, this Telephone Visit was conducted, with patient's consent, to reduce the patient's risk of exposure to COVID-19 and provide continuity of care for an established patient. She and/or health care decision maker is aware that that she may receive a bill for this telephone service, depending on her insurance coverage, and has provided verbal consent to proceed. This is a Patient Initiated Episode with an Established Patient who has not had a related appointment within my department in the past 7 days or scheduled within the next 24 hours. HPI: Adenike Chaidez, a 69y.o. year-old who was previously seen for evaluation of uncontrolled hypertension and CAD. Mrs. Jefferson Gowers has history of extremely difficult to control hypertension and was admitted to the hospital with malignant hypertension on multiple occasions in 2019 associated with flash pulmonary edema. Subsequently she underwent invasive renal angiogram which demonstrated 70 to 75% stenosis in main right renal artery in its proximal segment. She subsequently underwent stenting of right renal artery with a 5 x 15 mm Herculink bare-metal stent with no residual stenosis. She has moderate stenosis and a relatively small sized left renal artery. There is an accessory left renal artery free of significant disease along with an accessory right renal artery free of significant disease. BP has been well controlled since undergoing renal artery stenting.     Additionally she has had symptoms of persistent shortness of breath attributable to anemia and underwent a cardiac catheterization which demonstrated significant disease in his diagonal vessel for which she underwent PCI with 2 drug-eluting stents in 2019. From a cardiovascular standpoint she did well with now controlled blood pressure and no anginal or shortness of breath symptoms. Notably in January and February 2020 she had hospitalization for unexplained bilateral pneumonia with high oxygen requirement which I suspect could have been COVID-19. This occurred after an extended travel to [place]. Investigations:  10/18 vcs normal stress echo, did not reach target heart rate  10/18 vcs normal lexiscan cardiolyte    ECG: Sinus bradycardia, nonspecific ST-T changes. Echocardiogram: Normal LV systolic function mild to moderate LV hypertrophy consistent with hypertensive heart disease. CT abdomen with angiogram and runoff: 2 renal arteries are present bilaterally. Right major renal artery has at least moderate to severe ostial stenosis. Accessory right renal artery is free of significant disease. Left renal arteries are equal in size. CT scan has been reported to demonstrate disease in superior renal artery although I cannot personally confirm it. Both arteries on the left appear to be too small to provide durable results with renal artery stenting. Renal angiogram and stenting July 2019: Main right renal artery 75% stenosis, accessory renal artery on the right free of significant disease. 2 codominant left renal arteries, one with 40 to 50% angiographic stenosis other free of disease. Status post proximal right main renal artery stenting with 5 x 15 Herculink stent with 0% residual stenosis. Renal arterial Doppler August 2019: Patent stent in main proximal right renal artery. Accessory right renal artery not visualized. Main left renal artery has less than 60% stenosis. Accessory renal artery on the left is not visualized.  Technically suboptimal study. Normal kidney size bilaterally. Cath Sep 2019: Severe disease in D1 s/p PCI with 2 REBECCA. Moderate disease in mid LAD, otherwise non obstructive disease. Assessment/Plan:  1. Hypertension likely secondary to renal artery stenosis significantly improved following renal artery stenting to proximal main right renal artery now improved status post renal artery stenting. 2.  Atherosclerotic vascular disease  3. History of flash pulmonary edema related to uncontrolled hypertension now improved  4. Renal artery stenosis bilateral.  5.  Remote history of tobacco abuse  6. Shortness of breath without orthopnea PND. 7.  Hyperlipidemia  8. Hypertensive heart disease with moderate LVH and likely chronic diastolic heart failure. 9.  Anemia of uncertain etiology with normal iron stores and increased reticulocyte count. 10.  Likely extracranial cerebrovascular disease with bilateral carotid bruit  11. Likely bilateral pneumonia secondary to COVID-19 in January and February 2020    Mrs. Linares Saykathleen is now doing well after recovering from her recent pneumonia. She does not have any new cardiovascular complaints. Her blood pressure appears to be very well controlled with just 2 antianginal hypertensive therapies. She has no anginal symptoms. Due to her history of anemia I have decided to discontinue Plavix after completing 6 months post PCI. She has no residual angina and has moderate residual disease in the LAD which is currently not causing any symptoms and does not require any intervention. I plan to see her back in about 6 months at which time we will consider lipid check and renal arterial Doppler. Past medical history:  She  has a past medical history of CAD (coronary artery disease) (09/12/2019), Hypertension, Pneumonia, and MARY (renal artery stenosis) ([facility] [address] (07/24/2019). She also has no past medical history of Adverse effect of anesthesia. Patient reports no PND/Orthpnea/CP.  He reports no cough/fever/focal neurological deficits/abdominal pain. All other systems negative except as above. PE  Vitals:    04/28/20 1316   Weight: 140 lb (63.5 kg)   Height: 5' 3\" (1.6 m)    Body mass index is 24.8 kg/m². Recent Labs:  Lab Results   Component Value Date/Time    Cholesterol, total 111 02/23/2020 03:28 AM    HDL Cholesterol 27 02/23/2020 03:28 AM    LDL, calculated 35.4 02/23/2020 03:28 AM    Triglyceride 243 (H) 02/23/2020 03:28 AM    CHOL/HDL Ratio 4.1 02/23/2020 03:28 AM     Lab Results   Component Value Date/Time    Creatinine (POC) 0.9 10/20/2016 10:16 AM    Creatinine 0.91 03/03/2020 05:01 AM     Lab Results   Component Value Date/Time    BUN 23 (H) 03/03/2020 05:01 AM     Lab Results   Component Value Date/Time    Potassium 4.2 03/03/2020 05:01 AM     Lab Results   Component Value Date/Time    Hemoglobin A1c 5.4 02/22/2020 05:32 AM     Lab Results   Component Value Date/Time    HGB 9.1 (L) 03/01/2020 12:41 AM     Lab Results   Component Value Date/Time    PLATELET 124 61/63/0551 12:41 AM       Reviewed:  Past Medical History:   Diagnosis Date    CAD (coronary artery disease) 09/12/2019    stent D1 9/12/2019 (Aroldo Gordillo)    Hypertension     Pneumonia     MARY (renal artery stenosis) ([facility] [address] 07/24/2019    s/p right renal stent 7/24/2019 Dannie Vera)     Social History     Tobacco Use   Smoking Status Former Smoker    Packs/day: 0.50   Smokeless Tobacco Never Used     Social History     Substance and Sexual Activity   Alcohol Use Yes    Alcohol/week: 6.0 standard drinks    Types: 6 Glasses of wine per week     No Known Allergies  Family History   Problem Relation Age of Onset    Heart Attack Father 47        Current Outpatient Medications   Medication Sig    NIFEdipine ER (PROCARDIA XL) 90 mg ER tablet TAKE ONE TABLET BY MOUTH ONE TIME DAILY    carvediloL (COREG) 6.25 mg tablet Take 1 Tab by mouth two (2) times daily (with meals) for 30 days.     ALPRAZolam [place]) 1 mg tablet Take 1 Tab by mouth two (2) times daily as needed for Anxiety. Max Daily Amount: 2 mg.  budesonide-formoteroL (SYMBICORT) 160-4.5 mcg/actuation HFAA Take 2 Puffs by inhalation two (2) times a day.  furosemide (LASIX) 20 mg tablet TAKE ONE TABLET BY MOUTH ONE TIME DAILY    atorvastatin (LIPITOR) 40 mg tablet Take 2 Tabs by mouth daily for 360 days.  aspirin delayed-release 81 mg tablet Take 1 Tab by mouth daily.  meloxicam (MOBIC) 15 mg tablet Take 15 mg by mouth daily.  sertraline (ZOLOFT) 100 mg tablet Take 100 mg by mouth nightly.  albuterol-ipratropium (DUO-NEB) 2.5 mg-0.5 mg/3 ml nebu 3 mL by Nebulization route every four (4) hours as needed for Wheezing, Shortness of Breath or Respiratory Distress.  zolpidem (AMBIEN) 10 mg tablet Take  by mouth nightly as needed for Sleep. No current facility-administered medications for this visit. This telehealth visit  lasted approximately 14 minutes. This is not inclusive of note preparation time, dx testing review, medication orders or coordination of care. Shiela Phoenix, MD04/30/20 There are other unrelated non-urgent complaints, but due to the busy schedule and the amount of time I've already spent with her, time does not permit me to address these routine issues at today's visit. I've requested another appointment to review these additional issues.     [facility] [place]  [address], [address] [place] [address]

## 2020-07-15 ENCOUNTER — HOSPITAL ENCOUNTER (OUTPATIENT)
Dept: CT IMAGING | Age: 75
Discharge: HOME | End: 2020-07-15
Attending: INTERNAL MEDICINE
Payer: MEDICARE

## 2020-07-15 DIAGNOSIS — J18.9 PNEUMONIA: ICD-10-CM

## 2020-07-15 PROCEDURE — 71250 CT THORAX DX C-: CPT

## 2020-07-28 ENCOUNTER — TELEPHONE (OUTPATIENT)
Dept: CARDIOLOGY CLINIC | Age: 75
End: 2020-07-28

## 2020-07-28 NOTE — TELEPHONE ENCOUNTER
Pharmacy is calling to see if the patient is supposed to be taking clopidogrel.      Phone: [phone]

## 2020-07-28 NOTE — TELEPHONE ENCOUNTER
Returned call to pharmacy. Spoke with [facility]. She was informed that medication was d/c last visit.  Pharmacy stated that she was going to note and d/c

## 2020-08-31 RX ORDER — FUROSEMIDE 20 MG/1
TABLET ORAL
Qty: 30 TAB | Refills: 5 | Status: SHIPPED | OUTPATIENT
Start: 2020-08-31 | End: 2021-02-25

## 2020-10-26 RX ORDER — NIFEDIPINE 90 MG/1
TABLET, EXTENDED RELEASE ORAL
Qty: 90 TAB | Refills: 1 | Status: SHIPPED | OUTPATIENT
Start: 2020-10-26 | End: 2021-03-19

## 2020-10-26 RX ORDER — ATORVASTATIN CALCIUM 80 MG/1
TABLET, FILM COATED ORAL
Qty: 30 TAB | Refills: 11 | Status: SHIPPED | OUTPATIENT
Start: 2020-10-26 | End: 2021-01-21

## 2020-12-15 ENCOUNTER — APPOINTMENT (OUTPATIENT)
Dept: GENERAL RADIOLOGY | Age: 75
End: 2020-12-15
Attending: EMERGENCY MEDICINE
Payer: MEDICARE

## 2020-12-15 ENCOUNTER — HOSPITAL ENCOUNTER (EMERGENCY)
Age: 75
Discharge: HOME | End: 2020-12-15
Attending: EMERGENCY MEDICINE
Payer: MEDICARE

## 2020-12-15 ENCOUNTER — APPOINTMENT (OUTPATIENT)
Dept: CT IMAGING | Age: 75
End: 2020-12-15
Attending: EMERGENCY MEDICINE
Payer: MEDICARE

## 2020-12-15 VITALS
BODY MASS INDEX: 26.58 KG/M2 | DIASTOLIC BLOOD PRESSURE: 69 MMHG | HEIGHT: 63 IN | RESPIRATION RATE: 17 BRPM | WEIGHT: 150 LBS | TEMPERATURE: 98 F | OXYGEN SATURATION: 95 % | SYSTOLIC BLOOD PRESSURE: 154 MMHG | HEART RATE: 69 BPM
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
## 2020-12-15 DIAGNOSIS — S00.10XA: Primary | ICD-10-CM

## 2020-12-15 LAB
ALBUMIN SERPL-MCNC: 4.1 G/DL (ref 3.5–5)
ALBUMIN/GLOB SERPL: 1.2 {RATIO} (ref 1.1–2.2)
ALP SERPL-CCNC: 88 U/L (ref 45–117)
ALT SERPL-CCNC: 33 U/L (ref 12–78)
ANION GAP SERPL CALC-SCNC: 5 MMOL/L (ref 5–15)
APPEARANCE UR: CLEAR
APTT PPP: 27 SEC (ref 22.1–31)
AST SERPL-CCNC: 26 U/L (ref 15–37)
ATRIAL RATE: 69 BPM
BACTERIA URNS QL MICRO: NEGATIVE /HPF
BASOPHILS # BLD: 0 K/UL (ref 0–0.1)
BASOPHILS NFR BLD: 1 % (ref 0–1)
BILIRUB SERPL-MCNC: 0.5 MG/DL (ref 0.2–1)
BILIRUB UR QL: NEGATIVE
BUN SERPL-MCNC: 22 MG/DL (ref 6–20)
BUN/CREAT SERPL: 22 (ref 12–20)
CALCIUM SERPL-MCNC: 9.4 MG/DL (ref 8.5–10.1)
CALCULATED P AXIS, ECG09: 64 DEGREES
CALCULATED R AXIS, ECG10: 48 DEGREES
CALCULATED T AXIS, ECG11: 50 DEGREES
CHLORIDE SERPL-SCNC: 109 MMOL/L (ref 97–108)
CK SERPL-CCNC: 52 U/L (ref 26–192)
CO2 SERPL-SCNC: 24 MMOL/L (ref 21–32)
COLOR UR: NORMAL
CREAT SERPL-MCNC: 1.01 MG/DL (ref 0.55–1.02)
DIAGNOSIS, 93000: NORMAL
DIFFERENTIAL METHOD BLD: ABNORMAL
EOSINOPHIL # BLD: 0.1 K/UL (ref 0–0.4)
EOSINOPHIL NFR BLD: 2 % (ref 0–7)
EPITH CASTS URNS QL MICRO: NORMAL /LPF
ERYTHROCYTE [DISTWIDTH] IN BLOOD BY AUTOMATED COUNT: 12.8 % (ref 11.5–14.5)
ETHANOL SERPL-MCNC: 10 MG/DL
GLOBULIN SER CALC-MCNC: 3.4 G/DL (ref 2–4)
GLUCOSE SERPL-MCNC: 102 MG/DL (ref 65–100)
GLUCOSE UR STRIP.AUTO-MCNC: NEGATIVE MG/DL
HCT VFR BLD AUTO: 37 % (ref 35–47)
HGB BLD-MCNC: 12.7 G/DL (ref 11.5–16)
HGB UR QL STRIP: NEGATIVE
HYALINE CASTS URNS QL MICRO: NORMAL /LPF (ref 0–5)
IMM GRANULOCYTES # BLD AUTO: 0 K/UL (ref 0–0.04)
IMM GRANULOCYTES NFR BLD AUTO: 0 % (ref 0–0.5)
INR PPP: 1 (ref 0.9–1.1)
KETONES UR QL STRIP.AUTO: NEGATIVE MG/DL
LEUKOCYTE ESTERASE UR QL STRIP.AUTO: NEGATIVE
LIPASE SERPL-CCNC: 212 U/L (ref 73–393)
LYMPHOCYTES # BLD: 1.5 K/UL (ref 0.8–3.5)
LYMPHOCYTES NFR BLD: 21 % (ref 12–49)
MCH RBC QN AUTO: 35.2 PG (ref 26–34)
MCHC RBC AUTO-ENTMCNC: 34.3 G/DL (ref 30–36.5)
MCV RBC AUTO: 102.5 FL (ref 80–99)
MONOCYTES # BLD: 0.5 K/UL (ref 0–1)
MONOCYTES NFR BLD: 7 % (ref 5–13)
NEUTS SEG # BLD: 4.9 K/UL (ref 1.8–8)
NEUTS SEG NFR BLD: 69 % (ref 32–75)
NITRITE UR QL STRIP.AUTO: NEGATIVE
NRBC # BLD: 0 K/UL (ref 0–0.01)
NRBC BLD-RTO: 0 PER 100 WBC
P-R INTERVAL, ECG05: 148 MS
PH UR STRIP: 6 [PH] (ref 5–8)
PLATELET # BLD AUTO: 180 K/UL (ref 150–400)
PMV BLD AUTO: 8.9 FL (ref 8.9–12.9)
POTASSIUM SERPL-SCNC: 5 MMOL/L (ref 3.5–5.1)
PROT SERPL-MCNC: 7.5 G/DL (ref 6.4–8.2)
PROT UR STRIP-MCNC: NEGATIVE MG/DL
PROTHROMBIN TIME: 10.9 SEC (ref 9–11.1)
Q-T INTERVAL, ECG07: 414 MS
QRS DURATION, ECG06: 64 MS
QTC CALCULATION (BEZET), ECG08: 443 MS
RBC # BLD AUTO: 3.61 M/UL (ref 3.8–5.2)
RBC #/AREA URNS HPF: NORMAL /HPF (ref 0–5)
SODIUM SERPL-SCNC: 138 MMOL/L (ref 136–145)
SP GR UR REFRACTOMETRY: 1.02 (ref 1–1.03)
THERAPEUTIC RANGE,PTTT: NORMAL SECS (ref 58–77)
TROPONIN I SERPL-MCNC: <0.05 NG/ML
UR CULT HOLD, URHOLD: NORMAL
UROBILINOGEN UR QL STRIP.AUTO: 0.2 EU/DL (ref 0.2–1)
VENTRICULAR RATE, ECG03: 69 BPM
WBC # BLD AUTO: 7.1 K/UL (ref 3.6–11)
WBC URNS QL MICRO: NORMAL /HPF (ref 0–4)

## 2020-12-15 PROCEDURE — 85730 THROMBOPLASTIN TIME PARTIAL: CPT

## 2020-12-15 PROCEDURE — 84484 ASSAY OF TROPONIN QUANT: CPT

## 2020-12-15 PROCEDURE — 36415 COLL VENOUS BLD VENIPUNCTURE: CPT

## 2020-12-15 PROCEDURE — 70486 CT MAXILLOFACIAL W/O DYE: CPT

## 2020-12-15 PROCEDURE — 85025 COMPLETE CBC W/AUTO DIFF WBC: CPT

## 2020-12-15 PROCEDURE — 71045 X-RAY EXAM CHEST 1 VIEW: CPT

## 2020-12-15 PROCEDURE — 83690 ASSAY OF LIPASE: CPT

## 2020-12-15 PROCEDURE — 85610 PROTHROMBIN TIME: CPT

## 2020-12-15 PROCEDURE — 93005 ELECTROCARDIOGRAM TRACING: CPT

## 2020-12-15 PROCEDURE — 70450 CT HEAD/BRAIN W/O DYE: CPT

## 2020-12-15 PROCEDURE — 80053 COMPREHEN METABOLIC PANEL: CPT

## 2020-12-15 PROCEDURE — 72125 CT NECK SPINE W/O DYE: CPT

## 2020-12-15 PROCEDURE — 80307 DRUG TEST PRSMV CHEM ANLYZR: CPT

## 2020-12-15 PROCEDURE — 99285 EMERGENCY DEPT VISIT HI MDM: CPT

## 2020-12-15 PROCEDURE — 82550 ASSAY OF CK (CPK): CPT

## 2020-12-15 PROCEDURE — 81001 URINALYSIS AUTO W/SCOPE: CPT

## 2020-12-15 NOTE — ED TRIAGE NOTES
Pt [facility] Pt presents with a black/blue swollen left eye. Pt states she remembers watching the news last night around 2200 and falling asleep on the couch. She states she woke around 0400 due to pain. She does not recall getting up or falling during the night. She does not know how she was injured. She states she lives alone. Pt reports getting an ice pack at home and going back to bed. She states she took an [facility] to [facility] this morning and they suggested she come to the ED. Dr. Vickie Garcia at bedside. Pt also c/o chest tightness. BP elevated. Pt states she has not taken any of her morning medications this morning. Call bell placed within reach. On cardiac monitor x3.

## 2020-12-15 NOTE — ED NOTES
1350 - PIV removed and pt with excessive bleeding from site. More gauze applied with a pressure dressing. Held physical pressure and raised arm above head. Bleeding appears to have stopped. Ace pressure dressing removed. More gauze applied and coban applied. Pt states she can feel her arm and fingers.  are equal. Advised pt to leave dressing in placed x 4 hours. Sent pt home with gauze and coban if needed. Pt verbalized her understanding. 1405 - Patient (s)  given copy of dc instructions. Patient (s)  verbalized understanding of instructionst.  Patient given a current medication reconciliation form and verbalized understanding of their medications. Patient (s) verbalized understanding of the importance of discussing medications with  his or her physician or clinic they will be following up with. Patient alert and oriented and in no acute distress. Patient discharged home ambulatory with all belongings.
no

## 2020-12-15 NOTE — DISCHARGE INSTRUCTIONS
Patient Education        Black Eye: Care Instructions  Your Care Instructions     A black eye is bruising and swelling around the eye or the eyelids. The swelling from your black eye may get worse over the next couple of days. After that, the swelling should steadily improve until it is gone. The bruise around your eye will change colors as it heals. The skin may turn from black and blue to green, yellow, and brown before it returns to its normal color. It may take 1 to 3 weeks to return to normal.  Follow-up care is a key part of your treatment and safety. Be sure to make and go to all appointments, and call your doctor if you are having problems. It's also a good idea to know your test results and keep a list of the medicines you take. How can you care for yourself at home? · Put ice or a cold pack on the area for 10 to 20 minutes at a time. Put a thin cloth between the ice pack and your skin. · If your doctor prescribed antibiotics, take them as directed. Do not stop taking them just because you feel better. You need to take the full course of antibiotics. · Take pain medicines exactly as directed. ? If the doctor gave you a prescription medicine for pain, take it as prescribed. ? If you are not taking a prescription pain medicine, ask your doctor if you can take an over-the-counter medicine. When should you call for help? Call your doctor now or seek immediate medical care if:    · You have any new changes in vision, such as double vision or blurring.     · You have new or increased pain in or around your eye. Watch closely for changes in your health, and be sure to contact your doctor if:    · You do not get better as expected. Where can you learn more? Go to http://cody-akanksha.info/  Enter O897 in the search box to learn more about \"Black Eye: Care Instructions. \"  Current as of: June 26, 2019               Content Version: 12.6  © 4695-6440 [employer].    Care instructions adapted under license by [employer] (which disclaims liability or warranty for this information). If you have questions about a medical condition or this instruction, always ask your healthcare professional. [address] any warranty or liability for your use of this information. Patient Education        Preventing Falls: Care Instructions  Your Care Instructions     Getting around your home safely can be a challenge if you have injuries or health problems that make it easy for you to fall. Loose rugs and furniture in walkways are among the dangers for many older people who have problems walking or who have poor eyesight. People who have conditions such as arthritis, osteoporosis, or dementia also have to be careful not to fall. You can make your home safer with a few simple measures. Follow-up care is a key part of your treatment and safety. Be sure to make and go to all appointments, and call your doctor if you are having problems. It's also a good idea to know your test results and keep a list of the medicines you take. How can you care for yourself at home? Taking care of yourself  · You may get dizzy if you do not drink enough water. To prevent dehydration, drink plenty of fluids, enough so that your urine is light yellow or clear like water. Choose water and other caffeine-free clear liquids. If you have kidney, heart, or liver disease and have to limit fluids, talk with your doctor before you increase the amount of fluids you drink. · Exercise regularly to improve your strength, muscle tone, and balance. Walk if you can. Swimming may be a good choice if you cannot walk easily. · Have your vision and hearing checked each year or any time you notice a change. If you have trouble seeing and hearing, you might not be able to avoid objects and could lose your balance. · Know the side effects of the medicines you take.  Ask your doctor or pharmacist whether the medicines you take can affect your balance. Sleeping pills or sedatives can affect your balance. · Limit the amount of alcohol you drink. Alcohol can impair your balance and other senses. · Ask your doctor whether calluses or corns on your feet need to be removed. If you wear loose-fitting shoes because of calluses or corns, you can lose your balance and fall. · Talk to your doctor if you have numbness in your feet. Preventing falls at home  · Remove raised doorway thresholds, throw rugs, and clutter. Repair loose carpet or raised areas in the floor. · Move furniture and electrical cords to keep them out of walking paths. · Use nonskid floor wax, and wipe up spills right away, especially on ceramic tile floors. · If you use a walker or cane, put rubber tips on it. If you use crutches, clean the bottoms of them regularly with an abrasive pad, such as steel wool. · Keep your house well lit, especially [place], and outside walkways. Use night-lights in areas such as hallways and bathrooms. Add extra light switches or use remote switches (such as switches that go on or off when you clap your hands) to make it easier to turn lights on if you have to get up during the night. · Install sturdy handrails on stairways. · Move items in your cabinets so that the things you use a lot are on the lower shelves (about waist level). · Keep a cordless phone and a flashlight with new batteries by your bed. If possible, put a phone in each of the main rooms of your house, or carry a cell phone in case you fall and cannot reach a phone. Or, you can wear a device around your neck or wrist. You push a button that sends a signal for help. · Wear low-heeled shoes that fit well and give your feet good support. Use footwear with nonskid soles. Check the heels and soles of your shoes for wear. Repair or replace worn heels or soles. · Do not wear socks without shoes on wood floors.   · Walk on the grass when the sidewalks are slippery. If you live in an area that gets snow and ice in the winter, sprinkle salt on slippery steps and sidewalks. Preventing falls in the bath  · Install grab bars and nonskid mats inside and outside your shower or tub and near the toilet and sinks. · Use shower chairs and bath benches. · Use a hand-held shower head that will allow you to sit while showering. · Get into a tub or shower by putting the weaker leg in first. Get out of a tub or shower with your strong side first.  · Repair loose toilet seats and consider installing a raised toilet seat to make getting on and off the toilet easier. · Keep your bathroom door unlocked while you are in the shower. Where can you learn more? Go to http://cody-akanksha.info/  Enter G117 in the search box to learn more about \"Preventing Falls: Care Instructions. \"  Current as of: April 15, 2020               Content Version: 12.6  © 5129-7858 [employer]. Care instructions adapted under license by [employer] (which disclaims liability or warranty for this information). If you have questions about a medical condition or this instruction, always ask your healthcare professional. [address] any warranty or liability for your use of this information.

## 2020-12-15 NOTE — ED PROVIDER NOTES
Please note that this dictation was completed with [employer], the computer voice recognition software.  Quite often unanticipated grammatical, syntax, homophones, and other interpretive errors are inadvertently transcribed by the computer software.  Please disregard these errors.  Please excuse any errors that have escaped final proofreading. 79-year-old female past medical history markable for coronary artery disease (stent in September 2019 with Dr. Kong Tavares), hypertension, pneumonia, renal artery stenosis status post right renal stent (July 2019 Dr. Mariana Leone) [facility] after \"had a glass of wine with dinner last night. [employer] turning off the TV after the late news. Then \"the next thing I know, I woke up on the couch at 4 AM in my left eye was swollen and painful. \"  I then  went to bed,  Put an ice pack on it. Then when I woke was having headache so I took an [facility] to [facility] they called [facility] sent me here for further evaluation. \"  Patient states she can still see out of her left eye there is a little bit of pain associated with it but \"not too bad. \"  Patient states she does not remember any trauma also has an abrasion to the posterior aspect of her right hand. She is unsure when her last tetanus shot was denies any bite abnormalities loose teeth nosebleeds difficulty with ambulation numbness tingling burning with urination or vaginal discharge. Patient states this is never happened to her before, is here for further evaluation. Patient had she was able to have coffee this morning as normal.    EMS then denies it as patient was in route to the ER she began to develop some substernal chest pain nonradiating located about the middle of her chest has not been treated. \"    pt denies HA, vison changes, diff swallowing, CP, SOB, Abd pain, F/Ch, N/V, D/Cons or other current systemic complaints    Social/ PSH reviewed in EMR    EMR Chart Reviewed           Past Medical History:   Diagnosis Date    CAD (coronary artery disease) 09/12/2019    stent D1 9/12/2019 (Magui Collins)    Hypertension     Pneumonia     [facility] 07/[facility]/2019    s/p right renal stent 7/24/2019 Abdierin Novak)       Past Surgical History:   Procedure Laterality Date    CARDIAC SURG PROCEDURE UNLIST  08/2019    cardiac stents     HX APPENDECTOMY      HX BACK SURGERY      LUMBAR FUSION X2 SURGERIES    HX BACK SURGERY      CERVICAL X2    HX GI  2019    arterial stent in right kidney    HX HYSTERECTOMY      HX TONSILLECTOMY           Family History:   Problem Relation Age of Onset    Heart Attack Father 47       Social History     Socioeconomic History    Marital status:      Spouse name: Not on file    Number of children: Not on file    Years of education: Not on file    Highest education level: Not on file   Occupational History    Not on file   Social Needs    Financial resource strain: Not on file    Food insecurity     Worry: Not on file     Inability: Not on file    Transportation needs     Medical: Not on file     Non-medical: Not on file   Tobacco Use    Smoking status: Former Smoker     Packs/day: 0.50    Smokeless tobacco: Never Used   Substance and Sexual Activity    Alcohol use:  Yes     Alcohol/week: 6.0 standard drinks     Types: 6 Glasses of wine per week    Drug use: Never    Sexual activity: Not on file   Lifestyle    Physical activity     Days per week: Not on file     Minutes per session: Not on file    Stress: Not on file   Relationships    Social connections     Talks on phone: Not on file     Gets together: Not on file     Attends Bahai service: Not on file     Active member of club or organization: Not on file     Attends meetings of clubs or organizations: Not on file     Relationship status: Not on file    Intimate partner violence     Fear of current or ex partner: Not on file     Emotionally abused: Not on file     Physically abused: Not on file     Forced sexual activity: Not on file   Other Topics Concern    Not on file   Social History Narrative    Not on file         ALLERGIES: Patient has no known allergies. Review of Systems   Constitutional: Negative for chills, fatigue and fever. HENT: Negative for dental problem, drooling, hearing loss, mouth sores, nosebleeds, sore throat, trouble swallowing and voice change. Eyes: Positive for redness. Negative for photophobia and visual disturbance. Respiratory: Positive for chest tightness. Cardiovascular: Positive for chest pain. Negative for palpitations and leg swelling. Gastrointestinal: Negative for abdominal pain, constipation, diarrhea, nausea and vomiting. Genitourinary: Negative for dysuria. Musculoskeletal: Negative for back pain and neck pain. Skin: Positive for color change and wound. Negative for pallor. Neurological: Positive for facial asymmetry. Negative for speech difficulty, light-headedness and headaches. All other systems reviewed and are negative. Vitals:    12/15/20 1128 12/15/20 1330   BP: (!) 168/79 (!) 154/69   Pulse: 77 69   Resp: 20 17   Temp: 98 °F (36.7 °C)    SpO2: 98% 95%   Weight: 68 kg (150 lb)    Height: 5' 3\" (1.6 m)             Physical Exam  Vitals signs and nursing note reviewed. Constitutional:       General: She is not in acute distress. Appearance: Normal appearance. She is well-developed. She is not ill-appearing, toxic-appearing or diaphoretic. Comments: NAD, AxOx4, speaking in complete sentences    GCS = 15       HENT:      Head: Normocephalic. Comments: CN intact    L eye swelling / bruising noted/ EOMI intact/ no eye pain./ min conj redness; No loose/ broken teeth, bite alignment wnl; No septal hematoma or mastoid tenderness noted     Right Ear: External ear normal.      Left Ear: External ear normal.      Nose: Nose normal.   Eyes:      General: No scleral icterus. Right eye: No discharge. Conjunctiva/sclera: Conjunctivae normal.      Pupils: Pupils are equal, round, and reactive to light. Neck:      Musculoskeletal: Normal range of motion. Neck rigidity present. No muscular tenderness. Vascular: No JVD. Trachea: No tracheal deviation. Cardiovascular:      Rate and Rhythm: Normal rate and regular rhythm. Pulses: Normal pulses. Heart sounds: Normal heart sounds. No murmur. No friction rub. No gallop. Pulmonary:      Effort: Pulmonary effort is normal. No respiratory distress. Breath sounds: Normal breath sounds. No stridor. No wheezing, rhonchi or rales. Chest:      Chest wall: No tenderness. Abdominal:      General: Bowel sounds are normal.      Palpations: Abdomen is soft. Tenderness: There is no abdominal tenderness. There is no guarding or rebound. Genitourinary:     Vagina: No vaginal discharge. Comments: Pt denies urinary/ vaginal complaints  Musculoskeletal: Normal range of motion. General: Swelling, tenderness and signs of injury present. Comments: Pt had central/ paraspinal C/T/L spines, Upper/Lower ext long bones, Abdomen,  Pelvis, hands /feet and all joints palpated and tolerated well (except as above) ; Pt has motor/ CV / Sensation grossly intact to all extremities;   Skin:     General: Skin is warm and dry. Capillary Refill: Capillary refill takes less than 2 seconds. Coloration: Skin is not pale. Findings: Bruising present. No erythema or rash. Neurological:      General: No focal deficit present. Mental Status: She is alert and oriented to person, place, and time. Cranial Nerves: No cranial nerve deficit. Sensory: No sensory deficit. Motor: No weakness or abnormal muscle tone.       Coordination: Coordination normal.      Gait: Gait normal.      Deep Tendon Reflexes: Reflexes normal.   Psychiatric:         Behavior: Behavior normal.         Thought Content: Thought content normal.          MDM  Number of Diagnoses or Management Options         Procedures          Chief Complaint   Patient presents with   Joy Suarez       11:33 AM  The patients presenting problems have been discussed, and they are in agreement with the care plan formulated and outlined with them. I have encouraged them to ask questions as they arise throughout their visit. MEDICATIONS GIVEN:  Medications - No data to display    LABS REVIEWED:  Labs Reviewed   TROPONIN I   LIPASE   METABOLIC PANEL, COMPREHENSIVE   CBC WITH AUTOMATED DIFF   ETHYL ALCOHOL   URINALYSIS W/MICROSCOPIC   SAMPLES BEING HELD   PROTHROMBIN TIME + INR   PTT   CK       RADIOLOGY RESULTS:  The following have been ordered and reviewed:  _____________________________________________________________________  _____________________________________________________________________    EKG interpretation:   Rhythm: normal sinus rhythm; and regular . Rate (approx.): 70; Axis: normal; P wave: normal; QRS interval: normal ; ST/T wave: normal; Negative acute significant segmental elevations/ unchanged compared to study dated 02/29/2020    PROCEDURES:        CONSULTATIONS:       PROGRESS NOTES:      DIAGNOSIS:    1. Traumatic hematoma of eyelid, initial encounter    2. Fall, initial encounter        PLAN:  1-black eye after a fall last night;       ED COURSE: The patients hospital course has been uncomplicated. 1:47 PM  Dewayne Burton's  results have been reviewed with her. She has been counseled regarding her diagnosis. She verbally conveys understanding and agreement of the signs, symptoms, diagnosis, treatment and prognosis and additionally agrees to Call/ Arrange follow up as recommended with Dr. Jesu Hankins MD in 24 - 48 hours. She also agrees with the care-plan and conveys that all of her questions have been answered.   I have also put together some discharge instructions for her that include: 1) educational information regarding their diagnosis, 2) how to care for their diagnosis at home, as well a 3) list of reasons why they would want to return to the ED prior to their follow-up appointment, should their condition change or for concerns.

## 2021-01-21 ENCOUNTER — TELEPHONE (OUTPATIENT)
Dept: CARDIOLOGY CLINIC | Age: 76
End: 2021-01-21

## 2021-01-21 RX ORDER — ATORVASTATIN CALCIUM 40 MG/1
80 TABLET, FILM COATED ORAL DAILY
Qty: 180 TAB | Refills: 1 | Status: SHIPPED | OUTPATIENT
Start: 2021-01-21 | End: 2021-07-28

## 2021-01-21 NOTE — TELEPHONE ENCOUNTER
Letitia with [facility] called to get a medication change; Lipitor 40 mg with 2 x daily.  Please advise          [phone]

## 2021-01-21 NOTE — TELEPHONE ENCOUNTER
Returned call to pharmacy. Pharmacy stated that patient wanted 40 mg tablets take 2 tablets a day due to size of the 80 mg tablet. Pharmacy was informed that I can update prescription but not sure if insurance will cover the 2 pills a day.

## 2021-02-25 ENCOUNTER — TELEPHONE (OUTPATIENT)
Dept: CARDIOLOGY CLINIC | Age: 76
End: 2021-02-25

## 2021-02-25 DIAGNOSIS — I70.1 RENAL ARTERY STENOSIS (HCC): Primary | ICD-10-CM

## 2021-02-25 RX ORDER — FUROSEMIDE 20 MG/1
TABLET ORAL
Qty: 30 TAB | Refills: 5 | Status: SHIPPED | OUTPATIENT
Start: 2021-02-25 | End: 2022-06-01

## 2021-02-25 NOTE — TELEPHONE ENCOUNTER
----- Message from Cindi Vyas MD sent at 2/25/2021 12:23 PM EST -----  Needs follow up with me so that we can continue medication prescriptions. Also recommend a renal doppler if patient is will to do. Otherwise visit without doppler.

## 2021-02-25 NOTE — TELEPHONE ENCOUNTER
Called patient. Two patient indentifiers verified. Pt was scheduled for renal doppler and follow up appointment.      Future Appointments   Date Time Provider Andi Slater   3/19/2021  2:00 PM VASCULAR, ANDREA VEGA   3/19/2021  2:40 PM MD ANURADHA Mejia [facility]

## 2021-03-19 ENCOUNTER — ANCILLARY PROCEDURE (OUTPATIENT)
Dept: CARDIOLOGY CLINIC | Age: 76
End: 2021-03-19
Payer: MEDICARE

## 2021-03-19 ENCOUNTER — OFFICE VISIT (OUTPATIENT)
Dept: CARDIOLOGY CLINIC | Age: 76
End: 2021-03-19
Payer: MEDICARE

## 2021-03-19 VITALS
RESPIRATION RATE: 18 BRPM | BODY MASS INDEX: 24.63 KG/M2 | SYSTOLIC BLOOD PRESSURE: 130 MMHG | WEIGHT: 139 LBS | HEIGHT: 63 IN | OXYGEN SATURATION: 98 % | HEART RATE: 66 BPM | DIASTOLIC BLOOD PRESSURE: 70 MMHG

## 2021-03-19 DIAGNOSIS — I70.1 RENAL ARTERY STENOSIS (HCC): ICD-10-CM

## 2021-03-19 DIAGNOSIS — I25.10 CORONARY ARTERY DISEASE INVOLVING NATIVE CORONARY ARTERY OF NATIVE HEART WITHOUT ANGINA PECTORIS: Primary | ICD-10-CM

## 2021-03-19 LAB
ABDOMINAL PROX AORTA VEL: 99.6 CENTIMETER/SECOND
LEFT KIDNEY LENGTH: 9.11 CM
LEFT RENAL DIST SYS: 59.6 CENTIMETER/SECOND
LEFT RENAL MID SYS: 97.6 CENTIMETER/SECOND
LEFT RENAL ORIGIN SYS: 2.4 CM/S
LEFT RENAL PROX SYS: 152.8 CENTIMETER/SECOND
RIGHT KIDNEY LENGTH: 8.62 CM
RIGHT KIDNEY WIDTH: 3.76 CM
RIGHT RENAL DIST SYS: 94.8 CENTIMETER/SECOND
RIGHT RENAL MID SYS: 114.4 CENTIMETER/SECOND
RIGHT RENAL ORIGIN SYS: 56 CM/S
RIGHT RENAL PROX SYS: 87.6 CENTIMETER/SECOND

## 2021-03-19 PROCEDURE — G8510 SCR DEP NEG, NO PLAN REQD: HCPCS | Performed by: INTERNAL MEDICINE

## 2021-03-19 PROCEDURE — 93975 VASCULAR STUDY: CPT | Performed by: INTERNAL MEDICINE

## 2021-03-19 PROCEDURE — G8420 CALC BMI NORM PARAMETERS: HCPCS | Performed by: INTERNAL MEDICINE

## 2021-03-19 PROCEDURE — 1101F PT FALLS ASSESS-DOCD LE1/YR: CPT | Performed by: INTERNAL MEDICINE

## 2021-03-19 PROCEDURE — 99214 OFFICE O/P EST MOD 30 MIN: CPT | Performed by: INTERNAL MEDICINE

## 2021-03-19 PROCEDURE — G8400 PT W/DXA NO RESULTS DOC: HCPCS | Performed by: INTERNAL MEDICINE

## 2021-03-19 PROCEDURE — G8536 NO DOC ELDER MAL SCRN: HCPCS | Performed by: INTERNAL MEDICINE

## 2021-03-19 PROCEDURE — G0463 HOSPITAL OUTPT CLINIC VISIT: HCPCS | Performed by: INTERNAL MEDICINE

## 2021-03-19 PROCEDURE — G8427 DOCREV CUR MEDS BY ELIG CLIN: HCPCS | Performed by: INTERNAL MEDICINE

## 2021-03-19 PROCEDURE — 1090F PRES/ABSN URINE INCON ASSESS: CPT | Performed by: INTERNAL MEDICINE

## 2021-03-19 PROCEDURE — 3017F COLORECTAL CA SCREEN DOC REV: CPT | Performed by: INTERNAL MEDICINE

## 2021-03-19 RX ORDER — CARVEDILOL 6.25 MG/1
6.25 TABLET ORAL 2 TIMES DAILY
COMMUNITY
Start: 2021-02-21 | End: 2021-03-19 | Stop reason: SDUPTHER

## 2021-03-19 RX ORDER — CARVEDILOL 25 MG/1
25 TABLET ORAL 2 TIMES DAILY
Qty: 180 TAB | Refills: 3 | Status: SHIPPED | OUTPATIENT
Start: 2021-03-19 | End: 2022-04-14

## 2021-03-19 NOTE — PATIENT INSTRUCTIONS
Take 45 mg Nifedipine for 2 weeks and Take 12.5 mg carvedilol for 2 weeks. After 2 weeks discontinue nifedipine and increase carvedilol to 25 mg twice a day.

## 2021-03-19 NOTE — LETTER
3/20/2021 Patient: Denia Suarez YOB: 1945 Date of Visit: 3/19/2021 Flor Henson MD 
[address] [facility] [address] [place] [address] Via Fax: [phone] Dear Flor Henson MD, Thank you for referring Ms. Denia Suarez to [facility] for evaluation. My notes for this consultation are attached. If you have questions, please do not hesitate to call me. I look forward to following your patient along with you.  
 
 
Sincerely, 
 
Louis Castillo MD

## 2021-03-19 NOTE — PROGRESS NOTES
[facility] [address] [place]: Gwen Reyes  [phone]          Cardiology Consult/Progress Note      Requesting/referring provider: Dr. Martha Nelson  Reason for Follow up: Renovascular hypertension/CAD    HPI: Alma Terrazas, a 70y.o. year-old who was previously seen for evaluation of uncontrolled hypertension and CAD. Mrs. Bradford Knutson has history of extremely difficult to control hypertension and was admitted to the hospital with malignant hypertension on multiple occasions in 2019 associated with flash pulmonary edema. Subsequently she underwent invasive renal angiogram which demonstrated 70 to 75% stenosis in main right renal artery in its proximal segment. She subsequently underwent stenting of right renal artery with a 5 x 15 mm Herculink bare-metal stent with no residual stenosis. She has moderate stenosis and a relatively small sized left renal artery. There is an accessory left renal artery free of significant disease along with an accessory right renal artery free of significant disease. BP has been well controlled since undergoing renal artery stenting. Additionally she has had symptoms of persistent shortness of breath attributable to anemia and underwent a cardiac catheterization which demonstrated significant disease in his diagonal vessel for which she underwent PCI with 2 drug-eluting stents in 2019. From a cardiovascular standpoint she did well with now controlled blood pressure and no anginal or shortness of breath symptoms. Notably in January and February 2020 she had hospitalization for unexplained bilateral pneumonia with high oxygen requirement which I suspect could have been COVID-19. This occurred after an extended travel to [place]. Investigations:  10/18 vcs normal stress echo, did not reach target heart rate  10/18 vcs normal lexiscan cardiolyte    ECG: Sinus bradycardia, nonspecific ST-T changes.   Echocardiogram: Normal LV systolic function mild to moderate LV hypertrophy consistent with hypertensive heart disease. CT abdomen with angiogram and runoff: 2 renal arteries are present bilaterally. Right major renal artery has at least moderate to severe ostial stenosis. Accessory right renal artery is free of significant disease. Left renal arteries are equal in size. CT scan has been reported to demonstrate disease in superior renal artery although I cannot personally confirm it. Both arteries on the left appear to be too small to provide durable results with renal artery stenting. Renal angiogram and stenting July 2019: Main right renal artery 75% stenosis, accessory renal artery on the right free of significant disease. 2 codominant left renal arteries, one with 40 to 50% angiographic stenosis other free of disease. Status post proximal right main renal artery stenting with 5 x 15 Herculink stent with 0% residual stenosis. Renal arterial Doppler August 2019: Patent stent in main proximal right renal artery. Accessory right renal artery not visualized. Main left renal artery has less than 60% stenosis. Accessory renal artery on the left is not visualized. Technically suboptimal study. Normal kidney size bilaterally. Cath Sep 2019: Severe disease in D1 s/p PCI with 2 REBECCA. Moderate disease in mid LAD, otherwise non obstructive disease. Bilateral renal Dopplers March 2021: Patent flow in the right renal stent. Less than 60% stenosis in left renal artery. Assessment/Plan:  1. Hypertension likely secondary to renal artery stenosis significantly improved following renal artery stenting to proximal main right renal artery now improved status post renal artery stenting. 2.  Atherosclerotic vascular disease  3. History of flash pulmonary edema related to uncontrolled hypertension now improved  4. Renal artery stenosis bilateral.  5.  Remote history of tobacco abuse  6. Shortness of breath without orthopnea PND. 7.  Hyperlipidemia  8.   Hypertensive heart disease with moderate LVH and likely chronic diastolic heart failure. 9.  Anemia of uncertain etiology with normal iron stores and increased reticulocyte count. 10.  Likely extracranial cerebrovascular disease with bilateral carotid bruit  11. Likely bilateral pneumonia secondary to COVID-19 in January and February 2020  12. CAD s/p PCI to diagonal vessel for chest pain in 2019, moderate disease in mid LAD. Mrs. Jefferson Gowers is now doing well from a cardiovascular standpoint. She does have some shortness of breath but nothing significant. She has recovered well from COVID-19 pneumonia. She is on 2 antihypertensive agents and her pressures are very well controlled. I will try to get her off nifedipine as it seems like is causing her to have lower extremity edema. Hopefully she does not have any further edema we might be able to get her off Lasix as well. Her LV function is completely preserved. She is tolerating high intensity statin therapy as well. Her renal Doppler today demonstrate patent stent with no significant restenosis or increased velocities. She has moderate disease in her LAD. Diagonal has been stented previously and now she is of Plavix. Aspirin should be continued long-term. Past medical history:  She  has a past medical history of CAD (coronary artery disease) (09/12/2019), Hypertension, Pneumonia, and MARY (renal artery stenosis) ([facility] [address] (07/24/2019). She also has no past medical history of Adverse effect of anesthesia. Patient reports no PND/Orthpnea/CP. He reports no cough/fever/focal neurological deficits/abdominal pain. All other systems negative except as above. PE  Vitals:    03/19/21 1510   BP: 130/70   Pulse: 66   Resp: 18   SpO2: 98%   Weight: 139 lb (63 kg)   Height: 5' 3\" (1.6 m)    Body mass index is 24.62 kg/m².   /70 (BP 1 Location: Left upper arm, BP Patient Position: Sitting, BP Cuff Size: Adult)   Pulse 66   Resp 18   Ht 5' 3\" (1.6 m)   Wt 139 lb (63 kg)   SpO2 98%   BMI 24.62 kg/m²   General:    Alert, cooperative, no distress.   Psychiatric:    Normal Mood and affect    Eye/ENT:      Pupils equal, No asymmetry, Conjunctival pink. Able to hear voice at normal amplitude   Lungs:      Visibly symmetric chest expansion, No palpable tenderness. Clear to auscultation bilaterally.    Heart::    Regular rate and rhythm, S1, S2 normal, no murmur, click, rub or gallop.No JVD, Normal palpable peripheral pulses. No cyanosis   Abdomen:     Soft, non-tender. Bowel sounds normal. No masses,  No      organomegaly.   Extremities:   Extremities normal, atraumatic, no edema.   Neurologic:   CN II-XII grossly intact. No gross focal deficits         Recent Labs:  Lab Results   Component Value Date/Time    Cholesterol, total 111 02/23/2020 03:28 AM    HDL Cholesterol 27 02/23/2020 03:28 AM    LDL, calculated 35.4 02/23/2020 03:28 AM    Triglyceride 243 (H) 02/23/2020 03:28 AM    CHOL/HDL Ratio 4.1 02/23/2020 03:28 AM     Lab Results   Component Value Date/Time    Creatinine (POC) 0.9 10/20/2016 10:16 AM    Creatinine 1.01 12/15/2020 11:59 AM     Lab Results   Component Value Date/Time    BUN 22 (H) 12/15/2020 11:59 AM     Lab Results   Component Value Date/Time    Potassium 5.0 12/15/2020 11:59 AM     Lab Results   Component Value Date/Time    Hemoglobin A1c 5.4 02/22/2020 05:32 AM     Lab Results   Component Value Date/Time    HGB 12.7 12/15/2020 11:59 AM     Lab Results   Component Value Date/Time    PLATELET 180 12/15/2020 11:59 AM       Reviewed:  Past Medical History:   Diagnosis Date   • CAD (coronary artery disease) 09/12/2019    stent D1 9/12/2019 ([facility])   • Hypertension    • Pneumonia    • MARY (renal artery stenosis) ([facility]) 07/24/2019    s/p right renal stent 7/24/2019 ([facility])     Social History     Tobacco Use   Smoking Status Former Smoker   • Packs/day: 0.50   Smokeless Tobacco Never Used     Social History     Substance and Sexual Activity   Alcohol Use Yes   Alcohol/week: 6.0 standard drinks    Types: 6 Glasses of wine per week    Comment: 6 a week     No Known Allergies  Family History   Problem Relation Age of Onset    Heart Attack Father 47        Current Outpatient Medications   Medication Sig    carvediloL (COREG) 6.25 mg tablet Take 6.25 mg by mouth two (2) times a day.  furosemide (LASIX) 20 mg tablet TAKE ONE TABLET BY MOUTH ONE TIME DAILY    atorvastatin (LIPITOR) 40 mg tablet Take 2 Tabs by mouth daily.  NIFEdipine ER (PROCARDIA XL) 90 mg ER tablet TAKE ONE TABLET BY MOUTH ONE TIME DAILY    ALPRAZolam (XANAX) 1 mg tablet Take 1 Tab by mouth two (2) times daily as needed for Anxiety. Max Daily Amount: 2 mg.  budesonide-formoteroL (SYMBICORT) 160-4.5 mcg/actuation HFAA Take 2 Puffs by inhalation two (2) times a day.  meloxicam (MOBIC) 15 mg tablet Take 15 mg by mouth daily.  sertraline (ZOLOFT) 100 mg tablet Take 100 mg by mouth nightly.  zolpidem (AMBIEN) 10 mg tablet Take  by mouth nightly as needed for Sleep.  albuterol-ipratropium (DUO-NEB) 2.5 mg-0.5 mg/3 ml nebu 3 mL by Nebulization route every four (4) hours as needed for Wheezing, Shortness of Breath or Respiratory Distress.  aspirin delayed-release 81 mg tablet Take 1 Tab by mouth daily. No current facility-administered medications for this visit. Pamela Kearney MD03/19/21 There are other unrelated non-urgent complaints, but due to the busy schedule and the amount of time I've already spent with her, time does not permit me to address these routine issues at today's visit. I've requested another appointment to review these additional issues.     [facility] [place]  [address], [address], [address] [place] [address]

## 2021-03-26 ENCOUNTER — TELEPHONE (OUTPATIENT)
Dept: RHEUMATOLOGY | Age: 76
End: 2021-03-26

## 2021-03-26 NOTE — TELEPHONE ENCOUNTER
Call patient on 3/26/2021 spoke with patient and explain appt she has with physician on 4/9/2021 has to be cancel due to a provider cancellation patient stated she would call back.  sdh

## 2021-05-01 ENCOUNTER — HOSPITAL ENCOUNTER (OUTPATIENT)
Dept: PREADMISSION TESTING | Age: 76
Discharge: HOME | End: 2021-05-01
Payer: MEDICARE

## 2021-05-01 ENCOUNTER — TRANSCRIBE ORDER (OUTPATIENT)
Dept: REGISTRATION | Age: 76
End: 2021-05-01

## 2021-05-01 DIAGNOSIS — Z01.812 PRE-PROCEDURE LAB EXAM: ICD-10-CM

## 2021-05-01 DIAGNOSIS — Z01.812 PRE-PROCEDURE LAB EXAM: Primary | ICD-10-CM

## 2021-05-01 PROCEDURE — U0003 INFECTIOUS AGENT DETECTION BY NUCLEIC ACID (DNA OR RNA); SEVERE ACUTE RESPIRATORY SYNDROME CORONAVIRUS 2 (SARS-COV-2) (CORONAVIRUS DISEASE [COVID-19]), AMPLIFIED PROBE TECHNIQUE, MAKING USE OF HIGH THROUGHPUT TECHNOLOGIES AS DESCRIBED BY CMS-2020-01-R: HCPCS

## 2021-05-02 LAB — SARS-COV-2, COV2NT: NOT DETECTED

## 2021-05-05 ENCOUNTER — ANESTHESIA EVENT (OUTPATIENT)
Dept: ENDOSCOPY | Age: 76
End: 2021-05-05
Payer: MEDICARE

## 2021-05-05 ENCOUNTER — ANESTHESIA (OUTPATIENT)
Dept: ENDOSCOPY | Age: 76
End: 2021-05-05
Payer: MEDICARE

## 2021-05-05 ENCOUNTER — HOSPITAL ENCOUNTER (OUTPATIENT)
Age: 76
Setting detail: OUTPATIENT SURGERY
Discharge: HOME | End: 2021-05-05
Attending: INTERNAL MEDICINE | Admitting: INTERNAL MEDICINE
Payer: MEDICARE

## 2021-05-05 VITALS
WEIGHT: 134 LBS | SYSTOLIC BLOOD PRESSURE: 183 MMHG | HEART RATE: 65 BPM | OXYGEN SATURATION: 96 % | DIASTOLIC BLOOD PRESSURE: 83 MMHG | HEIGHT: 63 IN | RESPIRATION RATE: 26 BRPM | TEMPERATURE: 98.4 F | BODY MASS INDEX: 23.74 KG/M2

## 2021-05-05 PROCEDURE — 74011250636 HC RX REV CODE- 250/636: Performed by: NURSE ANESTHETIST, CERTIFIED REGISTERED

## 2021-05-05 PROCEDURE — 88305 TISSUE EXAM BY PATHOLOGIST: CPT

## 2021-05-05 PROCEDURE — 76060000031 HC ANESTHESIA FIRST 0.5 HR: Performed by: INTERNAL MEDICINE

## 2021-05-05 PROCEDURE — 74011000250 HC RX REV CODE- 250: Performed by: NURSE ANESTHETIST, CERTIFIED REGISTERED

## 2021-05-05 PROCEDURE — 77030021593 HC FCPS BIOP ENDOSC BSC -A: Performed by: INTERNAL MEDICINE

## 2021-05-05 PROCEDURE — 2709999900 HC NON-CHARGEABLE SUPPLY: Performed by: INTERNAL MEDICINE

## 2021-05-05 PROCEDURE — 76040000019: Performed by: INTERNAL MEDICINE

## 2021-05-05 RX ORDER — ATROPINE SULFATE 0.1 MG/ML
0.5 INJECTION INTRAVENOUS
Status: DISCONTINUED | OUTPATIENT
Start: 2021-05-05 | End: 2021-05-05 | Stop reason: HOSPADM

## 2021-05-05 RX ORDER — PROPOFOL 10 MG/ML
INJECTION, EMULSION INTRAVENOUS AS NEEDED
Status: DISCONTINUED | OUTPATIENT
Start: 2021-05-05 | End: 2021-05-05 | Stop reason: HOSPADM

## 2021-05-05 RX ORDER — SODIUM CHLORIDE 0.9 % (FLUSH) 0.9 %
5-40 SYRINGE (ML) INJECTION EVERY 8 HOURS
Status: DISCONTINUED | OUTPATIENT
Start: 2021-05-05 | End: 2021-05-05 | Stop reason: HOSPADM

## 2021-05-05 RX ORDER — FENTANYL CITRATE 50 UG/ML
200 INJECTION, SOLUTION INTRAMUSCULAR; INTRAVENOUS
Status: DISCONTINUED | OUTPATIENT
Start: 2021-05-05 | End: 2021-05-05 | Stop reason: HOSPADM

## 2021-05-05 RX ORDER — SODIUM CHLORIDE 9 MG/ML
INJECTION, SOLUTION INTRAVENOUS
Status: DISCONTINUED | OUTPATIENT
Start: 2021-05-05 | End: 2021-05-05 | Stop reason: HOSPADM

## 2021-05-05 RX ORDER — EPINEPHRINE 0.1 MG/ML
1 INJECTION INTRACARDIAC; INTRAVENOUS
Status: DISCONTINUED | OUTPATIENT
Start: 2021-05-05 | End: 2021-05-05 | Stop reason: HOSPADM

## 2021-05-05 RX ORDER — DEXTROMETHORPHAN/PSEUDOEPHED 2.5-7.5/.8
1.2 DROPS ORAL
Status: DISCONTINUED | OUTPATIENT
Start: 2021-05-05 | End: 2021-05-05 | Stop reason: HOSPADM

## 2021-05-05 RX ORDER — LIDOCAINE HYDROCHLORIDE 20 MG/ML
INJECTION, SOLUTION EPIDURAL; INFILTRATION; INTRACAUDAL; PERINEURAL AS NEEDED
Status: DISCONTINUED | OUTPATIENT
Start: 2021-05-05 | End: 2021-05-05 | Stop reason: HOSPADM

## 2021-05-05 RX ORDER — SODIUM CHLORIDE 9 MG/ML
150 INJECTION, SOLUTION INTRAVENOUS CONTINUOUS
Status: DISCONTINUED | OUTPATIENT
Start: 2021-05-05 | End: 2021-05-05 | Stop reason: HOSPADM

## 2021-05-05 RX ORDER — MIDAZOLAM HYDROCHLORIDE 1 MG/ML
.25-5 INJECTION, SOLUTION INTRAMUSCULAR; INTRAVENOUS
Status: DISCONTINUED | OUTPATIENT
Start: 2021-05-05 | End: 2021-05-05 | Stop reason: HOSPADM

## 2021-05-05 RX ORDER — SODIUM CHLORIDE 0.9 % (FLUSH) 0.9 %
5-40 SYRINGE (ML) INJECTION AS NEEDED
Status: DISCONTINUED | OUTPATIENT
Start: 2021-05-05 | End: 2021-05-05 | Stop reason: HOSPADM

## 2021-05-05 RX ADMIN — SODIUM CHLORIDE: 900 INJECTION, SOLUTION INTRAVENOUS at 14:25

## 2021-05-05 RX ADMIN — PROPOFOL 30 MG: 10 INJECTION, EMULSION INTRAVENOUS at 14:38

## 2021-05-05 RX ADMIN — PROPOFOL 70 MG: 10 INJECTION, EMULSION INTRAVENOUS at 14:30

## 2021-05-05 RX ADMIN — PROPOFOL 30 MG: 10 INJECTION, EMULSION INTRAVENOUS at 14:35

## 2021-05-05 RX ADMIN — PROPOFOL 20 MG: 10 INJECTION, EMULSION INTRAVENOUS at 14:41

## 2021-05-05 RX ADMIN — LIDOCAINE HYDROCHLORIDE 50 MG: 20 INJECTION, SOLUTION EPIDURAL; INFILTRATION; INTRACAUDAL; PERINEURAL at 14:30

## 2021-05-05 NOTE — H&P
[address] [facility], [address] [facility]          Pre-procedure History and Physical       NAME:  Ethan Venegas   :   1945   MRN:   [id number]     CHIEF COMPLAINT/HPI: gerd, diarrhea    PMH:  Past Medical History:   Diagnosis Date    CAD (coronary artery disease) 2019    stent D1 2019 P[facility] Driscoll)    GERD (gastroesophageal reflux disease)     Hypertension     Pneumonia     MARY (renal artery stenosis) ([facility] [address] 2019    s/p right renal stent 2019 Pasty Driscoll)       PSH:  Past Surgical History:   Procedure Laterality Date    HX APPENDECTOMY      HX BACK SURGERY      LUMBAR FUSION X2 SURGERIES    HX BACK SURGERY      CERVICAL X2    HX HYSTERECTOMY      HX TONSILLECTOMY      HX UROLOGICAL  2019    arterial stent in R kidney    PELVIS/HIP JOINT SURGERY UNLISTED      Hip replacement     IN CARDIAC SURG PROCEDURE UNLIST  2019    cardiac stents        Allergies:  No Known Allergies    Home Medications:  Prior to Admission Medications   Prescriptions Last Dose Informant Patient Reported? Taking? ALPRAZolam (XANAX) 1 mg tablet Not Taking at Unknown time  No No   Sig: Take 1 Tab by mouth two (2) times daily as needed for Anxiety. Max Daily Amount: 2 mg. albuterol-ipratropium (DUO-NEB) 2.5 mg-0.5 mg/3 ml nebu Not Taking at Unknown time  No No   Sig: 3 mL by Nebulization route every four (4) hours as needed for Wheezing, Shortness of Breath or Respiratory Distress. aspirin delayed-release 81 mg tablet Not Taking at Unknown time  Yes No   Sig: Take 1 Tab by mouth daily. atorvastatin (LIPITOR) 40 mg tablet 2021 at Unknown time  No Yes   Sig: Take 2 Tabs by mouth daily. budesonide-formoteroL (SYMBICORT) 160-4.5 mcg/actuation HFAA 2021 at Unknown time  No Yes   Sig: Take 2 Puffs by inhalation two (2) times a day. carvediloL (COREG) 25 mg tablet 2021 at Unknown time  No Yes   Sig: Take 1 Tab by mouth two (2) times a day.    furosemide (LASIX) 20 mg tablet Not Taking at Unknown time  No No   Sig: TAKE ONE TABLET BY MOUTH ONE TIME DAILY   meloxicam (MOBIC) 15 mg tablet 5/5/2021 at Unknown time  Yes Yes   Sig: Take 15 mg by mouth daily. sertraline (ZOLOFT) 100 mg tablet 5/5/2021 at Unknown time  Yes Yes   Sig: Take 100 mg by mouth nightly. zolpidem (AMBIEN) 10 mg tablet 5/1/2021  Yes No   Sig: Take  by mouth nightly as needed for Sleep. Facility-Administered Medications: None       Hospital Medications:  Current Facility-Administered Medications   Medication Dose Route Frequency    0.9% sodium chloride infusion  150 mL/hr IntraVENous CONTINUOUS    sodium chloride (NS) flush 5-40 mL  5-40 mL IntraVENous Q8H    sodium chloride (NS) flush 5-40 mL  5-40 mL IntraVENous PRN    midazolam (VERSED) injection 0.25-5 mg  0.25-5 mg IntraVENous Multiple    fentaNYL citrate (PF) injection 200 mcg  200 mcg IntraVENous Multiple    simethicone (MYLICON) 51AG/7.9SO oral drops 80 mg  1.2 mL Oral Multiple    atropine injection 0.5 mg  0.5 mg IntraVENous ONCE PRN    EPINEPHrine (ADRENALIN) 0.1 mg/mL syringe 1 mg  1 mg Endoscopically ONCE PRN       Family History:  Family History   Problem Relation Age of Onset    Heart Attack Father 47       Social History:  Social History     Tobacco Use    Smoking status: Former Smoker     Packs/day: 0.50    Smokeless tobacco: Never Used   Substance Use Topics    Alcohol use: Yes     Alcohol/week: 6.0 standard drinks     Types: 6 Glasses of wine per week     Comment: 6 a week       The patient was counseled at length about the risks of ramandeep Covid-19 in the solomon-operative and post-operative states including the recovery window of their procedure. The patient was made aware that ramandeep Covid-19 after a surgical procedure may worsen their prognosis for recovering from the virus and lend to a higher morbidity and or mortality risk. The patient was given the options of postponing their procedure.  All of the risks, benefits, and alternatives were discussed. The patient does  wish to proceed with the procedure. PHYSICAL EXAM PRIOR TO SEDATION:  General: Alert, in no acute distress    Lungs:            CTA bilaterally  Heart:  Normal S1, S2    Abdomen: Soft, Non distended, Non tender. Normoactive bowel sounds. Assessment:   Stable for sedation administration.     Plan:     · Endoscopic procedure with sedation     Signed By: Maeve Howell MD     5/5/2021  2:28 PM

## 2021-05-05 NOTE — ANESTHESIA POSTPROCEDURE EVALUATION
Post-Anesthesia Evaluation and Assessment    Patient: Babatunde Clay MRN: [id number]  SSN: [id number]    YOB: 1945  Age: 70 y.o. Sex: female      I have evaluated the patient and they are stable and ready for discharge from the PACU. Cardiovascular Function/Vital Signs  Visit Vitals  BP (!) 144/59   Pulse 64   Temp 36.9 °C (98.4 °F)   Resp 26   Ht 5' 3\" (1.6 m)   Wt 60.8 kg (134 lb)   SpO2 97%   Breastfeeding No   BMI 23.74 kg/m²       Patient is status post MAC anesthesia for Procedure(s):  ESOPHAGOGASTRODUODENOSCOPY (EGD)   :-  ESOPHAGOGASTRODUODENAL (EGD) BIOPSY. Nausea/Vomiting: None    Postoperative hydration reviewed and adequate. Pain:  Pain Scale 1: Numeric (0 - 10) (05/05/21 1450)  Pain Intensity 1: 0 (05/05/21 1450)   Managed    Neurological Status: At baseline    Mental Status, Level of Consciousness: Alert and  oriented to person, place, and time    Pulmonary Status:   O2 Device: None (Room air) (05/05/21 1450)   Adequate oxygenation and airway patent    Complications related to anesthesia: None    Post-anesthesia assessment completed. No concerns    Signed By: Joshua Hernandez MD     May 5, 2021              Procedure(s):  ESOPHAGOGASTRODUODENOSCOPY (EGD)   :-  ESOPHAGOGASTRODUODENAL (EGD) BIOPSY. MAC    <BSHSIANPOST>    INITIAL Post-op Vital signs:   Vitals Value Taken Time   /81 05/05/21 1455   Temp 36.9 °C (98.4 °F) 05/05/21 1450   Pulse 63 05/05/21 1455   Resp 21 05/05/21 1455   SpO2 96 % 05/05/21 1455   Vitals shown include unvalidated device data.

## 2021-05-05 NOTE — DISCHARGE INSTRUCTIONS
Patient Education   Patient Education   Patient Education        Duodenitis: Care Instructions  Your Care Instructions     The duodenum (say \"doo-AW-duh-num\") is the first part of the small intestine. It connects to the stomach. It's about 10 inches long and curved, almost forming a [place]. Duodenitis (say \"doo-aw-duh-[place]-tus\") may feel like a sore and upset stomach. It happens when something irritates the lining of the duodenum. Many things can cause it. These include an infection such as the flu or something you ate or drank. Certain medicines or having a sore (ulcer) on the lining of the duodenum also can cause it. Your belly may bloat and ache. You may belch, vomit, and feel sick to your stomach. You should be able to relieve the problem by taking medicine. And it may help to change your diet. If the problem lasts, your doctor may prescribe different medicine. Follow-up care is a key part of your treatment and safety. Be sure to make and go to all appointments, and call your doctor if you are having problems. It's also a good idea to know your test results and keep a list of the medicines you take. How can you care for yourself at home? · If your doctor prescribed antibiotics, take them as directed. Do not stop taking them just because you feel better. You need to take the full course of antibiotics. · Be safe with medicines. If your doctor prescribed medicine to decrease stomach acid, take it as directed. Call your doctor if you think you are having a problem with your medicine. · Do not take any other medicine, including over-the-counter pain relievers, without talking to your doctor first.  · If your doctor recommends over-the-counter medicine to reduce stomach acid, such as Pepcid AC (famotidine), Prilosec (omeprazole), or Tagamet HB (cimetidine), follow the directions on the label. · To prevent dehydration, drink plenty of fluids.  Choose water and other caffeine-free clear liquids until you feel better. If you have kidney, heart, or liver disease and have to limit fluids, talk with your doctor before you increase the amount of fluids you drink. · Limit how much alcohol you drink. · Avoid coffee, tea, cola drinks, chocolate, and other foods with caffeine. They increase stomach acid. When should you call for help? Call 911 anytime you think you may need emergency care. For example, call if:    · Your stools are maroon or very bloody.     · You vomit blood or what looks like coffee grounds. Call your doctor now or seek immediate medical care if:    · You start breathing fast and have not produced urine in the last 8 hours.     · You are sick to your stomach or cannot drink fluids. Watch closely for changes in your health, and be sure to contact your doctor if:    · You do not get better as expected. Where can you learn more? Go to http://www.gray.com/  Enter U273 in the search box to learn more about \"Duodenitis: Care Instructions. \"  Current as of: April 15, 2020               Content Version: 12.8  © 2006-2021 [employer]. Care instructions adapted under license by [employer] (which disclaims liability or warranty for this information). If you have questions about a medical condition or this instruction, always ask your healthcare professional. [address] any warranty or liability for your use of this information. Esophagitis: Care Instructions  Your Care Instructions     Esophagitis (say \"ih-sof-uh-JY-tus\") is irritation of the esophagus, the tube that carries food from your throat to your stomach. Acid reflux is the most common cause of this condition. When you have reflux, stomach acid and juices flow upward. This can cause pain or a burning feeling in your chest. You may have a sore throat. It may be hard to swallow. Other causes of this condition include some medicines and supplements.  Allergies or an infection can also cause it. Your doctor will ask about your symptoms and past health. He or she might do tests to find the cause of your symptoms. Treatment depends on what is causing the problem. Treatment might include changing your diet or taking medicine to relieve your symptoms. It might also include changing a medicine that is causing your symptoms. If you have reflux, medicine that reduces the stomach acid helps your body heal. It might take 1 to 3 weeks to heal.  Follow-up care is a key part of your treatment and safety. Be sure to make and go to all appointments, and call your doctor if you are having problems. It's also a good idea to know your test results and keep a list of the medicines you take. How can you care for yourself at home? · If you have acid reflux, your doctor may recommend that you:  ? Eat several small meals instead of two or three large meals. After you eat, wait 2 to 3 hours before you lie down. ? Avoid chocolate, mint, alcohol, and spicy foods. ? Don't smoke or use smokeless tobacco. Smoking can make this condition worse. If you need help quitting, talk to your doctor about stop-smoking programs and medicines. These can increase your chances of quitting for good. ? Raise the head of your bed 6 to 8 inches if you have symptoms at night. ? Lose weight if you are overweight. ? Take an over-the-counter antacid, such as Maalox, Mylanta, or Tums. Be careful when you take over-the-counter antacid medicines. Many of these medicines have aspirin in them. Read the label to make sure that you are not taking more than the recommended dose. Too much aspirin can be harmful. ? Take stronger acid reducers. Examples are famotidine (such as Pepcid) and omeprazole (such as Prilosec). · If your condition is caused by infection, allergy, or other problems, use the medicine or treatments that your doctor recommends. · Be safe with medicines. Take your medicines exactly as prescribed.  Call your doctor if you think you are having a problem with your medicine. When should you call for help? Call your doctor now or seek immediate medical care if:    · You have new or worse belly pain.     · You are vomiting. Watch closely for changes in your health, and be sure to contact your doctor if:    · You have new or worse symptoms of reflux.     · You have trouble or pain swallowing.     · You are losing weight.     · You do not get better as expected. Where can you learn more? Go to http://www.gray.com/  Enter N977 in the search box to learn more about \"Esophagitis: Care Instructions. \"  Current as of: April 15, 2020               Content Version: 12.8  © 2006-2021 [employer]. Care instructions adapted under license by [id number] (which disclaims liability or warranty for this information). If you have questions about a medical condition or this instruction, always ask your healthcare professional. [place] [address] any warranty or liability for your use of this information. Gastritis: Care Instructions  Your Care Instructions     Gastritis is a sore and upset stomach. It happens when something irritates the stomach lining. Many things can cause it. These include an infection such as the flu or something you ate or drank. Medicines or a sore on the lining of the stomach (ulcer) also can cause it. Your belly may bloat and ache. You may belch, vomit, and feel sick to your stomach. You should be able to relieve the problem by taking medicine. And it may help to change your diet. If gastritis lasts, your doctor may prescribe medicine. Follow-up care is a key part of your treatment and safety. Be sure to make and go to all appointments, and call your doctor if you are having problems. It's also a good idea to know your test results and keep a list of the medicines you take. How can you care for yourself at home?   · If your doctor prescribed antibiotics, take them as directed. Do not stop taking them just because you feel better. You need to take the full course of antibiotics. · Be safe with medicines. If your doctor prescribed medicine to decrease stomach acid, take it as directed. Call your doctor if you think you are having a problem with your medicine. · Do not take any other medicine, including over-the-counter pain relievers, without talking to your doctor first.  · If your doctor recommends over-the-counter medicine to reduce stomach acid, such as Pepcid AC (famotidine), Prilosec (omeprazole), or Tagamet HB (cimetidine) follow the directions on the label. · Drink plenty of fluids to prevent dehydration. Choose water and other caffeine-free clear liquids. If you have kidney, heart, or liver disease and have to limit fluids, talk with your doctor before you increase the amount of fluids you drink. · Limit how much alcohol you drink. · Avoid coffee, tea, cola drinks, chocolate, and other foods with caffeine. They increase stomach acid. When should you call for help? Call 911 anytime you think you may need emergency care. For example, call if:    · You vomit blood or what looks like coffee grounds.     · You pass maroon or very bloody stools. Call your doctor now or seek immediate medical care if:    · You start breathing fast and have not produced urine in the last 8 hours.     · You cannot keep fluids down. Watch closely for changes in your health, and be sure to contact your doctor if:    · You do not get better as expected. Where can you learn more? Go to http://cody-akanksha.info/  Enter Z536 in the search box to learn more about \"Gastritis: Care Instructions. \"  Current as of: April 15, 2020               Content Version: 12.8  © 6353-0267 [employer]. Care instructions adapted under license by [employer] (which disclaims liability or warranty for this information).  If you have questions about a medical condition or this instruction, always ask your healthcare professional. [place] [address] any warranty or liability for your use of this information. Reina Carpio [address] Randall Wong M.D.  Denzel Gusman, [address]  [phone]          [place]  [id number]  1945    DISCOMFORT:  Sore throat- throat lozenges or warm salt water gargle  Redness at IV site- apply warm compress to area; if redness or soreness persist- contact your physician  Gaseous discomfort- walking, belching will help relieve any discomfort  You may not operate a vehicle for 12 hours  You may not engage in an occupation involving machinery or appliances for the  rest of today  You may not drink alcoholic beverages for at least 12 hours  Avoid making any critical decisions for at least 24 hours    DIET:   You may resume your normal diet, but some patients find that heavy or large  meals may lead to indigestion or vomiting. I suggest a light meal as first food  Intake. I recommend a whole food, plant-based diet for your overall health. ACTIVITY:  You may resume your normal daily activities. It is recommended that you spend the remainder of the day resting - avoid any strenuous activity. CALL M.D. IF ANY SIGN OF:   Increasing pain, nausea, vomiting  Abdominal distension (swelling)  Significant bleeding (oral or rectal)  Fever   Pain in chest area  Shortness of breath    Additional Instructions:   Call Dr. Randall Wong if any questions or problems at [phone]   Setup follow-up office visit in 6 weeks  EGD showed mild reflux esophagitis, moderate erosions in the stomach, mild redness in the duodenum. Avoid NSAIDs. My assistant will call rx for omeprazole 20 mg daily. Learning About Coronavirus [phone])  Coronavirus [phone]): Overview  What is coronavirus (COVID-19)?   The coronavirus disease (COVID-19) is caused by a virus. It is an illness that was first found in [place], [place], in December 2019. It has since spread worldwide. The virus can cause fever, cough, and trouble breathing. In severe cases, it can cause pneumonia and make it hard to breathe without help. It can cause death. Coronaviruses are a large group of viruses. They cause the common cold. They also cause more serious illnesses like Middle East respiratory syndrome (MERS) and severe acute respiratory syndrome (SARS). COVID-19 is caused by a novel coronavirus. That means it's a new type that has not been seen in people before. This virus spreads person-to-person through droplets from coughing and sneezing. It can also spread when you are close to someone who is infected. And it can spread when you touch something that has the virus on it, such as a doorknob or a tabletop. What can you do to protect yourself from coronavirus (COVID-19)? The best way to protect yourself from getting sick is to:  · Avoid areas where there is an outbreak. · Avoid contact with people who may be infected. · Wash your hands often with soap or alcohol-based hand sanitizers. · Avoid crowds and try to stay at least 6 feet away from other people. · Wash your hands often, especially after you cough or sneeze. Use soap and water, and scrub for at least 20 seconds. If soap and water aren't available, use an alcohol-based hand . · Avoid touching your mouth, nose, and eyes. What can you do to avoid spreading the virus to others? To help avoid spreading the virus to others:  · Cover your mouth with a tissue when you cough or sneeze. Then throw the tissue in the trash. · Use a disinfectant to clean things that you touch often. · Stay home if you are sick or have been exposed to the virus. Don't go to school, work, or public areas. And don't use public transportation. · If you are sick:  ? Leave your home only if you need to get medical care.  But call the doctor's office first so they know you're coming. And wear a face mask, if you have one.  ? If you have a face mask, wear it whenever you're around other people. It can help stop the spread of the virus when you cough or sneeze. ? Clean and disinfect your home every day. Use household  and disinfectant wipes or sprays. Take special care to clean things that you grab with your hands. These include doorknobs, remote controls, phones, and handles on your refrigerator and microwave. And don't forget countertops, tabletops, bathrooms, and computer keyboards. When to call for help  Call 911 anytime you think you may need emergency care. For example, call if:  · You have severe trouble breathing. (You can't talk at all.)  · You have constant chest pain or pressure. · You are severely dizzy or lightheaded. · You are confused or can't think clearly. · Your face and lips have a blue color. · You pass out (lose consciousness) or are very hard to wake up. Call your doctor now if you develop symptoms such as:  · Shortness of breath. · Fever. · Cough. If you need to get care, call ahead to the doctor's office for instructions before you go. Make sure you wear a face mask, if you have one, to prevent exposing other people to the virus. Where can you get the latest information? The following health organizations are tracking and studying this virus. Their websites contain the most up-to-date information. Audery Bloch also learn what to do if you think you may have been exposed to the virus. · U.S. Centers for Disease Control and Prevention (CDC): The CDC provides updated news about the disease and travel advice. The website also tells you how to prevent the spread of infection. www.cdc.gov  · World Health Organization [facility]): WHO offers information about the virus outbreaks. WHO also has travel advice. www.who.int  Current as of: April 1, 2020               Content Version: 12.4  © 3464-7710 [employer].    Care instructions adapted under license by your healthcare professional. If you have questions about a medical condition or this instruction, always ask your healthcare professional. [place] [address] any warranty or liability for your use of this information.

## 2021-05-05 NOTE — ANESTHESIA PREPROCEDURE EVALUATION
Relevant Problems   No relevant active problems       Anesthetic History   No history of anesthetic complications            Review of Systems / Medical History  Patient summary reviewed, nursing notes reviewed and pertinent labs reviewed    Pulmonary  Within defined limits                 Neuro/Psych   Within defined limits           Cardiovascular    Hypertension: well controlled          CAD    Exercise tolerance: >4 METS     GI/Hepatic/Renal     GERD           Endo/Other             Other Findings              Physical Exam    Airway  Mallampati: I  TM Distance: > 6 cm  Neck ROM: normal range of motion   Mouth opening: Normal     Cardiovascular    Rhythm: regular  Rate: normal         Dental  No notable dental hx       Pulmonary  Breath sounds clear to auscultation               Abdominal         Other Findings            Anesthetic Plan    ASA: 3  Anesthesia type: MAC          Induction: Intravenous  Anesthetic plan and risks discussed with: Patient

## 2021-05-05 NOTE — PROCEDURES
Reina [facility] 912 Fortino Conde M.D.  [address] [facility], [address] [place] [address]  [phone]               Esophagogastroduodenoscopy (EGD) Procedure Note    NAME: Ina Granados  :  1945  MRN:  [id number]    Indications:  GERD; nausea/vomiting, diarrhea     : Wilder De León MD    Referring Provider:  Xiao Mir MD    Staff: Endoscopy RN-1: Weston Li RN  Endoscopy RN-2: Jacob Nicole RN    Prosthetic devices, grafts, tissues, transplant, or devices implanted: none    Medicine:  MAC anesthesia      Procedure Details:  After informed consent was obtained with all risks and benefits of the procedure explained and preprocedure exam completed, the patient was placed in the left lateral decubitus position. Universal protocol for patient identification was performed and documented in the nursing notes. Throughout the procedure, the patient's blood pressure was monitored at least every five minutes; pulse, and oxygen saturations were monitored continuously. All vital signs were documented in the nursing notes. The endoscope was inserted into the mouth and advanced under direct vision to second portion of the duodenum. A careful inspection was made as the gastroscope was withdrawn, including a retroflexed view of the proximal stomach; findings and interventions are described below.       Findings:   Esophagus: LA Grade A reflux esophagitis s/p biopsies  Stomach: moderate erosive gastropathy in the antrum s/p biopsies throughout the stomach  Duodenum: mild patchy erythema in the bulb, rest of the examined duodenum was normal s/p biopsies    Interventions:    biopsy of esophagus, stomach, and duodenum    Specimens:   ID Type Source Tests Collected by Time Destination   1 : Biopsies Rule Out Celiac Disease Preservative Duodenum  Octavio Zapata MD 2021 1433 Pathology   2 : Biopsies Rule Out H. Pylori Preservative Gastric  Octavio Zapata MD 2021 [address] [facility]   3 : GE Junction Biopsies Preservative   Srikanth Rosas MD 5/5/2021 1440 Pathology        EBL: None          Complications:     No immediate complications        Impression:  -See post-procedure diagnoses. Recommendations:  -Await pathology.  -Avoid NSAIDs  -Trial of PPI 20 mg daily    Signed by:  Gomez De Leon MD         5/5/2021 2:46 PM

## 2021-05-05 NOTE — PROGRESS NOTES

## 2021-07-28 RX ORDER — ATORVASTATIN CALCIUM 40 MG/1
TABLET, FILM COATED ORAL
Qty: 180 TABLET | Refills: 1 | Status: SHIPPED | OUTPATIENT
Start: 2021-07-28 | End: 2021-12-13

## 2021-08-03 PROBLEM — I25.10 CORONARY ARTERY DISEASE: Status: RESOLVED | Noted: 2019-09-12 | Resolved: 2021-08-03

## 2021-12-13 RX ORDER — ATORVASTATIN CALCIUM 40 MG/1
TABLET, FILM COATED ORAL
Qty: 180 TABLET | Refills: 1 | Status: SHIPPED | OUTPATIENT
Start: 2021-12-13 | End: 2022-06-03

## 2022-03-18 PROBLEM — D64.9 ANEMIA: Status: ACTIVE | Noted: 2019-10-15

## 2022-03-18 PROBLEM — J15.9 BACTERIAL PNEUMONIA: Status: ACTIVE | Noted: 2020-02-22

## 2022-03-19 PROBLEM — Z82.49 FAMILY HISTORY OF PREMATURE CAD: Status: ACTIVE | Noted: 2018-11-16

## 2022-03-19 PROBLEM — R60.0 BILATERAL LEG EDEMA: Status: ACTIVE | Noted: 2018-11-16

## 2022-03-19 PROBLEM — E78.2 MIXED HYPERLIPIDEMIA: Status: ACTIVE | Noted: 2018-11-16

## 2022-03-19 PROBLEM — I15.0 RENOVASCULAR HYPERTENSION: Status: ACTIVE | Noted: 2018-11-16

## 2022-03-19 PROBLEM — I70.1 RENAL ARTERY STENOSIS (HCC): Status: ACTIVE | Noted: 2019-08-20

## 2022-03-19 PROBLEM — J81.0 FLASH PULMONARY EDEMA (HCC): Status: ACTIVE | Noted: 2019-08-20

## 2022-03-19 PROBLEM — I25.10 ATHEROSCLEROTIC CARDIOVASCULAR DISEASE: Status: ACTIVE | Noted: 2019-08-20

## 2022-03-20 PROBLEM — R06.02 SOB (SHORTNESS OF BREATH): Status: ACTIVE | Noted: 2018-11-16

## 2022-04-14 RX ORDER — CARVEDILOL 25 MG/1
TABLET ORAL
Qty: 180 TABLET | Refills: 3 | Status: SHIPPED | OUTPATIENT
Start: 2022-04-14

## 2022-05-06 ENCOUNTER — HOSPITAL ENCOUNTER (EMERGENCY)
Age: 77
Discharge: HOME | End: 2022-05-06
Attending: STUDENT IN AN ORGANIZED HEALTH CARE EDUCATION/TRAINING PROGRAM
Payer: MEDICARE

## 2022-05-06 ENCOUNTER — APPOINTMENT (OUTPATIENT)
Dept: CT IMAGING | Age: 77
End: 2022-05-06
Attending: STUDENT IN AN ORGANIZED HEALTH CARE EDUCATION/TRAINING PROGRAM
Payer: MEDICARE

## 2022-05-06 VITALS
OXYGEN SATURATION: 97 % | SYSTOLIC BLOOD PRESSURE: 191 MMHG | TEMPERATURE: 97.6 F | RESPIRATION RATE: 18 BRPM | DIASTOLIC BLOOD PRESSURE: 88 MMHG | BODY MASS INDEX: 23.08 KG/M2 | WEIGHT: 130.29 LBS | HEART RATE: 56 BPM

## 2022-05-06 DIAGNOSIS — S06.0X0A CONCUSSION WITHOUT LOSS OF CONSCIOUSNESS, INITIAL ENCOUNTER: ICD-10-CM

## 2022-05-06 DIAGNOSIS — S01.01XA LACERATION OF SCALP, INITIAL ENCOUNTER: Primary | ICD-10-CM

## 2022-05-06 PROCEDURE — 90715 TDAP VACCINE 7 YRS/> IM: CPT | Performed by: STUDENT IN AN ORGANIZED HEALTH CARE EDUCATION/TRAINING PROGRAM

## 2022-05-06 PROCEDURE — 74011250637 HC RX REV CODE- 250/637: Performed by: STUDENT IN AN ORGANIZED HEALTH CARE EDUCATION/TRAINING PROGRAM

## 2022-05-06 PROCEDURE — 75810000293 HC SIMP/SUPERF WND  RPR

## 2022-05-06 PROCEDURE — 72125 CT NECK SPINE W/O DYE: CPT

## 2022-05-06 PROCEDURE — 90471 IMMUNIZATION ADMIN: CPT

## 2022-05-06 PROCEDURE — 74011250636 HC RX REV CODE- 250/636: Performed by: STUDENT IN AN ORGANIZED HEALTH CARE EDUCATION/TRAINING PROGRAM

## 2022-05-06 PROCEDURE — 70450 CT HEAD/BRAIN W/O DYE: CPT

## 2022-05-06 PROCEDURE — 99284 EMERGENCY DEPT VISIT MOD MDM: CPT

## 2022-05-06 RX ORDER — ACETAMINOPHEN 500 MG
1000 TABLET ORAL ONCE
Status: COMPLETED | OUTPATIENT
Start: 2022-05-06 | End: 2022-05-06

## 2022-05-06 RX ADMIN — ACETAMINOPHEN 1000 MG: 500 TABLET ORAL at 15:15

## 2022-05-06 RX ADMIN — TETANUS TOXOID, REDUCED DIPHTHERIA TOXOID AND ACELLULAR PERTUSSIS VACCINE, ADSORBED 0.5 ML: 5; 2.5; 8; 8; 2.5 SUSPENSION INTRAMUSCULAR at 13:48

## 2022-05-06 NOTE — DISCHARGE INSTRUCTIONS
You presented to the ED after striking her head resulting in a 6 cm laceration. CT of the head neck showed no acute bleeds or fractures. 5 staples were placed to close the laceration your scalp. Have these removed in 7 to 10 days. Tdap was given. Use information discharge paperwork to read about postconcussion syndrome and acute concussion.   If symptoms still present after 2 weeks follow-up with concussion clinic for postconcussion physical therapy

## 2022-05-06 NOTE — ED TRIAGE NOTES
Pt c/o weakness to left leg x\"couple of days\" causing her to fall this morning, striking her head on the wall. She states she believed she lost consciousness for a few seconds. Pt was able to get up and apply direct pressure to her head. Pt presents to ED awake, alert, oriented, ambulatory. approx 4 cm laceration noted to top of front of head, oozing blood.

## 2022-05-06 NOTE — ED PROVIDER NOTES
Patient top of head on a wall, resulting in a 6 mm laceration to the scalp. Hemostatic at this time. Patient may have lost consciousness. ABCs intact right now. No blood thinners. The history is provided by the patient and medical records. Laceration   The incident occurred 1 to 2 hours ago. The laceration is located on the scalp. The laceration is 6 cm in size. The injury mechanism is a blunt object. Foreign body present: no. The pain is at a severity of 4/10. The pain is mild. The pain has been constant since onset. Pertinent negatives include no numbness. The patient's last tetanus shot was more than 10 years ago. Past Medical History:   Diagnosis Date    CAD (coronary artery disease) 09/12/2019    stent D1 9/12/2019 [facility])    GERD (gastroesophageal reflux disease)     Hypertension     Pneumonia     MARY (renal artery stenosis) ([facility] 07/24/2019    s/p right renal stent 7/24/2019 [facility])       Past Surgical History:   Procedure Laterality Date    HX APPENDECTOMY      HX BACK SURGERY      LUMBAR FUSION X2 SURGERIES    HX BACK SURGERY      CERVICAL X2    HX HYSTERECTOMY      HX TONSILLECTOMY      HX UROLOGICAL  2019    arterial stent in R kidney    PELVIS/HIP JOINT SURGERY UNLISTED      Hip replacement 2005    [place] CARDIAC SURG PROCEDURE UNLIST  08/2019    cardiac stents          Family History:   Problem Relation Age of Onset    Heart Attack Father 47       Social History     Socioeconomic History    Marital status:      Spouse name: Not on file    Number of children: Not on file    Years of education: Not on file    Highest education level: Not on file   Occupational History    Not on file   Tobacco Use    Smoking status: Former Smoker     Packs/day: 0.50    Smokeless tobacco: Never Used   Substance and Sexual Activity    Alcohol use:  Yes     Alcohol/week: 6.0 standard drinks     Types: 6 Glasses of wine per week     Comment: 6 a week    Drug use: Never    Sexual activity: Not on file   Other Topics Concern    Not on file   Social History Narrative    Not on file     Social Determinants of Health     Financial Resource Strain:     Difficulty of Paying Living Expenses: Not on file   Food Insecurity:     Worried About Running Out of Food in the Last Year: Not on file    Loly of Food in the Last Year: Not on file   Transportation Needs:     Lack of Transportation (Medical): Not on file    Lack of Transportation (Non-Medical): Not on file   Physical Activity:     Days of Exercise per Week: Not on file    Minutes of Exercise per Session: Not on file   Stress:     Feeling of Stress : Not on file   Social Connections:     Frequency of Communication with Friends and Family: Not on file    Frequency of Social Gatherings with Friends and Family: Not on file    Attends [employer] Services: Not on file    Active Member of [address] [place] [address] [employer] or Organizations: Not on file    Attends Club or Organization Meetings: Not on file    Marital Status: Not on file   Intimate Partner Violence:     Fear of Current or Ex-Partner: Not on file    Emotionally Abused: Not on file    Physically Abused: Not on file    Sexually Abused: Not on file   Housing Stability:     Unable to Pay for Housing in the Last Year: Not on file    Number of [place] in the Last Year: Not on file    Unstable Housing in the Last Year: Not on file         ALLERGIES: Patient has no known allergies. Review of Systems   Constitutional: Negative. HENT: Negative. Eyes: Negative. Respiratory: Negative. Cardiovascular: Negative. Gastrointestinal: Negative. Endocrine: Negative. Genitourinary: Negative. Musculoskeletal: Negative. Skin: Positive for wound. Allergic/Immunologic: Negative. Neurological: Positive for headaches. Negative for numbness. Hematological: Negative. Psychiatric/Behavioral: Negative.         Vitals:    05/06/22 1335   BP: (!) 214/88   Pulse: (!) 58   Resp: 18 Temp: 97.6 °F (36.4 °C)   SpO2: 97%   Weight: 59.1 kg (130 lb 4.7 oz)            Physical Exam  Vitals and nursing note reviewed. Constitutional:       General: She is not in acute distress. Appearance: Normal appearance. HENT:      Head: Normocephalic. Comments: A 6 cm hemostatic laceration to the scalp at the hairline, going posterior along the lateral part     Right Ear: External ear normal.      Left Ear: External ear normal.      Nose: Nose normal.   Eyes:      Extraocular Movements: Extraocular movements intact. Conjunctiva/sclera: Conjunctivae normal.   Cardiovascular:      Rate and Rhythm: Normal rate. Pulses: Normal pulses. Radial pulses are 2+ on the right side and 2+ on the left side. Heart sounds: Normal heart sounds. Pulmonary:      Effort: Pulmonary effort is normal.      Breath sounds: Normal breath sounds. Abdominal:      General: Abdomen is flat. There is no distension. Tenderness: There is no abdominal tenderness. Musculoskeletal:         General: No deformity or signs of injury. Normal range of motion. Cervical back: Normal range of motion and neck supple. No tenderness. Skin:     General: Skin is warm and dry. Capillary Refill: Capillary refill takes less than 2 seconds. Neurological:      General: No focal deficit present. Mental Status: She is alert and oriented to person, place, and time. Psychiatric:         Attention and Perception: Attention normal.         Mood and Affect: Mood normal.         Behavior: Behavior normal.          MDM       IMAGING RESULTS:  CT HEAD WO CONT   Final Result   1. No evidence of acute infarct or intracranial hemorrhage. 2. Periventricular white matter disease is likely secondary to chronic small   vessel ischemic changes. CT SPINE CERV WO CONT   Final Result      1. No acute osseous abnormality. 2. Multilevel degenerative spondylosis.    3. Intact anterior cervical fusion, from C4 through C7. MEDICATIONS GIVEN:  Medications   diph,Pertuss(AC),Tet Vac-PF (BOOSTRIX) suspension 0.5 mL (0.5 mL IntraMUSCular Given 5/6/22 1348)   acetaminophen (TYLENOL) tablet 1,000 mg (1,000 mg Oral Given 5/6/22 1515)       Differential diagnosis: Concussion, laceration, head bleed    ED physician interpretation of imaging: CT head without acute bleeds    MDM: Patient is a 79-year-old female presented ED after striking her head without losing consciousness resulting in a 6 mm laceration requiring repair as below with CT unremarkable for head bleed or cervical spine injury prefer discharge home outpatient follow-up and concussion precautions. Key Discharge Instructions and summary of care: You presented to the ED after striking her head resulting in a 6 cm laceration. CT of the head neck showed no acute bleeds or fractures. 5 staples were placed to close the laceration your scalp. Have these removed in 7 to 10 days. Tdap was given. Use information discharge paperwork to read about postconcussion syndrome and acute concussion. If symptoms still present after 2 weeks follow-up with concussion clinic for postconcussion physical therapy      ED Impression:    ICD-10-CM ICD-9-CM    1. Laceration of scalp, initial encounter  S01. 01XA 873.0    2. Concussion without loss of consciousness, initial encounter  S06.0X0A 850.0         DISPOSITION: Discharged    Jaylen Mendez MD          Wound Repair    Date/Time: 5/6/2022 3:25 PM  Performed by: attendingPre-procedure re-eval: Immediately prior to the procedure, the patient was reevaluated and found suitable for the planned procedure and any planned medications.   Location details: scalp  Wound length:2.6 - 7.5 cm  Foreign bodies: no foreign bodies  Irrigation solution: saline  Irrigation method: syringe  Debridement: minimal  Skin closure: staples  Number of sutures: 5  Approximation: close  Patient tolerance: patient tolerated the procedure well with no immediate complications  My total time at bedside, performing this procedure was 1-15 minutes.

## 2022-05-16 ENCOUNTER — HOSPITAL ENCOUNTER (EMERGENCY)
Age: 77
Discharge: HOME | End: 2022-05-16
Attending: EMERGENCY MEDICINE
Payer: MEDICARE

## 2022-05-16 VITALS
RESPIRATION RATE: 16 BRPM | DIASTOLIC BLOOD PRESSURE: 75 MMHG | SYSTOLIC BLOOD PRESSURE: 196 MMHG | WEIGHT: 132.28 LBS | HEART RATE: 60 BPM | HEIGHT: 63 IN | OXYGEN SATURATION: 97 % | TEMPERATURE: 96.8 F | BODY MASS INDEX: 23.44 KG/M2

## 2022-05-16 DIAGNOSIS — Z48.02: Primary | ICD-10-CM

## 2022-05-16 PROCEDURE — 75810000275 HC EMERGENCY DEPT VISIT NO LEVEL OF CARE

## 2022-05-16 NOTE — ED PROVIDER NOTES
Date of Service:  5/16/2022    Patient:  Maria C Dow    Chief Complaint:  Staple Removal       HPI:  Maria C Dow is a 78 y.o.  female who presents for removal of staples from scalp. Patient had 5 staples placed to the top of scalp on 5/6/2022 due to a fall injury. .  Patient denies complications since placement of  staples. Patient denies any signs of infection. Patient denies headache. Patient denies fever or chills. She               Past Medical History:   Diagnosis Date    CAD (coronary artery disease) 09/12/2019    stent D1 9/12/2019 [facility])    Foot drop, left foot     GERD (gastroesophageal reflux disease)     Hypertension     Pneumonia     MARY (renal artery stenosis) ([facility] 07/24/2019    s/p right renal stent 7/24/2019 [facility])       Past Surgical History:   Procedure Laterality Date    HX APPENDECTOMY      HX BACK SURGERY      LUMBAR FUSION X2 SURGERIES    HX BACK SURGERY      CERVICAL X2    HX HYSTERECTOMY      HX TONSILLECTOMY      HX UROLOGICAL  2019    arterial stent in R kidney    PELVIS/HIP JOINT SURGERY UNLISTED      Hip replacement 2005    [place] CARDIAC SURG PROCEDURE UNLIST  08/2019    cardiac stents          Family History:   Problem Relation Age of Onset    Heart Attack Father 47       Social History     Socioeconomic History    Marital status:      Spouse name: Not on file    Number of children: Not on file    Years of education: Not on file    Highest education level: Not on file   Occupational History    Not on file   Tobacco Use    Smoking status: Former Smoker     Packs/day: 0.50    Smokeless tobacco: Never Used   Substance and Sexual Activity    Alcohol use:  Yes     Alcohol/week: 6.0 standard drinks     Types: 6 Glasses of wine per week     Comment: 6 a week    Drug use: Never    Sexual activity: Not on file   Other Topics Concern    Not on file   Social History Narrative    Not on file     Social Determinants of Health     Financial Resource Strain:     Difficulty of Paying Living Expenses: Not on file   Food Insecurity:     Worried About Running Out of Food in the Last Year: Not on file    Loly of Food in the Last Year: Not on file   Transportation Needs:     Lack of Transportation (Medical): Not on file    Lack of Transportation (Non-Medical): Not on file   Physical Activity:     Days of Exercise per Week: Not on file    Minutes of Exercise per Session: Not on file   Stress:     Feeling of Stress : Not on file   Social Connections:     Frequency of Communication with Friends and Family: Not on file    Frequency of Social Gatherings with Friends and Family: Not on file    Attends Alevism Services: Not on file    Active Member of [address] [place] [address] [employer] or Organizations: Not on file    Attends Club or Organization Meetings: Not on file    Marital Status: Not on file   Intimate Partner Violence:     Fear of Current or Ex-Partner: Not on file    Emotionally Abused: Not on file    Physically Abused: Not on file    Sexually Abused: Not on file   Housing Stability:     Unable to Pay for Housing in the Last Year: Not on file    Number of [place] in the Last Year: Not on file    Unstable Housing in the Last Year: Not on file         ALLERGIES: Patient has no known allergies. Review of Systems   Constitutional: Negative for chills and fever. Respiratory: Negative for shortness of breath. Cardiovascular: Negative for chest pain. Gastrointestinal: Negative for abdominal pain. Genitourinary: Negative for dysuria. Musculoskeletal: Negative for neck stiffness. Skin: Positive for wound. Negative for rash. Allergic/Immunologic: Negative for immunocompromised state. Neurological: Negative for headaches. Psychiatric/Behavioral: Negative for agitation. All other systems reviewed and are negative.       Vitals:    05/16/22 1213   BP: (!) 196/75   Pulse: 60   Resp: 16   Temp: 96.8 °F (36 °C)   SpO2: 97%   Weight: 60 kg (132 lb 4.4 oz) Height: 5' 3\" (1.6 m)            Physical Exam  Vitals and nursing note reviewed. Constitutional:       General: She is not in acute distress. HENT:      Head:      Comments: 6 cm laceration states of the scalp closed with 5 staples. Cardiovascular:      Rate and Rhythm: Normal rate and regular rhythm. Pulmonary:      Effort: Pulmonary effort is normal.   Abdominal:      Palpations: Abdomen is soft. Musculoskeletal:         General: Normal range of motion. Cervical back: Normal range of motion. Skin:     General: Skin is warm. Neurological:      Mental Status: She is alert and oriented to person, place, and time. Mental status is at baseline. MDM       Suture/Staple Removal    Date/Time: 5/18/2022 10:32 AM  Performed by: DAVIS Castro  Authorized by: DAVIS Castro     Consent:     Consent obtained:  Verbal    Consent given by:  Patient    Risks discussed:  Pain, wound separation and bleeding    Alternatives discussed:  No treatment and delayed treatment  Location:     Location: scalp. Procedure details:     Wound appearance:  No signs of infection and good wound healing    Number of staples removed:  5  Post-procedure details:     Post-removal:  No dressing applied    Patient tolerance of procedure: Tolerated well, no immediate complications          VITAL SIGNS:  No data found. LABS:  No results found for this or any previous visit (from the past 6 hour(s)). IMAGING:  No orders to display         Medications During Visit:  Medications - No data to display      DECISION MAKING:  Tae Horn is a 78 y.o. female who comes in as above. 5 staples removed from patient's scalp. Patient tolerated procedure well. No signs of infection. No bleeding. Patient stable upon discharge. Return precautions given to patient. IMPRESSION:  1.  Encounter for removal of staples        DISPOSITION:  Discharged      Discharge Medication List as of 5/16/2022 12:43 PM Follow-up Information     Follow up With Specialties Details Why Contact Info    [facility] [address] [facility]  If symptoms worsen [address] [phone]

## 2022-05-16 NOTE — ED TRIAGE NOTES
Patient arrives ambulatory to triage. Pt reports she is here for staple removal. Pt has 5 staples to her scalp, reports they were placed here 9 days ago. No other complaints.

## 2022-05-16 NOTE — ED NOTES
Discharge instructions were reviewed with patient. Space and time were provided for the patient to ask questions or voice concerns, but did not have any. Patient verbalized understanding of wound care and when to seek medical attention if the wound becomes swollen, any red streaks coming from the wound, or any purulent drainage. She understood to be gentle when washing and combing her hair with the wound. The patient ambulated out of the department without any difficulty.

## 2022-06-01 ENCOUNTER — OFFICE VISIT (OUTPATIENT)
Dept: CARDIOLOGY CLINIC | Age: 77
End: 2022-06-01
Payer: MEDICARE

## 2022-06-01 VITALS
OXYGEN SATURATION: 97 % | SYSTOLIC BLOOD PRESSURE: 200 MMHG | HEART RATE: 74 BPM | HEIGHT: 63 IN | RESPIRATION RATE: 18 BRPM | DIASTOLIC BLOOD PRESSURE: 98 MMHG | BODY MASS INDEX: 22.39 KG/M2 | WEIGHT: 126.4 LBS

## 2022-06-01 DIAGNOSIS — I70.1 RENAL ARTERY STENOSIS (HCC): ICD-10-CM

## 2022-06-01 DIAGNOSIS — I15.0 RENOVASCULAR HYPERTENSION: ICD-10-CM

## 2022-06-01 DIAGNOSIS — R60.0 BILATERAL LEG EDEMA: ICD-10-CM

## 2022-06-01 DIAGNOSIS — I25.10 CORONARY ARTERY DISEASE INVOLVING NATIVE CORONARY ARTERY OF NATIVE HEART WITHOUT ANGINA PECTORIS: Primary | ICD-10-CM

## 2022-06-01 DIAGNOSIS — E78.2 MIXED HYPERLIPIDEMIA: ICD-10-CM

## 2022-06-01 PROCEDURE — 93005 ELECTROCARDIOGRAM TRACING: CPT | Performed by: INTERNAL MEDICINE

## 2022-06-01 PROCEDURE — G8510 SCR DEP NEG, NO PLAN REQD: HCPCS | Performed by: INTERNAL MEDICINE

## 2022-06-01 PROCEDURE — 1101F PT FALLS ASSESS-DOCD LE1/YR: CPT | Performed by: INTERNAL MEDICINE

## 2022-06-01 PROCEDURE — G0463 HOSPITAL OUTPT CLINIC VISIT: HCPCS | Performed by: INTERNAL MEDICINE

## 2022-06-01 PROCEDURE — 99214 OFFICE O/P EST MOD 30 MIN: CPT | Performed by: INTERNAL MEDICINE

## 2022-06-01 PROCEDURE — G8420 CALC BMI NORM PARAMETERS: HCPCS | Performed by: INTERNAL MEDICINE

## 2022-06-01 PROCEDURE — G8400 PT W/DXA NO RESULTS DOC: HCPCS | Performed by: INTERNAL MEDICINE

## 2022-06-01 PROCEDURE — 1090F PRES/ABSN URINE INCON ASSESS: CPT | Performed by: INTERNAL MEDICINE

## 2022-06-01 PROCEDURE — 93010 ELECTROCARDIOGRAM REPORT: CPT | Performed by: INTERNAL MEDICINE

## 2022-06-01 PROCEDURE — G8427 DOCREV CUR MEDS BY ELIG CLIN: HCPCS | Performed by: INTERNAL MEDICINE

## 2022-06-01 PROCEDURE — G8536 NO DOC ELDER MAL SCRN: HCPCS | Performed by: INTERNAL MEDICINE

## 2022-06-01 PROCEDURE — 1123F ACP DISCUSS/DSCN MKR DOCD: CPT | Performed by: INTERNAL MEDICINE

## 2022-06-01 PROCEDURE — 3017F COLORECTAL CA SCREEN DOC REV: CPT | Performed by: INTERNAL MEDICINE

## 2022-06-01 RX ORDER — LOSARTAN POTASSIUM 50 MG/1
50 TABLET ORAL DAILY
Qty: 30 TABLET | Refills: 2 | Status: SHIPPED | OUTPATIENT
Start: 2022-06-01 | End: 2022-06-30 | Stop reason: SDUPTHER

## 2022-06-01 NOTE — PATIENT INSTRUCTIONS
Start taking losartan 50 mg daily. Have labs obtained approximately 6-20-22. Follow up with Dr. Anna Marie Cohn in 3 months with renal artery duplex (same day).

## 2022-06-01 NOTE — PROGRESS NOTES
[address]: Timothy Baron  [phone]          Cardiology Consult/Progress Note      Requesting/referring provider: Dr. Sera Cobos    Reason for Follow up: Renovascular hypertension/CAD  Assessment/Plan:  1. Hypertension likely secondary to renal artery stenosis significantly improved following renal artery stenting to proximal main right renal artery now improved status post renal artery stenting. 2.  Atherosclerotic vascular disease  3. History of flash pulmonary edema related to uncontrolled hypertension now improved  4. Renal artery stenosis bilateral.  5.  Remote history of tobacco abuse  6. Shortness of breath without orthopnea PND. 7.  Hyperlipidemia  8. Hypertensive heart disease with moderate LVH and likely chronic diastolic heart failure. 9.  Anemia of uncertain etiology with normal iron stores and increased reticulocyte count. 10.  Likely extracranial cerebrovascular disease with bilateral carotid bruit  11. Likely bilateral pneumonia secondary to COVID-19 in January and February 2020  12. CAD s/p PCI to diagonal vessel for chest pain in 2019, moderate disease in mid LAD. Ms Gregoria Oro presents for annual follow up with elevated SBPs for the last 4 mo on bb. She appears to be significantly anemic clinically. She has not report of any obvious GI bleeding. I suspect she has history of deficiency anemia and she is not on any iron supplementation at this point of time. Due to increasing shortness of breath and elevated blood pressures, need to rule out recurrent renal artery stenosis in the stent. Is due for annual renal artery duplex which will be performed in the next 1 to 2 months. . Add ARB for HTN control and repeat cbc and CMP in 2 weeks after starting ARB. he has moderate disease in her LAD. Diagonal has been stented previously.   If hemoglobin is not significantly reduced and there is no evidence of renal artery stenosis, may require stress testing to ensure that there is no progression of ischemic burden in the LAD territory. She is currently not taking either aspirin or Plavix given prior history of GI bleed and anemia. Follow up in 3 months. Investigations outside records:  10/18 vcs normal stress echo, did not reach target heart rate  10/18 vcs normal lexiscan cardiolyte  Investigations personally reviewed and interpreted by me  ECG: Sinus bradycardia, nonspecific ST-T changes. Echocardiogram: Normal LV systolic function mild to moderate LV hypertrophy consistent with hypertensive heart disease. CT abdomen with angiogram and runoff: 2 renal arteries are present bilaterally. Right major renal artery has at least moderate to severe ostial stenosis. Accessory right renal artery is free of significant disease. Left renal arteries are equal in size. CT scan has been reported to demonstrate disease in superior renal artery although I cannot personally confirm it. Both arteries on the left appear to be too small to provide durable results with renal artery stenting. Renal angiogram and stenting July 2019: Main right renal artery 75% stenosis, accessory renal artery on the right free of significant disease. 2 codominant left renal arteries, one with 40 to 50% angiographic stenosis other free of disease. Status post proximal right main renal artery stenting with 5 x 15 Herculink stent with 0% residual stenosis. Renal arterial Doppler August 2019: Patent stent in main proximal right renal artery. Accessory right renal artery not visualized. Main left renal artery has less than 60% stenosis. Accessory renal artery on the left is not visualized. Technically suboptimal study. Normal kidney size bilaterally. Cath Sep 2019: Severe disease in D1 s/p PCI with 2 REBECCA. Moderate disease in mid LAD, otherwise non obstructive disease. Bilateral renal Dopplers March 2021: Patent flow in the right renal stent.   Less than 60% stenosis in left renal artery. HPI: Clark Mccoy, a 78y.o. year-old who was previously seen for evaluation of uncontrolled hypertension and CAD. She is here for annual follow up. Mrs. Miri Camacho has history of extremely difficult to control hypertension and was admitted to the hospital with malignant hypertension on multiple occasions in 2019 associated with flash pulmonary edema. Subsequently she underwent invasive renal angiogram which demonstrated 70 to 75% stenosis in main right renal artery in its proximal segment. She subsequently underwent stenting of right renal artery with a 5 x 15 mm Herculink bare-metal stent with no residual stenosis. She has moderate stenosis and a relatively small sized left renal artery. There is an accessory left renal artery free of significant disease along with an accessory right renal artery free of significant disease. For the last 4 months, she has not had well controlled blood pressures. At home SBP will run 210 although average is 190/68. She does not fell well. Notes sx similar to how she felt prior to renal stenting. She reports one fall and since has been diagnosed with \"drop foot\" on the left. With the fall she receivedf 5 stables to her head for a 3\" cut. S  Notably in January and February 2020 she had hospitalization for unexplained bilateral pneumonia with high oxygen requirement which I suspect could have been COVID-19. This occurred after an extended travel to [place]. Past medical history:  She  has a past medical history of CAD (coronary artery disease) (09/12/2019), Foot drop, left foot, GERD (gastroesophageal reflux disease), Hypertension, Pneumonia, and MARY (renal artery stenosis) ([facility] [place] [address] (07/24/2019). She has no past medical history of Adverse effect of anesthesia, Diabetes ([address] or Sleep apnea. Patient reports no PND/Orthpnea/CP. He reports no cough/fever/focal neurological deficits/abdominal pain. All other systems negative except as above. PE  Vitals:    06/01/22 1259   BP: (!) 200/98   Pulse: 74   Resp: 18   SpO2: 97%   Weight: 126 lb 6.4 oz (57.3 kg)   Height: 5' 3\" (1.6 m)    Body mass index is 22.39 kg/m². BP (!) 200/98 (BP 1 Location: Left upper arm, BP Patient Position: Sitting, BP Cuff Size: Adult)   Pulse 74   Resp 18   Ht 5' 3\" (1.6 m)   Wt 126 lb 6.4 oz (57.3 kg)   SpO2 97%   BMI 22.39 kg/m²   General:    Alert, cooperative, no distress. Psychiatric:    Normal Mood and affect    Eye/ENT:      Pupils equal, No asymmetry, Conjunctival pink. Able to hear voice at normal amplitude   Lungs:      Visibly symmetric chest expansion, No palpable tenderness. Clear to auscultation bilaterally. Heart[de-identified]    Regular rate and rhythm, S1, S2 normal, no murmur, click, rub or gallop. No JVD, Normal palpable peripheral pulses. No cyanosis   Abdomen:     Soft, non-tender. Bowel sounds normal. No masses,  No      organomegaly. Extremities:   Extremities normal, atraumatic, no edema. Neurologic:   CN II-XII grossly intact.  No gross focal deficits         Recent Labs:  Lab Results   Component Value Date/Time    Cholesterol, total 111 02/23/2020 03:28 AM    HDL Cholesterol 27 02/23/2020 03:28 AM    LDL, calculated 35.4 02/23/2020 03:28 AM    Triglyceride 243 (H) 02/23/2020 03:28 AM    CHOL/HDL Ratio 4.1 02/23/2020 03:28 AM     Lab Results   Component Value Date/Time    Creatinine (POC) 0.9 10/20/2016 10:16 AM    Creatinine 1.01 12/15/2020 11:59 AM     Lab Results   Component Value Date/Time    BUN 22 (H) 12/15/2020 11:59 AM     Lab Results   Component Value Date/Time    Potassium 5.0 12/15/2020 11:59 AM     Lab Results   Component Value Date/Time    Hemoglobin A1c 5.4 02/22/2020 05:32 AM     Lab Results   Component Value Date/Time    HGB 12.7 12/15/2020 11:59 AM     Lab Results   Component Value Date/Time    PLATELET 222 22/69/0983 11:59 AM       Reviewed:  Past Medical History:   Diagnosis Date    CAD (coronary artery disease) 09/12/2019 stent D1 9/12/2019 Samaria Sabillon)    Foot drop, left foot     GERD (gastroesophageal reflux disease)     Hypertension     Pneumonia     MARY (renal artery stenosis) ([facility] [address] 07/24/2019    s/p right renal stent 7/24/2019 Jfglenys Ramandeep)     Social History     Tobacco Use   Smoking Status Former Smoker    Packs/day: 0.50   Smokeless Tobacco Never Used     Social History     Substance and Sexual Activity   Alcohol Use Yes    Alcohol/week: 6.0 standard drinks    Types: 6 Glasses of wine per week    Comment: 6 a week     No Known Allergies  Family History   Problem Relation Age of Onset    Heart Attack Father 47        Current Outpatient Medications   Medication Sig    carvediloL (COREG) 25 mg tablet TAKE ONE TABLET BY MOUTH TWICE A DAY    atorvastatin (LIPITOR) 40 mg tablet TAKE TWO TABLETS BY MOUTH ONE TIME DAILY    ALPRAZolam (XANAX) 1 mg tablet Take 1 Tab by mouth two (2) times daily as needed for Anxiety. Max Daily Amount: 2 mg.  budesonide-formoteroL (SYMBICORT) 160-4.5 mcg/actuation HFAA Take 2 Puffs by inhalation two (2) times a day.  meloxicam (MOBIC) 15 mg tablet Take 15 mg by mouth daily.  sertraline (ZOLOFT) 100 mg tablet Take 100 mg by mouth nightly.  zolpidem (AMBIEN) 10 mg tablet Take  by mouth nightly as needed for Sleep.  furosemide (LASIX) 20 mg tablet TAKE ONE TABLET BY MOUTH ONE TIME DAILY    albuterol-ipratropium (DUO-NEB) 2.5 mg-0.5 mg/3 ml nebu 3 mL by Nebulization route every four (4) hours as needed for Wheezing, Shortness of Breath or Respiratory Distress.  aspirin delayed-release 81 mg tablet Take 1 Tab by mouth daily. No current facility-administered medications for this visit. Janna Escamilla, ANP06/01/22 There are other unrelated non-urgent complaints, but due to the busy schedule and the amount of time I've already spent with her, time does not permit me to address these routine issues at today's visit.  I've requested another appointment to review these additional issues.     [facility] heart and Vascular [place]  [address], [address]

## 2022-06-01 NOTE — LETTER
6/1/2022    Patient: Tesha Garcia   YOB: 1945   Date of Visit: 6/1/2022     Amy Hernadez, [address] [address] [address]  Via Fax: [phone]    Dear Amy Hernadez MD,      Thank you for referring Ms. Julia Brito to [facility] for evaluation. My notes for this consultation are attached. If you have questions, please do not hesitate to call me. I look forward to following your patient along with you.       Sincerely,    Ramy Warren MD

## 2022-06-03 RX ORDER — ATORVASTATIN CALCIUM 40 MG/1
TABLET, FILM COATED ORAL
Qty: 180 TABLET | Refills: 1 | Status: SHIPPED | OUTPATIENT
Start: 2022-06-03 | End: 2022-09-02

## 2022-06-30 ENCOUNTER — TELEPHONE (OUTPATIENT)
Dept: CARDIOLOGY CLINIC | Age: 77
End: 2022-06-30

## 2022-06-30 DIAGNOSIS — I25.10 CORONARY ARTERY DISEASE INVOLVING NATIVE CORONARY ARTERY OF NATIVE HEART WITHOUT ANGINA PECTORIS: Primary | ICD-10-CM

## 2022-06-30 DIAGNOSIS — I15.0 RENOVASCULAR HYPERTENSION: ICD-10-CM

## 2022-06-30 RX ORDER — LOSARTAN POTASSIUM 50 MG/1
100 TABLET ORAL DAILY
Qty: 180 TABLET | Refills: 1 | Status: SHIPPED | OUTPATIENT
Start: 2022-06-30

## 2022-06-30 NOTE — TELEPHONE ENCOUNTER
Per Tahmina Russo, NP:    Discussed pt with Dr Manuela Urena. Pls call pt. Ok to increase lostartan to 100 mg daily. Pls have her get her labs done. We can add another medication if we need to for HTN which is IMDUR. Have her make the medication changes, get the labs done in the next 7 days. Call with her bps - only checking 1-2 times a day. We may need to move her follow up to earlier. Thanks     Attempted to reach patient by telephone. A message was left for return call. Zang message sent.

## 2022-06-30 NOTE — TELEPHONE ENCOUNTER
Patient has an elevated blood pressure of initial 216/110 now 198/102    Is now at her PCP.       Lester Bustosus [address]    Transferred Vasquez Hendrickson

## 2022-06-30 NOTE — TELEPHONE ENCOUNTER
Spoke to Mingly, [address]. States patient with elevated BP-currently 216/110. Confirmed that she is taking losartan 50 mg daily and coreg 25 mg bid. Known  renal artery stenonis. Physician assistant to determine if losartan dose should be increased or patient to be sent to ER. No complaints of headache/chest pain.

## 2022-07-01 NOTE — TELEPHONE ENCOUNTER
----- Message from Carmita Whatley ANP sent at 7/1/2022 10:36 AM EDT -----  Regarding: labs  Labs reviewed. Have her start the lostartan 100mg per [facility] and let's get her to repeat bmp in 1 mo. Have her call or my cart message us in 2 weeks with blood pressures. Thanks. Identifiers x 2. Informed of the above. Verbalized understanding.

## 2022-07-01 NOTE — TELEPHONE ENCOUNTER
Identifiers x 2. Reiterated dose increase. Patient states labs obtained at PCP office. Will request results.

## 2022-07-08 ENCOUNTER — TELEPHONE (OUTPATIENT)
Dept: CARDIOLOGY CLINIC | Age: 77
End: 2022-07-08

## 2022-07-08 DIAGNOSIS — I15.0 RENOVASCULAR HYPERTENSION: Primary | ICD-10-CM

## 2022-07-08 DIAGNOSIS — I70.1 RENAL ARTERY STENOSIS (HCC): ICD-10-CM

## 2022-07-08 RX ORDER — AMLODIPINE BESYLATE 5 MG/1
5 TABLET ORAL DAILY
Qty: 90 TABLET | Refills: 1 | Status: SHIPPED | OUTPATIENT
Start: 2022-07-08 | End: 2022-10-04 | Stop reason: SDUPTHER

## 2022-07-08 NOTE — TELEPHONE ENCOUNTER
Per Dr. Heather Joseph; Add amlodipine 5 mg daily. Scheduled renal doppler and follow up. Identifiers x 2. Informed of the above. Offered appt on 7-11-22 @ 1 pm. Not convenient. To continue to monitor BP/HR and call office next week with update. Requested Prescriptions     Signed Prescriptions Disp Refills    amLODIPine (NORVASC) 5 mg tablet 90 Tablet 1     Sig: Take 1 Tablet by mouth daily.      Authorizing Provider: Chanda Avila     Ordering User: Boy Shorten     Future Appointments   Date Time Provider Andi Slater   8/17/2022  2:00 PM ANDREA BARRETT BS AMB   8/17/2022  3:00 PM MD ANURADHA Rios AMB   9/7/2022  1:00 PM MD ANURADHA Rios BS AMB

## 2022-07-08 NOTE — TELEPHONE ENCOUNTER
Patient is calling to give her BP readings and she also has a question for the nurse.     7/1/22 /94 am 110/57 pm  7/2/22 /65 am 152/80 pm  7/3/22 /95 am no pm  7/4/22 /88 am no pm  7/5/22 /96 am 172/99 pm  7/6/22 /67 am   7/7/22 /86 am 180/85 pm  7/8/22 /95 am    [phone] oral

## 2022-07-08 NOTE — TELEPHONE ENCOUNTER
Identifiers x 2. Confirmed that she is taking losartan 100 mg daily and coreg 25 mg bid. Complaints of fatigue, dizziness, occasional palpitations and continued headache.   To discuss with NP.

## 2022-07-13 ENCOUNTER — ANCILLARY PROCEDURE (OUTPATIENT)
Dept: CARDIOLOGY CLINIC | Age: 77
End: 2022-07-13
Payer: MEDICARE

## 2022-07-13 ENCOUNTER — OFFICE VISIT (OUTPATIENT)
Dept: CARDIOLOGY CLINIC | Age: 77
End: 2022-07-13
Payer: MEDICARE

## 2022-07-13 VITALS
BODY MASS INDEX: 23.04 KG/M2 | WEIGHT: 130 LBS | RESPIRATION RATE: 16 BRPM | OXYGEN SATURATION: 94 % | HEIGHT: 63 IN | DIASTOLIC BLOOD PRESSURE: 90 MMHG | SYSTOLIC BLOOD PRESSURE: 140 MMHG | HEART RATE: 63 BPM

## 2022-07-13 DIAGNOSIS — I25.10 CORONARY ARTERY DISEASE INVOLVING NATIVE CORONARY ARTERY OF NATIVE HEART WITHOUT ANGINA PECTORIS: ICD-10-CM

## 2022-07-13 DIAGNOSIS — I73.9 PAD (PERIPHERAL ARTERY DISEASE) (HCC): ICD-10-CM

## 2022-07-13 DIAGNOSIS — I15.0 RENOVASCULAR HYPERTENSION: ICD-10-CM

## 2022-07-13 DIAGNOSIS — I10 ESSENTIAL HYPERTENSION: Primary | ICD-10-CM

## 2022-07-13 DIAGNOSIS — I70.1 RENAL ARTERY STENOSIS (HCC): ICD-10-CM

## 2022-07-13 LAB
ABDOMINAL PROX AORTA VEL: 34.1 CM/S
LEFT KIDNEY LENGTH: 9.6 CM
LEFT KIDNEY WIDTH: 4.52 CM
LEFT RENAL DIST RAR: 2.09
LEFT RENAL DIST SYS: 71.2 CM/S
LEFT RENAL MID RAR: 1.9
LEFT RENAL MID SYS: 64.7 CM/S
LEFT RENAL ORIGIN RAR: 7.45
LEFT RENAL ORIGIN SYS: 254 CM/S
LEFT RENAL PROX DIAS: 254.7 CM/S
LEFT RENAL PROX RAR: 7.35
LEFT RENAL PROX RI: -0.02
LEFT RENAL PROX SYS: 250.8 CM/S
RIGHT KIDNEY LENGTH: 8.42 CM
RIGHT KIDNEY WIDTH: 4.06 CM
RIGHT RENAL DIST RAR: 1.75
RIGHT RENAL DIST SYS: 59.8 CM/S
RIGHT RENAL MID DIAS: 40.7 CM/S
RIGHT RENAL MID RAR: 4.86
RIGHT RENAL MID RI: 0.75
RIGHT RENAL MID SYS: 165.6 CM/S
RIGHT RENAL ORIGIN RAR: 7.51
RIGHT RENAL ORIGIN SYS: 256 CM/S
RIGHT RENAL PROX RAR: 6.39
RIGHT RENAL PROX SYS: 218 CM/S

## 2022-07-13 PROCEDURE — G8536 NO DOC ELDER MAL SCRN: HCPCS | Performed by: INTERNAL MEDICINE

## 2022-07-13 PROCEDURE — 3017F COLORECTAL CA SCREEN DOC REV: CPT | Performed by: INTERNAL MEDICINE

## 2022-07-13 PROCEDURE — G8400 PT W/DXA NO RESULTS DOC: HCPCS | Performed by: INTERNAL MEDICINE

## 2022-07-13 PROCEDURE — 1101F PT FALLS ASSESS-DOCD LE1/YR: CPT | Performed by: INTERNAL MEDICINE

## 2022-07-13 PROCEDURE — 1090F PRES/ABSN URINE INCON ASSESS: CPT | Performed by: INTERNAL MEDICINE

## 2022-07-13 PROCEDURE — 99214 OFFICE O/P EST MOD 30 MIN: CPT | Performed by: INTERNAL MEDICINE

## 2022-07-13 PROCEDURE — G8427 DOCREV CUR MEDS BY ELIG CLIN: HCPCS | Performed by: INTERNAL MEDICINE

## 2022-07-13 PROCEDURE — G8510 SCR DEP NEG, NO PLAN REQD: HCPCS | Performed by: INTERNAL MEDICINE

## 2022-07-13 PROCEDURE — G8420 CALC BMI NORM PARAMETERS: HCPCS | Performed by: INTERNAL MEDICINE

## 2022-07-13 PROCEDURE — G0463 HOSPITAL OUTPT CLINIC VISIT: HCPCS | Performed by: INTERNAL MEDICINE

## 2022-07-13 PROCEDURE — 1123F ACP DISCUSS/DSCN MKR DOCD: CPT | Performed by: INTERNAL MEDICINE

## 2022-07-13 PROCEDURE — 93975 VASCULAR STUDY: CPT | Performed by: INTERNAL MEDICINE

## 2022-07-13 NOTE — PROGRESS NOTES
[address]: Lisa Thompson  [phone]          Cardiology Consult/Progress Note      Requesting/referring provider: Dr. Amy Bradley    Reason for Follow up: Renovascular hypertension/CAD  Assessment/Plan:  1. Hypertension likely secondary to renal artery stenosis significantly improved following renal artery stenting to proximal main right renal artery now improved status post renal artery stenting. 2.  Atherosclerotic vascular disease  3. History of flash pulmonary edema related to uncontrolled hypertension now improved  4. Renal artery stenosis bilateral.  With presence of right renal bruit. 5.  Remote history of tobacco abuse  6. Shortness of breath without orthopnea PND. 7.  Hyperlipidemia  8. Hypertensive heart disease with moderate LVH and likely chronic diastolic heart failure. 9.  Anemia of uncertain etiology with normal iron stores and increased reticulocyte count. 10.  Likely extracranial cerebrovascular disease with bilateral carotid bruit  11. Likely bilateral pneumonia secondary to COVID-19 in January and February 2020  12. CAD s/p PCI to diagonal vessel for chest pain in 2019, moderate disease in mid LAD. Ms Bobby Elias presents for annual follow up with elevated SBPs for the last 4 mo on bb. She appears to be significantly anemic clinically. She has not report of any obvious GI bleeding. I suspect she has history of deficiency anemia and she is not on any iron supplementation at this point of time. Due to increasing shortness of breath and elevated blood pressures, renal artery Doppler was performed which does demonstrate slightly increased velocity across all 3 renal arteries including the stented right inferior renal artery. There is no post stent turbulence but the velocities are increased.   The significance of this is uncertain in light of the fact that there are increased in all 3 arteries and the other 2 arteries in the past had demonstrated no angiographic stenosis. Nonetheless, we may consider reimaging renal artery with angiogram if blood pressure is difficult to control with medications alone. Have added ARB for HTN control and now amlodipine is added to beta-blocker and ARB combination. Repeat cbc and CMP in 2 weeks after starting ARB. he has moderate disease in her LAD. Diagonal has been stented previously. If she has continued shortness of breath may require repeat stress test as well. She is currently not taking either aspirin or Plavix given prior history of GI bleed and anemia. Follow up in 3 months. I have asked her to call us back with her blood pressure readings after 4 to 5 days on amlodipine to assess if this is working for her or not. Investigations outside records:  10/18 vcs normal stress echo, did not reach target heart rate  10/18 vcs normal lexiscan cardiolyte  Investigations personally reviewed and interpreted by me  ECG: Sinus bradycardia, nonspecific ST-T changes. Echocardiogram: Normal LV systolic function mild to moderate LV hypertrophy consistent with hypertensive heart disease. CT abdomen with angiogram and runoff: 2 renal arteries are present bilaterally. Right major renal artery has at least moderate to severe ostial stenosis. Accessory right renal artery is free of significant disease. Left renal arteries are equal in size. CT scan has been reported to demonstrate disease in superior renal artery although I cannot personally confirm it. Both arteries on the left appear to be too small to provide durable results with renal artery stenting. Renal angiogram and stenting July 2019: Main right renal artery 75% stenosis, accessory renal artery on the right free of significant disease. 2 codominant left renal arteries, one with 40 to 50% angiographic stenosis other free of disease. Status post proximal right main renal artery stenting with 5 x 15 Herculink stent with 0% residual stenosis.   Renal arterial Doppler August 2019: Patent stent in main proximal right renal artery. Accessory right renal artery not visualized. Main left renal artery has less than 60% stenosis. Accessory renal artery on the left is not visualized. Technically suboptimal study. Normal kidney size bilaterally. Cath Sep 2019: Severe disease in D1 s/p PCI with 2 REBECCA. Moderate disease in mid LAD, otherwise non obstructive disease. Bilateral renal Dopplers March 2021: Patent flow in the right renal stent. Less than 60% stenosis in left renal artery. Renal artery Doppler July 2022: Patent flow in the right renal stent. Velocities are increased in all 3 renal arteries 2 on the right, 1 on the left in equal fashion. Uncertain of the cause of increased velocities. There is no turbulence in the mid and distal renal segments although diastolic velocities are increased in right lower renal artery. HPI: Isidro Penn, a 78y.o. year-old who was previously seen for evaluation of uncontrolled hypertension and CAD. She is here for annual follow up. Mrs. Kyree Mccallum has history of extremely difficult to control hypertension and was admitted to the hospital with malignant hypertension on multiple occasions in 2019 associated with flash pulmonary edema. Subsequently she underwent invasive renal angiogram which demonstrated 70 to 75% stenosis in main right renal artery in its proximal segment. She subsequently underwent stenting of right renal artery with a 5 x 15 mm Herculink bare-metal stent with no residual stenosis. She has moderate stenosis and a relatively small sized left renal artery. There is an accessory left renal artery free of significant disease along with an accessory right renal artery free of significant disease. For the last 4 months, she has not had well controlled blood pressures. At home SBP will run 210 although average is 190/68. She does not fell well. Notes sx similar to how she felt prior to renal stenting.     The past 2 months has had increasing shortness of breath, increasing blood pressure. Today the blood pressure is better. She is a started amlodipine in addition to ARB and beta-blocker 2 days ago. Past medical history:  She  has a past medical history of CAD (coronary artery disease) (09/12/2019), Foot drop, left foot, GERD (gastroesophageal reflux disease), Hypertension, Pneumonia, and [facility] (07/24/[facility]). She has no past medical history of Adverse effect of anesthesia, Diabetes ([facility] [address] or Sleep apnea. Patient reports no PND/Orthpnea/CP. He reports no cough/fever/focal neurological deficits/abdominal pain. All other systems negative except as above. PE  Vitals:    07/13/22 1459   BP: (!) 140/90   Pulse: 63   Resp: 16   SpO2: 94%   Weight: 130 lb (59 kg)   Height: 5' 3\" (1.6 m)    Body mass index is 23.03 kg/m². BP (!) 140/90   Pulse 63   Resp 16   Ht 5' 3\" (1.6 m)   Wt 130 lb (59 kg)   SpO2 94%   BMI 23.03 kg/m²   General:    Alert, cooperative, no distress. Psychiatric:    Normal Mood and affect    Eye/ENT:      Pupils equal, No asymmetry, Conjunctival pink. Able to hear voice at normal amplitude   Lungs:      Visibly symmetric chest expansion, No palpable tenderness. Clear to auscultation bilaterally. Heart[de-identified]    Regular rate and rhythm, S1, S2 normal, no murmur, click, rub or gallop. No JVD, Normal palpable peripheral pulses. No cyanosis   Abdomen:     Soft, non-tender. Bowel sounds normal. No masses,  No      organomegaly. Right renal bruits heard   Extremities:   Extremities normal, atraumatic, no edema. Neurologic:   CN II-XII grossly intact.  No gross focal deficits         Recent Labs:  Lab Results   Component Value Date/Time    Cholesterol, total 111 02/23/2020 03:28 AM    HDL Cholesterol 27 02/23/2020 03:28 AM    LDL, calculated 35.4 02/23/2020 03:28 AM    Triglyceride 243 (H) 02/23/2020 03:28 AM    CHOL/HDL Ratio 4.1 02/23/2020 03:28 AM     Lab Results   Component Value Date/Time    Creatinine (POC) 0.9 10/20/2016 10:16 AM    Creatinine 1.01 12/15/2020 11:59 AM     Lab Results   Component Value Date/Time    BUN 22 (H) 12/15/2020 11:59 AM     Lab Results   Component Value Date/Time    Potassium 5.0 12/15/2020 11:59 AM     Lab Results   Component Value Date/Time    Hemoglobin A1c 5.4 02/22/2020 05:32 AM     Lab Results   Component Value Date/Time    HGB 12.7 12/15/2020 11:59 AM     Lab Results   Component Value Date/Time    PLATELET 516 29/54/4447 11:59 AM       Reviewed:  Past Medical History:   Diagnosis Date    CAD (coronary artery disease) 09/12/2019    stent D1 9/12/2019 Radha Ellington)    Foot drop, left foot     GERD (gastroesophageal reflux disease)     Hypertension     Pneumonia     [facility]    s/p right renal stent 7/24/2019 Radha Ellington)     Social History     Tobacco Use   Smoking Status Former Smoker    Packs/day: 0.50   Smokeless Tobacco Never Used     Social History     Substance and Sexual Activity   Alcohol Use Yes    Alcohol/week: 6.0 standard drinks    Types: 6 Glasses of wine per week    Comment: 6 a week     No Known Allergies  Family History   Problem Relation Age of Onset    Heart Attack Father 47        Current Outpatient Medications   Medication Sig    amLODIPine (NORVASC) 5 mg tablet Take 1 Tablet by mouth daily.  losartan (COZAAR) 50 mg tablet Take 2 Tablets by mouth daily.  atorvastatin (LIPITOR) 40 mg tablet TAKE TWO TABLETS BY MOUTH ONE TIME DAILY    carvediloL (COREG) 25 mg tablet TAKE ONE TABLET BY MOUTH TWICE A DAY    ALPRAZolam (XANAX) 1 mg tablet Take 1 Tab by mouth two (2) times daily as needed for Anxiety. Max Daily Amount: 2 mg.  albuterol-ipratropium (DUO-NEB) 2.5 mg-0.5 mg/3 ml nebu 3 mL by Nebulization route every four (4) hours as needed for Wheezing, Shortness of Breath or Respiratory Distress.     budesonide-formoteroL (SYMBICORT) 160-4.5 mcg/actuation HFAA Take 2 Puffs by inhalation two (2) times a day.  meloxicam (MOBIC) 15 mg tablet Take 15 mg by mouth daily.  sertraline (ZOLOFT) 100 mg tablet Take 100 mg by mouth nightly.  zolpidem (AMBIEN) 10 mg tablet Take  by mouth nightly as needed for Sleep. No current facility-administered medications for this visit. Yolis Joseph MD07/13/22 There are other unrelated non-urgent complaints, but due to the busy schedule and the amount of time I've already spent with her, time does not permit me to address these routine issues at today's visit. I've requested another appointment to review these additional issues.     [facility] heart and Vascular [place]  [address], [address]  [address]

## 2022-07-13 NOTE — LETTER
7/13/2022    Patient: Nena Welsh   YOB: 1945   Date of Visit: 7/13/2022     Juni Rojas, [id number] [address] [address]  Via Fax: [phone]    Dear Juni Rojas MD,      Thank you for referring Ms. Aashish Lerner to [facility] for evaluation. My notes for this consultation are attached. If you have questions, please do not hesitate to call me. I look forward to following your patient along with you.       Sincerely,    Elena Booker MD

## 2022-09-02 RX ORDER — ATORVASTATIN CALCIUM 40 MG/1
TABLET, FILM COATED ORAL
Qty: 180 TABLET | Refills: 1 | Status: SHIPPED | OUTPATIENT
Start: 2022-09-02

## 2022-10-04 ENCOUNTER — TELEPHONE (OUTPATIENT)
Dept: CARDIOLOGY CLINIC | Age: 77
End: 2022-10-04

## 2022-10-04 RX ORDER — AMLODIPINE BESYLATE 5 MG/1
5 TABLET ORAL 2 TIMES DAILY
Qty: 90 TABLET | Refills: 1 | Status: SHIPPED | OUTPATIENT
Start: 2022-10-04

## 2022-10-04 NOTE — TELEPHONE ENCOUNTER
Verified patient using two identifiers. 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 States she does not have a recent log of BP readings. Requested a current readin/63. Let her know I would sent to Janna and Dr. Jyotsna Haas for recommendations of taking amlodipine 1 or 2 times per day. Patient verbalized understanding, no further questions at this time.

## 2022-10-04 NOTE — TELEPHONE ENCOUNTER
Pt requested to speak with the nurse, pt would like to discuss her medication, please advise      Pt # [phone]

## 2022-10-04 NOTE — TELEPHONE ENCOUNTER
Janna Lee, ANP  You 29 minutes ago (12:35 PM)     Her bp looks good. Continue same - BID dosing of amlodipine. Thanks. Prescription sent to pharmacy with updated dosing information. Informed patient via [address]

## 2023-01-11 RX ORDER — LOSARTAN POTASSIUM 50 MG/1
TABLET ORAL
Qty: 180 TABLET | Refills: 1 | Status: SHIPPED | OUTPATIENT
Start: 2023-01-11

## 2023-02-23 ENCOUNTER — OFFICE VISIT (OUTPATIENT)
Dept: ORTHOPEDIC SURGERY | Age: 78
End: 2023-02-23
Payer: MEDICARE

## 2023-02-23 VITALS — BODY MASS INDEX: 22.32 KG/M2 | HEIGHT: 63 IN | WEIGHT: 126 LBS

## 2023-02-23 DIAGNOSIS — M48.062 LUMBAR STENOSIS WITH NEUROGENIC CLAUDICATION: Primary | ICD-10-CM

## 2023-02-23 NOTE — PROGRESS NOTES
1. Have you been to the ER, urgent care clinic since your last visit? Hospitalized since your last visit? No    2. Have you seen or consulted any other health care providers outside of the [address] [place] [address] since your last visit? Include any pap smears or colon screening.  No    Chief Complaint   Patient presents with    Back Pain    Neck Pain     Cervical and Thoracic MRIs @[facility] 1/12/23

## 2023-02-23 NOTE — PATIENT INSTRUCTIONS
Lumbar Spinal Fusion: Before Your Surgery  What is lumbar spinal fusion? Spinal fusion is surgery that joins, or fuses, two or more vertebrae together. The joints will no longer be able to move. The surgery is also called arthrodesis. Most of the time, bone from your pelvic bone or from a bone bank is used. Or sometimes human-made bone is used. This bone is used to make a \"bridge\" between the vertebrae to be joined. Metal rods, wires, or screws are often attached to the vertebrae. This will hold them together until new bone grows between them. Spinal fusion surgery usually takes a few hours. It involves making a cut in your back, belly, or side. The cuts, called incisions, leave scars that fade with time. You can expect your back to feel stiff and sore after surgery. You will be given pain medicine. You will probably get up and walk at the hospital. Lee Mealing will have a short hospital stay. It may take 4 to 6 weeks to get back to doing simple activities, such as light housework or office work. How do you prepare for surgery? Surgery can be stressful. This information will help you understand what you can expect. And it will help you safely prepare for surgery. Preparing for surgery    Be sure you have someone to take you home. Anesthesia and pain medicine will make it unsafe for you to drive or get home on your own. Understand exactly what surgery is planned, along with the risks, benefits, and other options. If you take a medicine that prevents blood clots, your doctor may tell you to stop taking it before your surgery. Or your doctor may tell you to keep taking it. (These medicines include aspirin and other blood thinners.) Make sure that you understand exactly what your doctor wants you to do. Tell your doctor ALL the medicines, vitamins, supplements, and herbal remedies you take. Some may increase the risk of problems during your surgery.  Your doctor will tell you if you should stop taking any of them before the surgery and how soon to do it. Make sure your doctor and the hospital have a copy of your advance directive. If you don't have one, you may want to prepare one. It lets others know your health care wishes. It's a good thing to have before any type of surgery or procedure. What happens on the day of surgery? Follow the instructions exactly about when to stop eating and drinking. If you don't, your surgery may be canceled. If your doctor told you to take your medicines on the day of surgery, take them with only a sip of water. Take a bath or shower before you come in for your surgery. Do not apply lotions, perfumes, deodorants, or nail polish. Do not shave the surgical site yourself. Take off all jewelry and piercings. And take out contact lenses, if you wear them. At the hospital or surgery center   Bring a picture ID. The area for surgery is often marked to make sure there are no errors. You will be kept comfortable and safe by your anesthesia provider. You will be asleep during the surgery. Surgery will take a few hours. When should you call your doctor? You have questions or concerns. You do not understand how to prepare for your surgery. You become ill before surgery (such as fever, cold or flu, chest pain, or shortness of breath). You need to reschedule or have changed your mind about having the surgery. Where can you learn more? Go to http://www.gray.com/  Enter [id number] in the search box to learn more about \"Lumbar Spinal Fusion: Before Your Surgery. \"  Current as of: March 9, 2022               Content Version: 13.4  © 5482-5252 [employer]. Care instructions adapted under license by "[employer]" (which disclaims liability or warranty for this information).  If you have questions about a medical condition or this instruction, always ask your healthcare professional. Herminia Barnes disclaims any warranty or liability for your use of this information.

## 2023-02-23 NOTE — PROGRESS NOTES
Mikaela Pepper (: 1945) is a 78 y.o. female, patient, here for evaluation of the following chief complaint(s):  Back Pain and Neck Pain (Cervical and Thoracic MRIs @[facility] 23)       ASSESSMENT/PLAN:    Below is the assessment and plan developed based on review of pertinent history, physical exam, labs, studies, and medications. We discussed her treatment options. She does have a previous fusion L5-S1 with some moderate stenosis at L4-5. She describes neurogenic claudication in terms of her lower back. She is having difficulty with standing for long distances or for walking tolerance. She does not want to try further injections. I think ultimately she is a candidate for revision laminectomy at L4-5 with extension of her fusion to L4. Risk and benefits were discussed with her. Procedure: Lumbar laminectomy revision L4-5 with revision L4-S1 fusion      The risks and benefits were discussed at length with the patient and the patient has elected to proceed. Indications for surgery include failed conservative treatment. Alternative treatments, risks and the perioperative course were discussed with the patient. All questions were answered. The risks and benefits of the procedure were explained. Benefits include definitive diagnosis, relief of pain, elimination of deformity and improved function. Risks of surgery including bleeding, infection, weakness, numbness, CSF leak, failure to improve symptoms, exacerbation of medical co-morbidities and even death were discussed with the patient. 1. Lumbar stenosis with neurogenic claudication  -     XR SPINE LUMB MIN 4 V; Future      No follow-ups on file. SUBJECTIVE/OBJECTIVE:  Mikaela Pepper (: 1945) is a 78 y.o. female. No flowsheet data found. She comes in today on self-referral for chronic lower back pain. She has had both a neck fusion as well as a lower back fusion at L5-S1.   On discussion her main complaint today is her lower back with difficulty with any prolonged standing and walking. Denies any new trauma. She has to sit frequently in order to get relief. She had conservative treatment the past including physical therapy injections without any long-term relief. She denies any bowel bladder difficulties. Imaging:    XR Results (most recent):  Results from Appointment encounter on 02/23/23    XR SPINE LUMB MIN 4 V    Narrative  AP and lateral flexion-extension lumbar spine demonstrates previous L5-S1 fusion. Mild grade 1 anterolisthesis at L4-5. No fractures or lytic lesions. MRI Results (most recent):    I independently reviewed her outside MRI from last year. She has a previous L5-S1 fusion. Facet hypertrophy and ligamentum partly causing moderate stenosis at L4-5. Perhaps maybe a mild grade 1 anterolisthesis at L4-5. No fractures or lytic lesions. No Known Allergies    Current Outpatient Medications   Medication Sig    losartan (COZAAR) 50 mg tablet TAKE TWO TABLETS BY MOUTH ONE TIME DAILY    amLODIPine (NORVASC) 5 mg tablet Take 1 Tablet by mouth two (2) times a day. atorvastatin (LIPITOR) 40 mg tablet TAKE TWO TABLETS BY MOUTH ONE TIME DAILY    carvediloL (COREG) 25 mg tablet TAKE ONE TABLET BY MOUTH TWICE A DAY    ALPRAZolam (XANAX) 1 mg tablet Take 1 Tab by mouth two (2) times daily as needed for Anxiety. Max Daily Amount: 2 mg. albuterol-ipratropium (DUO-NEB) 2.5 mg-0.5 mg/3 ml nebu 3 mL by Nebulization route every four (4) hours as needed for Wheezing, Shortness of Breath or Respiratory Distress. budesonide-formoteroL (SYMBICORT) 160-4.5 mcg/actuation HFAA Take 2 Puffs by inhalation two (2) times a day. meloxicam (MOBIC) 15 mg tablet Take 15 mg by mouth daily. sertraline (ZOLOFT) 100 mg tablet Take 100 mg by mouth nightly. zolpidem (AMBIEN) 10 mg tablet Take  by mouth nightly as needed for Sleep. No current facility-administered medications for this visit.        Past Medical History:   Diagnosis Date    CAD (coronary artery disease) 09/12/2019    stent D1 9/12/2019 ([place])    Foot drop, left foot     GERD (gastroesophageal reflux disease)     Hypertension     Pneumonia     [facility] (renal artery stenosis) ([facility] 07/24/2019    s/p right renal stent 7/24/2019 [facility])        Past Surgical History:   Procedure Laterality Date    HX APPENDECTOMY      HX BACK SURGERY      LUMBAR FUSION X2 SURGERIES    HX BACK SURGERY      CERVICAL X2    HX HYSTERECTOMY      HX TONSILLECTOMY      HX UROLOGICAL  2019    arterial stent in R kidney    CA UNLISTED PROCEDURE CARDIAC SURGERY  08/2019    cardiac stents     CA UNLISTED PROCEDURE PELVIS/HIP JOINT      Hip replacement 2005       Family History   Problem Relation Age of Onset    Heart Attack Father 47        Social History     Tobacco Use    Smoking status: Former     Packs/day: 0.50     Types: Cigarettes    Smokeless tobacco: Never   Substance Use Topics    Alcohol use: Yes     Alcohol/week: 6.0 standard drinks     Types: 6 Glasses of wine per week     Comment: 6 a week    Drug use: Never        Review of Systems       Vitals:  Ht 5' 3\" (1.6 m)   Wt 126 lb (57.2 kg)   BMI 22.32 kg/m²    Body mass index is 22.32 kg/m². Ortho Exam       Alert and [place]  x 3    Normal gait and station; normal posture    No assistive devices today. Cervical spine:  Examination of the cervical spine demonstrates no tenderness on palpation, no pain, no swelling or edema, with normal lumbar range of motion. Thoracic spine: Examination of the thoracic spine demonstrates no tenderness on palpation, no pain, no swelling or edema. Normal sensation and range of motion. Lumbar spine:     Examination of the lumbar spine demonstrates on inspection: No abnormal cutaneous markings. Well-healed lumbar and paraspinal musculature incisions at approximately the lumbar sacral junction. No pelvic obliquity.     On palpation: Generalized tenderness on palpation lower buttock posterior hip region. Pain radiates into the thoracolumbar junction. No step-offs noted    Range of motion: Reasonable range of motion still maintained. Flexion about 60 degrees. Extension is to 15 degrees with some increased pain    Motor examination:  Right iliopsoas 5/5,  left iliopsoas 5/5, right quad 5/5, left quad 5/5, right anterior tibialis 5/5, left anterior tibialis 5/5, right EHL 4/5, left EHL 4/5, right gastrocnemius 5/5 , left gastrocnemius 5/5    Sensory examination: Reveals no deficits    Reflexes: Left knee 2/2, right knee 2/2, right ankle 2/2, left ankle 2/2    Functional testing: Straight leg test negative bilaterally; bowstring sign negative bilaterally    Babinsksi and Clonus negative bilaterally. An electronic signature was used to authenticate this note.   -- Karthik Escudero MD

## 2023-03-08 RX ORDER — AMLODIPINE BESYLATE 5 MG/1
5 TABLET ORAL 2 TIMES DAILY
Qty: 180 TABLET | Refills: 1 | Status: SHIPPED | OUTPATIENT
Start: 2023-03-08

## 2023-03-08 NOTE — TELEPHONE ENCOUNTER
Requested Prescriptions     Signed Prescriptions Disp Refills    amLODIPine (NORVASC) 5 mg tablet 180 Tablet 1     Sig: Take 1 Tablet by mouth two (2) times a day.      Authorizing Provider: Kenji Ozuna     Ordering User: Marty RUBIO per MD

## 2023-03-08 NOTE — TELEPHONE ENCOUNTER
Patient called to advise she needs a refill on Amlodipine 5mg. Pt is out of medication.     [facility] [phone]

## 2023-03-08 NOTE — TELEPHONE ENCOUNTER
Spoke with patient. 2 patient identifiers used. Called patient to let her know her medication had been sent to her pharmacy and scheduled for missed follow up.      Future Appointments   Date Time Provider Andi Slater   3/15/2023 11:00 AM MD ANURADHA Riggins [facility]

## 2023-03-15 ENCOUNTER — OFFICE VISIT (OUTPATIENT)
Dept: CARDIOLOGY CLINIC | Age: 78
End: 2023-03-15
Payer: MEDICARE

## 2023-03-15 VITALS
OXYGEN SATURATION: 95 % | HEART RATE: 59 BPM | SYSTOLIC BLOOD PRESSURE: 136 MMHG | HEIGHT: 63 IN | BODY MASS INDEX: 22.68 KG/M2 | DIASTOLIC BLOOD PRESSURE: 84 MMHG | WEIGHT: 128 LBS | RESPIRATION RATE: 16 BRPM

## 2023-03-15 DIAGNOSIS — I25.10 ATHEROSCLEROTIC CARDIOVASCULAR DISEASE: ICD-10-CM

## 2023-03-15 DIAGNOSIS — Z82.49 FAMILY HISTORY OF PREMATURE CAD: ICD-10-CM

## 2023-03-15 DIAGNOSIS — I70.1 RENAL ARTERY STENOSIS (HCC): ICD-10-CM

## 2023-03-15 DIAGNOSIS — I15.0 RENOVASCULAR HYPERTENSION: Primary | ICD-10-CM

## 2023-03-15 PROCEDURE — 99212 OFFICE O/P EST SF 10 MIN: CPT | Performed by: INTERNAL MEDICINE

## 2023-03-15 PROCEDURE — G8510 SCR DEP NEG, NO PLAN REQD: HCPCS | Performed by: INTERNAL MEDICINE

## 2023-03-15 PROCEDURE — G8536 NO DOC ELDER MAL SCRN: HCPCS | Performed by: INTERNAL MEDICINE

## 2023-03-15 PROCEDURE — 1123F ACP DISCUSS/DSCN MKR DOCD: CPT | Performed by: INTERNAL MEDICINE

## 2023-03-15 PROCEDURE — G8420 CALC BMI NORM PARAMETERS: HCPCS | Performed by: INTERNAL MEDICINE

## 2023-03-15 PROCEDURE — G0463 HOSPITAL OUTPT CLINIC VISIT: HCPCS | Performed by: INTERNAL MEDICINE

## 2023-03-15 PROCEDURE — 93010 ELECTROCARDIOGRAM REPORT: CPT | Performed by: INTERNAL MEDICINE

## 2023-03-15 PROCEDURE — 1090F PRES/ABSN URINE INCON ASSESS: CPT | Performed by: INTERNAL MEDICINE

## 2023-03-15 PROCEDURE — 1101F PT FALLS ASSESS-DOCD LE1/YR: CPT | Performed by: INTERNAL MEDICINE

## 2023-03-15 PROCEDURE — 3017F COLORECTAL CA SCREEN DOC REV: CPT | Performed by: INTERNAL MEDICINE

## 2023-03-15 PROCEDURE — G8427 DOCREV CUR MEDS BY ELIG CLIN: HCPCS | Performed by: INTERNAL MEDICINE

## 2023-03-15 PROCEDURE — G8400 PT W/DXA NO RESULTS DOC: HCPCS | Performed by: INTERNAL MEDICINE

## 2023-03-15 PROCEDURE — 93005 ELECTROCARDIOGRAM TRACING: CPT | Performed by: INTERNAL MEDICINE

## 2023-03-15 NOTE — LETTER
3/15/2023    Patient: Ryann Gómez   YOB: 1945   Date of Visit: 3/15/2023     Sanchez Collazo, [address] [address] [address]  Via Fax: [phone]    Dear Sanchez Collazo MD,      Thank you for referring Ms. Marie Grant to [address] [place] [address] for evaluation. My notes for this consultation are attached. If you have questions, please do not hesitate to call me. I look forward to following your patient along with you.       Sincerely,    Gale Campbell MD no

## 2023-03-15 NOTE — PROGRESS NOTES
[facility] [place]: Kirk Krueger  [phone]          Cardiology Consult/Progress Note      Requesting/referring provider: Dr. Jaxon Jones    Reason for Follow up: Renovascular hypertension/CAD  Assessment/Plan:  1. Hypertension likely secondary to renal artery stenosis significantly improved following renal artery stenting to proximal main right renal artery now improved status post renal artery stenting. 2.  Atherosclerotic vascular disease  3. History of flash pulmonary edema related to uncontrolled hypertension now improved  4. Renal artery stenosis bilateral, status post right main renal arteries stenting  5. Remote history of tobacco abuse  6. Progressive dyspnea on exertion  7. Hyperlipidemia  8. Hypertensive heart disease with moderate LVH and likely chronic diastolic heart failure. 9.  Likely extracranial cerebrovascular disease with bilateral carotid bruit  11. Likely bilateral pneumonia secondary to COVID-19 in January and February 2020  12. CAD s/p PCI to diagonal vessel for chest pain in 2019, moderate disease in mid LAD now with accelerated anginal equivalent symptoms. Ms King Salinas presents for annual follow up. Her anemia has resolved. Nonetheless she continues to have progressive shortness of breath which is quite significant. This appears to be anginal equivalent with accelerated symptoms. Notably on her last cardiac catheterization she had moderate disease in her LAD and RCA and I suspect there is a significant risk of progressive coronary disease causing her symptoms. For that reason I recommend proceeding with cardiac catheterization. Her blood pressures appear to be remarkably well controlled now with losartan, amlodipine, beta-blocker. Notably there was increasing right renal stent velocities on her last renal Doppler. It may be reasonable to proceed with right renal artery angiogram at the same time as coronary angiography.   I discussed the risks/benefits/alternatives of the procedure with the patient. Risks include (but are not limited to) bleeding, infection,stroke,heart attack, need for emergency surgery/pericardiocentesis, need for dialysis or death. The patient understands and agrees to proceed. Finally she has reported occasional episodes of palpitations that lasted me a few minutes and appeared to be with irregular heartbeat. I discussed that there could be a possibility of atrial arrhythmia especially atrial fibrillation in a paroxysmal fashion. We could identify/confirm it further with extended monitoring but patient is not interested in that at this point in time. Total of 42 minutes were spent on reviewing the records, analyzing investigations and documentation in the chart, on the day of visit in addition to time spent with the patient for interview, examination and counselling        Investigations personally reviewed and interpreted:  Cath Sep 2019: Severe disease in D1 s/p PCI with 2 REBECCA. Moderate disease in mid LAD, otherwise non obstructive disease. Bilateral renal Dopplers March 2021: Patent flow in the right renal stent. Less than 60% stenosis in left renal artery. Renal artery Doppler July 2022: Patent flow in the right renal stent. Velocities are increased in all 3 renal arteries 2 on the right, 1 on the left in equal fashion. Uncertain of the cause of increased velocities. There is no turbulence in the mid and distal renal segments although diastolic velocities are increased in right lower renal artery. Investigations outside records:  10/18 vcs normal stress echo, did not reach target heart rate  10/18 vcs normal lexiscan cardiolyte  Investigations personally reviewed and interpreted by me  ECG: Sinus bradycardia, nonspecific ST-T changes. Echocardiogram: Normal LV systolic function mild to moderate LV hypertrophy consistent with hypertensive heart disease.   CT abdomen with angiogram and runoff: 2 renal arteries are present bilaterally. Right major renal artery has at least moderate to severe ostial stenosis. Accessory right renal artery is free of significant disease. Left renal arteries are equal in size. CT scan has been reported to demonstrate disease in superior renal artery although I cannot personally confirm it. Both arteries on the left appear to be too small to provide durable results with renal artery stenting. Renal angiogram and stenting July 2019: Main right renal artery 75% stenosis, accessory renal artery on the right free of significant disease. 2 codominant left renal arteries, one with 40 to 50% angiographic stenosis other free of disease. Status post proximal right main renal artery stenting with 5 x 15 Herculink stent with 0% residual stenosis. Renal arterial Doppler August 2019: Patent stent in main proximal right renal artery. Accessory right renal artery not visualized. Main left renal artery has less than 60% stenosis. Accessory renal artery on the left is not visualized. Technically suboptimal study. Normal kidney size bilaterally. HPI: Yanet Fulton, a 78y.o. year-old who was previously seen for evaluation of uncontrolled hypertension and CAD. She is here for annual follow up. Mrs. Renford Lefort has history of extremely difficult to control hypertension and was admitted to the hospital with malignant hypertension on multiple occasions in 2019 associated with flash pulmonary edema. Subsequently she underwent invasive renal angiogram which demonstrated 70 to 75% stenosis in main right renal artery in its proximal segment. She subsequently underwent stenting of right renal artery with a 5 x 15 mm Herculink bare-metal stent with no residual stenosis. She has moderate stenosis and a relatively small sized left renal artery. There is an accessory left renal artery free of significant disease along with an accessory right renal artery free of significant disease.       The past 2 months has had increasing shortness of breath, increasing blood pressure. Today the blood pressure is better within normal range on losartan amlodipine and beta-blocker combination. Occasionally she reports of palpitations in the morning when she is resting which last for a few minutes and then resolved spontaneously. No dizziness is reported with this. Past medical history:  She  has a past medical history of CAD (coronary artery disease) (09/12/2019), Foot drop, left foot, GERD (gastroesophageal reflux disease), Hypertension, Pneumonia, and MARY (renal artery stenosis) ([facility] (07/24/2019). She has no past medical history of Adverse effect of anesthesia, Diabetes ([facility] or Sleep apnea. Patient reports no PND/Orthpnea/CP. He reports no cough/fever/focal neurological deficits/abdominal pain. All other systems negative except as above. PE  Vitals:    03/15/23 1042 03/15/23 1051   BP: 136/84    Pulse: 64 (!) 59   Resp: 16    SpO2: 95%    Weight: 128 lb (58.1 kg)    Height: 5' 3\" (1.6 m)     Body mass index is 22.67 kg/m². /84 (BP 1 Location: Left upper arm, BP Patient Position: Sitting, BP Cuff Size: Adult)   Pulse (!) 59 Comment: during ekg  Resp 16   Ht 5' 3\" (1.6 m)   Wt 128 lb (58.1 kg)   SpO2 95%   BMI 22.67 kg/m²   General:    Alert, cooperative, no distress. Psychiatric:    Normal Mood and affect    Eye/ENT:      Pupils equal, No asymmetry, Conjunctival pink. Able to hear voice at normal amplitude   Lungs:      Visibly symmetric chest expansion, No palpable tenderness. Clear to auscultation bilaterally. Heart[de-identified]    Regular rate and rhythm, S1, S2 normal, no murmur, click, rub or gallop. No JVD, Normal palpable peripheral pulses. No cyanosis   Abdomen:     Soft, non-tender. Bowel sounds normal. No masses,  No      organomegaly. Right renal bruits heard   Extremities:   Extremities normal, atraumatic, no edema. Neurologic:   CN II-XII grossly intact.  No gross focal deficits         Recent Labs:  Lab Results   Component Value Date/Time    Cholesterol, total 111 02/23/2020 03:28 AM    HDL Cholesterol 27 02/23/2020 03:28 AM    LDL, calculated 35.4 02/23/2020 03:28 AM    Triglyceride 243 (H) 02/23/2020 03:28 AM    CHOL/HDL Ratio 4.1 02/23/2020 03:28 AM     Lab Results   Component Value Date/Time    Creatinine (POC) 0.9 10/20/2016 10:16 AM    Creatinine 1.01 12/15/2020 11:59 AM     Lab Results   Component Value Date/Time    BUN 22 (H) 12/15/2020 11:59 AM     Lab Results   Component Value Date/Time    Potassium 5.0 12/15/2020 11:59 AM     Lab Results   Component Value Date/Time    Hemoglobin A1c 5.4 02/22/2020 05:32 AM     Lab Results   Component Value Date/Time    HGB 12.7 12/15/2020 11:59 AM     Lab Results   Component Value Date/Time    PLATELET 477 72/67/8648 11:59 AM       Reviewed:  Past Medical History:   Diagnosis Date    CAD (coronary artery disease) 09/12/2019    stent D1 9/12/2019 ([place])    Foot drop, left foot     GERD (gastroesophageal reflux disease)     Hypertension     Pneumonia     MARY (renal artery stenosis) ([facility] 07/24/2019    s/p right renal stent 7/24/2019 [facility])     Social History     Tobacco Use   Smoking Status Former    Packs/day: 0.50    Types: Cigarettes   Smokeless Tobacco Never     Social History     Substance and Sexual Activity   Alcohol Use Yes    Alcohol/week: 6.0 standard drinks    Types: 6 Glasses of wine per week    Comment: 6 a week     No Known Allergies  Family History   Problem Relation Age of Onset    Heart Attack Father 47        Current Outpatient Medications   Medication Sig    amLODIPine (NORVASC) 5 mg tablet Take 1 Tablet by mouth two (2) times a day.     losartan (COZAAR) 50 mg tablet TAKE TWO TABLETS BY MOUTH ONE TIME DAILY    atorvastatin (LIPITOR) 40 mg tablet TAKE TWO TABLETS BY MOUTH ONE TIME DAILY    carvediloL (COREG) 25 mg tablet TAKE ONE TABLET BY MOUTH TWICE A DAY    ALPRAZolam (XANAX) 1 mg tablet Take 1 Tab by mouth two (2) times daily as needed for Anxiety. Max Daily Amount: 2 mg. albuterol-ipratropium (DUO-NEB) 2.5 mg-0.5 mg/3 ml nebu 3 mL by Nebulization route every four (4) hours as needed for Wheezing, Shortness of Breath or Respiratory Distress. budesonide-formoteroL (SYMBICORT) 160-4.5 mcg/actuation HFAA Take 2 Puffs by inhalation two (2) times a day. meloxicam (MOBIC) 15 mg tablet Take 15 mg by mouth daily. sertraline (ZOLOFT) 100 mg tablet Take 100 mg by mouth nightly. zolpidem (AMBIEN) 10 mg tablet Take  by mouth nightly as needed for Sleep. No current facility-administered medications for this visit. Norma Dutton MD03/15/23 There are other unrelated non-urgent complaints, but due to the busy schedule and the amount of time I've already spent with her, time does not permit me to address these routine issues at today's visit. I've requested another appointment to review these additional issues.     [facility] [place]  [address], [address] [place] [address]

## 2023-03-16 ENCOUNTER — TELEPHONE (OUTPATIENT)
Dept: CARDIOLOGY CLINIC | Age: 78
End: 2023-03-16

## 2023-03-16 DIAGNOSIS — I25.10 ATHEROSCLEROTIC CARDIOVASCULAR DISEASE: ICD-10-CM

## 2023-03-16 DIAGNOSIS — Z01.812 PRE-PROCEDURE LAB EXAM: Primary | ICD-10-CM

## 2023-03-16 DIAGNOSIS — I15.0 RENOVASCULAR HYPERTENSION: ICD-10-CM

## 2023-03-16 NOTE — TELEPHONE ENCOUNTER
Spoke to patient and scheduled her for [facility] with Dr. Hank Carlin 3/24/23 @ 1:15 pm with 11:15 am arrival. Instructions gone over with patient as well as sent a copy to her my chart. Advised patient to have labs done by 3/21/23 and that she doesn't need to hold any of her medications. Patient verbalized understanding and had no  questions at the time of call. Patient identification verified x2. Patient Instructions    Catheterization   Pre-procedure instructions  3-4 days prior to procedure  Lab work:  Please do by 3/21/23  Which will be in [facility] sites records, or a printed copy to go to [address] [place] [address],[address]  Night of the procedure nothing to eat or drink after midnight, you may take approved medications the morning of the procedure with a few sips of water  Stop blood thinners 2 days prior to procedure EXCEPT: Brilinta, Plavix or Aspirin  Medications restrictions: No medications to hold.     Procedure day  Have a  that will bring you and take you home (6-8 hours)  Have a responsible adult that can stay with you for 24 hours  Bring ID and insurance card  Wear comfortable clothing  Leave valuables at home, bring: dentures, hearing aids, or glasses  Bring overnight bag (just in case of an overnight stay)  Where to report  [facility]: go through main entrance and to the left is outpatient registration      Date of procedure:  3/24/23 w/ Dr. Faiza Gomez Time: 11:15 am    Post procedure instructions  No driving for 24 hours  No heavy lifting (over 10lbs) or strenuous activity for 48 hours  No baths, swimming, hot tubs, or spas for a week  The band aid over the cath site may be removed the day after procedure and washed gently with soap and water  The site may be bruised or discolored for a couple of weeks, a small knot may also be present  You may have tenderness/soreness in groin or wrist area, you may take Tylenol for relief    When to call the office  You notice any tingling, numbness, coldness, or loss of feeling, or you have a fever for 2-3 days after the procedure, if there is visible blood at the site, hold pressure for 20 minutes and then call     [facility] [address]                                                           [facility], [address]                                                        Cath lab: [id number] office: [phone]----LHCVJ

## 2023-03-20 RX ORDER — SODIUM CHLORIDE 9 MG/ML
75 INJECTION, SOLUTION INTRAVENOUS CONTINUOUS
Status: CANCELLED | OUTPATIENT
Start: 2023-03-20 | End: 2023-03-20

## 2023-03-22 NOTE — PROGRESS NOTES
Spoke with patient on 3-21 who was concerned about not having a ride home following her LHC with Wilfrid Sofia  on 3/24.     3-22 at 1210pm I spoke with \"CC\" in case management who recommended a hospital to home ride service to be set up by the patient. I called the patient back and explained the hospital to home ride service that was recommended. The hospital to home ride service is [phone].

## 2023-03-24 ENCOUNTER — HOSPITAL ENCOUNTER (OUTPATIENT)
Age: 78
Setting detail: OUTPATIENT SURGERY
Discharge: HOME | End: 2023-03-24
Attending: INTERNAL MEDICINE | Admitting: INTERNAL MEDICINE
Payer: MEDICARE

## 2023-03-24 VITALS
TEMPERATURE: 98.1 F | HEIGHT: 63 IN | WEIGHT: 128 LBS | OXYGEN SATURATION: 92 % | HEART RATE: 61 BPM | RESPIRATION RATE: 15 BRPM | SYSTOLIC BLOOD PRESSURE: 157 MMHG | BODY MASS INDEX: 22.68 KG/M2 | DIASTOLIC BLOOD PRESSURE: 70 MMHG

## 2023-03-24 DIAGNOSIS — I20.0 UNSTABLE ANGINA (HCC): ICD-10-CM

## 2023-03-24 PROCEDURE — 74011000250 HC RX REV CODE- 250: Performed by: INTERNAL MEDICINE

## 2023-03-24 PROCEDURE — C1769 GUIDE WIRE: HCPCS | Performed by: INTERNAL MEDICINE

## 2023-03-24 PROCEDURE — 76937 US GUIDE VASCULAR ACCESS: CPT | Performed by: INTERNAL MEDICINE

## 2023-03-24 PROCEDURE — 74011250636 HC RX REV CODE- 250/636: Performed by: INTERNAL MEDICINE

## 2023-03-24 PROCEDURE — 99152 MOD SED SAME PHYS/QHP 5/>YRS: CPT | Performed by: INTERNAL MEDICINE

## 2023-03-24 PROCEDURE — 77030013744: Performed by: INTERNAL MEDICINE

## 2023-03-24 PROCEDURE — 74011000636 HC RX REV CODE- 636: Performed by: INTERNAL MEDICINE

## 2023-03-24 PROCEDURE — 93458 L HRT ARTERY/VENTRICLE ANGIO: CPT | Performed by: INTERNAL MEDICINE

## 2023-03-24 PROCEDURE — 99153 MOD SED SAME PHYS/QHP EA: CPT | Performed by: INTERNAL MEDICINE

## 2023-03-24 PROCEDURE — C1894 INTRO/SHEATH, NON-LASER: HCPCS | Performed by: INTERNAL MEDICINE

## 2023-03-24 PROCEDURE — 77030040934 HC CATH DIAG DXTERITY MEDT -A: Performed by: INTERNAL MEDICINE

## 2023-03-24 PROCEDURE — 2709999900 HC NON-CHARGEABLE SUPPLY: Performed by: INTERNAL MEDICINE

## 2023-03-24 RX ORDER — LIDOCAINE HYDROCHLORIDE 10 MG/ML
INJECTION INFILTRATION; PERINEURAL AS NEEDED
Status: DISCONTINUED | OUTPATIENT
Start: 2023-03-24 | End: 2023-03-24 | Stop reason: HOSPADM

## 2023-03-24 RX ORDER — SODIUM CHLORIDE 0.9 % (FLUSH) 0.9 %
5-40 SYRINGE (ML) INJECTION EVERY 8 HOURS
Status: DISCONTINUED | OUTPATIENT
Start: 2023-03-24 | End: 2023-03-24 | Stop reason: HOSPADM

## 2023-03-24 RX ORDER — IPRATROPIUM BROMIDE AND ALBUTEROL SULFATE 2.5; .5 MG/3ML; MG/3ML
3 SOLUTION RESPIRATORY (INHALATION)
Status: COMPLETED | OUTPATIENT
Start: 2023-03-24 | End: 2023-03-24

## 2023-03-24 RX ORDER — SODIUM CHLORIDE 9 MG/ML
75 INJECTION, SOLUTION INTRAVENOUS CONTINUOUS
Status: DISPENSED | OUTPATIENT
Start: 2023-03-24 | End: 2023-03-24

## 2023-03-24 RX ORDER — HEPARIN SODIUM 200 [USP'U]/100ML
INJECTION, SOLUTION INTRAVENOUS
Status: COMPLETED | OUTPATIENT
Start: 2023-03-24 | End: 2023-03-24

## 2023-03-24 RX ORDER — SODIUM CHLORIDE 0.9 % (FLUSH) 0.9 %
5-40 SYRINGE (ML) INJECTION AS NEEDED
Status: DISCONTINUED | OUTPATIENT
Start: 2023-03-24 | End: 2023-03-24 | Stop reason: HOSPADM

## 2023-03-24 RX ORDER — FENTANYL CITRATE 50 UG/ML
INJECTION, SOLUTION INTRAMUSCULAR; INTRAVENOUS AS NEEDED
Status: DISCONTINUED | OUTPATIENT
Start: 2023-03-24 | End: 2023-03-24 | Stop reason: HOSPADM

## 2023-03-24 RX ORDER — SODIUM CHLORIDE 9 MG/ML
125 INJECTION, SOLUTION INTRAVENOUS CONTINUOUS
Status: DISCONTINUED | OUTPATIENT
Start: 2023-03-24 | End: 2023-03-24 | Stop reason: HOSPADM

## 2023-03-24 RX ORDER — MIDAZOLAM HYDROCHLORIDE 1 MG/ML
INJECTION, SOLUTION INTRAMUSCULAR; INTRAVENOUS AS NEEDED
Status: DISCONTINUED | OUTPATIENT
Start: 2023-03-24 | End: 2023-03-24 | Stop reason: HOSPADM

## 2023-03-24 RX ADMIN — SODIUM CHLORIDE 75 ML/HR: 9 INJECTION, SOLUTION INTRAVENOUS at 12:07

## 2023-03-24 RX ADMIN — IPRATROPIUM BROMIDE AND ALBUTEROL SULFATE 3 ML: .5; 3 SOLUTION RESPIRATORY (INHALATION) at 17:47

## 2023-03-24 NOTE — Clinical Note
TRANSFER - OUT REPORT:     Verbal report given to: RN. Report consisted of patient's Situation, Background, Assessment and   Recommendations(SBAR). Opportunity for questions and clarification was provided. Patient transported with a Registered Nurse. Patient transported to: recovery.

## 2023-03-24 NOTE — DISCHARGE INSTRUCTIONS
CARDIAC CATHETERIZATION DISCHARGE INSTRUCTIONS    It is normal to feel tired the first couple days. Take it easy and follow the physicians instructions. CHECK THE CATHETER INSERTION SITE DAILY:    You may shower 24 hours after the procedure, remove the bandage during showering. Wash with soap and water and pat dry. Gentle cleaning of the site with soap and water is sufficient, cover with a dry clean dressing or bandage. Do not apply creams or powders to the area. Do not sit in a bathtub or pool of water for 7 days or until wound has completely healed. Temporary bruising and discomfort is normal and may last a few weeks. You may have a  formation of a small lump at the site which may last up to 6 weeks. CALL THE PHYSICIANS:    If the site becomes red, swollen or feels warm to the touch  If there is bleeding or drainage or if there is unusual pain at the groin or down the leg. If there is any bleeding, lie down, apply pressure or have someone apply pressure with a clean cloth until the bleeding stops. If the bleeding continues, call 911 to be transported to the hospital.  DO NOT DRIVE YOURSELF, [place] [address]. ACTIVITY:    For the first 24-48 hours or as instructed by the physician:  No lifting, pushing or pulling over 10 pounds and no straining the insertion site. Do not life grocery bags or the garbage can, do not run the vacuum  or  for 7 days. Start with short walks as in the hospital and gradually increase as tolerated each day. It is recommended to walk 30 minutes 5-7 days per week. Follow your physicians instructions on activity. Avoid walking outside in extremes of heat or cold. Walk inside when it is cold and windy or hot and humid. Things to keep in mind:  No driving for at least 24 hours, or as designated by your physician. Limit the number of times you go up and down the stairs  Take rests and pace yourself with activity.   Be careful and do not strain with bowel movements. MEDICATIONS:    Take all medications as prescribed  Call your physician if you have any questions  Keep an updated list of your medications with you at all times and give a list to your physician and pharmacist        SIGNS AND SYMPTOMS:    Be cautious of symptoms of angina or recurrent symptoms such as chest discomfort, unusual shortness of breath or fatigue. These could be symptoms of restenosis, a new blockage or a heart attack. If your symptoms are relieved with rest it is still recommended that you notify your physician of recurrent chest pain or discomfort. FOR CHEST PAIN or symptoms of angina not relieved with rest:  If the discomfort is not relieved with rest, and you have been prescribed Nitroglycerin, take as directed (taken under the tongue, one at a time 5 minutes apart for a total of 3 doses). If the discomfort is not relieved after the 3rd nitroglycerin, call 911. If you have not been prescribed Nitroglycerin  and your chest discomfort is not relieved with rest, call 911. AFTER CARE:    Follow up with your physician as instructed. Follow a heart healthy diet with proper portion control, daily stress management, daily exercise, blood pressure and cholesterol control , and smoking cessation.

## 2023-03-24 NOTE — PROGRESS NOTES
Cardiac Cath Lab Recovery Arrival Note:      Roman Pineda arrived to Cardiac Cath Lab, Recovery Area. Staff introduced to patient. Patient identifiers verified with NAME and DATE OF BIRTH. Procedure verified with patient. Consent forms reviewed and signed by patient or authorized representative and verified. Allergies verified. Patient and family oriented to department. Patient and family informed of procedure and plan of care. Questions answered with review. Patient prepped for procedure, per orders from physician, prior to arrival.    Patient on cardiac monitor, non-invasive blood pressure, SPO2 monitor. On RA. Patient is A&Ox 4. Patient reports no complaints. Patient in stretcher, in low position, with side rails up, call bell within reach, patient instructed to call if assistance as needed. Patient prep in:[employer]. Patient family has pager #   Family in: No family, hospital to home for transportation.    Prep by: Bri Fang

## 2023-03-24 NOTE — PROGRESS NOTES
TRANSFER - IN REPORT:    Verbal report received from Lora Vines RN on Margot Piper  being received from [facility] for routine progression of care. Report consisted of patients Situation, Background, Assessment and Recommendations(SBAR). Information from the following report(s) Procedure Summary, Intake/Output, and MAR was reviewed with the receiving clinician. Opportunity for questions and clarification was provided. Assessment completed upon patients arrival to [address] [place] [address] and care assumed. Cardiac Cath Lab Recovery Arrival Note:    Margot Piper arrived to [facility] recovery area. Patient procedure= Diagnostic LHC. Patient on cardiac monitor, non-invasive blood pressure, SPO2 monitor. On RA. IV  of NS on pump at 50 ml/hr. Patient status doing well without problems. Patient is A&Ox 4. Patient reports no complaints. PROCEDURE SITE CHECK:    Procedure site:Right groin without any bleeding and dressing C/D/I, zero pain/discomfort reported at procedure site. No change in patient status. Continue to monitor patient and status. 1745: desats to 88-89% with expiratory wheezing. MD notified, ordered Duo neb treatment. Still okay for discharge if improvement in breath sounds. 1800: Verified with trisha CARBONE with post treatment results. 1815: discharge instructions reviewed with patient, extensive education on use of albuterol inhaler use with teachback. Restrictions, precautions and site care discussed. 1830: OOB, ambulated within the department. Right groin c/d/I.   1845: IV removed, patient dressed. Ride to be here at 1900.  Taken out in wheelchair by "[employer]

## 2023-03-24 NOTE — PROGRESS NOTES
Cardiac Cath Lab Procedure Area Arrival Note:    Jamal Best arrived to Cardiac Cath Lab, Procedure Area. Patient identifiers verified with NAME and DATE OF BIRTH. Procedure verified with patient. Consent forms verified. Allergies verified. Patient informed of procedure and plan of care. Questions answered with review. Patient voiced understanding of procedure and plan of care. Patient on cardiac monitor, non-invasive blood pressure, SPO2 monitor. On RA . IV of NS on pump at 75 ml/hr. Patient status doing well without problems. Patient is A&Ox 4. Patient reports no CP or SOB. Patient medicated during procedure with orders obtained and verified by Dr. Christal Otto. Refer to patients Cardiac Cath Lab PROCEDURE REPORT for vital signs, assessment, status, and response during procedure, printed at end of case. Printed report on chart or scanned into chart.

## 2023-03-24 NOTE — Clinical Note
Right femoral artery. Accessed successfully. Radial access needle used. Using ultrasound guidance.  Number of attempts =  1.

## 2023-03-24 NOTE — PROGRESS NOTES
CATH LAB to RECOVERY ROOM REPORT    Procedure: [facility]     MD:    The procedure was diagnostic only    Verbal Report given to Recovery Nurse on patient being transferred to Cardiac Cath Lab RR for routine post-op. Patient stable upon transfer to . Vitals, mental status, MAR, procedural summary discussed with recovery RN. Medication given during procedure:    Contrast:33mL                          Versed:5mg                                                               Fentanyl:100mcg                                                               Sheaths:  Right femoral artery 6fr sheath pulled at 333 pm with   [facility] Cath Closure Device: manual hold.     Distal pulse to femoral sheath access is PALPABLE: weak; doppler

## 2023-03-24 NOTE — PROCEDURES
Findings  1. Moderate one-vessel coronary artery disease involving mid LAD with 50 to 60% stenosis, relatively unchanged compared to cath 3 years ago  2. Mild disease in proximal RCA  3. Patent stents in proximal diagonal  4. High-grade stenosis in right common iliac artery extending into the right external iliac artery with 40 mm gradient between the aorta and the right common femoral artery  4. Normal LVEDP    Access right radial was occluded. Right femoral ultrasound-guided access with a 4 Niuean sheath. Difficulty crossing the right common iliac but eventually was able to cross with [facility] AND [facility] wire. Significant disease in right common iliac as well as femoral artery. Contrast 40 cc    Recommendations  1. Continue medical management for coronary artery disease. Unlikely that her severe symptoms at minimal activity are related to coronary artery disease as lesion appears to be relatively stable  2. If she has progression of right lower extremity cramping and or progressive claudication, may consider right iliac artery intervention. 3.  Dyspnea predominantly related to COPD/asthma with probable recent exacerbation due to allergies. Recommend follow-up with pulmonology and use of short and long-acting bronchodilators.

## 2023-03-28 ENCOUNTER — HOSPITAL ENCOUNTER (INPATIENT)
Age: 78
LOS: 3 days | Discharge: HOME | End: 2023-03-31
Attending: EMERGENCY MEDICINE | Admitting: FAMILY MEDICINE
Payer: MEDICARE

## 2023-03-28 ENCOUNTER — APPOINTMENT (OUTPATIENT)
Dept: GENERAL RADIOLOGY | Age: 78
End: 2023-03-28
Attending: EMERGENCY MEDICINE
Payer: MEDICARE

## 2023-03-28 DIAGNOSIS — R09.02 HYPOXIA: ICD-10-CM

## 2023-03-28 DIAGNOSIS — R06.2 WHEEZING: ICD-10-CM

## 2023-03-28 DIAGNOSIS — J44.1 COPD EXACERBATION (HCC): Primary | ICD-10-CM

## 2023-03-28 LAB
ALBUMIN SERPL-MCNC: 4 G/DL (ref 3.5–5)
ALBUMIN/GLOB SERPL: 1.1 (ref 1.1–2.2)
ALP SERPL-CCNC: 67 U/L (ref 45–117)
ALT SERPL-CCNC: 27 U/L (ref 12–78)
ANION GAP SERPL CALC-SCNC: 4 MMOL/L (ref 5–15)
AST SERPL-CCNC: 21 U/L (ref 15–37)
ATRIAL RATE: 53 BPM
BASOPHILS # BLD: 0 K/UL (ref 0–0.1)
BASOPHILS NFR BLD: 1 % (ref 0–1)
BILIRUB SERPL-MCNC: 0.5 MG/DL (ref 0.2–1)
BNP SERPL-MCNC: 357 PG/ML
BUN SERPL-MCNC: 18 MG/DL (ref 6–20)
BUN/CREAT SERPL: 21 (ref 12–20)
CALCIUM SERPL-MCNC: 9.6 MG/DL (ref 8.5–10.1)
CALCULATED P AXIS, ECG09: 71 DEGREES
CALCULATED R AXIS, ECG10: 93 DEGREES
CALCULATED T AXIS, ECG11: 69 DEGREES
CHLORIDE SERPL-SCNC: 108 MMOL/L (ref 97–108)
CO2 SERPL-SCNC: 24 MMOL/L (ref 21–32)
COMMENT, HOLDF: NORMAL
CREAT SERPL-MCNC: 0.86 MG/DL (ref 0.55–1.02)
DIAGNOSIS, 93000: NORMAL
DIFFERENTIAL METHOD BLD: ABNORMAL
EOSINOPHIL # BLD: 0.3 K/UL (ref 0–0.4)
EOSINOPHIL NFR BLD: 5 % (ref 0–7)
ERYTHROCYTE [DISTWIDTH] IN BLOOD BY AUTOMATED COUNT: 12.9 % (ref 11.5–14.5)
GLOBULIN SER CALC-MCNC: 3.6 G/DL (ref 2–4)
GLUCOSE SERPL-MCNC: 134 MG/DL (ref 65–100)
HCT VFR BLD AUTO: 36.7 % (ref 35–47)
HGB BLD-MCNC: 12.3 G/DL (ref 11.5–16)
IMM GRANULOCYTES # BLD AUTO: 0 K/UL (ref 0–0.04)
IMM GRANULOCYTES NFR BLD AUTO: 0 % (ref 0–0.5)
LYMPHOCYTES # BLD: 1.3 K/UL (ref 0.8–3.5)
LYMPHOCYTES NFR BLD: 21 % (ref 12–49)
MCH RBC QN AUTO: 35 PG (ref 26–34)
MCHC RBC AUTO-ENTMCNC: 33.5 G/DL (ref 30–36.5)
MCV RBC AUTO: 104.6 FL (ref 80–99)
MONOCYTES # BLD: 0.3 K/UL (ref 0–1)
MONOCYTES NFR BLD: 5 % (ref 5–13)
NEUTS SEG # BLD: 4.3 K/UL (ref 1.8–8)
NEUTS SEG NFR BLD: 68 % (ref 32–75)
NRBC # BLD: 0 K/UL (ref 0–0.01)
NRBC BLD-RTO: 0 PER 100 WBC
P-R INTERVAL, ECG05: 160 MS
PLATELET # BLD AUTO: 161 K/UL (ref 150–400)
PMV BLD AUTO: 9 FL (ref 8.9–12.9)
POTASSIUM SERPL-SCNC: 4 MMOL/L (ref 3.5–5.1)
PROT SERPL-MCNC: 7.6 G/DL (ref 6.4–8.2)
Q-T INTERVAL, ECG07: 450 MS
QRS DURATION, ECG06: 68 MS
QTC CALCULATION (BEZET), ECG08: 422 MS
RBC # BLD AUTO: 3.51 M/UL (ref 3.8–5.2)
SAMPLES BEING HELD,HOLD: NORMAL
SARS-COV-2 RDRP RESP QL NAA+PROBE: NOT DETECTED
SODIUM SERPL-SCNC: 136 MMOL/L (ref 136–145)
SOURCE, COVRS: NORMAL
TROPONIN I SERPL HS-MCNC: 8 NG/L (ref 0–37)
VENTRICULAR RATE, ECG03: 53 BPM
WBC # BLD AUTO: 6.3 K/UL (ref 3.6–11)

## 2023-03-28 PROCEDURE — 36415 COLL VENOUS BLD VENIPUNCTURE: CPT

## 2023-03-28 PROCEDURE — 74011000250 HC RX REV CODE- 250: Performed by: EMERGENCY MEDICINE

## 2023-03-28 PROCEDURE — 74011250636 HC RX REV CODE- 250/636: Performed by: NURSE PRACTITIONER

## 2023-03-28 PROCEDURE — 84484 ASSAY OF TROPONIN QUANT: CPT

## 2023-03-28 PROCEDURE — 83880 ASSAY OF NATRIURETIC PEPTIDE: CPT

## 2023-03-28 PROCEDURE — 71046 X-RAY EXAM CHEST 2 VIEWS: CPT

## 2023-03-28 PROCEDURE — 96374 THER/PROPH/DIAG INJ IV PUSH: CPT

## 2023-03-28 PROCEDURE — 99285 EMERGENCY DEPT VISIT HI MDM: CPT

## 2023-03-28 PROCEDURE — 74011250637 HC RX REV CODE- 250/637: Performed by: NURSE PRACTITIONER

## 2023-03-28 PROCEDURE — 65270000046 HC RM TELEMETRY

## 2023-03-28 PROCEDURE — 74011250636 HC RX REV CODE- 250/636: Performed by: EMERGENCY MEDICINE

## 2023-03-28 PROCEDURE — 94640 AIRWAY INHALATION TREATMENT: CPT

## 2023-03-28 PROCEDURE — 87635 SARS-COV-2 COVID-19 AMP PRB: CPT

## 2023-03-28 PROCEDURE — 93005 ELECTROCARDIOGRAM TRACING: CPT

## 2023-03-28 PROCEDURE — A9270 NON-COVERED ITEM OR SERVICE: HCPCS | Performed by: EMERGENCY MEDICINE

## 2023-03-28 PROCEDURE — 74011000250 HC RX REV CODE- 250: Performed by: NURSE PRACTITIONER

## 2023-03-28 PROCEDURE — 85025 COMPLETE CBC W/AUTO DIFF WBC: CPT

## 2023-03-28 PROCEDURE — 80053 COMPREHEN METABOLIC PANEL: CPT

## 2023-03-28 PROCEDURE — 74011636637 HC RX REV CODE- 636/637: Performed by: EMERGENCY MEDICINE

## 2023-03-28 RX ORDER — MULTIVITAMIN
1 TABLET ORAL DAILY
COMMUNITY

## 2023-03-28 RX ORDER — MORPHINE SULFATE 2 MG/ML
1 INJECTION, SOLUTION INTRAMUSCULAR; INTRAVENOUS
Status: DISCONTINUED | OUTPATIENT
Start: 2023-03-28 | End: 2023-03-31 | Stop reason: HOSPADM

## 2023-03-28 RX ORDER — SODIUM CHLORIDE 0.9 % (FLUSH) 0.9 %
5-40 SYRINGE (ML) INJECTION AS NEEDED
Status: DISCONTINUED | OUTPATIENT
Start: 2023-03-28 | End: 2023-03-31 | Stop reason: HOSPADM

## 2023-03-28 RX ORDER — BUDESONIDE 0.5 MG/2ML
500 INHALANT ORAL
Status: DISCONTINUED | OUTPATIENT
Start: 2023-03-28 | End: 2023-03-31 | Stop reason: HOSPADM

## 2023-03-28 RX ORDER — ALBUTEROL SULFATE 90 UG/1
2 AEROSOL, METERED RESPIRATORY (INHALATION)
COMMUNITY

## 2023-03-28 RX ORDER — ONDANSETRON 2 MG/ML
4 INJECTION INTRAMUSCULAR; INTRAVENOUS
Status: DISCONTINUED | OUTPATIENT
Start: 2023-03-28 | End: 2023-03-31 | Stop reason: HOSPADM

## 2023-03-28 RX ORDER — ATORVASTATIN CALCIUM 10 MG/1
40 TABLET, FILM COATED ORAL
Status: DISCONTINUED | OUTPATIENT
Start: 2023-03-28 | End: 2023-03-29

## 2023-03-28 RX ORDER — CARVEDILOL 12.5 MG/1
25 TABLET ORAL 2 TIMES DAILY
Status: DISCONTINUED | OUTPATIENT
Start: 2023-03-28 | End: 2023-03-31 | Stop reason: HOSPADM

## 2023-03-28 RX ORDER — LEVOFLOXACIN 5 MG/ML
500 INJECTION, SOLUTION INTRAVENOUS EVERY 24 HOURS
Status: DISCONTINUED | OUTPATIENT
Start: 2023-03-29 | End: 2023-03-28 | Stop reason: DRUGHIGH

## 2023-03-28 RX ORDER — IPRATROPIUM BROMIDE AND ALBUTEROL SULFATE 2.5; .5 MG/3ML; MG/3ML
3 SOLUTION RESPIRATORY (INHALATION)
Status: DISCONTINUED | OUTPATIENT
Start: 2023-03-28 | End: 2023-03-31 | Stop reason: HOSPADM

## 2023-03-28 RX ORDER — SODIUM CHLORIDE 0.9 % (FLUSH) 0.9 %
5-40 SYRINGE (ML) INJECTION EVERY 8 HOURS
Status: DISCONTINUED | OUTPATIENT
Start: 2023-03-28 | End: 2023-03-31 | Stop reason: HOSPADM

## 2023-03-28 RX ORDER — ENOXAPARIN SODIUM 100 MG/ML
40 INJECTION SUBCUTANEOUS EVERY 24 HOURS
Status: DISCONTINUED | OUTPATIENT
Start: 2023-03-28 | End: 2023-03-31 | Stop reason: HOSPADM

## 2023-03-28 RX ORDER — OXYCODONE HYDROCHLORIDE 5 MG/1
5 TABLET ORAL
Status: DISCONTINUED | OUTPATIENT
Start: 2023-03-28 | End: 2023-03-31 | Stop reason: HOSPADM

## 2023-03-28 RX ORDER — PREDNISONE 20 MG/1
60 TABLET ORAL
Status: COMPLETED | OUTPATIENT
Start: 2023-03-28 | End: 2023-03-28

## 2023-03-28 RX ORDER — SERTRALINE HYDROCHLORIDE 50 MG/1
100 TABLET, FILM COATED ORAL
Status: DISCONTINUED | OUTPATIENT
Start: 2023-03-28 | End: 2023-03-29

## 2023-03-28 RX ORDER — LOSARTAN POTASSIUM 50 MG/1
50 TABLET ORAL 2 TIMES DAILY
COMMUNITY

## 2023-03-28 RX ORDER — LEVOFLOXACIN 5 MG/ML
500 INJECTION, SOLUTION INTRAVENOUS
Status: COMPLETED | OUTPATIENT
Start: 2023-03-28 | End: 2023-03-28

## 2023-03-28 RX ORDER — FAMOTIDINE 20 MG/1
20 TABLET, FILM COATED ORAL 2 TIMES DAILY
Status: DISCONTINUED | OUTPATIENT
Start: 2023-03-28 | End: 2023-03-28 | Stop reason: DRUGHIGH

## 2023-03-28 RX ORDER — ACETAMINOPHEN 325 MG/1
650 TABLET ORAL
Status: DISCONTINUED | OUTPATIENT
Start: 2023-03-28 | End: 2023-03-31 | Stop reason: HOSPADM

## 2023-03-28 RX ORDER — LEVOFLOXACIN 5 MG/ML
250 INJECTION, SOLUTION INTRAVENOUS EVERY 24 HOURS
Status: DISCONTINUED | OUTPATIENT
Start: 2023-03-29 | End: 2023-03-31 | Stop reason: HOSPADM

## 2023-03-28 RX ORDER — FAMOTIDINE 20 MG/1
20 TABLET, FILM COATED ORAL EVERY EVENING
Status: DISCONTINUED | OUTPATIENT
Start: 2023-03-28 | End: 2023-03-31 | Stop reason: HOSPADM

## 2023-03-28 RX ORDER — ZOLPIDEM TARTRATE 5 MG/1
5 TABLET ORAL
Status: DISCONTINUED | OUTPATIENT
Start: 2023-03-28 | End: 2023-03-29

## 2023-03-28 RX ORDER — LOSARTAN POTASSIUM 50 MG/1
100 TABLET ORAL DAILY
Status: DISCONTINUED | OUTPATIENT
Start: 2023-03-29 | End: 2023-03-29

## 2023-03-28 RX ORDER — AMLODIPINE BESYLATE 5 MG/1
5 TABLET ORAL 2 TIMES DAILY
Status: DISCONTINUED | OUTPATIENT
Start: 2023-03-28 | End: 2023-03-31 | Stop reason: HOSPADM

## 2023-03-28 RX ORDER — ATORVASTATIN CALCIUM 40 MG/1
80 TABLET, FILM COATED ORAL
COMMUNITY

## 2023-03-28 RX ADMIN — IPRATROPIUM BROMIDE AND ALBUTEROL SULFATE 1 DOSE: 2.5; .5 SOLUTION RESPIRATORY (INHALATION) at 13:02

## 2023-03-28 RX ADMIN — CARVEDILOL 25 MG: 12.5 TABLET, FILM COATED ORAL at 18:22

## 2023-03-28 RX ADMIN — LEVOFLOXACIN 500 MG: 500 INJECTION, SOLUTION INTRAVENOUS at 15:42

## 2023-03-28 RX ADMIN — IPRATROPIUM BROMIDE AND ALBUTEROL SULFATE 1 DOSE: 2.5; .5 SOLUTION RESPIRATORY (INHALATION) at 14:30

## 2023-03-28 RX ADMIN — FAMOTIDINE 20 MG: 20 TABLET ORAL at 18:22

## 2023-03-28 RX ADMIN — PREDNISONE 60 MG: 20 TABLET ORAL at 13:01

## 2023-03-28 RX ADMIN — IPRATROPIUM BROMIDE AND ALBUTEROL SULFATE 1 DOSE: 2.5; .5 SOLUTION RESPIRATORY (INHALATION) at 13:41

## 2023-03-28 RX ADMIN — SODIUM CHLORIDE, PRESERVATIVE FREE 10 ML: 5 INJECTION INTRAVENOUS at 18:24

## 2023-03-28 RX ADMIN — METHYLPREDNISOLONE SODIUM SUCCINATE 60 MG: 125 INJECTION, POWDER, FOR SOLUTION INTRAMUSCULAR; INTRAVENOUS at 22:22

## 2023-03-28 RX ADMIN — AMLODIPINE BESYLATE 5 MG: 5 TABLET ORAL at 18:22

## 2023-03-28 RX ADMIN — IPRATROPIUM BROMIDE AND ALBUTEROL SULFATE 3 ML: 2.5; .5 SOLUTION RESPIRATORY (INHALATION) at 18:55

## 2023-03-28 RX ADMIN — BUDESONIDE 500 MCG: 0.5 INHALANT RESPIRATORY (INHALATION) at 18:55

## 2023-03-28 NOTE — ED TRIAGE NOTES
Patient arrives to ED via EMS for shortness of breath for 3 days. EMS reports 88-89% on room air, duo neb given and patient placed on 2L NC en route with some relief in symptoms.

## 2023-03-28 NOTE — H&P
History and Physical    Date of Service:  3/28/2023  Primary Care Provider: Babar Delanye MD  Source of information: Chart review    Chief Complaint: Shortness of Breath      History of Presenting Illness:   Pamella Marques is a 78 y.o. female with a PMH of CAD, HTN, GERD, Left Foot Drop, PNA, Hx Renal Artery Stenosis (s/p right renal stent 7/2019 Dr. Dolores Calderón) and COPD who presented via EMS with c/o shortness of breath x3 days. EMS reported 88-89% on RA, duo neb given and patient placed on 2L NC en route to ED with reported symptom improvement. The patient denied headache, blurry vision, sore throat, trouble swallowing, trouble with speech, chest pain, fever, chills, N/V/D, abd pain, urinary symptoms, constipation, recent travels, sick contacts, focal or generalized neurological symptoms, falls, injuries, rashes, contact with COVID-19 diagnosed patients, hematemesis, melena, hemoptysis, hematuria, rashes, denied starting any new medications and denied any other concerns or problems besides as mentioned above. REVIEW OF SYSTEMS:  A comprehensive review of systems was negative except for that written in the History of Present Illness.      Past Medical History:   Diagnosis Date    CAD (coronary artery disease) 09/12/2019    stent D1 9/12/2019 ([place])    Foot drop, left foot     GERD (gastroesophageal reflux disease)     Hypertension     Pneumonia     MARY (renal artery stenosis) ([facility] [address] 07/24/2019    s/p right renal stent 7/24/2019 Aysha Chaparro)      Past Surgical History:   Procedure Laterality Date    HX APPENDECTOMY      HX BACK SURGERY      LUMBAR FUSION X2 SURGERIES    HX BACK SURGERY      CERVICAL X2    HX HYSTERECTOMY      HX TONSILLECTOMY      HX UROLOGICAL  2019    arterial stent in R kidney    [place] UNLISTED PROCEDURE CARDIAC SURGERY  08/2019    cardiac stents     HI UNLISTED PROCEDURE PELVIS/HIP JOINT      Hip replacement 2005     Prior to Admission medications    Medication Sig Start Date End Date Taking? Authorizing Provider   amLODIPine (NORVASC) 5 mg tablet Take 1 Tablet by mouth two (2) times a day. 3/8/23   Kong De Jesus MD   losartan (COZAAR) 50 mg tablet TAKE TWO TABLETS BY MOUTH ONE TIME DAILY 1/11/23   Janna Lee ANP   atorvastatin (LIPITOR) 40 mg tablet TAKE TWO TABLETS BY MOUTH ONE TIME DAILY 9/2/22   Kong De Jesus MD   carvediloL (COREG) 25 mg tablet TAKE ONE TABLET BY MOUTH TWICE A DAY 4/14/22   Janna Lee ANP   ALPRAZolam (XANAX) 1 mg tablet Take 1 Tab by mouth two (2) times daily as needed for Anxiety. Max Daily Amount: 2 mg. 3/4/20   Josefina Maher MD   albuterol-ipratropium (DUO-NEB) 2.5 mg-0.5 mg/3 ml nebu 3 mL by Nebulization route every four (4) hours as needed for Wheezing, Shortness of Breath or Respiratory Distress. 3/4/20   Josefina Maher MD   budesonide-formoteroL (SYMBICORT) 160-4.5 mcg/actuation HFAA Take 2 Puffs by inhalation two (2) times a day. 3/4/20   KARELY Castillo MD   meloxicam (MOBIC) 15 mg tablet Take 15 mg by mouth daily. Provider, Historical   sertraline (ZOLOFT) 100 mg tablet Take 100 mg by mouth nightly. Provider, Historical   zolpidem (AMBIEN) 10 mg tablet Take  by mouth nightly as needed for Sleep. Provider, Historical     No Known Allergies   Family History   Problem Relation Age of Onset    Heart Attack Father 47      Social History:  reports that she has quit smoking. Her smoking use included cigarettes. She smoked an average of .5 packs per day. She has never used smokeless tobacco. She reports current alcohol use of about 6.0 standard drinks per week. She reports that she does not use drugs.    Social Determinants of Health     Tobacco Use: Medium Risk    Smoking Tobacco Use: Former    Smokeless Tobacco Use: Never    Passive Exposure: Not on file   Alcohol Use: Not on file   Financial Resource Strain: Not on file   Food Insecurity: Not on file   Transportation Needs: Not on file   Physical Activity: Not on file   Stress: Not on file   Social Connections: Not on file   Intimate Partner Violence: Not on file   Depression: Not at risk    PHQ-2 Score: 0   Housing Stability: Not on file        Medications were reconciled to the best of my ability given all available resources at the time of admission. Route is PO if not otherwise noted. Family and social history were personally reviewed, all pertinent and relevant details are outlined as above. Objective:   Visit Vitals  BP (!) 159/79 (BP 1 Location: Right arm, BP Patient Position: At rest)   Pulse (!) 57   Temp 97.6 °F (36.4 °C)   Resp 20   Ht 5' 3\" (1.6 m)   Wt 56.9 kg (125 lb 7.1 oz)   SpO2 97%   BMI 22.22 kg/m²    O2 Flow Rate (L/min): 2 l/min O2 Device: Nasal cannula    PHYSICAL EXAM:     Constitutional:  No acute distress, cooperative, pleasant    ENT:  Oral mucosa moist.    Resp:  CTA bilaterally. No wheezing/rhonchi/rales. No accessory muscle use. CV:  Regular rhythm, normal rate, S1,S2.    GI/:  Soft, non distended, non tender, no guarding, BS present. Voids Freely. Musculoskeletal:  No edema, warm. Neurologic:  Moves all extremities. AAOx3. Skin:  w/d. Psych:  Not anxious nor agitated.                Data Review:   I have independently reviewed and interpreted patient's lab and all other diagnostic data    Abnormal Labs Reviewed   CBC WITH AUTOMATED DIFF - Abnormal; Notable for the following components:       Result Value    RBC 3.51 (*)     .6 (*)     MCH 35.0 (*)     All other components within normal limits   METABOLIC PANEL, COMPREHENSIVE - Abnormal; Notable for the following components:    Anion gap 4 (*)     Glucose 134 (*)     BUN/Creatinine ratio 21 (*)     All other components within normal limits       All Micro Results       Procedure Component Value Units Date/Time    COVID-19 RAPID TEST [[id number]] Collected: 03/28/23 1305    Order Status: Completed Specimen: Nasopharyngeal Updated: 03/28/23 1528     Specimen source Nasopharyngeal COVID-19 rapid test Not detected        Comment: Rapid Abbott ID Now       Rapid NAAT:  The specimen is NEGATIVE for SARS-CoV-2, the novel coronavirus associated with COVID-19. Negative results should be treated as presumptive and, if inconsistent with clinical signs and symptoms or necessary for patient management, should be tested with an alternative molecular assay. Negative results do not preclude SARS-CoV-2 infection and should not be used as the sole basis for patient management decisions. This test has been authorized by the FDA under an Emergency Use Authorization (EUA) for use by authorized laboratories. Fact sheet for Healthcare Providers:  http://www.kelsie.jasper/  Fact sheet for Patients: http://www.tiffani-alysia.bielmre/       Methodology: Isothermal Nucleic Acid Amplification                 IMAGING:   XR CHEST PA LAT   Final Result      Unremarkable PA and lateral chest views. ECG/ECHO:    Results for orders placed or performed during the hospital encounter of 03/28/23   EKG, 12 LEAD, INITIAL   Result Value Ref Range    Ventricular Rate 53 BPM    Atrial Rate 53 BPM    P-R Interval 160 ms    QRS Duration 68 ms    Q-T Interval 450 ms    QTC Calculation (Bezet) 422 ms    Calculated P Axis 71 degrees    Calculated R Axis 93 degrees    Calculated T Axis 69 degrees    Diagnosis       Sinus bradycardia  Rightward axis  Possible Anterior infarct , age undetermined  Abnormal ECG  When compared with ECG of 15-DEC-2020 12:08,  No significant change was found            Notes reviewed from all clinical/nonclinical/nursing services involved in patient's clinical care. Care coordination discussions were held with appropriate clinical/nonclinical/ nursing providers based on care coordination needs. Assessment:   Given the patient's current clinical presentation, there is a high level of concern for decompensation if discharged from the emergency department. Complex decision making was performed, which includes reviewing the patient's available past medical records, laboratory results, and imaging studies. Active Problems:    COPD exacerbation ([address] (3/28/2023)        Plan:   COPD Exacerbation:   - CXR 3/28: unremarkable  - ABG  - duo nebs, pulmicort  - IV ABT Levaquin     CAD/HTN: resume home meds  - EKG 3/28: Sinus bradycardia: 53, Possible Anterior infarct. - troponin: 8, BNP: 357    Hyperglycemia: check HgA1c. GERD: pepcid      DIET: ADULT DIET Regular; 4 carb choices (60 gm/meal); Low Fat/Low Chol/High Fiber/2 gm Na   ISOLATION PRECAUTIONS: There are currently no Active Isolations  CODE STATUS: Full Code   DVT PROPHYLAXIS: Lovenox  FUNCTIONAL STATUS PRIOR TO HOSPITALIZATION: Fully active and ambulatory; able to carry on all self-care without restriction. Ambulatory status/function: By self   EARLY MOBILITY ASSESSMENT: Recommend routine ambulation while hospitalized with the assistance of nursing staff  ANTICIPATED DISCHARGE: Greater than 48 hours. ANTICIPATED DISPOSITION: Home  EMERGENCY CONTACT/SURROGATE DECISION MAKER: friend: Lexy Linda: [phone]    CRITICAL CARE WAS PERFORMED FOR THIS ENCOUNTER: NO.      Signed By: Italia Mir NP     March 28, 2023         Please note that this dictation may have been completed with Dragon, the Bio[employer]mber voice recognition software. Quite often unanticipated grammatical, syntax, homophones, and other interpretive errors are inadvertently transcribed by the computer software. Please disregard these errors. Please excuse any errors that have escaped final proofreading.

## 2023-03-28 NOTE — ED PROVIDER NOTES
The history is provided by the patient. Shortness of Breath  This is a new problem. The current episode started more than 2 days ago. The problem has been gradually worsening. Associated symptoms include cough and wheezing. Pertinent negatives include no fever, no headaches, no coryza, no rhinorrhea, no sore throat, no swollen glands, no ear pain, no neck pain, no sputum production, no hemoptysis, no PND, no orthopnea, no chest pain, no syncope, no vomiting, no abdominal pain, no rash, no leg pain, no leg swelling and no claudication. She has tried beta-agonist inhalers for the symptoms. The treatment provided moderate relief. Associated medical issues include COPD and CAD. Past Medical History:   Diagnosis Date    CAD (coronary artery disease) 09/12/2019    stent D1 9/12/2019 ([place])    Foot drop, left foot     GERD (gastroesophageal reflux disease)     Hypertension     Pneumonia     MARY (renal artery stenosis) ([facility] [address] 07/24/2019    s/p right renal stent 7/24/2019 Maryjane Ty)       Past Surgical History:   Procedure Laterality Date    HX APPENDECTOMY      HX BACK SURGERY      LUMBAR FUSION X2 SURGERIES    HX BACK SURGERY      CERVICAL X2    HX HYSTERECTOMY      HX TONSILLECTOMY      HX UROLOGICAL  2019    arterial stent in R kidney    [place] UNLISTED PROCEDURE CARDIAC SURGERY  08/2019    cardiac stents     [place] UNLISTED PROCEDURE PELVIS/HIP JOINT      Hip replacement 2005         Family History:   Problem Relation Age of Onset    Heart Attack Father 47       Social History     Socioeconomic History    Marital status:      Spouse name: Not on file    Number of children: Not on file    Years of education: Not on file    Highest education level: Not on file   Occupational History    Not on file   Tobacco Use    Smoking status: Former     Packs/day: 0.50     Types: Cigarettes    Smokeless tobacco: Never   Substance and Sexual Activity    Alcohol use:  Yes     Alcohol/week: 6.0 standard drinks     Types: 6 Glasses of wine per week     Comment: 6 a week    Drug use: Never    Sexual activity: Not on file   Other Topics Concern    Not on file   Social History Narrative    Not on file     Social Determinants of Health     Financial Resource Strain: Not on file   Food Insecurity: Not on file   Transportation Needs: Not on file   Physical Activity: Not on file   Stress: Not on file   Social Connections: Not on file   Intimate Partner Violence: Not on file   Housing Stability: Not on file         ALLERGIES: Patient has no known allergies. Review of Systems   Constitutional:  Negative for activity change, chills and fever. HENT:  Negative for ear pain, nosebleeds, rhinorrhea, sore throat, trouble swallowing and voice change. Eyes:  Negative for visual disturbance. Respiratory:  Positive for cough, shortness of breath and wheezing. Negative for hemoptysis and sputum production. Cardiovascular:  Negative for chest pain, palpitations, orthopnea, claudication, leg swelling, syncope and PND. Gastrointestinal:  Negative for abdominal pain, constipation, diarrhea, nausea and vomiting. Genitourinary:  Negative for difficulty urinating, dysuria, hematuria and urgency. Musculoskeletal:  Negative for back pain, neck pain and neck stiffness. Skin:  Negative for color change and rash. Allergic/Immunologic: Negative for immunocompromised state. Neurological:  Negative for dizziness, seizures, syncope, weakness, light-headedness, numbness and headaches. Psychiatric/Behavioral:  Negative for behavioral problems, confusion, hallucinations, self-injury and suicidal ideas. Vitals:    03/28/23 1205   BP: (!) 159/79   Pulse: (!) 57   Resp: 20   Temp: 97.6 °F (36.4 °C)   SpO2: 97%   Weight: 56.9 kg (125 lb 7.1 oz)   Height: 5' 3\" (1.6 m)            Physical Exam  Vitals and nursing note reviewed. Constitutional:       General: She is not in acute distress. Appearance: She is well-developed. She is not diaphoretic. Interventions: Nasal cannula in place. HENT:      Head: Atraumatic. Neck:      Trachea: No tracheal deviation. Cardiovascular:      Rate and Rhythm: Bradycardia present. Comments: Warm and well perfused  Pulmonary:      Effort: Pulmonary effort is normal. No respiratory distress. Breath sounds: Wheezing present. Musculoskeletal:         General: Normal range of motion. Skin:     General: Skin is warm and dry. Neurological:      Mental Status: She is alert. Coordination: Coordination normal.   Psychiatric:         Behavior: Behavior normal.         Thought Content: Thought content normal.         Judgment: Judgment normal.        Medical Decision Making  Total critical care time (not including time spent performing separately reportable procedures): 52min      Amount and/or Complexity of Data Reviewed  Labs: ordered. Radiology: ordered. ECG/medicine tests: ordered. Risk  Prescription drug management. Decision regarding hospitalization. ED Course as of 03/28/23 1223   Tue Mar 28, 2023   1217 EKG interpretation: (Preliminary)  Rhythm: sinus bradycardia; and regular . Rate (approx.): 53; Axis: right axis deviation; P wave: normal; QRS interval: normal ; ST/T wave: non-specific changes; Other findings: abnormal ekg   [FD]      ED Course User Index  [FD] Ashely Brasher MD       Procedures    This is a 42-year-old female with past medical history, review of systems, physical exam as above, presenting with complaints of cough, shortness of breath, exertional dyspnea. Patient states approximately 6 days worsened symptoms, gradually worsening. She denies chest pain, or fevers, states cough is nonproductive. She endorses a history of COPD, denies tobacco use, patient underwent cardiac catheterization with Dr. Magen Martínez last week which showed unchanged coronary artery disease.   She received stacked nebulizers while in route via EMS with mild improvement, however noted to be mildly hypoxic on arrival, improving with 2 L of oxygen by nasal cannula. Upon arrival she is no acute distress, hypertensive, afebrile mildly bradycardic. She has mild scattered wheezes to auscultation. Discussed with patient differential includes viral URI, COPD exacerbation, less likely ACS or CHF. Plan to obtain CMP, CBC, EKG, chest x-ray, cardiac enzymes, BNP. Will provide oral steroids and stacked nebulizers, reassess, and make a disposition. Perfect Serve Consult for Admission  3:30 PM    ED Room Number: ER07/07  Patient Name and age:  Elsie Fernandez 78 y.o.  female  Working Diagnosis:   1. COPD exacerbation ([address]    2. Wheezing    3.  Hypoxia        COVID-19 Suspicion:  no  Sepsis present:  no  Reassessment needed: no  Code Status:  Full Code  Readmission: no  Isolation Requirements:  no  Recommended Level of Care:  med/surg  Department: [facility] Adult ED - [phone]  Admitting Provider: d/w Dr. Janet Erwin

## 2023-03-28 NOTE — PROGRESS NOTES
Day #1 of Levquin  Indication:  COPD exacerbation  Current regimen:  500 mg q24h    Recent Labs     23  1211   WBC 6.3   CREA 0.86   BUN 18     Est CrCl: 47 ml/min  Temp (24hrs), Av.6 °F (36.4 °C), Min:97.6 °F (36.4 °C), Max:97.6 °F (36.4 °C)    Plan: Change to 500 mg x1 followed by 250 mg q24h for CrCl 20-49 ml/min

## 2023-03-29 LAB
ALBUMIN SERPL-MCNC: 3.9 G/DL (ref 3.5–5)
ALBUMIN/GLOB SERPL: 1.1 (ref 1.1–2.2)
ALP SERPL-CCNC: 66 U/L (ref 45–117)
ALT SERPL-CCNC: 28 U/L (ref 12–78)
ANION GAP SERPL CALC-SCNC: 5 MMOL/L (ref 5–15)
AST SERPL-CCNC: 17 U/L (ref 15–37)
BASOPHILS # BLD: 0 K/UL (ref 0–0.1)
BASOPHILS NFR BLD: 0 % (ref 0–1)
BILIRUB SERPL-MCNC: 0.4 MG/DL (ref 0.2–1)
BUN SERPL-MCNC: 23 MG/DL (ref 6–20)
BUN/CREAT SERPL: 23 (ref 12–20)
CALCIUM SERPL-MCNC: 9.6 MG/DL (ref 8.5–10.1)
CHLORIDE SERPL-SCNC: 110 MMOL/L (ref 97–108)
CO2 SERPL-SCNC: 22 MMOL/L (ref 21–32)
CREAT SERPL-MCNC: 0.99 MG/DL (ref 0.55–1.02)
DIFFERENTIAL METHOD BLD: ABNORMAL
EOSINOPHIL # BLD: 0 K/UL (ref 0–0.4)
EOSINOPHIL NFR BLD: 0 % (ref 0–7)
ERYTHROCYTE [DISTWIDTH] IN BLOOD BY AUTOMATED COUNT: 12.8 % (ref 11.5–14.5)
EST. AVERAGE GLUCOSE BLD GHB EST-MCNC: 103 MG/DL
GLOBULIN SER CALC-MCNC: 3.6 G/DL (ref 2–4)
GLUCOSE SERPL-MCNC: 145 MG/DL (ref 65–100)
HBA1C MFR BLD: 5.2 % (ref 4–5.6)
HCT VFR BLD AUTO: 36.8 % (ref 35–47)
HGB BLD-MCNC: 12.5 G/DL (ref 11.5–16)
IMM GRANULOCYTES # BLD AUTO: 0 K/UL (ref 0–0.04)
IMM GRANULOCYTES NFR BLD AUTO: 0 % (ref 0–0.5)
LYMPHOCYTES # BLD: 0.6 K/UL (ref 0.8–3.5)
LYMPHOCYTES NFR BLD: 14 % (ref 12–49)
MCH RBC QN AUTO: 35 PG (ref 26–34)
MCHC RBC AUTO-ENTMCNC: 34 G/DL (ref 30–36.5)
MCV RBC AUTO: 103.1 FL (ref 80–99)
MONOCYTES # BLD: 0.1 K/UL (ref 0–1)
MONOCYTES NFR BLD: 2 % (ref 5–13)
NEUTS SEG # BLD: 3.8 K/UL (ref 1.8–8)
NEUTS SEG NFR BLD: 84 % (ref 32–75)
NRBC # BLD: 0 K/UL (ref 0–0.01)
NRBC BLD-RTO: 0 PER 100 WBC
PLATELET # BLD AUTO: 158 K/UL (ref 150–400)
PMV BLD AUTO: 9.1 FL (ref 8.9–12.9)
POTASSIUM SERPL-SCNC: 4 MMOL/L (ref 3.5–5.1)
PROT SERPL-MCNC: 7.5 G/DL (ref 6.4–8.2)
RBC # BLD AUTO: 3.57 M/UL (ref 3.8–5.2)
RBC MORPH BLD: ABNORMAL
SODIUM SERPL-SCNC: 137 MMOL/L (ref 136–145)
WBC # BLD AUTO: 4.5 K/UL (ref 3.6–11)

## 2023-03-29 PROCEDURE — 83036 HEMOGLOBIN GLYCOSYLATED A1C: CPT

## 2023-03-29 PROCEDURE — 94640 AIRWAY INHALATION TREATMENT: CPT

## 2023-03-29 PROCEDURE — 74011250637 HC RX REV CODE- 250/637: Performed by: NURSE PRACTITIONER

## 2023-03-29 PROCEDURE — 85025 COMPLETE CBC W/AUTO DIFF WBC: CPT

## 2023-03-29 PROCEDURE — 94664 DEMO&/EVAL PT USE INHALER: CPT

## 2023-03-29 PROCEDURE — 80053 COMPREHEN METABOLIC PANEL: CPT

## 2023-03-29 PROCEDURE — 74011250637 HC RX REV CODE- 250/637: Performed by: FAMILY MEDICINE

## 2023-03-29 PROCEDURE — 65270000046 HC RM TELEMETRY

## 2023-03-29 PROCEDURE — 36415 COLL VENOUS BLD VENIPUNCTURE: CPT

## 2023-03-29 PROCEDURE — 77010033678 HC OXYGEN DAILY

## 2023-03-29 PROCEDURE — 74011000250 HC RX REV CODE- 250: Performed by: NURSE PRACTITIONER

## 2023-03-29 PROCEDURE — 74011250636 HC RX REV CODE- 250/636: Performed by: NURSE PRACTITIONER

## 2023-03-29 RX ORDER — ALPRAZOLAM 1 MG/1
1 TABLET ORAL
Status: DISCONTINUED | OUTPATIENT
Start: 2023-03-29 | End: 2023-03-31 | Stop reason: HOSPADM

## 2023-03-29 RX ORDER — SERTRALINE HYDROCHLORIDE 50 MG/1
100 TABLET, FILM COATED ORAL ONCE
Status: DISCONTINUED | OUTPATIENT
Start: 2023-03-29 | End: 2023-03-29 | Stop reason: SDUPTHER

## 2023-03-29 RX ORDER — LOSARTAN POTASSIUM 50 MG/1
50 TABLET ORAL EVERY 12 HOURS
Status: DISCONTINUED | OUTPATIENT
Start: 2023-03-29 | End: 2023-03-31 | Stop reason: HOSPADM

## 2023-03-29 RX ORDER — ALBUTEROL SULFATE 0.83 MG/ML
1.25 SOLUTION RESPIRATORY (INHALATION)
Status: DISCONTINUED | OUTPATIENT
Start: 2023-03-29 | End: 2023-03-29

## 2023-03-29 RX ORDER — ZOLPIDEM TARTRATE 5 MG/1
10 TABLET ORAL
Status: DISCONTINUED | OUTPATIENT
Start: 2023-03-29 | End: 2023-03-31 | Stop reason: HOSPADM

## 2023-03-29 RX ORDER — ATORVASTATIN CALCIUM 40 MG/1
80 TABLET, FILM COATED ORAL
Status: DISCONTINUED | OUTPATIENT
Start: 2023-03-29 | End: 2023-03-29

## 2023-03-29 RX ORDER — SERTRALINE HYDROCHLORIDE 50 MG/1
100 TABLET, FILM COATED ORAL DAILY
Status: DISCONTINUED | OUTPATIENT
Start: 2023-03-30 | End: 2023-03-29

## 2023-03-29 RX ORDER — GUAIFENESIN 600 MG/1
600 TABLET, EXTENDED RELEASE ORAL EVERY 12 HOURS
Status: DISCONTINUED | OUTPATIENT
Start: 2023-03-29 | End: 2023-03-31 | Stop reason: HOSPADM

## 2023-03-29 RX ORDER — ALBUTEROL SULFATE 0.83 MG/ML
1.25 SOLUTION RESPIRATORY (INHALATION)
Status: DISCONTINUED | OUTPATIENT
Start: 2023-03-29 | End: 2023-03-31 | Stop reason: HOSPADM

## 2023-03-29 RX ORDER — SERTRALINE HYDROCHLORIDE 50 MG/1
100 TABLET, FILM COATED ORAL DAILY
Status: DISCONTINUED | OUTPATIENT
Start: 2023-03-29 | End: 2023-03-31 | Stop reason: HOSPADM

## 2023-03-29 RX ORDER — ATORVASTATIN CALCIUM 40 MG/1
80 TABLET, FILM COATED ORAL
Status: DISCONTINUED | OUTPATIENT
Start: 2023-03-29 | End: 2023-03-31 | Stop reason: HOSPADM

## 2023-03-29 RX ADMIN — SODIUM CHLORIDE, PRESERVATIVE FREE 10 ML: 5 INJECTION INTRAVENOUS at 22:00

## 2023-03-29 RX ADMIN — IPRATROPIUM BROMIDE AND ALBUTEROL SULFATE 3 ML: 2.5; .5 SOLUTION RESPIRATORY (INHALATION) at 01:17

## 2023-03-29 RX ADMIN — ZOLPIDEM TARTRATE 10 MG: 5 TABLET ORAL at 22:12

## 2023-03-29 RX ADMIN — METHYLPREDNISOLONE SODIUM SUCCINATE 60 MG: 125 INJECTION, POWDER, FOR SOLUTION INTRAMUSCULAR; INTRAVENOUS at 21:59

## 2023-03-29 RX ADMIN — ATORVASTATIN CALCIUM 80 MG: 40 TABLET, FILM COATED ORAL at 01:09

## 2023-03-29 RX ADMIN — ATORVASTATIN CALCIUM 80 MG: 40 TABLET, FILM COATED ORAL at 21:59

## 2023-03-29 RX ADMIN — LEVOFLOXACIN 250 MG: 5 INJECTION, SOLUTION INTRAVENOUS at 15:48

## 2023-03-29 RX ADMIN — AMLODIPINE BESYLATE 5 MG: 5 TABLET ORAL at 09:26

## 2023-03-29 RX ADMIN — METHYLPREDNISOLONE SODIUM SUCCINATE 60 MG: 125 INJECTION, POWDER, FOR SOLUTION INTRAMUSCULAR; INTRAVENOUS at 09:26

## 2023-03-29 RX ADMIN — METHYLPREDNISOLONE SODIUM SUCCINATE 60 MG: 125 INJECTION, POWDER, FOR SOLUTION INTRAMUSCULAR; INTRAVENOUS at 15:48

## 2023-03-29 RX ADMIN — ENOXAPARIN SODIUM 40 MG: 100 INJECTION SUBCUTANEOUS at 15:48

## 2023-03-29 RX ADMIN — BUDESONIDE 500 MCG: 0.5 INHALANT RESPIRATORY (INHALATION) at 21:28

## 2023-03-29 RX ADMIN — CARVEDILOL 25 MG: 12.5 TABLET, FILM COATED ORAL at 09:26

## 2023-03-29 RX ADMIN — AMLODIPINE BESYLATE 5 MG: 5 TABLET ORAL at 19:20

## 2023-03-29 RX ADMIN — METHYLPREDNISOLONE SODIUM SUCCINATE 60 MG: 125 INJECTION, POWDER, FOR SOLUTION INTRAMUSCULAR; INTRAVENOUS at 05:32

## 2023-03-29 RX ADMIN — SODIUM CHLORIDE, PRESERVATIVE FREE 10 ML: 5 INJECTION INTRAVENOUS at 15:49

## 2023-03-29 RX ADMIN — GUAIFENESIN 600 MG: 600 TABLET, EXTENDED RELEASE ORAL at 19:20

## 2023-03-29 RX ADMIN — ALPRAZOLAM 1 MG: 1 TABLET ORAL at 19:20

## 2023-03-29 RX ADMIN — IPRATROPIUM BROMIDE AND ALBUTEROL SULFATE 3 ML: 2.5; .5 SOLUTION RESPIRATORY (INHALATION) at 14:02

## 2023-03-29 RX ADMIN — FAMOTIDINE 20 MG: 20 TABLET ORAL at 19:20

## 2023-03-29 RX ADMIN — BUDESONIDE 500 MCG: 0.5 INHALANT RESPIRATORY (INHALATION) at 08:05

## 2023-03-29 RX ADMIN — GUAIFENESIN 600 MG: 600 TABLET, EXTENDED RELEASE ORAL at 12:00

## 2023-03-29 RX ADMIN — CARVEDILOL 25 MG: 12.5 TABLET, FILM COATED ORAL at 19:20

## 2023-03-29 RX ADMIN — LOSARTAN POTASSIUM 50 MG: 50 TABLET, FILM COATED ORAL at 20:00

## 2023-03-29 RX ADMIN — SODIUM CHLORIDE, PRESERVATIVE FREE 10 ML: 5 INJECTION INTRAVENOUS at 05:33

## 2023-03-29 RX ADMIN — IPRATROPIUM BROMIDE AND ALBUTEROL SULFATE 3 ML: 2.5; .5 SOLUTION RESPIRATORY (INHALATION) at 08:05

## 2023-03-29 RX ADMIN — SERTRALINE HYDROCHLORIDE 100 MG: 50 TABLET ORAL at 09:26

## 2023-03-29 RX ADMIN — ZOLPIDEM TARTRATE 10 MG: 5 TABLET ORAL at 01:10

## 2023-03-29 RX ADMIN — IPRATROPIUM BROMIDE AND ALBUTEROL SULFATE 3 ML: 2.5; .5 SOLUTION RESPIRATORY (INHALATION) at 21:28

## 2023-03-29 RX ADMIN — LOSARTAN POTASSIUM 100 MG: 50 TABLET, FILM COATED ORAL at 09:26

## 2023-03-29 NOTE — ED NOTES
Has a final transfer review been performed? Yes    Reason for Admission: COPD exacerbation  Patient comes from: Home  Mental Status: Alert and oriented  ADL:self care  Ambulation:no difficulty  Pertinent Info/Safety Concerns: none  COVID Status: Negative  MEWS Score: 01    Sinus Rhythm  Vitals:    03/28/23 1803 03/28/23 1856 03/28/23 2225 03/28/23 2228   BP: (!) 176/77  (!) 149/65    Pulse: 63  62    Resp:   18    Temp:   97.9 °F (36.6 °C)    SpO2: 97% 93% 99% 99%   Weight:       Height:         Lines:   Peripheral IV 03/28/23 Anterior; Left Hand (Active)      Transport mode:Wheelchair    Sharlon Riser  being transferred to [address] [place] [address] ([facility]) for routine progression of care     \"Notification of etransfer note given to (Name) Sowmya Geller (Title) RN

## 2023-03-29 NOTE — PROGRESS NOTES
Admission Medication Reconciliation:    Information obtained from:  Patient and Rx Query  RxQuery data available¹:  NO    Comments/Recommendations: Updated PTA meds/reviewed patient's allergies. 1)  I spoke to the patient about her current medications, allergies and any OTC products that she may be taking. She was pleasant and very knowledgeable about her home medications. 2)  Medication changes (since last review): Added  - None    Adjusted (patient has trouble swallowing and needs smaller tabs)  - Losartan 50 mg twice daily (from 100 mg once daily)  - Atorvastatin 80 mg (2-40 mg tabs) at bedtime  - Amlodipine 5 mg twice daily    Removed  - none    3)  Provider aware of variable dosing/need for multiple tablets. ¹RxQuery pharmacy benefit data reflects medications filled and processed through the patient's insurance, however   this data does NOT capture whether the medication was picked up or is currently being taken by the patient. Allergies:  Patient has no known allergies. Significant PMH/Disease States:   Past Medical History:   Diagnosis Date    CAD (coronary artery disease) 09/12/2019    stent D1 9/12/2019 ([place])    Foot drop, left foot     GERD (gastroesophageal reflux disease)     Hypertension     Pneumonia     MARY (renal artery stenosis) ([facility] [address] 07/24/2019    s/p right renal stent 7/24/2019 Wiley Reason)     Chief Complaint for this Admission:    Chief Complaint   Patient presents with    Shortness of Breath     Prior to Admission Medications:   Prior to Admission Medications   Prescriptions Last Dose Informant Taking? ALPRAZolam (XANAX) 1 mg tablet   Yes   Sig: Take 1 Tab by mouth two (2) times daily as needed for Anxiety. Max Daily Amount: 2 mg. albuterol (PROVENTIL HFA, VENTOLIN HFA, PROAIR HFA) 90 mcg/actuation inhaler 3/28/2023  Yes   Sig: Take 2 Puffs by inhalation every four (4) hours as needed for Wheezing, Shortness of Breath, Cough or Respiratory Distress. albuterol-ipratropium (DUO-NEB) 2.5 mg-0.5 mg/3 ml nebu   Yes   Sig: 3 mL by Nebulization route every four (4) hours as needed for Wheezing, Shortness of Breath or Respiratory Distress. amLODIPine (NORVASC) 5 mg tablet 3/28/2023  Yes   Sig: Take 1 Tablet by mouth two (2) times a day. atorvastatin (LIPITOR) 40 mg tablet 3/28/2023  Yes   Sig: Take 80 mg by mouth nightly. Total dose = 80 mg- 2 tablets for easy swallowing. budesonide-formoteroL (SYMBICORT) 160-4.5 mcg/actuation HFAA 3/28/2023  Yes   Sig: Take 2 Puffs by inhalation two (2) times a day. carvediloL (COREG) 25 mg tablet 3/28/2023  Yes   Sig: TAKE ONE TABLET BY MOUTH TWICE A DAY   losartan (COZAAR) 50 mg tablet 3/28/2023  Yes   Sig: Take 50 mg by mouth two (2) times a day. meloxicam (MOBIC) 15 mg tablet 3/28/2023  Yes   Sig: Take 15 mg by mouth daily. multivitamin (ONE A DAY) tablet 3/28/2023  Yes   Sig: Take 1 Tablet by mouth daily. sertraline (ZOLOFT) 100 mg tablet 3/28/2023  Yes   Sig: Take 100 mg by mouth Every morning. zolpidem (AMBIEN) 10 mg tablet 3/28/2023  Yes   Sig: Take  by mouth nightly as needed for Sleep. Facility-Administered Medications: None     Please contact the main inpatient pharmacy with any questions or concerns at [phone] and we will direct you to the clinical pharmacist covering this patient's care while in-house.    Dmitriy Weber, JAND

## 2023-03-29 NOTE — PROGRESS NOTES
Reason for Admission:  admitted from home with complaints of shortness of breath. RUR Score:   8%-Low                  Plan for utilizing home health:  no skilled needs identified at this time. Pending progress. PCP: First and Last name:  Francisco Javier Snyder MD  Are you a current patient: Yes/No:   Approximate date of last visit:   Can you participate in a virtual visit with your PCP:                     Current Advanced Directive/Advance Care Plan: Full Code    Healthcare Decision Maker:             Primary Decision Maker: Sherri Gay - [phone]                  Transition of Care Plan:  Currently on 6 l nc  Pulmo consulted  Once medically stable and weaned off oxygen will likey discharge home with no skilled needs,. If unable to wean oxygen made need for home. CM will fllow and assist as needed. Care Management Interventions  PCP Verified by CM:  Yes  Last Visit to PCP: 02/15/23  Palliative Care Criteria Met (RRAT>21 & CHF Dx)?: No  Mode of Transport at Discharge: Self  Support Systems: Friend/Neighbor  Confirm Follow Up Transport: Friends  The Patient and/or Patient Representative was Provided with a Choice of Provider and Agrees with the Discharge Plan?:  Franca Blanton -friend)  Discharge Location  Patient Expects to be Discharged to[de-identified] Home

## 2023-03-29 NOTE — PROGRESS NOTES
I let the provider know that the atorvastatin should be 2 tabs of 40 mg at bedtime. The patient has a hard time swallowing. Losartan should also be 50 mg bid for the same reason.

## 2023-03-29 NOTE — PROGRESS NOTES
Spiritual Care Assessment/Progress Note  [facility]      NAME: Anastasiia Blackwell      MRN: [id number]  AGE: 78 y.o.  SEX: female  Jew Affiliation: Gnosticism   Language: English     3/29/2023     Total Time (in minutes): 5     Spiritual Assessment begun in [facility] [phone] through conversation with:         [x]Patient        [] Family    [] Friend(s)        Reason for Consult: [facility]     Spiritual beliefs: (Please include comment if needed)     [x] Identifies with a orlin tradition:  Gnosticism       [x] Supported by a orlin community: None at present            [] Claims no spiritual orientation:           [] Seeking spiritual identity:                [] Adheres to an individual form of spirituality:           [] Not able to assess:                           Identified resources for coping:      [x] Prayer                               [x] Music                  [] Guided Imagery     [x] Family/friends                 [] Pet visits     [] Devotional reading                         [] Unknown     [] Other:                                               Interventions offered during this visit: (See comments for more details)    Patient Interventions: Affirmation of orlin, Communion (Gnosticism), Initial/Spiritual assessment, patient floor, Prayer (actual), Prayer (assurance of)           Plan of Care:     [x] Support spiritual and/or cultural needs    [] Support AMD and/or advance care planning process      [] Support grieving process   [] Coordinate Rites and/or Rituals    [] Coordination with community clergy   [] No spiritual needs identified at this time   [] Detailed Plan of Care below (See Comments)  [] Make referral to Music Therapy  [] Make referral to Pet Therapy     [] Make referral to Addiction services  [] Make referral to [facility]  [] Make referral to Spiritual Care Partner  [] No future visits requested        [] Contact Spiritual Care for further referrals     Comments: Mrs. Robbie Gaviria was in bed. She received prayer and communion. She is hoping to go home soon and was not expecting to be admitted. She does not currently go to [facility] due to medical issues and attends [facility] services through the [place] [address] [facility] [place] [facility]", RN, ACSW, LCSW   Page:  [phone] .

## 2023-03-29 NOTE — CONSULTS
Pulmonary, Critical Care, and Sleep Medicine~Consult Note    Name: Nikolai Cr MRN: [id number]   : 1945 Hospital: [facility]    Date: 3/29/2023 11:07 AM Admission: 3/28/2023     Impression Plan   Acute hypoxic resp failure   AE of COPD, moderate obstructive pattern. Followed by Dr Alejo Diaz   Hx of renal art stenosis; s/p stent placement on   CAD s/p PCI Will touch base with her about swallowing  Continue steroids; transition to po tomorrow if feeling better   Bronchodilators   Will need follow up with Dr Alejo Diaz in following   Empiric levaquin   O2 titration above 90%      Daily Progression:    Consult Note requested by hospitalist service. Patient presented for hospitalization on 3/28 for worsening shortness of breath last 3 days. She mentions that it more is like an acute on chronic process. Stating that she has been slowly feeling more more short of breath over the last 9 months. She is compliant with Symbicort alone. She has not had any exacerbations in the last 2 years. She is normally followed by Dr. Terrie Juarez and was last seen by him on 3/22/2021 however he was seen by Kilo Chicas a nurse practitioner at our practice around 2 weeks ago. She does not make any mention of shortness of breath at that time. She denies any sick contacts but possibly weather changes what caused her to worsen recently. Denies any congestion fevers or chills. Chest image on admission did not show any acute processes. She had no white count. She is currently feeling better while she was on steroids. 1ppd x 25 years     PFTs 10/14/20  2. 28L @ 84%  1.3L @ 64%  %  %  DLCO reduced and does not normalized when corrected for alveolar volume     I have reviewed the labs and previous days notes. A comprehensive review of systems was negative.   Past Medical History:   Diagnosis Date    CAD (coronary artery disease) 2019    stent D1 2019 ([place])    Foot drop, left foot     GERD (gastroesophageal reflux disease)     Hypertension     Pneumonia     MARY (renal artery stenosis) ([facility] [address] 07/24/2019    s/p right renal stent 7/24/2019 [place] )      Past Surgical History:   Procedure Laterality Date    HX APPENDECTOMY      HX BACK SURGERY      LUMBAR FUSION X2 SURGERIES    HX BACK SURGERY      CERVICAL X2    HX HYSTERECTOMY      HX TONSILLECTOMY      HX UROLOGICAL  2019    arterial stent in R kidney    IN UNLISTED PROCEDURE CARDIAC SURGERY  08/2019    cardiac stents     IN UNLISTED PROCEDURE PELVIS/HIP JOINT      Hip replacement 2005      Prior to Admission medications    Medication Sig Start Date End Date Taking? Authorizing Provider   albuterol (PROVENTIL HFA, VENTOLIN HFA, PROAIR HFA) 90 mcg/actuation inhaler Take 2 Puffs by inhalation every four (4) hours as needed for Wheezing, Shortness of Breath, Cough or Respiratory Distress. Yes Provider, Historical   atorvastatin (LIPITOR) 40 mg tablet Take 80 mg by mouth nightly. Total dose = 80 mg- 2 tablets for easy swallowing. Yes Provider, Historical   losartan (COZAAR) 50 mg tablet Take 50 mg by mouth two (2) times a day. Yes Provider, Historical   multivitamin (ONE A DAY) tablet Take 1 Tablet by mouth daily. Yes Provider, Historical   amLODIPine (NORVASC) 5 mg tablet Take 1 Tablet by mouth two (2) times a day. 3/8/23  Yes Osmany Goldstein MD   carvediloL (COREG) 25 mg tablet TAKE ONE TABLET BY MOUTH TWICE A DAY 4/14/22  Yes Janna Lee ANP   ALPRAZolam (XANAX) 1 mg tablet Take 1 Tab by mouth two (2) times daily as needed for Anxiety. Max Daily Amount: 2 mg. 3/4/20  Yes Jaswant Sin MD   albuterol-ipratropium (DUO-NEB) 2.5 mg-0.5 mg/3 ml nebu 3 mL by Nebulization route every four (4) hours as needed for Wheezing, Shortness of Breath or Respiratory Distress. 3/4/20  Yes Jaswant Sin MD   budesonide-formoteroL (SYMBICORT) 160-4.5 mcg/actuation HFAA Take 2 Puffs by inhalation two (2) times a day.  3/4/20  Yes Jonathan Princess Erickson MD   meloxicam (MOBIC) 15 mg tablet Take 15 mg by mouth daily. Yes Provider, Historical   sertraline (ZOLOFT) 100 mg tablet Take 100 mg by mouth Every morning. Yes Provider, Historical   zolpidem (AMBIEN) 10 mg tablet Take  by mouth nightly as needed for Sleep. Yes Provider, Historical     No Known Allergies   Social History     Tobacco Use    Smoking status: Former     Packs/day: 0.50     Types: Cigarettes    Smokeless tobacco: Never   Substance Use Topics    Alcohol use: Yes     Alcohol/week: 6.0 standard drinks     Types: 6 Glasses of wine per week     Comment: 6 a week      Family History   Problem Relation Age of Onset    Heart Attack Father 47     OBJECTIVE:     Vital Signs:     Visit Vitals  BP (!) 180/78   Pulse 68   Temp 98.9 °F (37.2 °C)   Resp 18   Ht 5' 3\" (1.6 m)   Wt 56.9 kg (125 lb 7.1 oz)   SpO2 98%   BMI 22.22 kg/m²      Temp (24hrs), Av.1 °F (36.7 °C), Min:97.6 °F (36.4 °C), Max:98.9 °F (37.2 °C)     Intake/Output:     Last shift: No intake/output data recorded. Last 3 shifts: No intake/output data recorded.         Intake/Output Summary (Last 24 hours) at 3/29/2023 1107  Last data filed at 3/29/2023 0400  Gross per 24 hour   Intake 0 ml   Output --   Net 0 ml       Physical Exam:                                        Exam Findings Other   General: No resp distress noted, appears stated age    [de-identified]:  No ulcers, JVD not elevated, no cervical LAD    Chest: No pectus deformity, normal chest rise b/l    HEART:  RRR, no murmurs/rubs/gallops    Lungs:  Wheeze    ABD: Soft/NT, non rigid mildly distended    EXT: No cyanosis/clubbing/edema, normal peripheral pulses    Skin: No rashes or ulcers, no mottling    Neuro: A/O x 3        Medications:  Current Facility-Administered Medications   Medication Dose Route Frequency    zolpidem (AMBIEN) tablet 10 mg  10 mg Oral QHS PRN    atorvastatin (LIPITOR) tablet 80 mg  80 mg Oral QHS    sertraline (ZOLOFT) tablet 100 mg  100 mg Oral DAILY guaiFENesin ER (MUCINEX) tablet 600 mg  600 mg Oral Q12H    ALPRAZolam (XANAX) tablet 1 mg  1 mg Oral BID PRN    albuterol (PROVENTIL VENTOLIN) nebulizer solution 1.25 mg  1.25 mg Nebulization Q4HWA RT    [START ON 3/30/2023] losartan (COZAAR) tablet 50 mg  50 mg Oral Q12H    sodium chloride (NS) flush 5-40 mL  5-40 mL IntraVENous Q8H    sodium chloride (NS) flush 5-40 mL  5-40 mL IntraVENous PRN    budesonide (PULMICORT) 500 mcg/2 ml nebulizer suspension  500 mcg Nebulization BID RT    methylPREDNISolone (PF) (Solu-MEDROL) injection 60 mg  60 mg IntraVENous Q6H    acetaminophen (TYLENOL) tablet 650 mg  650 mg Oral Q4H PRN    oxyCODONE IR (ROXICODONE) tablet 5 mg  5 mg Oral Q4H PRN    morphine injection 1 mg  1 mg IntraVENous Q4H PRN    ondansetron (ZOFRAN) injection 4 mg  4 mg IntraVENous Q4H PRN    enoxaparin (LOVENOX) injection 40 mg  40 mg SubCUTAneous Q24H    albuterol-ipratropium (DUO-NEB) 2.5 MG-0.5 MG/3 ML  3 mL Nebulization Q6H RT    amLODIPine (NORVASC) tablet 5 mg  5 mg Oral BID    carvediloL (COREG) tablet 25 mg  25 mg Oral BID    levoFLOXacin (LEVAQUIN) 250 mg in D5W IVPB  250 mg IntraVENous Q24H    famotidine (PEPCID) tablet 20 mg  20 mg Oral QPM       Labs:  ABG No results for input(s): PHI, PCO2I, PO2I, HCO3I, SO2I, FIO2I in the last 72 hours.      CBC Recent Labs     03/29/23  0021 03/28/23  1211   WBC 4.5 6.3   HGB 12.5 12.3   HCT 36.8 36.7    161   .1* 104.6*   MCH 35.0* 63.1*        Metabolic  Panel Recent Labs     03/29/23  0021 03/28/23  1211    136   K 4.0 4.0   * 108   CO2 22 24   * 134*   BUN 23* 18   CREA 0.99 0.86   CA 9.6 9.6   ALB 3.9 4.0   ALT 28 27        Pertinent Labs                Sanchez Mariangel Gauthier PA-C  3/29/2023

## 2023-03-29 NOTE — PROGRESS NOTES
0000: Bedside shift change report given to Rogelio loya  (oncoming nurse) by Joslyn Velasco (offgoing nurse). Report included the following information SBAR, Kardex, Intake/Output, and MAR.       0730: Bedside shift change report given to [facility] (oncoming nurse) by Rogelio loya  (offgoing nurse). Report included the following information SBAR, Kardex, Intake/Output, and MAR.

## 2023-03-29 NOTE — PROGRESS NOTES
[facility]  Hospitalist Group                                                                                          Hospitalist Progress Note  Hakeem Patino NP  Answering service: [phone] OR [phone] from in house phone        Date of Service:  3/29/2023  NAME:  Mateo Sainz  :  1945  MRN:  [id number]       Admission Summary:   Per H&P, Mateo Sainz is a 78 y.o. female with a PMH of CAD, HTN, GERD, Left Foot Drop, PNA, Hx Renal Artery Stenosis (s/p right renal stent 2019 Dr. Jennifer Snyder) and COPD who presented via EMS with c/o shortness of breath x3 days. EMS reported 88-89% on RA, duo neb given and patient placed on 2L NC en route to ED with reported symptom improvement. Interval history / Subjective:   Prior to evaluating the patient I reviewed previous provider notes, nursing notes as well as labs, imaging, vital signs    I saw the patient this morning on rounds. Patient with expiratory wheeze, on 6 L of oxygen. Saturations stable on this flow of oxygen. Noted to be hypertensive this morning however is off schedule with her antihypertensives. Patient also with productive cough     Assessment & Plan:         COPD Exacerbation:   Chest radiograph was unremarkable  Continuing nebulizers  ICS  Methylprednisolone  Levofloxacin  Adding guaifenesin  Normally followed by pulmonology, since she is with acute hypoxic respiratory failure will consult with pulmonology. Acute hypoxic respiratory failure  Saturations were down in the 80s and room air  Not normally on home oxygen  Currently with 6 L/min of supplemental oxygen via nasal cannula  Likely due to the above  Aggressive pulmonary toilet, wean oxygen as tolerated     CAD/HTN:   Troponin within normal limits  EKG without ischemic changes  Blood pressure currently controlled  Continue amlodipine  Continue carvedilol  Continue losartan  She normally takes all of these medications every 12 hours, 7A7P.   I did discuss the case with Pharm. D. who will kindly change the schedule for me. Hyperglycemia:   Likely due to steroids  A1c 5.2  We will continue to monitor         GERD:   Stable  Continuing famotidine    CRITICAL CARE ATTESTATION:  I had a face to face encounter with the patient, reviewed and interpreted patient data including clinical events, labs, images, vital signs, I/O's, and examined patient. I have discussed the case and the plan and management of the patient's care with the consulting services, the bedside nurses and necessary ancillary providers. NOTE OF PERSONAL INVOLVEMENT IN CARE   This patient has a high probability of imminent, clinically significant deterioration, which requires the highest level of preparedness to intervene urgently. I participated in the decision-making and personally managed or directed the management of the following life and organ supporting interventions that required my frequent assessment to treat or prevent imminent deterioration. I personally spent 35 minutes of critical care time. This is time spent at this critically ill patient's bedside actively involved in patient care as well as the coordination of care and discussions with the patient's family. This does not include any procedural time which has been billed separately.       Code status: Full  Prophylaxis: LMWH  Care Plan discussed with:, Patient, nurse, care manager  Anticipated Disposition: Likely home in a few days  Inpatient  Cardiac monitoring: Telemetry     Hospital Problems  Date Reviewed: 2/23/2023            Codes Class Noted POA    [facility] ICD-10-CM: J44.1  ICD-9-CM: 491.21  3/28/2023 Unknown           Social Determinants of Health     Tobacco Use: Medium Risk    Smoking Tobacco Use: Former    Smokeless Tobacco Use: Never    Passive Exposure: Not on file   Alcohol Use: Not on file   Financial Resource Strain: Not on file   Food Insecurity: Not on file   Transportation Needs: Not on file   Physical Activity: Not on file   Stress: Not on file   Social Connections: Not on file   Intimate Partner Violence: Not on file   Depression: Not at risk    PHQ-2 Score: 0   Housing Stability: Not on file       Review of Systems:   A comprehensive review of systems was negative except for that written in the HPI. Vital Signs:    Last 24hrs VS reviewed since prior progress note. Most recent are:  Visit Vitals  BP (!) 144/78   Pulse 82   Temp 98.5 °F (36.9 °C)   Resp 18   Ht 5' 3\" (1.6 m)   Wt 56.9 kg (125 lb 7.1 oz)   SpO2 97%   BMI 22.22 kg/m²         Intake/Output Summary (Last 24 hours) at 3/29/2023 6203  Last data filed at 3/29/2023 0400  Gross per 24 hour   Intake 0 ml   Output --   Net 0 ml        Physical Examination:     I had a face to face encounter with this patient and independently examined them on 3/29/2023 as outlined below:          General : alert x 3, awake, no acute distress,   HEENT: PEERL, EOMI, moist mucus membrane,   Neck: supple, no JVD,   Chest: Scattered wheeze  CVS: S1 S2 heard, Capillary refill less than 2 seconds  Abd: soft/ non tender, non distended, BS physiological,   Ext: no clubbing, no cyanosis, no edema, brisk 2+ DP pulses feet warm  Neuro/Psych: pleasant mood and affect, LORRAINE EOMI  Skin: warm     Data Review:    Review and/or order of clinical lab test  Review and/or order of tests in the radiology section of CPT  I personally reviewed  Image      I have personally and independently reviewed all pertinent labs, diagnostic studies, imaging, and have provided independent interpretation of the same.      Labs:     Recent Labs     03/29/23  0021 03/28/23  1211   WBC 4.5 6.3   HGB 12.5 12.3   HCT 36.8 36.7    161     Recent Labs     03/29/23  0021 03/28/23  1211    136   K 4.0 4.0   * 108   CO2 22 24   BUN 23* 18   CREA 0.99 0.86   * 134*   CA 9.6 9.6     Recent Labs     03/29/23  0021 03/28/23  1211   ALT 28 27   AP 66 67   TBILI 0.4 0.5   TP 7.5 7. 6   ALB 3.9 4.0   GLOB 3.6 3.6     No results for input(s): INR, PTP, APTT, INREXT in the last 72 hours. No results for input(s): FE, TIBC, PSAT, FERR in the last 72 hours. Lab Results   Component Value Date/Time    Folate 10.9 07/24/2019 03:41 AM      No results for input(s): PH, PCO2, PO2 in the last 72 hours. No results for input(s): CPK, CKNDX, TROIQ in the last 72 hours. No lab exists for component: CPKMB  Lab Results   Component Value Date/Time    Cholesterol, total 111 02/23/2020 03:28 AM    HDL Cholesterol 27 02/23/2020 03:28 AM    LDL, calculated 35.4 02/23/2020 03:28 AM    Triglyceride 243 (H) 02/23/2020 03:28 AM    CHOL/HDL Ratio 4.1 02/23/2020 03:28 AM     Lab Results   Component Value Date/Time    Glucose (POC) 148 (H) 03/04/2020 11:29 AM    Glucose (POC) 84 03/04/2020 07:13 AM    Glucose (POC) 135 (H) 03/03/2020 09:21 PM    Glucose (POC) 249 (H) 03/03/2020 04:43 PM    Glucose (POC) 174 (H) 03/03/2020 11:31 AM     Lab Results   Component Value Date/Time    Color YELLOW/STRAW 12/15/2020 11:37 AM    Appearance CLEAR 12/15/2020 11:37 AM    Specific gravity 1.016 12/15/2020 11:37 AM    Specific gravity 1.015 02/22/2020 06:17 AM    pH (UA) 6.0 12/15/2020 11:37 AM    Protein Negative 12/15/2020 11:37 AM    Glucose Negative 12/15/2020 11:37 AM    Ketone Negative 12/15/2020 11:37 AM    Bilirubin Negative 12/15/2020 11:37 AM    Urobilinogen 0.2 12/15/2020 11:37 AM    Nitrites Negative 12/15/2020 11:37 AM    Leukocyte Esterase Negative 12/15/2020 11:37 AM    Epithelial cells FEW 12/15/2020 11:37 AM    Bacteria Negative 12/15/2020 11:37 AM    WBC 0-4 12/15/2020 11:37 AM    RBC 0-5 12/15/2020 11:37 AM       Notes reviewed from all clinical/nonclinical/nursing services involved in patient's clinical care. Care coordination discussions were held with appropriate clinical/nonclinical/ nursing providers based on care coordination needs.          Patients current active Medications were reviewed, considered, added and adjusted based on the clinical condition today. Home Medications were reconciled to the best of my ability given all available resources at the time of admission. Route is PO if not otherwise noted.       Admission Status:[id number]:::1}      Medications Reviewed:     Current Facility-Administered Medications   Medication Dose Route Frequency    zolpidem (AMBIEN) tablet 10 mg  10 mg Oral QHS PRN    atorvastatin (LIPITOR) tablet 80 mg  80 mg Oral QHS    sertraline (ZOLOFT) tablet 100 mg  100 mg Oral DAILY    sodium chloride (NS) flush 5-40 mL  5-40 mL IntraVENous Q8H    sodium chloride (NS) flush 5-40 mL  5-40 mL IntraVENous PRN    budesonide (PULMICORT) 500 mcg/2 ml nebulizer suspension  500 mcg Nebulization BID RT    methylPREDNISolone (PF) (Solu-MEDROL) injection 60 mg  60 mg IntraVENous Q6H    acetaminophen (TYLENOL) tablet 650 mg  650 mg Oral Q4H PRN    oxyCODONE IR (ROXICODONE) tablet 5 mg  5 mg Oral Q4H PRN    morphine injection 1 mg  1 mg IntraVENous Q4H PRN    ondansetron (ZOFRAN) injection 4 mg  4 mg IntraVENous Q4H PRN    enoxaparin (LOVENOX) injection 40 mg  40 mg SubCUTAneous Q24H    albuterol-ipratropium (DUO-NEB) 2.5 MG-0.5 MG/3 ML  3 mL Nebulization Q6H RT    amLODIPine (NORVASC) tablet 5 mg  5 mg Oral BID    carvediloL (COREG) tablet 25 mg  25 mg Oral BID    losartan (COZAAR) tablet 100 mg  100 mg Oral DAILY    levoFLOXacin (LEVAQUIN) 250 mg in D5W IVPB  250 mg IntraVENous Q24H    famotidine (PEPCID) tablet 20 mg  20 mg Oral QPM     ______________________________________________________________________  EXPECTED LENGTH OF STAY: - - -  ACTUAL LENGTH OF STAY:          1                 Sania Pate NP

## 2023-03-29 NOTE — PROGRESS NOTES
0000: Verbal shift change report given to Gillian Bishop RN (oncoming nurse) by Zay Burch RN (offgoing nurse). Report included the following information SBAR, Kardex, ED Summary, MAR, and Recent Results. 0030: Perfect Serve to MD Milian:  Hey: Pt just arrived from the ED at 2300. States that she takes 80mg of Lipitor at bedtime but order is in for 40 mg. Also, I tried to give her the ambien 5 mg that was ordered and pt refused it stating that she was taking 10mg at home. Lastly, she has zoloft ordered at bedtime but pt states that she takes these in the morning. Can these orders be changed please? 46Claris Veronica to MD Milian. RN okay to change orders but to forward message to NP Bozena Patel since she is covering the floor. Orders reflected in MAR.

## 2023-03-30 LAB
ALBUMIN SERPL-MCNC: 3.5 G/DL (ref 3.5–5)
ALBUMIN/GLOB SERPL: 1 (ref 1.1–2.2)
ALP SERPL-CCNC: 56 U/L (ref 45–117)
ALT SERPL-CCNC: 26 U/L (ref 12–78)
ANION GAP SERPL CALC-SCNC: 4 MMOL/L (ref 5–15)
AST SERPL-CCNC: 38 U/L (ref 15–37)
BASOPHILS # BLD: 0 K/UL (ref 0–0.1)
BASOPHILS NFR BLD: 0 % (ref 0–1)
BILIRUB SERPL-MCNC: 0.3 MG/DL (ref 0.2–1)
BUN SERPL-MCNC: 35 MG/DL (ref 6–20)
BUN/CREAT SERPL: 34 (ref 12–20)
CALCIUM SERPL-MCNC: 9.4 MG/DL (ref 8.5–10.1)
CHLORIDE SERPL-SCNC: 111 MMOL/L (ref 97–108)
CO2 SERPL-SCNC: 20 MMOL/L (ref 21–32)
CREAT SERPL-MCNC: 1.03 MG/DL (ref 0.55–1.02)
DIFFERENTIAL METHOD BLD: ABNORMAL
EOSINOPHIL # BLD: 0 K/UL (ref 0–0.4)
EOSINOPHIL NFR BLD: 0 % (ref 0–7)
ERYTHROCYTE [DISTWIDTH] IN BLOOD BY AUTOMATED COUNT: 12.7 % (ref 11.5–14.5)
GLOBULIN SER CALC-MCNC: 3.5 G/DL (ref 2–4)
GLUCOSE SERPL-MCNC: 177 MG/DL (ref 65–100)
HCT VFR BLD AUTO: 33.6 % (ref 35–47)
HGB BLD-MCNC: 11.5 G/DL (ref 11.5–16)
IMM GRANULOCYTES # BLD AUTO: 0 K/UL (ref 0–0.04)
IMM GRANULOCYTES NFR BLD AUTO: 1 % (ref 0–0.5)
LYMPHOCYTES # BLD: 1 K/UL (ref 0.8–3.5)
LYMPHOCYTES NFR BLD: 15 % (ref 12–49)
MCH RBC QN AUTO: 34.7 PG (ref 26–34)
MCHC RBC AUTO-ENTMCNC: 34.2 G/DL (ref 30–36.5)
MCV RBC AUTO: 101.5 FL (ref 80–99)
MONOCYTES # BLD: 0.2 K/UL (ref 0–1)
MONOCYTES NFR BLD: 3 % (ref 5–13)
NEUTS SEG # BLD: 5.8 K/UL (ref 1.8–8)
NEUTS SEG NFR BLD: 81 % (ref 32–75)
NRBC # BLD: 0 K/UL (ref 0–0.01)
NRBC BLD-RTO: 0 PER 100 WBC
PLATELET # BLD AUTO: 159 K/UL (ref 150–400)
PMV BLD AUTO: 9.5 FL (ref 8.9–12.9)
POTASSIUM SERPL-SCNC: 4.7 MMOL/L (ref 3.5–5.1)
PROT SERPL-MCNC: 7 G/DL (ref 6.4–8.2)
RBC # BLD AUTO: 3.31 M/UL (ref 3.8–5.2)
SODIUM SERPL-SCNC: 135 MMOL/L (ref 136–145)
WBC # BLD AUTO: 7 K/UL (ref 3.6–11)

## 2023-03-30 PROCEDURE — 74011250636 HC RX REV CODE- 250/636: Performed by: NURSE PRACTITIONER

## 2023-03-30 PROCEDURE — 65270000046 HC RM TELEMETRY

## 2023-03-30 PROCEDURE — 74011000250 HC RX REV CODE- 250: Performed by: PHYSICIAN ASSISTANT

## 2023-03-30 PROCEDURE — 65270000032 HC RM SEMIPRIVATE

## 2023-03-30 PROCEDURE — 85025 COMPLETE CBC W/AUTO DIFF WBC: CPT

## 2023-03-30 PROCEDURE — 94640 AIRWAY INHALATION TREATMENT: CPT

## 2023-03-30 PROCEDURE — 74011250637 HC RX REV CODE- 250/637: Performed by: NURSE PRACTITIONER

## 2023-03-30 PROCEDURE — 74011636637 HC RX REV CODE- 636/637: Performed by: NURSE PRACTITIONER

## 2023-03-30 PROCEDURE — 36415 COLL VENOUS BLD VENIPUNCTURE: CPT

## 2023-03-30 PROCEDURE — 80053 COMPREHEN METABOLIC PANEL: CPT

## 2023-03-30 PROCEDURE — 74011000250 HC RX REV CODE- 250: Performed by: NURSE PRACTITIONER

## 2023-03-30 PROCEDURE — 77010033678 HC OXYGEN DAILY

## 2023-03-30 PROCEDURE — 74011250637 HC RX REV CODE- 250/637: Performed by: FAMILY MEDICINE

## 2023-03-30 RX ORDER — ACETYLCYSTEINE 200 MG/ML
2 SOLUTION ORAL; RESPIRATORY (INHALATION)
Status: COMPLETED | OUTPATIENT
Start: 2023-03-30 | End: 2023-03-30

## 2023-03-30 RX ORDER — PREDNISONE 20 MG/1
60 TABLET ORAL EVERY 12 HOURS
Status: DISCONTINUED | OUTPATIENT
Start: 2023-03-30 | End: 2023-03-31 | Stop reason: HOSPADM

## 2023-03-30 RX ADMIN — LEVOFLOXACIN 250 MG: 5 INJECTION, SOLUTION INTRAVENOUS at 17:53

## 2023-03-30 RX ADMIN — AMLODIPINE BESYLATE 5 MG: 5 TABLET ORAL at 19:24

## 2023-03-30 RX ADMIN — ALPRAZOLAM 1 MG: 1 TABLET ORAL at 08:54

## 2023-03-30 RX ADMIN — AMLODIPINE BESYLATE 5 MG: 5 TABLET ORAL at 07:06

## 2023-03-30 RX ADMIN — LOSARTAN POTASSIUM 50 MG: 50 TABLET, FILM COATED ORAL at 07:11

## 2023-03-30 RX ADMIN — CARVEDILOL 25 MG: 12.5 TABLET, FILM COATED ORAL at 07:06

## 2023-03-30 RX ADMIN — GUAIFENESIN 600 MG: 600 TABLET, EXTENDED RELEASE ORAL at 07:06

## 2023-03-30 RX ADMIN — ZOLPIDEM TARTRATE 10 MG: 5 TABLET ORAL at 22:11

## 2023-03-30 RX ADMIN — IPRATROPIUM BROMIDE AND ALBUTEROL SULFATE 3 ML: 2.5; .5 SOLUTION RESPIRATORY (INHALATION) at 13:46

## 2023-03-30 RX ADMIN — BUDESONIDE 500 MCG: 0.5 INHALANT RESPIRATORY (INHALATION) at 07:06

## 2023-03-30 RX ADMIN — LOSARTAN POTASSIUM 50 MG: 50 TABLET, FILM COATED ORAL at 19:24

## 2023-03-30 RX ADMIN — ATORVASTATIN CALCIUM 80 MG: 40 TABLET, FILM COATED ORAL at 19:36

## 2023-03-30 RX ADMIN — CARVEDILOL 25 MG: 12.5 TABLET, FILM COATED ORAL at 19:24

## 2023-03-30 RX ADMIN — FAMOTIDINE 20 MG: 20 TABLET ORAL at 19:24

## 2023-03-30 RX ADMIN — METHYLPREDNISOLONE SODIUM SUCCINATE 60 MG: 125 INJECTION, POWDER, FOR SOLUTION INTRAMUSCULAR; INTRAVENOUS at 03:04

## 2023-03-30 RX ADMIN — ACETYLCYSTEINE 400 MG: 200 INHALANT RESPIRATORY (INHALATION) at 13:46

## 2023-03-30 RX ADMIN — ENOXAPARIN SODIUM 40 MG: 100 INJECTION SUBCUTANEOUS at 17:52

## 2023-03-30 RX ADMIN — METHYLPREDNISOLONE SODIUM SUCCINATE 60 MG: 125 INJECTION, POWDER, FOR SOLUTION INTRAMUSCULAR; INTRAVENOUS at 10:36

## 2023-03-30 RX ADMIN — BUDESONIDE 500 MCG: 0.5 INHALANT RESPIRATORY (INHALATION) at 20:39

## 2023-03-30 RX ADMIN — IPRATROPIUM BROMIDE AND ALBUTEROL SULFATE 3 ML: 2.5; .5 SOLUTION RESPIRATORY (INHALATION) at 20:39

## 2023-03-30 RX ADMIN — IPRATROPIUM BROMIDE AND ALBUTEROL SULFATE 3 ML: 2.5; .5 SOLUTION RESPIRATORY (INHALATION) at 03:04

## 2023-03-30 RX ADMIN — IPRATROPIUM BROMIDE AND ALBUTEROL SULFATE 3 ML: 2.5; .5 SOLUTION RESPIRATORY (INHALATION) at 07:05

## 2023-03-30 RX ADMIN — SERTRALINE HYDROCHLORIDE 100 MG: 50 TABLET ORAL at 07:06

## 2023-03-30 RX ADMIN — GUAIFENESIN 600 MG: 600 TABLET, EXTENDED RELEASE ORAL at 19:24

## 2023-03-30 RX ADMIN — PREDNISONE 60 MG: 20 TABLET ORAL at 20:39

## 2023-03-30 NOTE — PROGRESS NOTES
[address] [facility] Adult  Hospitalist Group                                                                                          Hospitalist Progress Note  Sheila Dewey NP  Answering service: [phone] from in house phone        Date of Service:  3/30/2023  NAME:  Priyank Barakat  :  1945  MRN:  [id number]       Admission Summary:   Per H&P, Priyank Barakat is a 78 y.o. female with a PMH of CAD, HTN, GERD, Left Foot Drop, PNA, Hx Renal Artery Stenosis (s/p right renal stent 2019 Dr. Michelle Lino) and COPD who presented via EMS with c/o shortness of breath x3 days. EMS reported 88-89% on RA, duo neb given and patient placed on 2L NC en route to ED with reported symptom improvement.          Interval history / Subjective:   SOB improving  Needs oxygen set up for d/c home tomorrow  Change steroids to po     Assessment & Plan:     COPD Exacerbation:   Chest radiograph was unremarkable  Continuing nebulizers  ICS  Levofloxacin, guaifenesin  Follows w/ Dr. Vita Carpio  3/30 change steroids to po    Acute hypoxic respiratory failure 2/2 COPDex  No home O2  Aggressive pulmonary toilet, wean oxygen as tolerated     CAD/HTN:   Troponin wnl  EKG without ischemic changes  Blood pressure currently controlled  Continue amlodipine, carvedilol, losartan    Hyperglycemia:   Likely due to steroids  A1c 5.2  We will continue to monitor    GERD:   Stable  Continuing famotidine    Code status: Full  Prophylaxis: LMWH  Care Plan discussed with:, Patient, nurse, care manager, Dr. Grace Wolff  Anticipated Disposition: d/c tomorrow  Inpatient  Cardiac monitoring: Telemetry     Hospital Problems  Date Reviewed: 2023            Codes Class Noted POA    COPD exacerbation [facility]) ICD-10-CM: J44.1  ICD-9-CM: 491.21  3/28/2023 Unknown         Social Determinants of Health     Tobacco Use: Medium Risk    Smoking Tobacco Use: Former    Smokeless Tobacco Use: Never    Passive Exposure: Not on file   Alcohol Use: Not on file Financial Resource Strain: Not on file   Food Insecurity: Not on file   Transportation Needs: Not on file   Physical Activity: Not on file   Stress: Not on file   Social Connections: Not on file   Intimate Partner Violence: Not on file   Depression: Not at risk    PHQ-2 Score: 0   Housing Stability: Not on file       Review of Systems:   A comprehensive review of systems was negative except for that written in the HPI. Vital Signs:    Last 24hrs VS reviewed since prior progress note. Most recent are:  Visit Vitals  BP (!) 149/79   Pulse 77   Temp 97.6 °F (36.4 °C)   Resp 18   Ht 5' 3\" (1.6 m)   Wt 56.9 kg (125 lb 7.1 oz)   SpO2 92%   BMI 22.22 kg/m²       No intake or output data in the 24 hours ending 03/30/23 1301       Physical Examination:     I had a face to face encounter with this patient and independently examined them on 3/30/2023 as outlined below:          General : alert x 3, awake, no acute distress  HEENT: PEERL, EOMI, moist mucus membrane  Neck: supple, no JVD  Chest: + exp wheeze, good inspiratory flow, no resp distress, rr even/labored  CVS: S1 S2 heard, Capillary refill less than 2 seconds  Abd: soft/ non tender, non distended, BS physiological,   Ext: no clubbing, no cyanosis, no edema, brisk 2+ DP pulses feet warm  Neuro/Psych: pleasant mood and affect, CN II-XII intact  Skin: warm     Data Review:    Review and/or order of clinical lab test  Review and/or order of tests in the radiology section of CPT  I personally reviewed  Image      I have personally and independently reviewed all pertinent labs, diagnostic studies, imaging, and have provided independent interpretation of the same.      Labs:     Recent Labs     03/30/23  0305 03/29/23  0021   WBC 7.0 4.5   HGB 11.5 12.5   HCT 33.6* 36.8    158       Recent Labs     03/30/23  0305 03/29/23  0021 03/28/23  1211   * 137 136   K 4.7 4.0 4.0   * 110* 108   CO2 20* 22 24   BUN 35* 23* 18   CREA 1.03* 0.99 0.86   * 145* 134*   CA 9.4 9.6 9.6       Recent Labs     03/30/23  0305 03/29/23  0021 03/28/23  1211   ALT 26 28 27   AP 56 66 67   TBILI 0.3 0.4 0.5   TP 7.0 7.5 7.6   ALB 3.5 3.9 4.0   GLOB 3.5 3.6 3.6       No results for input(s): INR, PTP, APTT, INREXT, INREXT in the last 72 hours. No results for input(s): FE, TIBC, PSAT, FERR in the last 72 hours. Lab Results   Component Value Date/Time    Folate 10.9 07/24/2019 03:41 AM        No results for input(s): PH, PCO2, PO2 in the last 72 hours. No results for input(s): CPK, CKNDX, TROIQ in the last 72 hours. No lab exists for component: CPKMB  Lab Results   Component Value Date/Time    Cholesterol, total 111 02/23/2020 03:28 AM    HDL Cholesterol 27 02/23/2020 03:28 AM    LDL, calculated 35.4 02/23/2020 03:28 AM    Triglyceride 243 (H) 02/23/2020 03:28 AM    CHOL/HDL Ratio 4.1 02/23/2020 03:28 AM     Lab Results   Component Value Date/Time    Glucose (POC) 148 (H) 03/04/2020 11:29 AM    Glucose (POC) 84 03/04/2020 07:13 AM    Glucose (POC) 135 (H) 03/03/2020 09:21 PM    Glucose (POC) 249 (H) 03/03/2020 04:43 PM    Glucose (POC) 174 (H) 03/03/2020 11:31 AM     Lab Results   Component Value Date/Time    Color YELLOW/STRAW 12/15/2020 11:37 AM    Appearance CLEAR 12/15/2020 11:37 AM    Specific gravity 1.016 12/15/2020 11:37 AM    Specific gravity 1.015 02/22/2020 06:17 AM    pH (UA) 6.0 12/15/2020 11:37 AM    Protein Negative 12/15/2020 11:37 AM    Glucose Negative 12/15/2020 11:37 AM    Ketone Negative 12/15/2020 11:37 AM    Bilirubin Negative 12/15/2020 11:37 AM    Urobilinogen 0.2 12/15/2020 11:37 AM    Nitrites Negative 12/15/2020 11:37 AM    Leukocyte Esterase Negative 12/15/2020 11:37 AM    Epithelial cells FEW 12/15/2020 11:37 AM    Bacteria Negative 12/15/2020 11:37 AM    WBC 0-4 12/15/2020 11:37 AM    RBC 0-5 12/15/2020 11:37 AM       Notes reviewed from all clinical/nonclinical/nursing services involved in patient's clinical care.  Care coordination discussions were held with appropriate clinical/nonclinical/ nursing providers based on care coordination needs. Patients current active Medications were reviewed, considered, added and adjusted based on the clinical condition today. Home Medications were reconciled to the best of my ability given all available resources at the time of admission. Route is PO if not otherwise noted.       Admission Status:[id number]:::1}      Medications Reviewed:     Current Facility-Administered Medications   Medication Dose Route Frequency    predniSONE (DELTASONE) tablet 60 mg  60 mg Oral Q12H    acetylcysteine (MUCOMYST) 200 mg/mL (20 %) solution 400 mg  2 mL Nebulization TID RT    zolpidem (AMBIEN) tablet 10 mg  10 mg Oral QHS PRN    atorvastatin (LIPITOR) tablet 80 mg  80 mg Oral QHS    sertraline (ZOLOFT) tablet 100 mg  100 mg Oral DAILY    guaiFENesin ER (MUCINEX) tablet 600 mg  600 mg Oral Q12H    ALPRAZolam (XANAX) tablet 1 mg  1 mg Oral BID PRN    losartan (COZAAR) tablet 50 mg  50 mg Oral Q12H    albuterol (PROVENTIL VENTOLIN) nebulizer solution 1.25 mg  1.25 mg Nebulization Q4H PRN    sodium chloride (NS) flush 5-40 mL  5-40 mL IntraVENous Q8H    sodium chloride (NS) flush 5-40 mL  5-40 mL IntraVENous PRN    budesonide (PULMICORT) 500 mcg/2 ml nebulizer suspension  500 mcg Nebulization BID RT    acetaminophen (TYLENOL) tablet 650 mg  650 mg Oral Q4H PRN    oxyCODONE IR (ROXICODONE) tablet 5 mg  5 mg Oral Q4H PRN    morphine injection 1 mg  1 mg IntraVENous Q4H PRN    ondansetron (ZOFRAN) injection 4 mg  4 mg IntraVENous Q4H PRN    enoxaparin (LOVENOX) injection 40 mg  40 mg SubCUTAneous Q24H    albuterol-ipratropium (DUO-NEB) 2.5 MG-0.5 MG/3 ML  3 mL Nebulization Q6H RT    amLODIPine (NORVASC) tablet 5 mg  5 mg Oral BID    carvediloL (COREG) tablet 25 mg  25 mg Oral BID    levoFLOXacin (LEVAQUIN) 250 mg in D5W IVPB  250 mg IntraVENous Q24H    famotidine (PEPCID) tablet 20 mg  20 mg Oral QPM ______________________________________________________________________  EXPECTED LENGTH OF STAY: 2d 4h  ACTUAL LENGTH OF STAY:          2                 Skylaradha Colby V, NP

## 2023-03-30 NOTE — PROGRESS NOTES
Bedside and Verbal shift change report given to Shasta TELLO (oncoming nurse) by Flower Noriega (offgoing nurse). Report included the following information SBAR, Kardex, Procedure Summary, Intake/Output, MAR, and Recent Results.

## 2023-03-30 NOTE — PROGRESS NOTES
Provided pastoral care visit to [facility] 5 patient. Did not include sacramental care.      Pam Gonzalez

## 2023-03-30 NOTE — PROGRESS NOTES
Pulmonary, Critical Care, and Sleep Medicine~Progress Note    Name: Mateo Sainz MRN: [id number]   : 1945 Hospital: . [facility]    Date: 3/30/2023 11:07 AM Admission: 3/28/2023     Impression Plan   Acute hypoxic resp failure   AE of COPD, moderate obstructive pattern. Followed by Dr Bryan Carson   Hx of renal art stenosis; s/p stent placement on   CAD s/p PCI PO steroids  Few doses of mucomyst    Bronchodilators   Will need follow up with Dr Bryan Carson in one wk  Empiric levaquin   O2 titration above 90%; will need O2 at discharge  Can go home from a pulmonary aspect  We will be available again to see if needed      Daily Progression:    Feeling better today     Consult Note requested by hospitalist service. Patient presented for hospitalization on 3/28 for worsening shortness of breath last 3 days. She mentions that it more is like an acute on chronic process. Stating that she has been slowly feeling more more short of breath over the last 9 months. She is compliant with Symbicort alone. She has not had any exacerbations in the last 2 years. She is normally followed by Dr. Daya Paiz and was last seen by him on 3/22/2021 however he was seen by Tobias Shore a nurse practitioner at our practice around 2 weeks ago. She does not make any mention of shortness of breath at that time. She denies any sick contacts but possibly weather changes what caused her to worsen recently. Denies any congestion fevers or chills. Chest image on admission did not show any acute processes. She had no white count. She is currently feeling better while she was on steroids. 1ppd x 25 years     PFTs 10/14/20  2. 28L @ 84%  1.3L @ 64%  %  %  DLCO reduced and does not normalized when corrected for alveolar volume     I have reviewed the labs and previous days notes. A comprehensive review of systems was negative.   Past Medical History:   Diagnosis Date    CAD (coronary artery disease) 09/12/2019    stent D1 9/12/2019 ([place])    Foot drop, left foot     GERD (gastroesophageal reflux disease)     Hypertension     Pneumonia     MARY (renal artery stenosis) ([facility] [address] 07/24/2019    s/p right renal stent 7/24/2019 [facility])      Past Surgical History:   Procedure Laterality Date    HX APPENDECTOMY      HX BACK SURGERY      LUMBAR FUSION X2 SURGERIES    HX BACK SURGERY      CERVICAL X2    HX HYSTERECTOMY      HX TONSILLECTOMY      HX UROLOGICAL  2019    arterial stent in R kidney    AR UNLISTED PROCEDURE CARDIAC SURGERY  08/2019    cardiac stents     AR UNLISTED PROCEDURE PELVIS/HIP JOINT      Hip replacement 2005      Prior to Admission medications    Medication Sig Start Date End Date Taking? Authorizing Provider   albuterol (PROVENTIL HFA, VENTOLIN HFA, PROAIR HFA) 90 mcg/actuation inhaler Take 2 Puffs by inhalation every four (4) hours as needed for Wheezing, Shortness of Breath, Cough or Respiratory Distress. Yes Provider, Historical   atorvastatin (LIPITOR) 40 mg tablet Take 80 mg by mouth nightly. Total dose = 80 mg- 2 tablets for easy swallowing. Yes Provider, Historical   losartan (COZAAR) 50 mg tablet Take 50 mg by mouth two (2) times a day. Yes Provider, Historical   multivitamin (ONE A DAY) tablet Take 1 Tablet by mouth daily. Yes Provider, Historical   amLODIPine (NORVASC) 5 mg tablet Take 1 Tablet by mouth two (2) times a day. 3/8/23  Yes Abdias Akhtar MD   carvediloL (COREG) 25 mg tablet TAKE ONE TABLET BY MOUTH TWICE A DAY 4/14/22  Yes Janna Lee ANP   ALPRAZolam (XANAX) 1 mg tablet Take 1 Tab by mouth two (2) times daily as needed for Anxiety. Max Daily Amount: 2 mg. 3/4/20  Yes Ambar Doan MD   albuterol-ipratropium (DUO-NEB) 2.5 mg-0.5 mg/3 ml nebu 3 mL by Nebulization route every four (4) hours as needed for Wheezing, Shortness of Breath or Respiratory Distress.  3/4/20  Yes Ambar Doan MD   budesonide-formoteroL (SYMBICORT) 160-4.5 mcg/actuation HFAA Take 2 Puffs by inhalation two (2) times a day. 3/4/20  Yes Evelin Hansen MD   meloxicam (MOBIC) 15 mg tablet Take 15 mg by mouth daily. Yes Provider, Historical   sertraline (ZOLOFT) 100 mg tablet Take 100 mg by mouth Every morning. Yes Provider, Historical   zolpidem (AMBIEN) 10 mg tablet Take  by mouth nightly as needed for Sleep. Yes Provider, Historical     No Known Allergies   Social History     Tobacco Use    Smoking status: Former     Packs/day: 0.50     Types: Cigarettes    Smokeless tobacco: Never   Substance Use Topics    Alcohol use: Yes     Alcohol/week: 6.0 standard drinks     Types: 6 Glasses of wine per week     Comment: 6 a week      Family History   Problem Relation Age of Onset    Heart Attack Father 47     OBJECTIVE:     Vital Signs:     Visit Vitals  BP (!) 149/79   Pulse 77   Temp 97.6 °F (36.4 °C)   Resp 18   Ht 5' 3\" (1.6 m)   Wt 56.9 kg (125 lb 7.1 oz)   SpO2 92%   BMI 22.22 kg/m²      Temp (24hrs), Av.1 °F (36.7 °C), Min:97.6 °F (36.4 °C), Max:98.5 °F (36.9 °C)     Intake/Output:     Last shift: No intake/output data recorded. Last 3 shifts: No intake/output data recorded.       No intake or output data in the 24 hours ending 23 1232      Physical Exam:                                        Exam Findings Other   General: No resp distress noted, appears stated age    [de-identified]:  No ulcers, JVD not elevated, no cervical LAD    Chest: No pectus deformity, normal chest rise b/l    HEART:  RRR, no murmurs/rubs/gallops    Lungs:  Wheeze    ABD: Soft/NT, non rigid mildly distended    EXT: No cyanosis/clubbing/edema, normal peripheral pulses    Skin: No rashes or ulcers, no mottling    Neuro: A/O x 3        Medications:  Current Facility-Administered Medications   Medication Dose Route Frequency    zolpidem (AMBIEN) tablet 10 mg  10 mg Oral QHS PRN    atorvastatin (LIPITOR) tablet 80 mg  80 mg Oral QHS    sertraline (ZOLOFT) tablet 100 mg  100 mg Oral DAILY    guaiFENesin ER (MUCINEX) tablet 600 mg  600 mg Oral Q12H    ALPRAZolam (XANAX) tablet 1 mg  1 mg Oral BID PRN    losartan (COZAAR) tablet 50 mg  50 mg Oral Q12H    albuterol (PROVENTIL VENTOLIN) nebulizer solution 1.25 mg  1.25 mg Nebulization Q4H PRN    sodium chloride (NS) flush 5-40 mL  5-40 mL IntraVENous Q8H    sodium chloride (NS) flush 5-40 mL  5-40 mL IntraVENous PRN    budesonide (PULMICORT) 500 mcg/2 ml nebulizer suspension  500 mcg Nebulization BID RT    methylPREDNISolone (PF) (Solu-MEDROL) injection 60 mg  60 mg IntraVENous Q6H    acetaminophen (TYLENOL) tablet 650 mg  650 mg Oral Q4H PRN    oxyCODONE IR (ROXICODONE) tablet 5 mg  5 mg Oral Q4H PRN    morphine injection 1 mg  1 mg IntraVENous Q4H PRN    ondansetron (ZOFRAN) injection 4 mg  4 mg IntraVENous Q4H PRN    enoxaparin (LOVENOX) injection 40 mg  40 mg SubCUTAneous Q24H    albuterol-ipratropium (DUO-NEB) 2.5 MG-0.5 MG/3 ML  3 mL Nebulization Q6H RT    amLODIPine (NORVASC) tablet 5 mg  5 mg Oral BID    carvediloL (COREG) tablet 25 mg  25 mg Oral BID    levoFLOXacin (LEVAQUIN) 250 mg in D5W IVPB  250 mg IntraVENous Q24H    famotidine (PEPCID) tablet 20 mg  20 mg Oral QPM       Labs:  ABG No results for input(s): PHI, PCO2I, PO2I, HCO3I, SO2I, FIO2I in the last 72 hours.      CBC Recent Labs     03/30/23  0305 03/29/23  0021 03/28/23  1211   WBC 7.0 4.5 6.3   HGB 11.5 12.5 12.3   HCT 33.6* 36.8 36.7    158 161   .5* 103.1* 104.6*   MCH 34.7* 35.0* 35.0*          Metabolic  Panel Recent Labs     03/30/23  0305 03/29/23  0021 03/28/23  1211   * 137 136   K 4.7 4.0 4.0   * 110* 108   CO2 20* 22 24   * 145* 134*   BUN 35* 23* 18   CREA 1.03* 0.99 0.86   CA 9.4 9.6 9.6   ALB 3.5 3.9 4.0   ALT 26 28 27          Pertinent Labs                Daniel Milan PA-C  3/30/2023

## 2023-03-30 NOTE — PROGRESS NOTES
0730-Bedside shift change report given to Sandra Cano RN (oncoming nurse) by Bin Goodman (offgoing nurse). Report included the following information SBAR, Kardex, Intake/Output, and MAR.

## 2023-03-31 VITALS
OXYGEN SATURATION: 94 % | WEIGHT: 125.44 LBS | TEMPERATURE: 98.5 F | HEIGHT: 63 IN | DIASTOLIC BLOOD PRESSURE: 73 MMHG | BODY MASS INDEX: 22.23 KG/M2 | RESPIRATION RATE: 18 BRPM | SYSTOLIC BLOOD PRESSURE: 144 MMHG | HEART RATE: 66 BPM

## 2023-03-31 PROBLEM — J44.1 COPD EXACERBATION (HCC): Status: RESOLVED | Noted: 2023-03-28 | Resolved: 2023-03-31

## 2023-03-31 LAB
ALBUMIN SERPL-MCNC: 3.6 G/DL (ref 3.5–5)
ALBUMIN/GLOB SERPL: 1.2 (ref 1.1–2.2)
ALP SERPL-CCNC: 52 U/L (ref 45–117)
ALT SERPL-CCNC: 26 U/L (ref 12–78)
ANION GAP SERPL CALC-SCNC: 3 MMOL/L (ref 5–15)
AST SERPL-CCNC: 15 U/L (ref 15–37)
BASOPHILS # BLD: 0 K/UL (ref 0–0.1)
BASOPHILS NFR BLD: 0 % (ref 0–1)
BILIRUB SERPL-MCNC: 0.3 MG/DL (ref 0.2–1)
BUN SERPL-MCNC: 36 MG/DL (ref 6–20)
BUN/CREAT SERPL: 40 (ref 12–20)
CALCIUM SERPL-MCNC: 9.3 MG/DL (ref 8.5–10.1)
CHLORIDE SERPL-SCNC: 109 MMOL/L (ref 97–108)
CO2 SERPL-SCNC: 23 MMOL/L (ref 21–32)
CREAT SERPL-MCNC: 0.91 MG/DL (ref 0.55–1.02)
DIFFERENTIAL METHOD BLD: ABNORMAL
EOSINOPHIL # BLD: 0 K/UL (ref 0–0.4)
EOSINOPHIL NFR BLD: 0 % (ref 0–7)
ERYTHROCYTE [DISTWIDTH] IN BLOOD BY AUTOMATED COUNT: 12.8 % (ref 11.5–14.5)
GLOBULIN SER CALC-MCNC: 3 G/DL (ref 2–4)
GLUCOSE SERPL-MCNC: 172 MG/DL (ref 65–100)
HCT VFR BLD AUTO: 36.8 % (ref 35–47)
HGB BLD-MCNC: 12.5 G/DL (ref 11.5–16)
IMM GRANULOCYTES # BLD AUTO: 0 K/UL (ref 0–0.04)
IMM GRANULOCYTES NFR BLD AUTO: 0 % (ref 0–0.5)
LYMPHOCYTES # BLD: 0.9 K/UL (ref 0.8–3.5)
LYMPHOCYTES NFR BLD: 12 % (ref 12–49)
MCH RBC QN AUTO: 35.3 PG (ref 26–34)
MCHC RBC AUTO-ENTMCNC: 34 G/DL (ref 30–36.5)
MCV RBC AUTO: 104 FL (ref 80–99)
MONOCYTES # BLD: 0.3 K/UL (ref 0–1)
MONOCYTES NFR BLD: 4 % (ref 5–13)
NEUTS SEG # BLD: 6.1 K/UL (ref 1.8–8)
NEUTS SEG NFR BLD: 84 % (ref 32–75)
NRBC # BLD: 0 K/UL (ref 0–0.01)
NRBC BLD-RTO: 0 PER 100 WBC
PLATELET # BLD AUTO: 157 K/UL (ref 150–400)
PMV BLD AUTO: 9.1 FL (ref 8.9–12.9)
POTASSIUM SERPL-SCNC: 3.9 MMOL/L (ref 3.5–5.1)
PROT SERPL-MCNC: 6.6 G/DL (ref 6.4–8.2)
RBC # BLD AUTO: 3.54 M/UL (ref 3.8–5.2)
SODIUM SERPL-SCNC: 135 MMOL/L (ref 136–145)
WBC # BLD AUTO: 7.3 K/UL (ref 3.6–11)

## 2023-03-31 PROCEDURE — 74011250637 HC RX REV CODE- 250/637: Performed by: FAMILY MEDICINE

## 2023-03-31 PROCEDURE — 80053 COMPREHEN METABOLIC PANEL: CPT

## 2023-03-31 PROCEDURE — 74011250637 HC RX REV CODE- 250/637: Performed by: NURSE PRACTITIONER

## 2023-03-31 PROCEDURE — 85025 COMPLETE CBC W/AUTO DIFF WBC: CPT

## 2023-03-31 PROCEDURE — 36415 COLL VENOUS BLD VENIPUNCTURE: CPT

## 2023-03-31 PROCEDURE — 74011636637 HC RX REV CODE- 636/637: Performed by: NURSE PRACTITIONER

## 2023-03-31 PROCEDURE — 74011000250 HC RX REV CODE- 250: Performed by: NURSE PRACTITIONER

## 2023-03-31 RX ORDER — FAMOTIDINE 20 MG/1
20 TABLET, FILM COATED ORAL EVERY EVENING
Qty: 5 TABLET | Refills: 0 | Status: SHIPPED | OUTPATIENT
Start: 2023-03-31 | End: 2023-04-05

## 2023-03-31 RX ORDER — GUAIFENESIN 600 MG/1
600 TABLET, EXTENDED RELEASE ORAL EVERY 12 HOURS
Qty: 6 TABLET | Refills: 0 | Status: SHIPPED | OUTPATIENT
Start: 2023-03-31 | End: 2023-04-03

## 2023-03-31 RX ORDER — LEVOFLOXACIN 250 MG/1
250 TABLET ORAL DAILY
Qty: 2 TABLET | Refills: 0 | Status: SHIPPED | OUTPATIENT
Start: 2023-03-31 | End: 2023-04-02

## 2023-03-31 RX ORDER — PREDNISONE 20 MG/1
40 TABLET ORAL DAILY
Qty: 10 TABLET | Refills: 0 | Status: SHIPPED | OUTPATIENT
Start: 2023-03-31 | End: 2023-04-05

## 2023-03-31 RX ADMIN — PREDNISONE 60 MG: 20 TABLET ORAL at 09:09

## 2023-03-31 RX ADMIN — IPRATROPIUM BROMIDE AND ALBUTEROL SULFATE 3 ML: 2.5; .5 SOLUTION RESPIRATORY (INHALATION) at 07:09

## 2023-03-31 RX ADMIN — SERTRALINE HYDROCHLORIDE 100 MG: 50 TABLET ORAL at 07:08

## 2023-03-31 RX ADMIN — GUAIFENESIN 600 MG: 600 TABLET, EXTENDED RELEASE ORAL at 07:09

## 2023-03-31 RX ADMIN — BUDESONIDE 500 MCG: 0.5 INHALANT RESPIRATORY (INHALATION) at 07:09

## 2023-03-31 RX ADMIN — LOSARTAN POTASSIUM 50 MG: 50 TABLET, FILM COATED ORAL at 07:09

## 2023-03-31 RX ADMIN — AMLODIPINE BESYLATE 5 MG: 5 TABLET ORAL at 07:09

## 2023-03-31 RX ADMIN — CARVEDILOL 25 MG: 12.5 TABLET, FILM COATED ORAL at 07:09

## 2023-03-31 NOTE — PROGRESS NOTES
Pulse oximetry assessment   91% at rest on room air (if 88% or less, skip next steps)  87% while ambulating on room air  93% at rest on 2LPM

## 2023-03-31 NOTE — PROGRESS NOTES
Pulse oximetry assessment   86 - 87% at rest on room air (if 88% or less, skip next steps)  90 -92 % at rest on 2 lpm nasal cannula      Bedside and Verbal shift change report given to Nitesh Dubose RN  (oncoming nurse) by Fracisco Humphrey (offgoing nurse). Report included the following information SBAR and Kardex.

## 2023-03-31 NOTE — DISCHARGE SUMMARY
Discharge Summary       PATIENT ID: Jenni Dial  MRN: [id number]   YOB: 1945    DATE OF ADMISSION: 3/28/2023 12:45 PM    DATE OF DISCHARGE: 3/23/2023  PRIMARY CARE PROVIDER: Talia Monsivais MD     ATTENDING PHYSICIAN: Dr. Ct Gupta  DISCHARGING PROVIDER: Mikaela Nuñez NP    To contact this individual call [phone] and ask the  to page. If unavailable ask to be transferred the Adult Hospitalist Department. CONSULTATIONS: IP CONSULT TO HOSPITALIST  IP CONSULT TO PULMONOLOGY    PROCEDURES/SURGERIES: * No surgery found *    ADMITTING [address] [place] [address] COURSE:   Per H&P, Jenni Dial is a 78 y.o. female with a PMH of CAD, HTN, GERD, Left Foot Drop, PNA, Hx Renal Artery Stenosis (s/p right renal stent 7/2019 Dr. Katlin Valadez) and COPD who presented via EMS with c/o shortness of breath x3 days. EMS reported 88-89% on RA, duo neb given and patient placed on 2L NC en route to ED with reported symptom improvement.       3/31/2023  Vital signs stable, no respiratory distress, respiratory rate even and unlabored  Home O2 oxygen set up per CM (new)  Patient will follow-up with her pulmonologist    DISCHARGE DIAGNOSES / PLAN:      COPD Exacerbation: Improved  Chest radiograph was unremarkable  Continuing nebulizers  ICS  Levofloxacin, guaifenesin  Follows w/ Dr. Jose F Wyman  3/30 change steroids to po     Acute hypoxic respiratory failure 2/2 COPDex - improved on d/c  No home O2 at baseline  Aggressive pulmonary toilet, wean oxygen as tolerated     CAD s/p PCI, HTN:   Troponin wnl  EKG without ischemic changes  Blood pressure currently controlled  Continue amlodipine, carvedilol, losartan    Hx of renal art stenosis; s/p stent placement on 7/19     Hyperglycemia:   Likely due to steroids  A1c 5.2     GERD:   Stable  Continuing famotidine     Code status: Full       PENDING TEST RESULTS:   At the time of discharge the following test results are still pending: none    FOLLOW UP APPOINTMENTS:    Follow-up Information       Follow up With Specialties Details Why Contact Info    Lamberto Monge MD Family Medicine   [address]  [phone]               ADDITIONAL CARE RECOMMENDATIONS:   Complete 2 more days of Levaquin (antibiotic) for anti-inflammatory effect for COPD exacerbation. Complete steroids over the next 5 days. Take Pepcid to protect your stomach while taking steroids. Oxygen being set up at home. Please follow-up with Dr. Norman Marcos. Safe travels! DIET: low sodium    ACTIVITY: Activity as tolerated    WOUND CARE: none    EQUIPMENT needed: none      DISCHARGE MEDICATIONS:  Current Discharge Medication List        START taking these medications    Details   levoFLOXacin (LEVAQUIN) 250 mg tablet Take 1 Tablet by mouth daily for 2 days. Qty: 2 Tablet, Refills: 0  Start date: 3/31/2023, End date: 4/2/2023      guaiFENesin ER (MUCINEX) 600 mg ER tablet Take 1 Tablet by mouth every twelve (12) hours for 3 days. Qty: 6 Tablet, Refills: 0  Start date: 3/31/2023, End date: 4/3/2023      predniSONE (DELTASONE) 20 mg tablet Take 2 Tablets by mouth daily for 5 days. Qty: 10 Tablet, Refills: 0  Start date: 3/31/2023, End date: 4/5/2023      famotidine (PEPCID) 20 mg tablet Take 1 Tablet by mouth every evening for 5 days. Qty: 5 Tablet, Refills: 0  Start date: 3/31/2023, End date: 4/5/2023           CONTINUE these medications which have NOT CHANGED    Details   albuterol (PROVENTIL HFA, VENTOLIN HFA, PROAIR HFA) 90 mcg/actuation inhaler Take 2 Puffs by inhalation every four (4) hours as needed for Wheezing, Shortness of Breath, Cough or Respiratory Distress. atorvastatin (LIPITOR) 40 mg tablet Take 80 mg by mouth nightly. Total dose = 80 mg- 2 tablets for easy swallowing. losartan (COZAAR) 50 mg tablet Take 50 mg by mouth two (2) times a day. multivitamin (ONE A DAY) tablet Take 1 Tablet by mouth daily.       amLODIPine (NORVASC) 5 mg tablet Take 1 Tablet by mouth two (2) times a day. Qty: 180 Tablet, Refills: 1      carvediloL (COREG) 25 mg tablet TAKE ONE TABLET BY MOUTH TWICE A DAY  Qty: 180 Tablet, Refills: 3      ALPRAZolam (XANAX) 1 mg tablet Take 1 Tab by mouth two (2) times daily as needed for Anxiety. Max Daily Amount: 2 mg. Qty: 10 Tab, Refills: 0    Associated Diagnoses: Anxiety      albuterol-ipratropium (DUO-NEB) 2.5 mg-0.5 mg/3 ml nebu 3 mL by Nebulization route every four (4) hours as needed for Wheezing, Shortness of Breath or Respiratory Distress. Qty: 30 Nebule, Refills: 0      budesonide-formoteroL (SYMBICORT) 160-4.5 mcg/actuation HFAA Take 2 Puffs by inhalation two (2) times a day. Qty: 1 Inhaler, Refills: 0      meloxicam (MOBIC) 15 mg tablet Take 15 mg by mouth daily. sertraline (ZOLOFT) 100 mg tablet Take 100 mg by mouth Every morning. zolpidem (AMBIEN) 10 mg tablet Take  by mouth nightly as needed for Sleep. NOTIFY YOUR PHYSICIAN FOR ANY OF THE FOLLOWING:   Fever over 101 degrees for 24 hours. Chest pain, shortness of breath, fever, chills, nausea, vomiting, diarrhea, change in mentation, falling, weakness, bleeding. Severe pain or pain not relieved by medications. Or, any other signs or symptoms that you may have questions about.     DISPOSITION:  x  Home With:   OT  PT  HH  RN       Long term SNF/Inpatient Rehab    Independent/assisted living    Hospice    Other:       PATIENT CONDITION AT DISCHARGE:     Functional status    Poor     Deconditioned    x Independent      Cognition   x  Lucid     Forgetful     Dementia      Catheters/lines (plus indication)    Silva     PICC     PEG    x None      Code status   x  Full code     DNR      PHYSICAL EXAMINATION AT DISCHARGE:  General : alert x 3, awake, no acute distress  HEENT: PEERL, EOMI, moist mucus membrane  Neck: supple, no JVD  Chest: + exp wheeze improved, good inspiratory flow, no resp distress, rr even/labored  CVS: S1 S2 heard, Capillary refill less than 2 seconds  Abd: soft/ non tender, non distended, BS physiological,   Ext: no clubbing, no cyanosis, no edema, brisk 2+ DP pulses feet warm  Neuro/Psych: pleasant mood and affect, CN II-XII intact  Skin: warm     CHRONIC MEDICAL DIAGNOSES:  Problem List as of 3/31/2023 Date Reviewed: 2/23/2023            Codes Class Noted - Resolved    RESOLVED: Coronary artery disease ICD-10-CM: I25.10  ICD-9-CM: 414.00  9/12/2019 - 8/3/2021        COPD exacerbation ([facility] ICD-10-CM: J44.1  ICD-9-CM: 491.21  3/28/2023 - Present        Bacterial pneumonia ICD-10-CM: J15.9  ICD-9-CM: 482.9  2/22/2020 - Present        Anemia ICD-10-CM: D64.9  ICD-9-CM: 285.9  10/15/2019 - Present        Renal artery stenosis ([facility] ICD-10-CM: I70.1  ICD-9-CM: 440.1  8/20/2019 - Present        Flash pulmonary edema ([facility] ICD-10-CM: J81.0  ICD-9-CM: 518.4  8/20/2019 - Present        Atherosclerotic cardiovascular disease ICD-10-CM: I25.10  ICD-9-CM: 429.2  8/20/2019 - Present        Mixed hyperlipidemia ICD-10-CM: E78.2  ICD-9-CM: 272.2  11/16/2018 - Present        Renovascular hypertension ICD-10-CM: I15.0  ICD-9-CM: 405.91  11/16/2018 - Present        SOB (shortness of breath) ICD-10-CM: R06.02  ICD-9-CM: 786.05  11/16/2018 - Present        Bilateral leg edema ICD-10-CM: R60.0  ICD-9-CM: 782.3  11/16/2018 - Present        Family history of premature CAD ICD-10-CM: Z82.49  ICD-9-CM: V17.3  11/16/2018 - Present        HNP (herniated nucleus pulposus), cervical ICD-10-CM: M50.20  ICD-9-CM: 722.0  12/27/2016 - Present        RESOLVED: Hypertensive urgency ICD-10-CM: I16.0  ICD-9-CM: 401.9  7/19/2019 - 7/26/2019        RESOLVED: Chest pain ICD-10-CM: R07.9  ICD-9-CM: 786.50  7/18/2019 - 7/26/2019           Greater than 31 minutes were spent with the patient on counseling and coordination of care    Signed:   Melvin Schwarz NP  3/31/2023  11:08 AM

## 2023-03-31 NOTE — PROGRESS NOTES
Patient alert and oriented x4. I have reviewed discharge instructions with the patient. The patient verbalized understanding. Discharge medications reviewed with patient and appropriate educational materials and side effects teaching were provided.

## 2023-03-31 NOTE — PROGRESS NOTES
Physician Progress Note      Helder Hawkins  [facility] #:                  [id number]  :                       1945  ADMIT DATE:       3/28/2023 12:45 PM  DISCH DATE:  RESPONDING  PROVIDER #:        ADDI MILNER MINOR NP          QUERY TEXT:    Patient admitted with AECOPD. Noted documentation of acute respiratory failure in [facility] PN dated 3/29. Pt with no resp distress documented. Pt on 2L at 97% with RR 20 which was increased to 4L and 6L with O2 remaining >93% with RR 18-20. In order to support the diagnosis of acute respiratory failure, please include additional clinical indicators in your documentation. Or please document if the diagnosis of acute respiratory failure has been ruled out after further study. The medical record reflects the following:  Risk Factors: former smoker, AECOPD    Clinical Indicators:    2L NC 97% RR 20  4L NC 93-99% RR 18-20  6L NC 96%    ED 3/28- She received stacked nebulizers while in route via EMS with mild improvement, however noted to be mildly hypoxic on arrival, improving with 2 L of oxygen by nasal cannula. Upon arrival she is no acute distress    H&P- Resp: CTA bilaterally. No wheezing/rhonchi/rales. No accessory muscle use.     [facility] PN 3/29-  Acute hypoxic respiratory failure  Saturations were down in the 80s and room air  Not normally on home oxygen  Currently with 6 L/min of supplemental oxygen via nasal cannula  Likely due to the above  Aggressive pulmonary toilet, wean oxygen as tolerated    Treatment: O2; pulm toilet    Acute Respiratory Failure Clinical Indicators per [employer] Training Guide and Quick Reference Guide:  pO2 < 60 mmHg or SpO2 (pulse oximetry) < 91% breathing room air  pCO2 > 50 and pH < 7.35  P/F ratio (pO2 / FIO2) < 300  pO2 decrease or pCO2 increase by 10 mmHg from baseline (if known)  Supplemental oxygen of 40% or more  Presence of respiratory distress, tachypnea, dyspnea, shortness of breath, wheezing  Unable to speak in complete sentences  Use of accessory muscles to breathe  Extreme anxiety and feeling of impending doom  Tripod position  Confusion/altered mental status/obtunded    Thank you,  Siobhan Parry RN, BSN, CRCR, CCDS, SMART  Clinical Documentation Improvement  Shiva [email or web address]. [email or web address]  [phone] or via [employer]  Options provided:  -- Acute Respiratory Failure as evidenced by, Please document evidence. -- Acute Respiratory Failure ruled out after study  -- Acute hypoxia  -- Other - I will add my own diagnosis  -- Disagree - Not applicable / Not valid  -- Disagree - Clinically unable to determine / Unknown  -- Refer to Clinical Documentation Reviewer    PROVIDER RESPONSE TEXT:    This patient has acute hypoxia.     Query created by: Alesha Shelton on 3/30/2023 11:04 AM      Electronically signed by:  Gasper REMY NP 3/31/2023 11:00 AM

## 2023-03-31 NOTE — DISCHARGE INSTRUCTIONS
Discharge Instructions       PATIENT ID: Carol Bernardo  MRN: [id number]   YOB: 1945    DATE OF ADMISSION: 3/28/2023   DATE OF DISCHARGE: 3/31/2023    PRIMARY CARE PROVIDER: Rossana Go     ATTENDING PHYSICIAN: Dr. Soheila Pineda  DISCHARGING PROVIDER: Serafin Emery NP    To contact this individual call [phone] and ask the  to page. If unavailable ask to be transferred the Adult Hospitalist Department. DISCHARGE DIAGNOSES: COPD exacerbation    CONSULTATIONS: Pulmonary    PROCEDURES/SURGERIES: * No surgery found *    PENDING TEST RESULTS:   At the time of discharge the following test results are still pending: none    FOLLOW UP APPOINTMENTS:   PCP, Dr. Bren Lewis:   Complete 2 more days of Levaquin (antibiotic) for anti-inflammatory effect for COPD exacerbation. Complete steroids over the next 5 days. Take Pepcid to protect your stomach while taking steroids. Oxygen being set up at home. Please follow-up with Dr. Severo Chow. Safe travels! DIET: low sodium    ACTIVITY: Activity as tolerated    WOUND CARE: none    EQUIPMENT needed: none      DISCHARGE MEDICATIONS:   See Medication Reconciliation Form    It is important that you take the medication exactly as they are prescribed. Keep your medication in the bottles provided by the pharmacist and keep a list of the medication names, dosages, and times to be taken in your wallet. Do not take other medications without consulting your doctor. NOTIFY YOUR PHYSICIAN FOR ANY OF THE FOLLOWING:   Fever over 101 degrees for 24 hours. Chest pain, shortness of breath, fever, chills, nausea, vomiting, diarrhea, change in mentation, falling, weakness, bleeding. Severe pain or pain not relieved by medications. Or, any other signs or symptoms that you may have questions about.       DISPOSITION:  x  Home With:   OT  PT  HH  RN       SNF/Inpatient Rehab/LTAC    Independent/assisted living    Hospice Other:     CDMP Checked:   Yes xx     PROBLEM LIST Updated:  Yes xx       Signed:   Destin SOLANO NP  3/31/2023  11:06 AM

## 2023-03-31 NOTE — PROGRESS NOTES
Transition Plan of care  -Low  Disposition-plan to discharge today with home oxygen. Referrals sent via [facility] and waiting for confirmation. Will need portable tank for transport home. Patient will also need assist with transport. CM will arrange transport once home oxygen has been confirmed and portable tank delivered. Update-portable tank will be delivered to room within the hour. She is waiting for prescriptions to be filled at outpatient pharmacy here. CM will arrange transport once these are completed. Update-transport with [facility] arranged for 1630. Patient aware. Bedside nurse to transport to front of hospital for discharge.

## 2023-03-31 NOTE — PROGRESS NOTES
Hospital follow-up PCP transitional care appointment has been scheduled with Dr. Tawny Balderrama for Thursday, April 6th, 2023  at 8:15 a.m. Katharine Riggs Pending patient discharge.   Mk Rivas, Care Management Assistant

## 2023-03-31 NOTE — PROGRESS NOTES
Pt able to tolerate room air while laying in bed sating between 89-91%. Pt walked a lap around 5E at brisk pace while maintaining a conversation. Upon returning to room pt O2 was 92%. Pt weaned down to 1L room air sating between 91-93%.

## 2023-04-19 ENCOUNTER — OFFICE VISIT (OUTPATIENT)
Dept: CARDIOLOGY CLINIC | Age: 78
End: 2023-04-19
Payer: MEDICARE

## 2023-04-19 VITALS
HEIGHT: 63 IN | HEART RATE: 60 BPM | OXYGEN SATURATION: 86 % | WEIGHT: 126.6 LBS | SYSTOLIC BLOOD PRESSURE: 130 MMHG | RESPIRATION RATE: 15 BRPM | BODY MASS INDEX: 22.43 KG/M2 | DIASTOLIC BLOOD PRESSURE: 80 MMHG

## 2023-04-19 DIAGNOSIS — I73.9 PAD (PERIPHERAL ARTERY DISEASE) (HCC): ICD-10-CM

## 2023-04-19 DIAGNOSIS — I10 ESSENTIAL HYPERTENSION: Primary | ICD-10-CM

## 2023-04-19 DIAGNOSIS — I25.10 CORONARY ARTERY DISEASE INVOLVING NATIVE CORONARY ARTERY OF NATIVE HEART WITHOUT ANGINA PECTORIS: ICD-10-CM

## 2023-04-19 DIAGNOSIS — J96.21 ACUTE ON CHRONIC RESPIRATORY FAILURE WITH HYPOXIA (HCC): ICD-10-CM

## 2023-04-19 PROCEDURE — G0463 HOSPITAL OUTPT CLINIC VISIT: HCPCS | Performed by: INTERNAL MEDICINE

## 2023-04-19 NOTE — PROGRESS NOTES
[facility] [place]: Bina Stinson  [phone]          Cardiology Consult/Progress Note      Requesting/referring provider: Dr. Robert Griffith    Reason for Follow up: Renovascular hypertension/CAD  Assessment/Plan:  1. Hypertension likely secondary to renal artery stenosis significantly improved following renal artery stenting to proximal main right renal artery now improved status post renal artery stenting. 2.  Atherosclerotic vascular disease  3. Peripheral artery disease in the form of right iliac stenosis asymptomatic  4. Renal artery stenosis bilateral, status post right main renal arteries stenting with elevated velocities on follow-up Dopplers  5. Remote history of tobacco abuse  6. Progressive dyspnea on exertion  7. Hyperlipidemia  8. Hypertensive heart disease with moderate LVH and likely chronic diastolic heart failure. 9.  Likely extracranial cerebrovascular disease with bilateral carotid bruit  11. Likely bilateral pneumonia secondary to COVID-19 in January and February 2020 now with exacerbation of ongoing COPD/asthma  12. CAD s/p PCI to diagonal vessel for chest pain in 2019, moderate residual disease in LAD    Ms Fay Brooks presents for follow-up of coronary artery disease which she has prior stent to her diagonal which is medically stable. Her blood pressures appear to be remarkably well controlled now with losartan, amlodipine, beta-blocker. Notably there was increasing right renal stent velocities on her last renal Doppler. Currently the major problem appears to be acute on chronic respiratory failure secondary to COPD exacerbation/asthma. This has been an issue since she had pneumonia back in winter 2020 possibly related to [place]. Nonetheless she is seeing pulmonologist tomorrow and we discussed whether she can come off oxygen and what is the short and long-term prognosis of her pulmonary condition.       She does have peripheral arterial disease with significant iliac disease on most recent cardiac authorization. Nonetheless is not causing any claudication and can be managed medically at this time. Statin should be continued. From a cardiac standpoint there is not much that can be offered to improve her symptoms. Investigations personally reviewed and interpreted:  Cath Sep 2019: Severe disease in D1 s/p PCI with 2 REBECCA. Moderate disease in mid LAD, otherwise non obstructive disease. Bilateral renal Dopplers March 2021: Patent flow in the right renal stent. Less than 60% stenosis in left renal artery. Renal artery Doppler July 2022: Patent flow in the right renal stent. Velocities are increased in all 3 renal arteries 2 on the right, 1 on the left in equal fashion. Uncertain of the cause of increased velocities. There is no turbulence in the mid and distal renal segments although diastolic velocities are increased in right lower renal artery. Investigations outside records:  10/18 vcs normal stress echo, did not reach target heart rate  10/18 vcs normal lexiscan cardiolyte  Investigations personally reviewed and interpreted by me  ECG: Sinus bradycardia, nonspecific ST-T changes. Echocardiogram: Normal LV systolic function mild to moderate LV hypertrophy consistent with hypertensive heart disease. CT abdomen with angiogram and runoff: 2 renal arteries are present bilaterally. Right major renal artery has at least moderate to severe ostial stenosis. Accessory right renal artery is free of significant disease. Left renal arteries are equal in size. CT scan has been reported to demonstrate disease in superior renal artery although I cannot personally confirm it. Both arteries on the left appear to be too small to provide durable results with renal artery stenting. Renal angiogram and stenting July 2019: Main right renal artery 75% stenosis, accessory renal artery on the right free of significant disease.  2 codominant left renal arteries, one with 40 to 50% angiographic stenosis other free of disease. Status post proximal right main renal artery stenting with 5 x 15 Herculink stent with 0% residual stenosis. Renal arterial Doppler August 2019: Patent stent in main proximal right renal artery. Accessory right renal artery not visualized. Main left renal artery has less than 60% stenosis. Accessory renal artery on the left is not visualized. Technically suboptimal study. Normal kidney size bilaterally. Card catheterization March 2023: Patent stent in the diagonal with intermediate disease in mid LAD at the site of bifurcation with no substantial change since previous catheterization in 2021. Moderate disease in the RCA. HPI: Gasper Powell, a 78y.o. year-old who was previously seen for evaluation of uncontrolled hypertension and CAD. She is here for annual follow up. Mrs. Kezia Amezcua has history of extremely difficult to control hypertension and was admitted to the hospital with malignant hypertension on multiple occasions in 2019 associated with flash pulmonary edema. Subsequently she underwent invasive renal angiogram which demonstrated 70 to 75% stenosis in main right renal artery in its proximal segment. She subsequently underwent stenting of right renal artery with a 5 x 15 mm Herculink bare-metal stent with no residual stenosis. She has moderate stenosis and a relatively small sized left renal artery. There is an accessory left renal artery free of significant disease along with an accessory right renal artery free of significant disease. Recently she reported progressive dyspnea which was presumed to be caused by progressive CAD but heart catheterization demonstrated no significant progression with intermediate residual disease in her LAD.   Her physical examination at that time started demonstrating significant wheezing and she was ultimately admitted to the hospital with what was deemed as COPD/asthma exacerbation and was discharged on home oxygen along antibiotics. Past medical history:  She  has a past medical history of CAD (coronary artery disease) (09/12/2019), Foot drop, left foot, GERD (gastroesophageal reflux disease), Hypertension, Pneumonia, and MARY (renal artery stenosis) ([facility] [address] (07/24/2019). She has no past medical history of Adverse effect of anesthesia, Diabetes ([facility] [address] or Sleep apnea. Patient reports no PND/Orthpnea/CP. He reports no cough/fever/focal neurological deficits/abdominal pain. All other systems negative except as above. PE  Vitals:    04/19/23 1003   BP: 130/80   Pulse: 60   Resp: 15   SpO2: (!) 86%   Weight: 126 lb 9.6 oz (57.4 kg)   Height: 5' 3\" (1.6 m)    Body mass index is 22.43 kg/m². /80 (BP 1 Location: Left arm, BP Patient Position: Sitting, BP Cuff Size: Small adult)   Pulse 60   Resp 15   Ht 5' 3\" (1.6 m)   Wt 126 lb 9.6 oz (57.4 kg)   SpO2 (!) 86%   BMI 22.43 kg/m²   General:    Alert, cooperative, no distress. Psychiatric:    Normal Mood and affect    Eye/ENT:      Pupils equal, No asymmetry, Conjunctival pink. Able to hear voice at normal amplitude   Lungs:      Visibly symmetric chest expansion, No palpable tenderness. Clear to auscultation bilaterally. Heart[de-identified]    Regular rate and rhythm, S1, S2 normal, no murmur, click, rub or gallop. No JVD, Normal palpable peripheral pulses. No cyanosis   Abdomen:     Soft, non-tender. Bowel sounds normal. No masses,  No      organomegaly. Right renal bruits heard   Extremities:   Extremities normal, atraumatic, no edema. Neurologic:   CN II-XII grossly intact.  No gross focal deficits         Recent Labs:  Lab Results   Component Value Date/Time    Cholesterol, total 111 02/23/2020 03:28 AM    HDL Cholesterol 27 02/23/2020 03:28 AM    LDL, calculated 35.4 02/23/2020 03:28 AM    Triglyceride 243 (H) 02/23/2020 03:28 AM    CHOL/HDL Ratio 4.1 02/23/2020 03:28 AM     Lab Results   Component Value Date/Time    Creatinine (POC) 0.9 10/20/2016 10:16 AM    Creatinine 0.91 03/31/2023 05:12 AM     Lab Results   Component Value Date/Time    BUN 36 (H) 03/31/2023 05:12 AM     Lab Results   Component Value Date/Time    Potassium 3.9 03/31/2023 05:12 AM     Lab Results   Component Value Date/Time    Hemoglobin A1c 5.2 03/29/2023 12:21 AM     Lab Results   Component Value Date/Time    HGB 12.5 03/31/2023 05:12 AM     Lab Results   Component Value Date/Time    PLATELET 563 07/76/4524 05:12 AM       Reviewed:  Past Medical History:   Diagnosis Date    CAD (coronary artery disease) 09/12/2019    stent D1 9/12/2019 ([place])    Foot drop, left foot     GERD (gastroesophageal reflux disease)     Hypertension     Pneumonia     MARY (renal artery stenosis) ([facility] [address] 07/24/2019    s/p right renal stent 7/24/2019 Janae Yuan)     Social History     Tobacco Use   Smoking Status Former    Packs/day: 0.50    Types: Cigarettes   Smokeless Tobacco Never     Social History     Substance and Sexual Activity   Alcohol Use Yes    Alcohol/week: 6.0 standard drinks    Types: 6 Glasses of wine per week    Comment: 6 a week     No Known Allergies  Family History   Problem Relation Age of Onset    Heart Attack Father 47        Current Outpatient Medications   Medication Sig    Oxygen 2 liters    carvediloL (COREG) 25 mg tablet TAKE ONE TABLET BY MOUTH TWICE A DAY    albuterol (PROVENTIL HFA, VENTOLIN HFA, PROAIR HFA) 90 mcg/actuation inhaler Take 2 Puffs by inhalation every four (4) hours as needed for Wheezing, Shortness of Breath, Cough or Respiratory Distress. atorvastatin (LIPITOR) 40 mg tablet Take 2 Tablets by mouth nightly. Total dose = 80 mg- 2 tablets for easy swallowing. losartan (COZAAR) 50 mg tablet Take 1 Tablet by mouth two (2) times a day. multivitamin (ONE A DAY) tablet Take 1 Tablet by mouth daily. amLODIPine (NORVASC) 5 mg tablet Take 1 Tablet by mouth two (2) times a day.     ALPRAZolam (XANAX) 1 mg tablet Take 1 Tab by mouth two (2) times daily as needed for Anxiety. Max Daily Amount: 2 mg. albuterol-ipratropium (DUO-NEB) 2.5 mg-0.5 mg/3 ml nebu 3 mL by Nebulization route every four (4) hours as needed for Wheezing, Shortness of Breath or Respiratory Distress. budesonide-formoteroL (SYMBICORT) 160-4.5 mcg/actuation HFAA Take 2 Puffs by inhalation two (2) times a day. meloxicam (MOBIC) 15 mg tablet Take 1 Tablet by mouth daily. sertraline (ZOLOFT) 100 mg tablet Take 1 Tablet by mouth Every morning. zolpidem (AMBIEN) 10 mg tablet Take  by mouth nightly as needed for Sleep. No current facility-administered medications for this visit. Kimmie Ch MD04/19/23 There are other unrelated non-urgent complaints, but due to the busy schedule and the amount of time I've already spent with her, time does not permit me to address these routine issues at today's visit. I've requested another appointment to review these additional issues.     [address] [facility] [place]  [address], [address], [address] [place] [address] Avenue

## 2023-04-20 ENCOUNTER — TRANSCRIBE ORDER (OUTPATIENT)
Dept: SCHEDULING | Age: 78
End: 2023-04-20

## 2023-04-20 DIAGNOSIS — R06.02 SOB (SHORTNESS OF BREATH): Primary | ICD-10-CM

## 2023-04-25 ENCOUNTER — TELEPHONE (OUTPATIENT)
Dept: ORTHOPEDIC SURGERY | Age: 78
End: 2023-04-25

## 2023-04-27 ENCOUNTER — HOSPITAL ENCOUNTER (OUTPATIENT)
Dept: NON INVASIVE DIAGNOSTICS | Age: 78
Discharge: HOME | End: 2023-04-27
Attending: INTERNAL MEDICINE
Payer: MEDICARE

## 2023-04-27 VITALS
BODY MASS INDEX: 22.42 KG/M2 | WEIGHT: 126.54 LBS | DIASTOLIC BLOOD PRESSURE: 69 MMHG | SYSTOLIC BLOOD PRESSURE: 148 MMHG | HEIGHT: 63 IN

## 2023-04-27 DIAGNOSIS — R06.02 SOB (SHORTNESS OF BREATH): ICD-10-CM

## 2023-04-27 LAB
ECHO AO ARCH DIAM: 2.1 CM
ECHO AO ASC DIAM: 3.4 CM
ECHO AO ASCENDING AORTA INDEX: 2.14 CM/M2
ECHO AV AREA PEAK VELOCITY: 2.2 CM2
ECHO AV AREA PEAK VELOCITY: 2.3 CM2
ECHO AV AREA VTI: 2.2 CM2
ECHO AV AREA/BSA VTI: 1.4 CM2/M2
ECHO AV MEAN GRADIENT: 3 MMHG
ECHO AV MEAN VELOCITY: 0.8 M/S
ECHO AV PEAK GRADIENT: 6 MMHG
ECHO AV PEAK GRADIENT: 7 MMHG
ECHO AV PEAK VELOCITY: 1.3 M/S
ECHO AV PEAK VELOCITY: 1.3 M/S
ECHO AV VTI: 32.9 CM
ECHO LA DIAMETER INDEX: 2.2 CM/M2
ECHO LA DIAMETER: 3.5 CM
ECHO LA VOL 2C: 35 ML (ref 22–52)
ECHO LA VOL 4C: 28 ML (ref 22–52)
ECHO LA VOL BP: 32 ML (ref 22–52)
ECHO LA VOL/BSA BIPLANE: 20 ML/M2 (ref 16–34)
ECHO LA VOLUME AREA LENGTH: 34 ML
ECHO LA VOLUME INDEX A2C: 22 ML/M2 (ref 16–34)
ECHO LA VOLUME INDEX A4C: 18 ML/M2 (ref 16–34)
ECHO LA VOLUME INDEX AREA LENGTH: 21 ML/M2 (ref 16–34)
ECHO LV E' LATERAL VELOCITY: 7 CM/S
ECHO LV E' SEPTAL VELOCITY: 6 CM/S
ECHO LV EDV A2C: 50 ML
ECHO LV EDV A4C: 50 ML
ECHO LV EDV BP: 50 ML (ref 56–104)
ECHO LV EDV INDEX A4C: 31 ML/M2
ECHO LV EDV INDEX BP: 31 ML/M2
ECHO LV EDV NDEX A2C: 31 ML/M2
ECHO LV EJECTION FRACTION A2C: 57 %
ECHO LV EJECTION FRACTION A4C: 64 %
ECHO LV EJECTION FRACTION BIPLANE: 60 % (ref 55–100)
ECHO LV ESV A2C: 21 ML
ECHO LV ESV A4C: 18 ML
ECHO LV ESV BP: 20 ML (ref 19–49)
ECHO LV ESV INDEX A2C: 13 ML/M2
ECHO LV ESV INDEX A4C: 11 ML/M2
ECHO LV ESV INDEX BP: 13 ML/M2
ECHO LV FRACTIONAL SHORTENING: 43 % (ref 28–44)
ECHO LV INTERNAL DIMENSION DIASTOLE INDEX: 2.89 CM/M2
ECHO LV INTERNAL DIMENSION DIASTOLIC: 4.6 CM (ref 3.9–5.3)
ECHO LV INTERNAL DIMENSION SYSTOLIC INDEX: 1.64 CM/M2
ECHO LV INTERNAL DIMENSION SYSTOLIC: 2.6 CM
ECHO LV IVSD: 0.6 CM (ref 0.6–0.9)
ECHO LV MASS 2D: 81.9 G (ref 67–162)
ECHO LV MASS INDEX 2D: 51.5 G/M2 (ref 43–95)
ECHO LV POSTERIOR WALL DIASTOLIC: 0.6 CM (ref 0.6–0.9)
ECHO LV RELATIVE WALL THICKNESS RATIO: 0.26
ECHO LVOT AREA: 2.8 CM2
ECHO LVOT AV VTI INDEX: 0.76
ECHO LVOT DIAM: 1.9 CM
ECHO LVOT MEAN GRADIENT: 2 MMHG
ECHO LVOT PEAK GRADIENT: 4 MMHG
ECHO LVOT PEAK VELOCITY: 1 M/S
ECHO LVOT STROKE VOLUME INDEX: 44.7 ML/M2
ECHO LVOT SV: 71.1 ML
ECHO LVOT VTI: 25.1 CM
ECHO MV A VELOCITY: 0.82 M/S
ECHO MV E DECELERATION TIME (DT): 218.2 MS
ECHO MV E VELOCITY: 0.81 M/S
ECHO MV E/A RATIO: 0.99
ECHO MV E/E' LATERAL: 11.57
ECHO MV E/E' RATIO (AVERAGED): 12.54
ECHO MV E/E' SEPTAL: 13.5
ECHO PULMONARY ARTERY END DIASTOLIC PRESSURE: 4 MMHG
ECHO PV MAX VELOCITY: 1.2 M/S
ECHO PV PEAK GRADIENT: 6 MMHG
ECHO PV REGURGITANT MAX VELOCITY: 1 M/S
ECHO RV FREE WALL PEAK S': 16 CM/S
ECHO RV INTERNAL DIMENSION: 3 CM
ECHO RV TAPSE: 1.6 CM (ref 1.7–?)
ECHO TV REGURGITANT MAX VELOCITY: 3.02 M/S
ECHO TV REGURGITANT PEAK GRADIENT: 36 MMHG

## 2023-04-27 PROCEDURE — 93306 TTE W/DOPPLER COMPLETE: CPT | Performed by: INTERNAL MEDICINE

## 2023-04-27 PROCEDURE — 93306 TTE W/DOPPLER COMPLETE: CPT

## 2023-05-22 ENCOUNTER — HOSPITAL ENCOUNTER (OUTPATIENT)
Facility: HOSPITAL | Age: 78
Discharge: HOME | End: 2023-05-25
Payer: MEDICARE

## 2023-05-22 DIAGNOSIS — J84.10 PULMONARY FIBROSIS (HCC): ICD-10-CM

## 2023-05-22 PROCEDURE — 71250 CT THORAX DX C-: CPT

## 2023-05-31 RX ORDER — ATORVASTATIN CALCIUM 40 MG/1
80 TABLET, FILM COATED ORAL NIGHTLY
Qty: 180 TABLET | Refills: 0 | Status: SHIPPED | OUTPATIENT
Start: 2023-05-31

## 2023-05-31 NOTE — TELEPHONE ENCOUNTER
Requested Prescriptions     Signed Prescriptions Disp Refills    atorvastatin (LIPITOR) 40 MG tablet 180 tablet 0     Sig: Take 2 tablets by mouth nightly     Authorizing Provider: Coy Delgado     Ordering User: Joe NASH per MD   Future Appointments   Date Time Provider [address] [place] [address]   5/1/2024 11:00 AM MD ROYER Farfan AMB

## 2023-06-23 ENCOUNTER — HOSPITAL ENCOUNTER (OUTPATIENT)
Facility: HOSPITAL | Age: 78
End: 2023-06-23
Attending: INTERNAL MEDICINE
Payer: MEDICARE

## 2023-06-23 ENCOUNTER — HOSPITAL ENCOUNTER (OUTPATIENT)
Facility: HOSPITAL | Age: 78
Discharge: HOME | End: 2023-06-23
Attending: INTERNAL MEDICINE
Payer: MEDICARE

## 2023-06-23 VITALS — BODY MASS INDEX: 23.38 KG/M2 | WEIGHT: 132 LBS

## 2023-06-23 DIAGNOSIS — R91.1 PULMONARY NODULE: ICD-10-CM

## 2023-06-23 LAB
GLUCOSE BLD STRIP.AUTO-MCNC: 152 MG/DL (ref 65–117)
SERVICE CMNT-IMP: ABNORMAL

## 2023-06-23 PROCEDURE — A9552 F18 FDG: HCPCS | Performed by: INTERNAL MEDICINE

## 2023-06-23 PROCEDURE — 3430000000 HC RX DIAGNOSTIC RADIOPHARMACEUTICAL: Performed by: INTERNAL MEDICINE

## 2023-06-23 PROCEDURE — 78815 PET IMAGE W/CT SKULL-THIGH: CPT

## 2023-06-23 PROCEDURE — 82962 GLUCOSE BLOOD TEST: CPT

## 2023-06-23 RX ORDER — FLUDEOXYGLUCOSE F-18 500 MCI/ML
10 INJECTION INTRAVENOUS
Status: COMPLETED | OUTPATIENT
Start: 2023-06-23 | End: 2023-06-23

## 2023-06-23 RX ADMIN — FLUDEOXYGLUCOSE F-18 10 MILLICURIE: 500 INJECTION INTRAVENOUS at 12:48

## 2023-07-10 RX ORDER — LOSARTAN POTASSIUM 50 MG/1
50 TABLET ORAL 2 TIMES DAILY
Qty: 180 TABLET | Refills: 1 | Status: SHIPPED | OUTPATIENT
Start: 2023-07-10

## 2023-07-10 RX ORDER — LOSARTAN POTASSIUM 50 MG/1
TABLET ORAL
Qty: 180 TABLET | Refills: 1 | OUTPATIENT
Start: 2023-07-10

## 2023-07-10 NOTE — TELEPHONE ENCOUNTER
Requested Prescriptions     Signed Prescriptions Disp Refills    losartan (COZAAR) 50 MG tablet 180 tablet 1     Sig: Take 1 tablet by mouth 2 times daily     Authorizing Provider: Loretha Jeans     Ordering User: Jas Byrd     Refused Prescriptions Disp Refills    losartan (COZAAR) 50 MG tablet [Pharmacy Med Name: Leela Marin 50 MG TAB[**]] 180 tablet 1     Sig: TAKE TWO TABLETS BY MOUTH ONE TIME DAILY     Refused By: Jas Byrd     Reason for Refusal: Other     Per VO per MD     Future Appointments   Date Time Provider [address] [place] [address]   5/1/2024 11:00 AM Loretha Jeans, MD [facility] AMB

## 2023-07-20 ENCOUNTER — HOSPITAL ENCOUNTER (OUTPATIENT)
Facility: HOSPITAL | Age: 78
Discharge: HOME | End: 2023-07-20
Attending: INTERNAL MEDICINE
Payer: MEDICARE

## 2023-07-20 DIAGNOSIS — R91.1 PULMONARY NODULE: ICD-10-CM

## 2023-07-20 PROCEDURE — 71250 CT THORAX DX C-: CPT

## 2023-08-30 RX ORDER — ATORVASTATIN CALCIUM 40 MG/1
TABLET, FILM COATED ORAL
Qty: 180 TABLET | Refills: 0 | Status: SHIPPED | OUTPATIENT
Start: 2023-08-30

## 2023-08-30 NOTE — TELEPHONE ENCOUNTER
Requested Prescriptions     Signed Prescriptions Disp Refills    atorvastatin (LIPITOR) 40 MG tablet 180 tablet 0     Sig: TAKE TWO TABLETS BY MOUTH ONE TIME DAILY AT NIGHT     Authorizing Provider: Baldemar Baumgarten     Ordering User: Oneil Sicard Per VO per MD     Future Appointments   Date Time Provider [address] [place] [address]   5/1/2024 11:00 AM Baldemar Baumgarten, MD [facility]

## 2023-09-18 ENCOUNTER — TELEPHONE (OUTPATIENT)
Age: 78
End: 2023-09-18

## 2023-09-18 RX ORDER — AMLODIPINE BESYLATE 5 MG/1
5 TABLET ORAL 2 TIMES DAILY
Qty: 180 TABLET | Refills: 0 | Status: SHIPPED | OUTPATIENT
Start: 2023-09-18 | End: 2023-10-04 | Stop reason: SDUPTHER

## 2023-09-18 NOTE — TELEPHONE ENCOUNTER
Patient called requested a medication refill Amlodpine 5mg patient is completely out of her medication. Per patient pharmacy attempted to reach out to Nurse no encounters on chart possibly incorrect office.     [facility] [phone]      PT# [phone]

## 2023-09-18 NOTE — TELEPHONE ENCOUNTER
Requested Prescriptions     Signed Prescriptions Disp Refills    amLODIPine (NORVASC) 5 MG tablet 180 tablet 0     Sig: Take 1 tablet by mouth 2 times daily     Authorizing Provider: Linda Cool     Ordering User: Abebe NASH per MD    Spoke with patient. 2 patient identifiers used. Updated patient that her prescription has been sent to her pharmacy and that I would check when I get back to the office to see if a fax request was received.

## 2023-10-24 ENCOUNTER — HOSPITAL ENCOUNTER (OUTPATIENT)
Facility: HOSPITAL | Age: 78
Discharge: HOME | End: 2023-10-27
Attending: INTERNAL MEDICINE
Payer: MEDICARE

## 2023-10-24 DIAGNOSIS — R91.1 PULMONARY NODULE: ICD-10-CM

## 2023-10-24 PROCEDURE — 71250 CT THORAX DX C-: CPT

## 2023-11-06 ENCOUNTER — APPOINTMENT (OUTPATIENT)
Facility: HOSPITAL | Age: 78
DRG: 189 | End: 2023-11-06
Payer: MEDICARE

## 2023-11-06 ENCOUNTER — HOSPITAL ENCOUNTER (INPATIENT)
Facility: HOSPITAL | Age: 78
LOS: 10 days | Discharge: HOSPICE | DRG: 189 | End: 2023-11-16
Attending: STUDENT IN AN ORGANIZED HEALTH CARE EDUCATION/TRAINING PROGRAM | Admitting: INTERNAL MEDICINE
Payer: MEDICARE

## 2023-11-06 DIAGNOSIS — J96.01 ACUTE RESPIRATORY FAILURE WITH HYPOXIA AND HYPERCAPNIA (HCC): Primary | ICD-10-CM

## 2023-11-06 DIAGNOSIS — J44.1 ACUTE EXACERBATION OF CHRONIC OBSTRUCTIVE PULMONARY DISEASE (COPD) (HCC): ICD-10-CM

## 2023-11-06 DIAGNOSIS — F41.9 ANXIETY: ICD-10-CM

## 2023-11-06 DIAGNOSIS — R06.02 SHORTNESS OF BREATH: ICD-10-CM

## 2023-11-06 DIAGNOSIS — J96.02 ACUTE RESPIRATORY FAILURE WITH HYPOXIA AND HYPERCAPNIA (HCC): Primary | ICD-10-CM

## 2023-11-06 DIAGNOSIS — Z71.1 CONCERN ABOUT END OF LIFE: ICD-10-CM

## 2023-11-06 LAB
ALBUMIN SERPL-MCNC: 3.7 G/DL (ref 3.5–5)
ALBUMIN/GLOB SERPL: 1.1 (ref 1.1–2.2)
ALP SERPL-CCNC: 70 U/L (ref 45–117)
ALT SERPL-CCNC: 25 U/L (ref 12–78)
ANION GAP BLD CALC-SCNC: 2 (ref 10–20)
ANION GAP SERPL CALC-SCNC: 8 MMOL/L (ref 5–15)
ARTERIAL PATENCY WRIST A: POSITIVE
AST SERPL-CCNC: 28 U/L (ref 15–37)
BASE DEFICIT BLD-SCNC: 1 MMOL/L
BASE DEFICIT BLDV-SCNC: 3.2 MMOL/L
BASE EXCESS BLD CALC-SCNC: 0.6 MMOL/L
BASOPHILS # BLD: 0 K/UL (ref 0–0.1)
BASOPHILS NFR BLD: 0 % (ref 0–1)
BDY SITE: ABNORMAL
BILIRUB SERPL-MCNC: 0.8 MG/DL (ref 0.2–1)
BUN SERPL-MCNC: 30 MG/DL (ref 6–20)
BUN/CREAT SERPL: 29 (ref 12–20)
CA-I BLD-MCNC: 1.33 MMOL/L (ref 1.12–1.32)
CALCIUM SERPL-MCNC: 9.8 MG/DL (ref 8.5–10.1)
CHLORIDE BLD-SCNC: 102 MMOL/L (ref 100–108)
CHLORIDE SERPL-SCNC: 98 MMOL/L (ref 97–108)
CO2 BLD-SCNC: 30 MMOL/L (ref 19–24)
CO2 SERPL-SCNC: 27 MMOL/L (ref 21–32)
CREAT SERPL-MCNC: 1.02 MG/DL (ref 0.55–1.02)
CREAT UR-MCNC: 1 MG/DL (ref 0.6–1.3)
DIFFERENTIAL METHOD BLD: ABNORMAL
EKG ATRIAL RATE: 61 BPM
EKG DIAGNOSIS: NORMAL
EKG P AXIS: 81 DEGREES
EKG P-R INTERVAL: 132 MS
EKG Q-T INTERVAL: 394 MS
EKG QRS DURATION: 68 MS
EKG QTC CALCULATION (BAZETT): 396 MS
EKG R AXIS: 39 DEGREES
EKG T AXIS: 58 DEGREES
EKG VENTRICULAR RATE: 61 BPM
EOSINOPHIL # BLD: 0.1 K/UL (ref 0–0.4)
EOSINOPHIL NFR BLD: 1 % (ref 0–7)
ERYTHROCYTE [DISTWIDTH] IN BLOOD BY AUTOMATED COUNT: 13.3 % (ref 11.5–14.5)
FLUAV AG NPH QL IA: NEGATIVE
FLUBV AG NOSE QL IA: NEGATIVE
GAS FLOW.O2 O2 DELIVERY SYS: ABNORMAL
GAS FLOW.O2 O2 DELIVERY SYS: ABNORMAL
GLOBULIN SER CALC-MCNC: 3.5 G/DL (ref 2–4)
GLUCOSE BLD STRIP.AUTO-MCNC: 165 MG/DL (ref 74–106)
GLUCOSE SERPL-MCNC: 168 MG/DL (ref 65–100)
HCO3 BLD-SCNC: 27.5 MMOL/L (ref 22–26)
HCO3 BLDA-SCNC: 29 MMOL/L
HCO3 BLDV-SCNC: 23.4 MMOL/L (ref 23–28)
HCT VFR BLD AUTO: 37 % (ref 35–47)
HGB BLD-MCNC: 12.4 G/DL (ref 11.5–16)
IMM GRANULOCYTES # BLD AUTO: 0.1 K/UL (ref 0–0.04)
IMM GRANULOCYTES NFR BLD AUTO: 1 % (ref 0–0.5)
INR PPP: 1 (ref 0.9–1.1)
LACTATE BLD-SCNC: <0.4 MMOL/L (ref 0.4–2)
LYMPHOCYTES # BLD: 0.5 K/UL (ref 0.8–3.5)
LYMPHOCYTES NFR BLD: 8 % (ref 12–49)
MAGNESIUM SERPL-MCNC: 2 MG/DL (ref 1.6–2.4)
MCH RBC QN AUTO: 34.7 PG (ref 26–34)
MCHC RBC AUTO-ENTMCNC: 33.5 G/DL (ref 30–36.5)
MCV RBC AUTO: 103.6 FL (ref 80–99)
MONOCYTES # BLD: 0.2 K/UL (ref 0–1)
MONOCYTES NFR BLD: 3 % (ref 5–13)
NEUTS SEG # BLD: 5.7 K/UL (ref 1.8–8)
NEUTS SEG NFR BLD: 87 % (ref 32–75)
NRBC # BLD: 0 K/UL (ref 0–0.01)
NRBC BLD-RTO: 0 PER 100 WBC
NT PRO BNP: 427 PG/ML (ref 0–450)
O2/TOTAL GAS SETTING VFR VENT: 50 %
O2/TOTAL GAS SETTING VFR VENT: 50 %
PCO2 BLD: 63.4 MMHG (ref 35–45)
PCO2 BLDV: 47.6 MMHG (ref 41–51)
PCO2 BLDV: 63.7 MMHG (ref 41–51)
PH BLD: 7.24 (ref 7.35–7.45)
PH BLDV: 7.27 (ref 7.32–7.42)
PH BLDV: 7.3 (ref 7.32–7.42)
PLATELET # BLD AUTO: 157 K/UL (ref 150–400)
PMV BLD AUTO: 9.3 FL (ref 8.9–12.9)
PO2 BLD: 74 MMHG (ref 80–100)
PO2 BLDV: 29 MMHG (ref 25–40)
PO2 BLDV: 59 MMHG (ref 25–40)
POTASSIUM BLD-SCNC: 4.9 MMOL/L (ref 3.5–5.5)
POTASSIUM SERPL-SCNC: 4.9 MMOL/L (ref 3.5–5.1)
PROT SERPL-MCNC: 7.2 G/DL (ref 6.4–8.2)
PROTHROMBIN TIME: 10.4 SEC (ref 9–11.1)
RBC # BLD AUTO: 3.57 M/UL (ref 3.8–5.2)
RBC MORPH BLD: ABNORMAL
SAO2 % BLD: 46 %
SAO2 % BLD: 91.3 % (ref 92–97)
SAO2 % BLDV: 87.2 % (ref 65–88)
SARS-COV-2 RDRP RESP QL NAA+PROBE: NOT DETECTED
SERVICE CMNT-IMP: ABNORMAL
SERVICE CMNT-IMP: ABNORMAL
SODIUM BLD-SCNC: 134 MMOL/L (ref 136–145)
SODIUM SERPL-SCNC: 133 MMOL/L (ref 136–145)
SOURCE: NORMAL
SPECIMEN SITE: ABNORMAL
SPECIMEN TYPE: ABNORMAL
SPECIMEN TYPE: ABNORMAL
TROPONIN I SERPL HS-MCNC: 10 NG/L (ref 0–51)
WBC # BLD AUTO: 6.6 K/UL (ref 3.6–11)

## 2023-11-06 PROCEDURE — 82803 BLOOD GASES ANY COMBINATION: CPT

## 2023-11-06 PROCEDURE — 82947 ASSAY GLUCOSE BLOOD QUANT: CPT

## 2023-11-06 PROCEDURE — 6370000000 HC RX 637 (ALT 250 FOR IP): Performed by: EMERGENCY MEDICINE

## 2023-11-06 PROCEDURE — 83880 ASSAY OF NATRIURETIC PEPTIDE: CPT

## 2023-11-06 PROCEDURE — 85610 PROTHROMBIN TIME: CPT

## 2023-11-06 PROCEDURE — 2500000003 HC RX 250 WO HCPCS: Performed by: INTERNAL MEDICINE

## 2023-11-06 PROCEDURE — 80053 COMPREHEN METABOLIC PANEL: CPT

## 2023-11-06 PROCEDURE — 93005 ELECTROCARDIOGRAM TRACING: CPT | Performed by: STUDENT IN AN ORGANIZED HEALTH CARE EDUCATION/TRAINING PROGRAM

## 2023-11-06 PROCEDURE — 6370000000 HC RX 637 (ALT 250 FOR IP): Performed by: STUDENT IN AN ORGANIZED HEALTH CARE EDUCATION/TRAINING PROGRAM

## 2023-11-06 PROCEDURE — 36600 WITHDRAWAL OF ARTERIAL BLOOD: CPT

## 2023-11-06 PROCEDURE — 6360000002 HC RX W HCPCS: Performed by: EMERGENCY MEDICINE

## 2023-11-06 PROCEDURE — 84132 ASSAY OF SERUM POTASSIUM: CPT

## 2023-11-06 PROCEDURE — 84484 ASSAY OF TROPONIN QUANT: CPT

## 2023-11-06 PROCEDURE — 82330 ASSAY OF CALCIUM: CPT

## 2023-11-06 PROCEDURE — 71045 X-RAY EXAM CHEST 1 VIEW: CPT

## 2023-11-06 PROCEDURE — 84295 ASSAY OF SERUM SODIUM: CPT

## 2023-11-06 PROCEDURE — 36415 COLL VENOUS BLD VENIPUNCTURE: CPT

## 2023-11-06 PROCEDURE — 2580000003 HC RX 258: Performed by: INTERNAL MEDICINE

## 2023-11-06 PROCEDURE — 87635 SARS-COV-2 COVID-19 AMP PRB: CPT

## 2023-11-06 PROCEDURE — 83735 ASSAY OF MAGNESIUM: CPT

## 2023-11-06 PROCEDURE — 99291 CRITICAL CARE FIRST HOUR: CPT

## 2023-11-06 PROCEDURE — 85025 COMPLETE CBC W/AUTO DIFF WBC: CPT

## 2023-11-06 PROCEDURE — 87804 INFLUENZA ASSAY W/OPTIC: CPT

## 2023-11-06 PROCEDURE — 93010 ELECTROCARDIOGRAM REPORT: CPT | Performed by: INTERNAL MEDICINE

## 2023-11-06 PROCEDURE — 2060000000 HC ICU INTERMEDIATE R&B

## 2023-11-06 RX ORDER — ATORVASTATIN CALCIUM 40 MG/1
40 TABLET, FILM COATED ORAL NIGHTLY
Status: DISCONTINUED | OUTPATIENT
Start: 2023-11-06 | End: 2023-11-16 | Stop reason: HOSPADM

## 2023-11-06 RX ORDER — CARVEDILOL 12.5 MG/1
25 TABLET ORAL 2 TIMES DAILY
Status: DISCONTINUED | OUTPATIENT
Start: 2023-11-06 | End: 2023-11-16 | Stop reason: HOSPADM

## 2023-11-06 RX ORDER — IPRATROPIUM BROMIDE AND ALBUTEROL SULFATE 2.5; .5 MG/3ML; MG/3ML
1 SOLUTION RESPIRATORY (INHALATION)
Status: COMPLETED | OUTPATIENT
Start: 2023-11-06 | End: 2023-11-06

## 2023-11-06 RX ORDER — SODIUM CHLORIDE 9 MG/ML
INJECTION, SOLUTION INTRAVENOUS PRN
Status: DISCONTINUED | OUTPATIENT
Start: 2023-11-06 | End: 2023-11-16 | Stop reason: HOSPADM

## 2023-11-06 RX ORDER — POTASSIUM CHLORIDE 7.45 MG/ML
10 INJECTION INTRAVENOUS PRN
Status: DISCONTINUED | OUTPATIENT
Start: 2023-11-06 | End: 2023-11-16 | Stop reason: HOSPADM

## 2023-11-06 RX ORDER — ONDANSETRON 2 MG/ML
4 INJECTION INTRAMUSCULAR; INTRAVENOUS EVERY 6 HOURS PRN
Status: DISCONTINUED | OUTPATIENT
Start: 2023-11-06 | End: 2023-11-16 | Stop reason: HOSPADM

## 2023-11-06 RX ORDER — ACETAMINOPHEN 325 MG/1
650 TABLET ORAL EVERY 6 HOURS PRN
Status: DISCONTINUED | OUTPATIENT
Start: 2023-11-06 | End: 2023-11-16 | Stop reason: HOSPADM

## 2023-11-06 RX ORDER — SODIUM CHLORIDE 0.9 % (FLUSH) 0.9 %
5-40 SYRINGE (ML) INJECTION EVERY 12 HOURS SCHEDULED
Status: DISCONTINUED | OUTPATIENT
Start: 2023-11-06 | End: 2023-11-16 | Stop reason: HOSPADM

## 2023-11-06 RX ORDER — ACETAMINOPHEN 650 MG/1
650 SUPPOSITORY RECTAL EVERY 6 HOURS PRN
Status: DISCONTINUED | OUTPATIENT
Start: 2023-11-06 | End: 2023-11-16 | Stop reason: HOSPADM

## 2023-11-06 RX ORDER — ENOXAPARIN SODIUM 100 MG/ML
40 INJECTION SUBCUTANEOUS DAILY
Status: DISCONTINUED | OUTPATIENT
Start: 2023-11-07 | End: 2023-11-16 | Stop reason: HOSPADM

## 2023-11-06 RX ORDER — LOSARTAN POTASSIUM 50 MG/1
50 TABLET ORAL 2 TIMES DAILY
Status: DISCONTINUED | OUTPATIENT
Start: 2023-11-06 | End: 2023-11-16 | Stop reason: HOSPADM

## 2023-11-06 RX ORDER — MAGNESIUM SULFATE IN WATER 40 MG/ML
2000 INJECTION, SOLUTION INTRAVENOUS PRN
Status: DISCONTINUED | OUTPATIENT
Start: 2023-11-06 | End: 2023-11-16 | Stop reason: HOSPADM

## 2023-11-06 RX ORDER — POTASSIUM CHLORIDE 750 MG/1
40 TABLET, FILM COATED, EXTENDED RELEASE ORAL PRN
Status: DISCONTINUED | OUTPATIENT
Start: 2023-11-06 | End: 2023-11-16 | Stop reason: HOSPADM

## 2023-11-06 RX ORDER — AMLODIPINE BESYLATE 5 MG/1
5 TABLET ORAL DAILY
Status: DISCONTINUED | OUTPATIENT
Start: 2023-11-07 | End: 2023-11-08

## 2023-11-06 RX ORDER — ALPRAZOLAM 0.5 MG/1
1 TABLET ORAL 2 TIMES DAILY PRN
Status: DISCONTINUED | OUTPATIENT
Start: 2023-11-06 | End: 2023-11-14

## 2023-11-06 RX ORDER — SODIUM CHLORIDE 0.9 % (FLUSH) 0.9 %
5-40 SYRINGE (ML) INJECTION PRN
Status: DISCONTINUED | OUTPATIENT
Start: 2023-11-06 | End: 2023-11-16 | Stop reason: HOSPADM

## 2023-11-06 RX ORDER — PREDNISONE 20 MG/1
40 TABLET ORAL DAILY
Status: DISCONTINUED | OUTPATIENT
Start: 2023-11-07 | End: 2023-11-07

## 2023-11-06 RX ORDER — ONDANSETRON 4 MG/1
4 TABLET, ORALLY DISINTEGRATING ORAL EVERY 8 HOURS PRN
Status: DISCONTINUED | OUTPATIENT
Start: 2023-11-06 | End: 2023-11-16 | Stop reason: HOSPADM

## 2023-11-06 RX ORDER — POLYETHYLENE GLYCOL 3350 17 G/17G
17 POWDER, FOR SOLUTION ORAL DAILY PRN
Status: DISCONTINUED | OUTPATIENT
Start: 2023-11-06 | End: 2023-11-16 | Stop reason: HOSPADM

## 2023-11-06 RX ORDER — LOSARTAN POTASSIUM 50 MG/1
50 TABLET ORAL 2 TIMES DAILY
Qty: 180 TABLET | Refills: 1 | Status: ON HOLD | OUTPATIENT
Start: 2023-11-06

## 2023-11-06 RX ORDER — IPRATROPIUM BROMIDE AND ALBUTEROL SULFATE 2.5; .5 MG/3ML; MG/3ML
1 SOLUTION RESPIRATORY (INHALATION) EVERY 4 HOURS PRN
Status: DISCONTINUED | OUTPATIENT
Start: 2023-11-06 | End: 2023-11-07

## 2023-11-06 RX ORDER — AMLODIPINE BESYLATE 5 MG/1
5 TABLET ORAL 2 TIMES DAILY
Qty: 180 TABLET | Refills: 1 | Status: ON HOLD | OUTPATIENT
Start: 2023-11-06

## 2023-11-06 RX ADMIN — DOXYCYCLINE 100 MG: 100 INJECTION, POWDER, LYOPHILIZED, FOR SOLUTION INTRAVENOUS at 23:48

## 2023-11-06 RX ADMIN — ALBUTEROL SULFATE 1 DOSE: 2.5 SOLUTION RESPIRATORY (INHALATION) at 17:34

## 2023-11-06 RX ADMIN — IPRATROPIUM BROMIDE AND ALBUTEROL SULFATE 1 DOSE: .5; 3 SOLUTION RESPIRATORY (INHALATION) at 10:43

## 2023-11-06 RX ADMIN — IPRATROPIUM BROMIDE AND ALBUTEROL SULFATE 1 DOSE: 2.5; .5 SOLUTION RESPIRATORY (INHALATION) at 10:44

## 2023-11-06 ASSESSMENT — PAIN - FUNCTIONAL ASSESSMENT: PAIN_FUNCTIONAL_ASSESSMENT: 0-10

## 2023-11-06 ASSESSMENT — PAIN DESCRIPTION - LOCATION: LOCATION: CHEST

## 2023-11-06 NOTE — ED TRIAGE NOTES
Patient arrives via EMS with complaints of shortness of breath since Friday. Patient has history of asthma, pulmonary fibrosis, and nodules on her lungs. Patient tried prednisone, inhaler and nebulized at home over the weekend with no relief. When EMS first got to patient she had Oxygen sats in the 80's and respiratory rate in 40s. EMS gave one duoneb enroute. Oxygen saturations 98% on RA in triage but audible wheezing. Last admission for similar was in April. Patient is talking in 1-2 word sentences and has been placed on BiPAP for breathing difficulty before but never needed to be intubated.

## 2023-11-06 NOTE — TELEPHONE ENCOUNTER
Requested Prescriptions     Signed Prescriptions Disp Refills    losartan (COZAAR) 50 MG tablet 180 tablet 1     Sig: Take 1 tablet by mouth 2 times daily     Authorizing Provider: Loretha Jeans     Ordering User: SANTIAGO Mobley    amLODIPine (NORVASC) 5 MG tablet 180 tablet 1     Sig: Take 1 tablet by mouth 2 times daily     Authorizing Provider: Loretha Jeans     Ordering User: Gasper Franco     Verbal order per Dr. Dayton Graham.     Future Appointments   Date Time Provider [address]   11/22/2023 12:00 PM [facility] DOSE 1 [facility]   11/22/2023  1:00 PM [facility] 1 [facility]   5/1/2024 11:00 AM Loretha Jeans, MD [facility]

## 2023-11-06 NOTE — ED NOTES
TRANSFER - OUT REPORT:    Verbal report given to Alcon Berger on [facility]  being transferred to [facility]- [phone] for routine progression of patient care       Report consisted of patient's Situation, Background, Assessment and   Recommendations(SBAR). Information from the following report(s) ED Encounter Summary, MAR, Recent Results, and Neuro Assessment was reviewed with the receiving nurse. [place] Fall Assessment:    Presents to emergency department  because of falls (Syncope, seizure, or loss of consciousness): No  Age > 70: Yes  Altered Mental Status, Intoxication with alcohol or substance confusion (Disorientation, impaired judgment, poor safety awaremess, or inability to follow instructions): No  Impaired Mobility: Ambulates or transfers with assistive devices or assistance; Unable to ambulate or transer.: No  Nursing Judgement: Yes          Lines:   Peripheral IV 11/06/23 Left;Posterior Hand (Active)        Opportunity for questions and clarification was provided.       Patient transported with:  Monitor, BIPAP          Kush Staley RN  11/06/23 2841

## 2023-11-06 NOTE — ED PROVIDER NOTES
[facility] EMERGENCY [address] ENCOUNTER      Patient Name:  Nicola Schneider  MRN:   [id number]  :   1945  Date of Evaluation: 2023  Physician:  Sheri Jordan MD    Chief Complaint   Patient presents with    Shortness of Breath       HISTORY OF PRESENT ILLNESS    This is a 61-year-old female with a history of coronary artery disease, renal artery stenosis, COPD and pulmonary fibrosis not oxygen dependent at baseline, arriving by EMS for respiratory distress. She tells me that she has been feeling short of breath over the last 3-4 days, took a dose of prednisone 50 mg earlier this morning as prescribed and administered a nebulizer treatment at home without relief. Upon EMS arrival she was hypoxic to the 80s and tachypneic in the 40s with increased work of breathing, received a single DuoNeb treatment prehospital as well. She tells me that she has been coughing up some secretions, denies associated abdominal pain, vomiting. On arrival was hypoxic to 92 percent on room air and continued to be in visible respiratory distress with profound expiratory wheezing. Has chronic left lower extremity edema, no history of venous thromboembolism to her knowledge. No other systemic complaints. REVIEW OF SYSTEMS   A review of systems was performed and was negative except as documented in the HPI.      PAST MEDICAL HISTORY     Past Medical History:   Diagnosis Date    CAD (coronary artery disease) 2019    stent D1 2019 ([facility])    Foot drop, left foot     GERD (gastroesophageal reflux disease)     Hypertension     Pneumonia     HUGO (renal artery stenosis) ([address]) 2019    s/p right renal stent 2019 July Mauricio)       PAST SURGICAL HISTORY     Past Surgical History:   Procedure Laterality Date    APPENDECTOMY      BACK SURGERY      CERVICAL X2    BACK SURGERY      LUMBAR FUSION X2 SURGERIES    HYSTERECTOMY (CERVIX STATUS UNKNOWN)      IR KYPHOPLASTY LUMBAR FIRST LEVEL  2017    IR

## 2023-11-07 ENCOUNTER — APPOINTMENT (OUTPATIENT)
Facility: HOSPITAL | Age: 78
DRG: 189 | End: 2023-11-07
Payer: MEDICARE

## 2023-11-07 LAB
ALBUMIN SERPL-MCNC: 3.2 G/DL (ref 3.5–5)
ALBUMIN/GLOB SERPL: 1.1 (ref 1.1–2.2)
ALP SERPL-CCNC: 56 U/L (ref 45–117)
ALT SERPL-CCNC: 19 U/L (ref 12–78)
ANION GAP SERPL CALC-SCNC: 6 MMOL/L (ref 5–15)
ARTERIAL PATENCY WRIST A: POSITIVE
AST SERPL-CCNC: 12 U/L (ref 15–37)
BASE DEFICIT BLD-SCNC: 0.5 MMOL/L
BASOPHILS # BLD: 0 K/UL (ref 0–0.1)
BASOPHILS NFR BLD: 0 % (ref 0–1)
BDY SITE: ABNORMAL
BILIRUB SERPL-MCNC: 0.5 MG/DL (ref 0.2–1)
BUN SERPL-MCNC: 32 MG/DL (ref 6–20)
BUN/CREAT SERPL: 40 (ref 12–20)
CALCIUM SERPL-MCNC: 9.2 MG/DL (ref 8.5–10.1)
CHLORIDE SERPL-SCNC: 107 MMOL/L (ref 97–108)
CO2 SERPL-SCNC: 24 MMOL/L (ref 21–32)
CREAT SERPL-MCNC: 0.8 MG/DL (ref 0.55–1.02)
DIFFERENTIAL METHOD BLD: ABNORMAL
EOSINOPHIL # BLD: 0.1 K/UL (ref 0–0.4)
EOSINOPHIL NFR BLD: 2 % (ref 0–7)
ERYTHROCYTE [DISTWIDTH] IN BLOOD BY AUTOMATED COUNT: 13.2 % (ref 11.5–14.5)
EST. AVERAGE GLUCOSE BLD GHB EST-MCNC: 114 MG/DL
GAS FLOW.O2 O2 DELIVERY SYS: ABNORMAL
GLOBULIN SER CALC-MCNC: 3 G/DL (ref 2–4)
GLUCOSE SERPL-MCNC: 67 MG/DL (ref 65–100)
HBA1C MFR BLD: 5.6 % (ref 4–5.6)
HCO3 BLD-SCNC: 26.7 MMOL/L (ref 22–26)
HCT VFR BLD AUTO: 32.9 % (ref 35–47)
HGB BLD-MCNC: 10.5 G/DL (ref 11.5–16)
IMM GRANULOCYTES # BLD AUTO: 0 K/UL (ref 0–0.04)
IMM GRANULOCYTES NFR BLD AUTO: 0 % (ref 0–0.5)
LYMPHOCYTES # BLD: 1.1 K/UL (ref 0.8–3.5)
LYMPHOCYTES NFR BLD: 22 % (ref 12–49)
MAGNESIUM SERPL-MCNC: 2 MG/DL (ref 1.6–2.4)
MCH RBC QN AUTO: 34.5 PG (ref 26–34)
MCHC RBC AUTO-ENTMCNC: 31.9 G/DL (ref 30–36.5)
MCV RBC AUTO: 108.2 FL (ref 80–99)
MONOCYTES # BLD: 0.5 K/UL (ref 0–1)
MONOCYTES NFR BLD: 9 % (ref 5–13)
NEUTS SEG # BLD: 3.4 K/UL (ref 1.8–8)
NEUTS SEG NFR BLD: 67 % (ref 32–75)
NRBC # BLD: 0 K/UL (ref 0–0.01)
NRBC BLD-RTO: 0 PER 100 WBC
O2/TOTAL GAS SETTING VFR VENT: 50 %
PCO2 BLD: 55 MMHG (ref 35–45)
PEEP RESPIRATORY: 5 CMH2O
PH BLD: 7.29 (ref 7.35–7.45)
PHOSPHATE SERPL-MCNC: 3 MG/DL (ref 2.6–4.7)
PLATELET # BLD AUTO: 125 K/UL (ref 150–400)
PMV BLD AUTO: 8.7 FL (ref 8.9–12.9)
PO2 BLD: 117 MMHG (ref 80–100)
POTASSIUM SERPL-SCNC: 4.1 MMOL/L (ref 3.5–5.1)
PRESSURE SUPPORT SETTING VENT: 12 CMH2O
PROT SERPL-MCNC: 6.2 G/DL (ref 6.4–8.2)
RBC # BLD AUTO: 3.04 M/UL (ref 3.8–5.2)
RBC MORPH BLD: ABNORMAL
SAO2 % BLD: 98 % (ref 92–97)
SODIUM SERPL-SCNC: 137 MMOL/L (ref 136–145)
SPECIMEN TYPE: ABNORMAL
TROPONIN I SERPL HS-MCNC: 8 NG/L (ref 0–51)
TSH SERPL DL<=0.05 MIU/L-ACNC: 1.73 UIU/ML (ref 0.36–3.74)
WBC # BLD AUTO: 5.1 K/UL (ref 3.6–11)

## 2023-11-07 PROCEDURE — 36600 WITHDRAWAL OF ARTERIAL BLOOD: CPT

## 2023-11-07 PROCEDURE — 83735 ASSAY OF MAGNESIUM: CPT

## 2023-11-07 PROCEDURE — 94640 AIRWAY INHALATION TREATMENT: CPT

## 2023-11-07 PROCEDURE — 6370000000 HC RX 637 (ALT 250 FOR IP): Performed by: STUDENT IN AN ORGANIZED HEALTH CARE EDUCATION/TRAINING PROGRAM

## 2023-11-07 PROCEDURE — 2500000003 HC RX 250 WO HCPCS: Performed by: INTERNAL MEDICINE

## 2023-11-07 PROCEDURE — 2580000003 HC RX 258: Performed by: INTERNAL MEDICINE

## 2023-11-07 PROCEDURE — 84484 ASSAY OF TROPONIN QUANT: CPT

## 2023-11-07 PROCEDURE — 36415 COLL VENOUS BLD VENIPUNCTURE: CPT

## 2023-11-07 PROCEDURE — 2700000000 HC OXYGEN THERAPY PER DAY

## 2023-11-07 PROCEDURE — 80053 COMPREHEN METABOLIC PANEL: CPT

## 2023-11-07 PROCEDURE — 82803 BLOOD GASES ANY COMBINATION: CPT

## 2023-11-07 PROCEDURE — 84443 ASSAY THYROID STIM HORMONE: CPT

## 2023-11-07 PROCEDURE — 2060000000 HC ICU INTERMEDIATE R&B

## 2023-11-07 PROCEDURE — 83036 HEMOGLOBIN GLYCOSYLATED A1C: CPT

## 2023-11-07 PROCEDURE — 6370000000 HC RX 637 (ALT 250 FOR IP): Performed by: INTERNAL MEDICINE

## 2023-11-07 PROCEDURE — 71275 CT ANGIOGRAPHY CHEST: CPT

## 2023-11-07 PROCEDURE — 6360000002 HC RX W HCPCS: Performed by: PHYSICIAN ASSISTANT

## 2023-11-07 PROCEDURE — 94660 CPAP INITIATION&MGMT: CPT

## 2023-11-07 PROCEDURE — 85025 COMPLETE CBC W/AUTO DIFF WBC: CPT

## 2023-11-07 PROCEDURE — 94760 N-INVAS EAR/PLS OXIMETRY 1: CPT

## 2023-11-07 PROCEDURE — 5A09557 ASSISTANCE WITH RESPIRATORY VENTILATION, GREATER THAN 96 CONSECUTIVE HOURS, CONTINUOUS POSITIVE AIRWAY PRESSURE: ICD-10-PCS | Performed by: STUDENT IN AN ORGANIZED HEALTH CARE EDUCATION/TRAINING PROGRAM

## 2023-11-07 PROCEDURE — 2580000003 HC RX 258: Performed by: STUDENT IN AN ORGANIZED HEALTH CARE EDUCATION/TRAINING PROGRAM

## 2023-11-07 PROCEDURE — 84100 ASSAY OF PHOSPHORUS: CPT

## 2023-11-07 PROCEDURE — 6360000002 HC RX W HCPCS: Performed by: INTERNAL MEDICINE

## 2023-11-07 PROCEDURE — 6360000004 HC RX CONTRAST MEDICATION: Performed by: RADIOLOGY

## 2023-11-07 RX ORDER — IPRATROPIUM BROMIDE AND ALBUTEROL SULFATE 2.5; .5 MG/3ML; MG/3ML
1 SOLUTION RESPIRATORY (INHALATION)
Status: DISCONTINUED | OUTPATIENT
Start: 2023-11-07 | End: 2023-11-11

## 2023-11-07 RX ORDER — DEXTROSE, SODIUM CHLORIDE, SODIUM LACTATE, POTASSIUM CHLORIDE, AND CALCIUM CHLORIDE 5; .6; .31; .03; .02 G/100ML; G/100ML; G/100ML; G/100ML; G/100ML
INJECTION, SOLUTION INTRAVENOUS CONTINUOUS
Status: DISCONTINUED | OUTPATIENT
Start: 2023-11-07 | End: 2023-11-13

## 2023-11-07 RX ORDER — BENZONATATE 100 MG/1
100 CAPSULE ORAL 3 TIMES DAILY PRN
Status: DISCONTINUED | OUTPATIENT
Start: 2023-11-07 | End: 2023-11-16 | Stop reason: HOSPADM

## 2023-11-07 RX ORDER — ARFORMOTEROL TARTRATE 15 UG/2ML
15 SOLUTION RESPIRATORY (INHALATION)
Status: DISCONTINUED | OUTPATIENT
Start: 2023-11-07 | End: 2023-11-16 | Stop reason: HOSPADM

## 2023-11-07 RX ORDER — NICOTINE 21 MG/24HR
1 PATCH, TRANSDERMAL 24 HOURS TRANSDERMAL DAILY
Status: DISCONTINUED | OUTPATIENT
Start: 2023-11-07 | End: 2023-11-16 | Stop reason: HOSPADM

## 2023-11-07 RX ORDER — BUDESONIDE 0.5 MG/2ML
0.5 INHALANT ORAL
Status: DISCONTINUED | OUTPATIENT
Start: 2023-11-07 | End: 2023-11-16 | Stop reason: HOSPADM

## 2023-11-07 RX ORDER — GUAIFENESIN 600 MG/1
1200 TABLET, EXTENDED RELEASE ORAL 2 TIMES DAILY
Status: DISCONTINUED | OUTPATIENT
Start: 2023-11-07 | End: 2023-11-16 | Stop reason: HOSPADM

## 2023-11-07 RX ADMIN — ATORVASTATIN CALCIUM 40 MG: 40 TABLET, FILM COATED ORAL at 20:25

## 2023-11-07 RX ADMIN — METHYLPREDNISOLONE SODIUM SUCCINATE 40 MG: 40 INJECTION, POWDER, FOR SOLUTION INTRAMUSCULAR; INTRAVENOUS at 10:33

## 2023-11-07 RX ADMIN — IPRATROPIUM BROMIDE AND ALBUTEROL SULFATE 1 DOSE: 2.5; .5 SOLUTION RESPIRATORY (INHALATION) at 16:12

## 2023-11-07 RX ADMIN — ARFORMOTEROL TARTRATE 15 MCG: 15 SOLUTION RESPIRATORY (INHALATION) at 12:46

## 2023-11-07 RX ADMIN — SODIUM CHLORIDE, SODIUM LACTATE, POTASSIUM CHLORIDE, CALCIUM CHLORIDE AND DEXTROSE MONOHYDRATE: 5; 600; 310; 30; 20 INJECTION, SOLUTION INTRAVENOUS at 23:50

## 2023-11-07 RX ADMIN — BUDESONIDE 500 MCG: 0.5 INHALANT RESPIRATORY (INHALATION) at 12:46

## 2023-11-07 RX ADMIN — ENOXAPARIN SODIUM 40 MG: 100 INJECTION SUBCUTANEOUS at 10:38

## 2023-11-07 RX ADMIN — SODIUM CHLORIDE, PRESERVATIVE FREE 10 ML: 5 INJECTION INTRAVENOUS at 21:00

## 2023-11-07 RX ADMIN — CARVEDILOL 25 MG: 12.5 TABLET, FILM COATED ORAL at 20:23

## 2023-11-07 RX ADMIN — ALPRAZOLAM 1 MG: 0.5 TABLET ORAL at 12:18

## 2023-11-07 RX ADMIN — BENZONATATE 100 MG: 100 CAPSULE ORAL at 13:57

## 2023-11-07 RX ADMIN — METHYLPREDNISOLONE SODIUM SUCCINATE 40 MG: 40 INJECTION, POWDER, FOR SOLUTION INTRAMUSCULAR; INTRAVENOUS at 16:11

## 2023-11-07 RX ADMIN — SERTRALINE HYDROCHLORIDE 100 MG: 50 TABLET ORAL at 20:23

## 2023-11-07 RX ADMIN — BUDESONIDE 500 MCG: 0.5 INHALANT RESPIRATORY (INHALATION) at 20:25

## 2023-11-07 RX ADMIN — GUAIFENESIN 1200 MG: 600 TABLET, EXTENDED RELEASE ORAL at 13:57

## 2023-11-07 RX ADMIN — METHYLPREDNISOLONE SODIUM SUCCINATE 40 MG: 40 INJECTION, POWDER, FOR SOLUTION INTRAMUSCULAR; INTRAVENOUS at 23:32

## 2023-11-07 RX ADMIN — ARFORMOTEROL TARTRATE 15 MCG: 15 SOLUTION RESPIRATORY (INHALATION) at 20:25

## 2023-11-07 RX ADMIN — DOXYCYCLINE 100 MG: 100 INJECTION, POWDER, LYOPHILIZED, FOR SOLUTION INTRAVENOUS at 23:32

## 2023-11-07 RX ADMIN — IOPAMIDOL 100 ML: 755 INJECTION, SOLUTION INTRAVENOUS at 15:42

## 2023-11-07 RX ADMIN — IPRATROPIUM BROMIDE AND ALBUTEROL SULFATE 1 DOSE: 2.5; .5 SOLUTION RESPIRATORY (INHALATION) at 20:25

## 2023-11-07 RX ADMIN — ALPRAZOLAM 1 MG: 0.5 TABLET ORAL at 23:32

## 2023-11-07 RX ADMIN — SODIUM CHLORIDE, SODIUM LACTATE, POTASSIUM CHLORIDE, CALCIUM CHLORIDE AND DEXTROSE MONOHYDRATE: 5; 600; 310; 30; 20 INJECTION, SOLUTION INTRAVENOUS at 10:47

## 2023-11-07 RX ADMIN — SODIUM CHLORIDE, PRESERVATIVE FREE 10 ML: 5 INJECTION INTRAVENOUS at 10:34

## 2023-11-07 RX ADMIN — DOXYCYCLINE 100 MG: 100 INJECTION, POWDER, LYOPHILIZED, FOR SOLUTION INTRAVENOUS at 10:39

## 2023-11-07 RX ADMIN — GUAIFENESIN 1200 MG: 600 TABLET, EXTENDED RELEASE ORAL at 20:24

## 2023-11-07 RX ADMIN — IPRATROPIUM BROMIDE AND ALBUTEROL SULFATE 1 DOSE: 2.5; .5 SOLUTION RESPIRATORY (INHALATION) at 12:46

## 2023-11-07 RX ADMIN — LOSARTAN POTASSIUM 50 MG: 50 TABLET, FILM COATED ORAL at 20:25

## 2023-11-07 ASSESSMENT — COPD QUESTIONNAIRES
GOLD_GRADE: 3
TOTAL_EXACERBATIONS_PASTYEAR: 2

## 2023-11-07 ASSESSMENT — PULMONARY FUNCTION TESTS
POST BRONCHODILATOR FEV1/FVC: 63
FEV1 (%PREDICTED): 46

## 2023-11-07 NOTE — CONSULTS
no mottling    Neuro: A/O x 3        Medications:  Current Facility-Administered Medications   Medication Dose Route Frequency    nicotine (NICODERM CQ) 21 MG/24HR 1 patch  1 patch TransDERmal Daily    methylPREDNISolone sodium (PF) (SOLU-MEDROL PF) injection 40 mg  40 mg IntraVENous Q6H    budesonide (PULMICORT) nebulizer suspension 500 mcg  0.5 mg Nebulization BID RT    arformoterol tartrate (BROVANA) nebulizer solution 15 mcg  15 mcg Nebulization BID RT    dextrose 5 % in lactated ringers infusion   IntraVENous Continuous    ALPRAZolam (XANAX) tablet 1 mg  1 mg Oral BID PRN    amLODIPine (NORVASC) tablet 5 mg  5 mg Oral Daily    atorvastatin (LIPITOR) tablet 40 mg  40 mg Oral Nightly    carvedilol (COREG) tablet 25 mg  25 mg Oral BID    ipratropium 0.5 mg-albuterol 2.5 mg (DUONEB) nebulizer solution 1 Dose  1 Dose Inhalation Q4H PRN    losartan (COZAAR) tablet 50 mg  50 mg Oral BID    sertraline (ZOLOFT) tablet 100 mg  100 mg Oral Nightly    sodium chloride flush 0.9 % injection 5-40 mL  5-40 mL IntraVENous 2 times per day    sodium chloride flush 0.9 % injection 5-40 mL  5-40 mL IntraVENous PRN    0.9 % sodium chloride infusion   IntraVENous PRN    potassium chloride (KLOR-CON) extended release tablet 40 mEq  40 mEq Oral PRN    Or    potassium bicarb-citric acid (EFFER-K) effervescent tablet 40 mEq  40 mEq Oral PRN    Or    potassium chloride 10 mEq/100 mL IVPB (Peripheral Line)  10 mEq IntraVENous PRN    magnesium sulfate 2000 mg in 50 mL IVPB premix  2,000 mg IntraVENous PRN    enoxaparin (LOVENOX) injection 40 mg  40 mg SubCUTAneous Daily    ondansetron (ZOFRAN-ODT) disintegrating tablet 4 mg  4 mg Oral Q8H PRN    Or    ondansetron (ZOFRAN) injection 4 mg  4 mg IntraVENous Q6H PRN    polyethylene glycol (GLYCOLAX) packet 17 g  17 g Oral Daily PRN    acetaminophen (TYLENOL) tablet 650 mg  650 mg Oral Q6H PRN    Or    acetaminophen (TYLENOL) suppository 650 mg  650 mg Rectal Q6H PRN    doxycycline (VIBRAMYCIN)

## 2023-11-07 NOTE — CARE COORDINATION
CM attempted to meet with pt to introduce self/role and conduct initial assessment. Pt on bipap, appears uncomfortable, and is unable to speak to provide assessment information. Pt in agreement CM should return another time. Pt was able to speak that she has no family to contact and has an  listed as her emergency contact, Cm Rider. Faustina Li is MDM. Full assessment to follow. CM will follow for MARTINA recommendations and dc planning.    KAY Ponce

## 2023-11-07 NOTE — H&P
clinical events, labs, images, vital signs, I/O's, and examined patient. I have discussed the case and the plan and management of the patient's care with the consulting services, the bedside nurses and necessary ancillary providers. NOTE OF PERSONAL INVOLVEMENT IN CARE   This patient has a high probability of imminent, clinically significant deterioration, which requires the highest level of preparedness to intervene urgently. I participated in the decision-making and personally managed or directed the management of the following life and organ supporting interventions that required my frequent assessment to treat or prevent imminent deterioration. I personally spent 45 minutes of critical care time. This is time spent at this critically ill patient's bedside actively involved in patient care as well as the coordination of care and discussions with the patient's family. This does not include any procedural time which has been billed separately.      Melva Olivas MD      [id number]  D:  11/07/2023 5:13  T:  11/07/2023 6:06  JOB #:  [id number]  CC:  Maurizio Snow MD

## 2023-11-08 LAB
ARTERIAL PATENCY WRIST A: POSITIVE
BASE EXCESS BLD CALC-SCNC: 2.7 MMOL/L
BDY SITE: ABNORMAL
GAS FLOW.O2 O2 DELIVERY SYS: ABNORMAL
HCO3 BLD-SCNC: 26 MMOL/L (ref 22–26)
O2/TOTAL GAS SETTING VFR VENT: 4 %
PCO2 BLD: 34.5 MMHG (ref 35–45)
PH BLD: 7.49 (ref 7.35–7.45)
PO2 BLD: 78 MMHG (ref 80–100)
SAO2 % BLD: 96.5 % (ref 92–97)
SPECIMEN TYPE: ABNORMAL

## 2023-11-08 PROCEDURE — 6370000000 HC RX 637 (ALT 250 FOR IP): Performed by: STUDENT IN AN ORGANIZED HEALTH CARE EDUCATION/TRAINING PROGRAM

## 2023-11-08 PROCEDURE — 94760 N-INVAS EAR/PLS OXIMETRY 1: CPT

## 2023-11-08 PROCEDURE — 2580000003 HC RX 258: Performed by: STUDENT IN AN ORGANIZED HEALTH CARE EDUCATION/TRAINING PROGRAM

## 2023-11-08 PROCEDURE — 2060000000 HC ICU INTERMEDIATE R&B

## 2023-11-08 PROCEDURE — 36600 WITHDRAWAL OF ARTERIAL BLOOD: CPT

## 2023-11-08 PROCEDURE — 2700000000 HC OXYGEN THERAPY PER DAY

## 2023-11-08 PROCEDURE — 6360000002 HC RX W HCPCS: Performed by: PHYSICIAN ASSISTANT

## 2023-11-08 PROCEDURE — 94640 AIRWAY INHALATION TREATMENT: CPT

## 2023-11-08 PROCEDURE — 2580000003 HC RX 258: Performed by: INTERNAL MEDICINE

## 2023-11-08 PROCEDURE — 82803 BLOOD GASES ANY COMBINATION: CPT

## 2023-11-08 PROCEDURE — 6360000002 HC RX W HCPCS: Performed by: INTERNAL MEDICINE

## 2023-11-08 PROCEDURE — 94660 CPAP INITIATION&MGMT: CPT

## 2023-11-08 PROCEDURE — 2500000003 HC RX 250 WO HCPCS: Performed by: INTERNAL MEDICINE

## 2023-11-08 PROCEDURE — 6370000000 HC RX 637 (ALT 250 FOR IP): Performed by: INTERNAL MEDICINE

## 2023-11-08 RX ORDER — NICARDIPINE HYDROCHLORIDE 20 MG/1
20 CAPSULE ORAL ONCE
Status: DISCONTINUED | OUTPATIENT
Start: 2023-11-08 | End: 2023-11-08 | Stop reason: RX

## 2023-11-08 RX ORDER — DOXYCYCLINE HYCLATE 100 MG
100 TABLET ORAL EVERY 12 HOURS SCHEDULED
Status: COMPLETED | OUTPATIENT
Start: 2023-11-08 | End: 2023-11-11

## 2023-11-08 RX ORDER — AMLODIPINE BESYLATE 5 MG/1
10 TABLET ORAL DAILY
Status: DISCONTINUED | OUTPATIENT
Start: 2023-11-09 | End: 2023-11-16 | Stop reason: HOSPADM

## 2023-11-08 RX ORDER — AMLODIPINE BESYLATE 5 MG/1
5 TABLET ORAL ONCE
Status: COMPLETED | OUTPATIENT
Start: 2023-11-08 | End: 2023-11-08

## 2023-11-08 RX ADMIN — CARVEDILOL 25 MG: 12.5 TABLET, FILM COATED ORAL at 08:26

## 2023-11-08 RX ADMIN — LOSARTAN POTASSIUM 50 MG: 50 TABLET, FILM COATED ORAL at 21:35

## 2023-11-08 RX ADMIN — ALPRAZOLAM 1 MG: 0.5 TABLET ORAL at 20:32

## 2023-11-08 RX ADMIN — GUAIFENESIN 1200 MG: 600 TABLET, EXTENDED RELEASE ORAL at 19:43

## 2023-11-08 RX ADMIN — CARVEDILOL 25 MG: 12.5 TABLET, FILM COATED ORAL at 21:36

## 2023-11-08 RX ADMIN — LOSARTAN POTASSIUM 50 MG: 50 TABLET, FILM COATED ORAL at 08:26

## 2023-11-08 RX ADMIN — IPRATROPIUM BROMIDE AND ALBUTEROL SULFATE 1 DOSE: 2.5; .5 SOLUTION RESPIRATORY (INHALATION) at 11:35

## 2023-11-08 RX ADMIN — ARFORMOTEROL TARTRATE 15 MCG: 15 SOLUTION RESPIRATORY (INHALATION) at 07:55

## 2023-11-08 RX ADMIN — ATORVASTATIN CALCIUM 40 MG: 40 TABLET, FILM COATED ORAL at 21:38

## 2023-11-08 RX ADMIN — METHYLPREDNISOLONE SODIUM SUCCINATE 40 MG: 40 INJECTION, POWDER, FOR SOLUTION INTRAMUSCULAR; INTRAVENOUS at 18:02

## 2023-11-08 RX ADMIN — DOXYCYCLINE 100 MG: 100 INJECTION, POWDER, LYOPHILIZED, FOR SOLUTION INTRAVENOUS at 10:31

## 2023-11-08 RX ADMIN — SODIUM CHLORIDE, PRESERVATIVE FREE 10 ML: 5 INJECTION INTRAVENOUS at 08:28

## 2023-11-08 RX ADMIN — GUAIFENESIN 1200 MG: 600 TABLET, EXTENDED RELEASE ORAL at 08:26

## 2023-11-08 RX ADMIN — AMLODIPINE BESYLATE 5 MG: 5 TABLET ORAL at 08:27

## 2023-11-08 RX ADMIN — DOXYCYCLINE HYCLATE 100 MG: 100 TABLET, COATED ORAL at 21:35

## 2023-11-08 RX ADMIN — SERTRALINE HYDROCHLORIDE 100 MG: 50 TABLET ORAL at 19:43

## 2023-11-08 RX ADMIN — IPRATROPIUM BROMIDE AND ALBUTEROL SULFATE 1 DOSE: 2.5; .5 SOLUTION RESPIRATORY (INHALATION) at 15:14

## 2023-11-08 RX ADMIN — IPRATROPIUM BROMIDE AND ALBUTEROL SULFATE 1 DOSE: 2.5; .5 SOLUTION RESPIRATORY (INHALATION) at 07:55

## 2023-11-08 RX ADMIN — AMLODIPINE BESYLATE 5 MG: 5 TABLET ORAL at 20:00

## 2023-11-08 RX ADMIN — METHYLPREDNISOLONE SODIUM SUCCINATE 40 MG: 40 INJECTION, POWDER, FOR SOLUTION INTRAMUSCULAR; INTRAVENOUS at 08:35

## 2023-11-08 RX ADMIN — SODIUM CHLORIDE, SODIUM LACTATE, POTASSIUM CHLORIDE, CALCIUM CHLORIDE AND DEXTROSE MONOHYDRATE: 5; 600; 310; 30; 20 INJECTION, SOLUTION INTRAVENOUS at 10:31

## 2023-11-08 RX ADMIN — SODIUM CHLORIDE, PRESERVATIVE FREE 10 ML: 5 INJECTION INTRAVENOUS at 21:38

## 2023-11-08 RX ADMIN — BUDESONIDE 500 MCG: 0.5 INHALANT RESPIRATORY (INHALATION) at 19:56

## 2023-11-08 RX ADMIN — BENZONATATE 100 MG: 100 CAPSULE ORAL at 19:59

## 2023-11-08 RX ADMIN — BENZONATATE 100 MG: 100 CAPSULE ORAL at 07:32

## 2023-11-08 RX ADMIN — BUDESONIDE 500 MCG: 0.5 INHALANT RESPIRATORY (INHALATION) at 07:55

## 2023-11-08 RX ADMIN — METHYLPREDNISOLONE SODIUM SUCCINATE 40 MG: 40 INJECTION, POWDER, FOR SOLUTION INTRAMUSCULAR; INTRAVENOUS at 03:11

## 2023-11-08 RX ADMIN — ENOXAPARIN SODIUM 40 MG: 100 INJECTION SUBCUTANEOUS at 08:30

## 2023-11-08 RX ADMIN — ARFORMOTEROL TARTRATE 15 MCG: 15 SOLUTION RESPIRATORY (INHALATION) at 19:56

## 2023-11-08 RX ADMIN — IPRATROPIUM BROMIDE AND ALBUTEROL SULFATE 1 DOSE: 2.5; .5 SOLUTION RESPIRATORY (INHALATION) at 19:56

## 2023-11-08 RX ADMIN — METHYLPREDNISOLONE SODIUM SUCCINATE 40 MG: 40 INJECTION, POWDER, FOR SOLUTION INTRAMUSCULAR; INTRAVENOUS at 21:48

## 2023-11-09 LAB
ANION GAP SERPL CALC-SCNC: 6 MMOL/L (ref 5–15)
BASOPHILS # BLD: 0 K/UL (ref 0–0.1)
BASOPHILS NFR BLD: 0 % (ref 0–1)
BUN SERPL-MCNC: 28 MG/DL (ref 6–20)
BUN/CREAT SERPL: 28 (ref 12–20)
CALCIUM SERPL-MCNC: 8.7 MG/DL (ref 8.5–10.1)
CHLORIDE SERPL-SCNC: 108 MMOL/L (ref 97–108)
CO2 SERPL-SCNC: 19 MMOL/L (ref 21–32)
CREAT SERPL-MCNC: 1.01 MG/DL (ref 0.55–1.02)
DIFFERENTIAL METHOD BLD: ABNORMAL
EOSINOPHIL # BLD: 0 K/UL (ref 0–0.4)
EOSINOPHIL NFR BLD: 0 % (ref 0–7)
ERYTHROCYTE [DISTWIDTH] IN BLOOD BY AUTOMATED COUNT: 13.2 % (ref 11.5–14.5)
GLUCOSE SERPL-MCNC: 217 MG/DL (ref 65–100)
HCT VFR BLD AUTO: 30.3 % (ref 35–47)
HGB BLD-MCNC: 9.9 G/DL (ref 11.5–16)
IMM GRANULOCYTES # BLD AUTO: 0.1 K/UL (ref 0–0.04)
IMM GRANULOCYTES NFR BLD AUTO: 1 % (ref 0–0.5)
LYMPHOCYTES # BLD: 0.5 K/UL (ref 0.8–3.5)
LYMPHOCYTES NFR BLD: 6 % (ref 12–49)
MCH RBC QN AUTO: 34.6 PG (ref 26–34)
MCHC RBC AUTO-ENTMCNC: 32.7 G/DL (ref 30–36.5)
MCV RBC AUTO: 105.9 FL (ref 80–99)
MONOCYTES # BLD: 0.3 K/UL (ref 0–1)
MONOCYTES NFR BLD: 4 % (ref 5–13)
NEUTS SEG # BLD: 6.7 K/UL (ref 1.8–8)
NEUTS SEG NFR BLD: 89 % (ref 32–75)
NRBC # BLD: 0 K/UL (ref 0–0.01)
NRBC BLD-RTO: 0 PER 100 WBC
PLATELET # BLD AUTO: 135 K/UL (ref 150–400)
PMV BLD AUTO: 9.5 FL (ref 8.9–12.9)
POTASSIUM SERPL-SCNC: 4.3 MMOL/L (ref 3.5–5.1)
RBC # BLD AUTO: 2.86 M/UL (ref 3.8–5.2)
RBC MORPH BLD: ABNORMAL
SODIUM SERPL-SCNC: 133 MMOL/L (ref 136–145)
WBC # BLD AUTO: 7.6 K/UL (ref 3.6–11)

## 2023-11-09 PROCEDURE — 6360000002 HC RX W HCPCS: Performed by: STUDENT IN AN ORGANIZED HEALTH CARE EDUCATION/TRAINING PROGRAM

## 2023-11-09 PROCEDURE — 94640 AIRWAY INHALATION TREATMENT: CPT

## 2023-11-09 PROCEDURE — 6370000000 HC RX 637 (ALT 250 FOR IP): Performed by: STUDENT IN AN ORGANIZED HEALTH CARE EDUCATION/TRAINING PROGRAM

## 2023-11-09 PROCEDURE — 6360000002 HC RX W HCPCS: Performed by: INTERNAL MEDICINE

## 2023-11-09 PROCEDURE — 2060000000 HC ICU INTERMEDIATE R&B

## 2023-11-09 PROCEDURE — 85025 COMPLETE CBC W/AUTO DIFF WBC: CPT

## 2023-11-09 PROCEDURE — 6370000000 HC RX 637 (ALT 250 FOR IP): Performed by: INTERNAL MEDICINE

## 2023-11-09 PROCEDURE — 6360000002 HC RX W HCPCS: Performed by: PHYSICIAN ASSISTANT

## 2023-11-09 PROCEDURE — 94760 N-INVAS EAR/PLS OXIMETRY 1: CPT

## 2023-11-09 PROCEDURE — 80048 BASIC METABOLIC PNL TOTAL CA: CPT

## 2023-11-09 PROCEDURE — 2580000003 HC RX 258: Performed by: INTERNAL MEDICINE

## 2023-11-09 PROCEDURE — 36415 COLL VENOUS BLD VENIPUNCTURE: CPT

## 2023-11-09 PROCEDURE — 2700000000 HC OXYGEN THERAPY PER DAY

## 2023-11-09 PROCEDURE — 94660 CPAP INITIATION&MGMT: CPT

## 2023-11-09 RX ADMIN — ARFORMOTEROL TARTRATE 15 MCG: 15 SOLUTION RESPIRATORY (INHALATION) at 21:38

## 2023-11-09 RX ADMIN — LOSARTAN POTASSIUM 50 MG: 50 TABLET, FILM COATED ORAL at 22:07

## 2023-11-09 RX ADMIN — DOXYCYCLINE HYCLATE 100 MG: 100 TABLET, COATED ORAL at 10:53

## 2023-11-09 RX ADMIN — LOSARTAN POTASSIUM 50 MG: 50 TABLET, FILM COATED ORAL at 10:54

## 2023-11-09 RX ADMIN — GUAIFENESIN 1200 MG: 600 TABLET, EXTENDED RELEASE ORAL at 10:53

## 2023-11-09 RX ADMIN — CARVEDILOL 25 MG: 12.5 TABLET, FILM COATED ORAL at 22:07

## 2023-11-09 RX ADMIN — IPRATROPIUM BROMIDE AND ALBUTEROL SULFATE 1 DOSE: 2.5; .5 SOLUTION RESPIRATORY (INHALATION) at 21:38

## 2023-11-09 RX ADMIN — SODIUM CHLORIDE, PRESERVATIVE FREE 10 ML: 5 INJECTION INTRAVENOUS at 10:58

## 2023-11-09 RX ADMIN — ALPRAZOLAM 1 MG: 0.5 TABLET ORAL at 22:07

## 2023-11-09 RX ADMIN — METHYLPREDNISOLONE SODIUM SUCCINATE 40 MG: 40 INJECTION, POWDER, FOR SOLUTION INTRAMUSCULAR; INTRAVENOUS at 05:43

## 2023-11-09 RX ADMIN — METHYLPREDNISOLONE SODIUM SUCCINATE 40 MG: 40 INJECTION, POWDER, FOR SOLUTION INTRAMUSCULAR; INTRAVENOUS at 22:08

## 2023-11-09 RX ADMIN — IPRATROPIUM BROMIDE AND ALBUTEROL SULFATE 1 DOSE: 2.5; .5 SOLUTION RESPIRATORY (INHALATION) at 07:17

## 2023-11-09 RX ADMIN — SERTRALINE HYDROCHLORIDE 100 MG: 50 TABLET ORAL at 22:08

## 2023-11-09 RX ADMIN — BENZONATATE 100 MG: 100 CAPSULE ORAL at 13:35

## 2023-11-09 RX ADMIN — ALPRAZOLAM 1 MG: 0.5 TABLET ORAL at 13:35

## 2023-11-09 RX ADMIN — ARFORMOTEROL TARTRATE 15 MCG: 15 SOLUTION RESPIRATORY (INHALATION) at 07:17

## 2023-11-09 RX ADMIN — METHYLPREDNISOLONE SODIUM SUCCINATE 40 MG: 40 INJECTION, POWDER, FOR SOLUTION INTRAMUSCULAR; INTRAVENOUS at 11:01

## 2023-11-09 RX ADMIN — AMLODIPINE BESYLATE 10 MG: 5 TABLET ORAL at 10:53

## 2023-11-09 RX ADMIN — SODIUM CHLORIDE, PRESERVATIVE FREE 10 ML: 5 INJECTION INTRAVENOUS at 22:08

## 2023-11-09 RX ADMIN — BUDESONIDE 500 MCG: 0.5 INHALANT RESPIRATORY (INHALATION) at 21:38

## 2023-11-09 RX ADMIN — IPRATROPIUM BROMIDE AND ALBUTEROL SULFATE 1 DOSE: 2.5; .5 SOLUTION RESPIRATORY (INHALATION) at 15:23

## 2023-11-09 RX ADMIN — ATORVASTATIN CALCIUM 40 MG: 40 TABLET, FILM COATED ORAL at 22:07

## 2023-11-09 RX ADMIN — GUAIFENESIN 1200 MG: 600 TABLET, EXTENDED RELEASE ORAL at 22:08

## 2023-11-09 RX ADMIN — IPRATROPIUM BROMIDE AND ALBUTEROL SULFATE 1 DOSE: 2.5; .5 SOLUTION RESPIRATORY (INHALATION) at 11:35

## 2023-11-09 RX ADMIN — BENZONATATE 100 MG: 100 CAPSULE ORAL at 22:07

## 2023-11-09 RX ADMIN — ENOXAPARIN SODIUM 40 MG: 100 INJECTION SUBCUTANEOUS at 10:54

## 2023-11-09 RX ADMIN — CARVEDILOL 25 MG: 12.5 TABLET, FILM COATED ORAL at 10:53

## 2023-11-09 RX ADMIN — BUDESONIDE 500 MCG: 0.5 INHALANT RESPIRATORY (INHALATION) at 07:17

## 2023-11-09 RX ADMIN — DOXYCYCLINE HYCLATE 100 MG: 100 TABLET, COATED ORAL at 21:00

## 2023-11-09 ASSESSMENT — PAIN DESCRIPTION - LOCATION
LOCATION: BACK;CHEST
LOCATION: BACK;CHEST

## 2023-11-09 ASSESSMENT — PAIN DESCRIPTION - DESCRIPTORS
DESCRIPTORS: HEAVINESS
DESCRIPTORS: HEAVINESS

## 2023-11-09 ASSESSMENT — PAIN SCALES - GENERAL
PAINLEVEL_OUTOF10: 0
PAINLEVEL_OUTOF10: 6

## 2023-11-09 NOTE — CARE COORDINATION
11/09/23 1301   Service Assessment   Patient Orientation Alert and Oriented   Cognition Alert   History Provided By Patient   Primary Caregiver Self   Accompanied By/Relationship none   Support Systems Friends/Neighbors   Patient's Healthcare Decision Maker is: Named in Barb E Lucía Lopez   PCP Verified by CM Yes  (NP Woodrow Ugalde)   Last Visit to PCP Within last 3 months   Prior Functional Level Independent in ADLs/IADLs   Current Functional Level Independent in ADLs/IADLs   Can patient return to prior living arrangement Yes  (Pt anticipting need for in home caregivers moving forward)   Ability to make needs known: Good   Family able to assist with home care needs: No   Would you like for me to discuss the discharge plan with any other family members/significant others, and if so, who? No   Financial Resources Medicare   CM/SW Referral DME   Social/Functional History   Lives With Alone   Type of Home Condo   Home Layout Multi-level   Home Access Elevator   Bathroom Shower/Tub Walk-in shower   Bathroom Toilet Standard   Bathroom Equipment Tub transfer bench   [address] [facility] From Home health  (Pt anticipates needing in katrin ecaregivers)   Active  No     6:04 O2 challenge uploaded in [address] to [facility].   4:35 Pt requested a change in DME company for her O2 and [employer]. CM cancelled [employer] order in [address] (and left a vm) with [address] replied in [address] that they received the cancellation. ELLIOTT requested [phone] call pt to coordinate pick-up of their equipment. Trilogy order placed with [facility] in [address] ELLIOTT spoke with Estephanie Tripathi - liaison for [facility] - [phone]. O2 challenge and O2 order pending with pulm and bedside nurse. CM following to coordinate completion of Trilogy and home O2 set-up. Pt updated. 3:48 Trilogy order and clinicals sent to [employer] through [address]  Pt open with [employer] for home O2

## 2023-11-10 LAB
ANION GAP SERPL CALC-SCNC: 6 MMOL/L (ref 5–15)
BUN SERPL-MCNC: 30 MG/DL (ref 6–20)
BUN/CREAT SERPL: 35 (ref 12–20)
CALCIUM SERPL-MCNC: 9.1 MG/DL (ref 8.5–10.1)
CHLORIDE SERPL-SCNC: 107 MMOL/L (ref 97–108)
CO2 SERPL-SCNC: 25 MMOL/L (ref 21–32)
CREAT SERPL-MCNC: 0.85 MG/DL (ref 0.55–1.02)
ERYTHROCYTE [DISTWIDTH] IN BLOOD BY AUTOMATED COUNT: 13.3 % (ref 11.5–14.5)
GLUCOSE SERPL-MCNC: 163 MG/DL (ref 65–100)
HCT VFR BLD AUTO: 28.6 % (ref 35–47)
HGB BLD-MCNC: 9.5 G/DL (ref 11.5–16)
MCH RBC QN AUTO: 33.9 PG (ref 26–34)
MCHC RBC AUTO-ENTMCNC: 33.2 G/DL (ref 30–36.5)
MCV RBC AUTO: 102.1 FL (ref 80–99)
NRBC # BLD: 0 K/UL (ref 0–0.01)
NRBC BLD-RTO: 0 PER 100 WBC
PLATELET # BLD AUTO: 133 K/UL (ref 150–400)
PMV BLD AUTO: 9.2 FL (ref 8.9–12.9)
POTASSIUM SERPL-SCNC: 3.6 MMOL/L (ref 3.5–5.1)
RBC # BLD AUTO: 2.8 M/UL (ref 3.8–5.2)
SODIUM SERPL-SCNC: 138 MMOL/L (ref 136–145)
WBC # BLD AUTO: 6.6 K/UL (ref 3.6–11)

## 2023-11-10 PROCEDURE — 97162 PT EVAL MOD COMPLEX 30 MIN: CPT

## 2023-11-10 PROCEDURE — 36415 COLL VENOUS BLD VENIPUNCTURE: CPT

## 2023-11-10 PROCEDURE — 97165 OT EVAL LOW COMPLEX 30 MIN: CPT

## 2023-11-10 PROCEDURE — 94760 N-INVAS EAR/PLS OXIMETRY 1: CPT

## 2023-11-10 PROCEDURE — 85027 COMPLETE CBC AUTOMATED: CPT

## 2023-11-10 PROCEDURE — 97535 SELF CARE MNGMENT TRAINING: CPT

## 2023-11-10 PROCEDURE — 6370000000 HC RX 637 (ALT 250 FOR IP): Performed by: STUDENT IN AN ORGANIZED HEALTH CARE EDUCATION/TRAINING PROGRAM

## 2023-11-10 PROCEDURE — 2580000003 HC RX 258: Performed by: INTERNAL MEDICINE

## 2023-11-10 PROCEDURE — 2060000000 HC ICU INTERMEDIATE R&B

## 2023-11-10 PROCEDURE — 94640 AIRWAY INHALATION TREATMENT: CPT

## 2023-11-10 PROCEDURE — 80048 BASIC METABOLIC PNL TOTAL CA: CPT

## 2023-11-10 PROCEDURE — 6360000002 HC RX W HCPCS: Performed by: STUDENT IN AN ORGANIZED HEALTH CARE EDUCATION/TRAINING PROGRAM

## 2023-11-10 PROCEDURE — 6370000000 HC RX 637 (ALT 250 FOR IP): Performed by: INTERNAL MEDICINE

## 2023-11-10 PROCEDURE — 6360000002 HC RX W HCPCS: Performed by: INTERNAL MEDICINE

## 2023-11-10 PROCEDURE — 6360000002 HC RX W HCPCS: Performed by: PHYSICIAN ASSISTANT

## 2023-11-10 PROCEDURE — 2700000000 HC OXYGEN THERAPY PER DAY

## 2023-11-10 PROCEDURE — 97116 GAIT TRAINING THERAPY: CPT

## 2023-11-10 RX ADMIN — SERTRALINE HYDROCHLORIDE 100 MG: 50 TABLET ORAL at 22:54

## 2023-11-10 RX ADMIN — ARFORMOTEROL TARTRATE 15 MCG: 15 SOLUTION RESPIRATORY (INHALATION) at 19:31

## 2023-11-10 RX ADMIN — GUAIFENESIN 1200 MG: 600 TABLET, EXTENDED RELEASE ORAL at 09:35

## 2023-11-10 RX ADMIN — METHYLPREDNISOLONE SODIUM SUCCINATE 40 MG: 40 INJECTION, POWDER, FOR SOLUTION INTRAMUSCULAR; INTRAVENOUS at 09:35

## 2023-11-10 RX ADMIN — ARFORMOTEROL TARTRATE 15 MCG: 15 SOLUTION RESPIRATORY (INHALATION) at 07:51

## 2023-11-10 RX ADMIN — BUDESONIDE 500 MCG: 0.5 INHALANT RESPIRATORY (INHALATION) at 19:31

## 2023-11-10 RX ADMIN — DOXYCYCLINE HYCLATE 100 MG: 100 TABLET, COATED ORAL at 09:35

## 2023-11-10 RX ADMIN — CARVEDILOL 25 MG: 12.5 TABLET, FILM COATED ORAL at 09:35

## 2023-11-10 RX ADMIN — ALPRAZOLAM 1 MG: 0.5 TABLET ORAL at 14:43

## 2023-11-10 RX ADMIN — LOSARTAN POTASSIUM 50 MG: 50 TABLET, FILM COATED ORAL at 22:54

## 2023-11-10 RX ADMIN — IPRATROPIUM BROMIDE AND ALBUTEROL SULFATE 1 DOSE: 2.5; .5 SOLUTION RESPIRATORY (INHALATION) at 15:49

## 2023-11-10 RX ADMIN — ATORVASTATIN CALCIUM 40 MG: 40 TABLET, FILM COATED ORAL at 22:53

## 2023-11-10 RX ADMIN — BUDESONIDE 500 MCG: 0.5 INHALANT RESPIRATORY (INHALATION) at 07:51

## 2023-11-10 RX ADMIN — LOSARTAN POTASSIUM 50 MG: 50 TABLET, FILM COATED ORAL at 09:35

## 2023-11-10 RX ADMIN — GUAIFENESIN 1200 MG: 600 TABLET, EXTENDED RELEASE ORAL at 22:53

## 2023-11-10 RX ADMIN — IPRATROPIUM BROMIDE AND ALBUTEROL SULFATE 1 DOSE: 2.5; .5 SOLUTION RESPIRATORY (INHALATION) at 07:51

## 2023-11-10 RX ADMIN — SODIUM CHLORIDE, PRESERVATIVE FREE 10 ML: 5 INJECTION INTRAVENOUS at 22:59

## 2023-11-10 RX ADMIN — ENOXAPARIN SODIUM 40 MG: 100 INJECTION SUBCUTANEOUS at 09:36

## 2023-11-10 RX ADMIN — METHYLPREDNISOLONE SODIUM SUCCINATE 40 MG: 40 INJECTION, POWDER, FOR SOLUTION INTRAMUSCULAR; INTRAVENOUS at 22:56

## 2023-11-10 RX ADMIN — IPRATROPIUM BROMIDE AND ALBUTEROL SULFATE 1 DOSE: 2.5; .5 SOLUTION RESPIRATORY (INHALATION) at 12:39

## 2023-11-10 RX ADMIN — AMLODIPINE BESYLATE 10 MG: 5 TABLET ORAL at 09:35

## 2023-11-10 RX ADMIN — DOXYCYCLINE HYCLATE 100 MG: 100 TABLET, COATED ORAL at 22:53

## 2023-11-10 RX ADMIN — IPRATROPIUM BROMIDE AND ALBUTEROL SULFATE 1 DOSE: 2.5; .5 SOLUTION RESPIRATORY (INHALATION) at 19:31

## 2023-11-10 ASSESSMENT — PAIN SCALES - GENERAL: PAINLEVEL_OUTOF10: 0

## 2023-11-10 NOTE — CARE COORDINATION
Transition of Care Plan:    RUR: 15% Moderate   Prior Level of Functioning: Independent   Disposition: HH vs SNF   If SNF or IPR: Date FOC offered: TBD   Date FOC received: TBD  Accepting facility: [facility]   Date authorization started with reference number: n/a  Date authorization received and expires: n/a  Follow up appointments: On AVS   DME needed: O2 and Trilogy   Transportation at discharge: Friend   IM/IMM Medicare/ letter given: 1st IMM 11/06  Is patient a [place] and connected with VA? No    If yes, was [place] transfer form completed and VA notified? N/A  Caregiver Contact: Raul Ham [phone]  Discharge Caregiver contacted prior to discharge? No   Care Conference needed? No   Barriers to discharge: Steroids, respiratory status, palliative consult    5:02 Pt declined IPR/SNF dispo rec. Pt opted for [facility] and will get paid caregivers as needed. M provided caregiver agency resources. Referral sent in [facility] to [address] [place] [address]. All clinicals and orders uploaded for Trilogy and home O2 set-up. 2:00  CM uploaded O2 order in [address] for [facility]. New home O2 set-up. Pt requested new DME company for her home [employer] with [place] confirmed he received update. Auth will be started for Trilogy and O2 with recently updated O2 order. 11:40 Pt discussed in IMCU rounds. MARIA ELENA Monday. Pt still on steroids with wheezing. CM updated [place]  in [address] of new MARIA ELENA. CM following for MARTINA planning.    KAY Barron

## 2023-11-11 LAB
ANION GAP SERPL CALC-SCNC: 8 MMOL/L (ref 5–15)
BUN SERPL-MCNC: 35 MG/DL (ref 6–20)
BUN/CREAT SERPL: 38 (ref 12–20)
CALCIUM SERPL-MCNC: 8.6 MG/DL (ref 8.5–10.1)
CHLORIDE SERPL-SCNC: 107 MMOL/L (ref 97–108)
CO2 SERPL-SCNC: 24 MMOL/L (ref 21–32)
CREAT SERPL-MCNC: 0.92 MG/DL (ref 0.55–1.02)
ERYTHROCYTE [DISTWIDTH] IN BLOOD BY AUTOMATED COUNT: 13.2 % (ref 11.5–14.5)
GLUCOSE SERPL-MCNC: 150 MG/DL (ref 65–100)
HCT VFR BLD AUTO: 29.1 % (ref 35–47)
HGB BLD-MCNC: 9.6 G/DL (ref 11.5–16)
MCH RBC QN AUTO: 34.3 PG (ref 26–34)
MCHC RBC AUTO-ENTMCNC: 33 G/DL (ref 30–36.5)
MCV RBC AUTO: 103.9 FL (ref 80–99)
NRBC # BLD: 0 K/UL (ref 0–0.01)
NRBC BLD-RTO: 0 PER 100 WBC
PLATELET # BLD AUTO: 137 K/UL (ref 150–400)
PMV BLD AUTO: 9.4 FL (ref 8.9–12.9)
POTASSIUM SERPL-SCNC: 3.7 MMOL/L (ref 3.5–5.1)
RBC # BLD AUTO: 2.8 M/UL (ref 3.8–5.2)
SODIUM SERPL-SCNC: 139 MMOL/L (ref 136–145)
WBC # BLD AUTO: 7.2 K/UL (ref 3.6–11)

## 2023-11-11 PROCEDURE — 80048 BASIC METABOLIC PNL TOTAL CA: CPT

## 2023-11-11 PROCEDURE — 36415 COLL VENOUS BLD VENIPUNCTURE: CPT

## 2023-11-11 PROCEDURE — 6360000002 HC RX W HCPCS: Performed by: STUDENT IN AN ORGANIZED HEALTH CARE EDUCATION/TRAINING PROGRAM

## 2023-11-11 PROCEDURE — 6370000000 HC RX 637 (ALT 250 FOR IP): Performed by: INTERNAL MEDICINE

## 2023-11-11 PROCEDURE — 6370000000 HC RX 637 (ALT 250 FOR IP): Performed by: STUDENT IN AN ORGANIZED HEALTH CARE EDUCATION/TRAINING PROGRAM

## 2023-11-11 PROCEDURE — 6360000002 HC RX W HCPCS: Performed by: INTERNAL MEDICINE

## 2023-11-11 PROCEDURE — 6360000002 HC RX W HCPCS: Performed by: PHYSICIAN ASSISTANT

## 2023-11-11 PROCEDURE — 2060000000 HC ICU INTERMEDIATE R&B

## 2023-11-11 PROCEDURE — 85027 COMPLETE CBC AUTOMATED: CPT

## 2023-11-11 PROCEDURE — 94660 CPAP INITIATION&MGMT: CPT

## 2023-11-11 PROCEDURE — 2580000003 HC RX 258: Performed by: INTERNAL MEDICINE

## 2023-11-11 PROCEDURE — 94640 AIRWAY INHALATION TREATMENT: CPT

## 2023-11-11 RX ORDER — IPRATROPIUM BROMIDE AND ALBUTEROL SULFATE 2.5; .5 MG/3ML; MG/3ML
1 SOLUTION RESPIRATORY (INHALATION) EVERY 4 HOURS PRN
Status: DISCONTINUED | OUTPATIENT
Start: 2023-11-11 | End: 2023-11-16 | Stop reason: HOSPADM

## 2023-11-11 RX ORDER — ZOLPIDEM TARTRATE 5 MG/1
10 TABLET ORAL NIGHTLY PRN
Status: DISCONTINUED | OUTPATIENT
Start: 2023-11-11 | End: 2023-11-13

## 2023-11-11 RX ADMIN — ARFORMOTEROL TARTRATE 15 MCG: 15 SOLUTION RESPIRATORY (INHALATION) at 10:10

## 2023-11-11 RX ADMIN — CARVEDILOL 25 MG: 12.5 TABLET, FILM COATED ORAL at 09:19

## 2023-11-11 RX ADMIN — ARFORMOTEROL TARTRATE 15 MCG: 15 SOLUTION RESPIRATORY (INHALATION) at 19:06

## 2023-11-11 RX ADMIN — LOSARTAN POTASSIUM 50 MG: 50 TABLET, FILM COATED ORAL at 21:10

## 2023-11-11 RX ADMIN — SERTRALINE HYDROCHLORIDE 100 MG: 50 TABLET ORAL at 21:11

## 2023-11-11 RX ADMIN — ALPRAZOLAM 1 MG: 0.5 TABLET ORAL at 02:58

## 2023-11-11 RX ADMIN — BUDESONIDE 500 MCG: 0.5 INHALANT RESPIRATORY (INHALATION) at 10:09

## 2023-11-11 RX ADMIN — BUDESONIDE 500 MCG: 0.5 INHALANT RESPIRATORY (INHALATION) at 19:06

## 2023-11-11 RX ADMIN — LOSARTAN POTASSIUM 50 MG: 50 TABLET, FILM COATED ORAL at 09:20

## 2023-11-11 RX ADMIN — ZOLPIDEM TARTRATE 10 MG: 5 TABLET ORAL at 21:10

## 2023-11-11 RX ADMIN — METHYLPREDNISOLONE SODIUM SUCCINATE 40 MG: 40 INJECTION, POWDER, FOR SOLUTION INTRAMUSCULAR; INTRAVENOUS at 13:25

## 2023-11-11 RX ADMIN — GUAIFENESIN 1200 MG: 600 TABLET, EXTENDED RELEASE ORAL at 09:20

## 2023-11-11 RX ADMIN — SODIUM CHLORIDE, PRESERVATIVE FREE 10 ML: 5 INJECTION INTRAVENOUS at 21:10

## 2023-11-11 RX ADMIN — GUAIFENESIN 1200 MG: 600 TABLET, EXTENDED RELEASE ORAL at 21:11

## 2023-11-11 RX ADMIN — DOXYCYCLINE HYCLATE 100 MG: 100 TABLET, COATED ORAL at 09:20

## 2023-11-11 RX ADMIN — ATORVASTATIN CALCIUM 40 MG: 40 TABLET, FILM COATED ORAL at 21:10

## 2023-11-11 RX ADMIN — CARVEDILOL 25 MG: 12.5 TABLET, FILM COATED ORAL at 21:10

## 2023-11-11 RX ADMIN — ENOXAPARIN SODIUM 40 MG: 100 INJECTION SUBCUTANEOUS at 09:19

## 2023-11-11 RX ADMIN — IPRATROPIUM BROMIDE AND ALBUTEROL SULFATE 1 DOSE: 2.5; .5 SOLUTION RESPIRATORY (INHALATION) at 10:09

## 2023-11-11 RX ADMIN — AMLODIPINE BESYLATE 10 MG: 5 TABLET ORAL at 09:20

## 2023-11-11 ASSESSMENT — PAIN SCALES - GENERAL
PAINLEVEL_OUTOF10: 0

## 2023-11-12 LAB
ANION GAP SERPL CALC-SCNC: 3 MMOL/L (ref 5–15)
BUN SERPL-MCNC: 35 MG/DL (ref 6–20)
BUN/CREAT SERPL: 38 (ref 12–20)
CALCIUM SERPL-MCNC: 8.4 MG/DL (ref 8.5–10.1)
CHLORIDE SERPL-SCNC: 109 MMOL/L (ref 97–108)
CO2 SERPL-SCNC: 25 MMOL/L (ref 21–32)
CREAT SERPL-MCNC: 0.91 MG/DL (ref 0.55–1.02)
ERYTHROCYTE [DISTWIDTH] IN BLOOD BY AUTOMATED COUNT: 13.3 % (ref 11.5–14.5)
GLUCOSE SERPL-MCNC: 172 MG/DL (ref 65–100)
HCT VFR BLD AUTO: 26.9 % (ref 35–47)
HGB BLD-MCNC: 8.9 G/DL (ref 11.5–16)
MCH RBC QN AUTO: 34 PG (ref 26–34)
MCHC RBC AUTO-ENTMCNC: 33.1 G/DL (ref 30–36.5)
MCV RBC AUTO: 102.7 FL (ref 80–99)
NRBC # BLD: 0 K/UL (ref 0–0.01)
NRBC BLD-RTO: 0 PER 100 WBC
PLATELET # BLD AUTO: 139 K/UL (ref 150–400)
PMV BLD AUTO: 9.5 FL (ref 8.9–12.9)
POTASSIUM SERPL-SCNC: 3.7 MMOL/L (ref 3.5–5.1)
RBC # BLD AUTO: 2.62 M/UL (ref 3.8–5.2)
SODIUM SERPL-SCNC: 137 MMOL/L (ref 136–145)
WBC # BLD AUTO: 5.9 K/UL (ref 3.6–11)

## 2023-11-12 PROCEDURE — 94660 CPAP INITIATION&MGMT: CPT

## 2023-11-12 PROCEDURE — 2060000000 HC ICU INTERMEDIATE R&B

## 2023-11-12 PROCEDURE — 6360000002 HC RX W HCPCS: Performed by: STUDENT IN AN ORGANIZED HEALTH CARE EDUCATION/TRAINING PROGRAM

## 2023-11-12 PROCEDURE — 6370000000 HC RX 637 (ALT 250 FOR IP): Performed by: STUDENT IN AN ORGANIZED HEALTH CARE EDUCATION/TRAINING PROGRAM

## 2023-11-12 PROCEDURE — 6370000000 HC RX 637 (ALT 250 FOR IP): Performed by: INTERNAL MEDICINE

## 2023-11-12 PROCEDURE — 80048 BASIC METABOLIC PNL TOTAL CA: CPT

## 2023-11-12 PROCEDURE — 36415 COLL VENOUS BLD VENIPUNCTURE: CPT

## 2023-11-12 PROCEDURE — 94640 AIRWAY INHALATION TREATMENT: CPT

## 2023-11-12 PROCEDURE — 2700000000 HC OXYGEN THERAPY PER DAY

## 2023-11-12 PROCEDURE — 94760 N-INVAS EAR/PLS OXIMETRY 1: CPT

## 2023-11-12 PROCEDURE — 6360000002 HC RX W HCPCS: Performed by: PHYSICIAN ASSISTANT

## 2023-11-12 PROCEDURE — 6360000002 HC RX W HCPCS: Performed by: INTERNAL MEDICINE

## 2023-11-12 PROCEDURE — 85027 COMPLETE CBC AUTOMATED: CPT

## 2023-11-12 RX ORDER — ALBUTEROL SULFATE 2.5 MG/3ML
2.5 SOLUTION RESPIRATORY (INHALATION)
Status: DISCONTINUED | OUTPATIENT
Start: 2023-11-12 | End: 2023-11-16 | Stop reason: HOSPADM

## 2023-11-12 RX ADMIN — ENOXAPARIN SODIUM 40 MG: 100 INJECTION SUBCUTANEOUS at 09:15

## 2023-11-12 RX ADMIN — LOSARTAN POTASSIUM 50 MG: 50 TABLET, FILM COATED ORAL at 09:16

## 2023-11-12 RX ADMIN — CARVEDILOL 25 MG: 12.5 TABLET, FILM COATED ORAL at 09:16

## 2023-11-12 RX ADMIN — LOSARTAN POTASSIUM 50 MG: 50 TABLET, FILM COATED ORAL at 21:13

## 2023-11-12 RX ADMIN — ALBUTEROL SULFATE 2.5 MG: 2.5 SOLUTION RESPIRATORY (INHALATION) at 21:25

## 2023-11-12 RX ADMIN — ALPRAZOLAM 1 MG: 0.5 TABLET ORAL at 21:12

## 2023-11-12 RX ADMIN — ALPRAZOLAM 1 MG: 0.5 TABLET ORAL at 09:15

## 2023-11-12 RX ADMIN — CARVEDILOL 25 MG: 12.5 TABLET, FILM COATED ORAL at 21:12

## 2023-11-12 RX ADMIN — GUAIFENESIN 1200 MG: 600 TABLET, EXTENDED RELEASE ORAL at 21:13

## 2023-11-12 RX ADMIN — ARFORMOTEROL TARTRATE 15 MCG: 15 SOLUTION RESPIRATORY (INHALATION) at 21:25

## 2023-11-12 RX ADMIN — IPRATROPIUM BROMIDE AND ALBUTEROL SULFATE 1 DOSE: .5; 3 SOLUTION RESPIRATORY (INHALATION) at 09:05

## 2023-11-12 RX ADMIN — METHYLPREDNISOLONE SODIUM SUCCINATE 40 MG: 40 INJECTION, POWDER, FOR SOLUTION INTRAMUSCULAR; INTRAVENOUS at 21:13

## 2023-11-12 RX ADMIN — METHYLPREDNISOLONE SODIUM SUCCINATE 40 MG: 40 INJECTION, POWDER, FOR SOLUTION INTRAMUSCULAR; INTRAVENOUS at 00:22

## 2023-11-12 RX ADMIN — ZOLPIDEM TARTRATE 10 MG: 5 TABLET ORAL at 21:12

## 2023-11-12 RX ADMIN — AMLODIPINE BESYLATE 10 MG: 5 TABLET ORAL at 09:16

## 2023-11-12 RX ADMIN — GUAIFENESIN 1200 MG: 600 TABLET, EXTENDED RELEASE ORAL at 09:16

## 2023-11-12 RX ADMIN — ARFORMOTEROL TARTRATE 15 MCG: 15 SOLUTION RESPIRATORY (INHALATION) at 09:02

## 2023-11-12 RX ADMIN — BUDESONIDE 500 MCG: 0.5 INHALANT RESPIRATORY (INHALATION) at 21:25

## 2023-11-12 RX ADMIN — SERTRALINE HYDROCHLORIDE 100 MG: 50 TABLET ORAL at 21:12

## 2023-11-12 RX ADMIN — ATORVASTATIN CALCIUM 40 MG: 40 TABLET, FILM COATED ORAL at 21:13

## 2023-11-12 RX ADMIN — METHYLPREDNISOLONE SODIUM SUCCINATE 40 MG: 40 INJECTION, POWDER, FOR SOLUTION INTRAMUSCULAR; INTRAVENOUS at 14:00

## 2023-11-12 RX ADMIN — BUDESONIDE 500 MCG: 0.5 INHALANT RESPIRATORY (INHALATION) at 09:02

## 2023-11-12 ASSESSMENT — PAIN SCALES - GENERAL
PAINLEVEL_OUTOF10: 0

## 2023-11-13 ENCOUNTER — APPOINTMENT (OUTPATIENT)
Facility: HOSPITAL | Age: 78
DRG: 189 | End: 2023-11-13
Payer: MEDICARE

## 2023-11-13 LAB
ANION GAP SERPL CALC-SCNC: 4 MMOL/L (ref 5–15)
BASOPHILS # BLD: 0 K/UL (ref 0–0.1)
BASOPHILS NFR BLD: 0 % (ref 0–1)
BUN SERPL-MCNC: 29 MG/DL (ref 6–20)
BUN/CREAT SERPL: 34 (ref 12–20)
CALCIUM SERPL-MCNC: 8.3 MG/DL (ref 8.5–10.1)
CHLORIDE SERPL-SCNC: 111 MMOL/L (ref 97–108)
CO2 SERPL-SCNC: 22 MMOL/L (ref 21–32)
CREAT SERPL-MCNC: 0.85 MG/DL (ref 0.55–1.02)
DIFFERENTIAL METHOD BLD: ABNORMAL
EOSINOPHIL # BLD: 0 K/UL (ref 0–0.4)
EOSINOPHIL NFR BLD: 0 % (ref 0–7)
ERYTHROCYTE [DISTWIDTH] IN BLOOD BY AUTOMATED COUNT: 13.4 % (ref 11.5–14.5)
GLUCOSE SERPL-MCNC: 173 MG/DL (ref 65–100)
HCT VFR BLD AUTO: 30.4 % (ref 35–47)
HGB BLD-MCNC: 9.7 G/DL (ref 11.5–16)
IMM GRANULOCYTES # BLD AUTO: 0.1 K/UL (ref 0–0.04)
IMM GRANULOCYTES NFR BLD AUTO: 2 % (ref 0–0.5)
LYMPHOCYTES # BLD: 0.7 K/UL (ref 0.8–3.5)
LYMPHOCYTES NFR BLD: 11 % (ref 12–49)
MCH RBC QN AUTO: 34.9 PG (ref 26–34)
MCHC RBC AUTO-ENTMCNC: 31.9 G/DL (ref 30–36.5)
MCV RBC AUTO: 109.4 FL (ref 80–99)
MONOCYTES # BLD: 0.3 K/UL (ref 0–1)
MONOCYTES NFR BLD: 5 % (ref 5–13)
NEUTS SEG # BLD: 5.6 K/UL (ref 1.8–8)
NEUTS SEG NFR BLD: 82 % (ref 32–75)
NRBC # BLD: 0 K/UL (ref 0–0.01)
NRBC BLD-RTO: 0 PER 100 WBC
PLATELET # BLD AUTO: 147 K/UL (ref 150–400)
PMV BLD AUTO: 9.6 FL (ref 8.9–12.9)
POTASSIUM SERPL-SCNC: 3.9 MMOL/L (ref 3.5–5.1)
RBC # BLD AUTO: 2.78 M/UL (ref 3.8–5.2)
RBC MORPH BLD: ABNORMAL
SODIUM SERPL-SCNC: 137 MMOL/L (ref 136–145)
WBC # BLD AUTO: 6.7 K/UL (ref 3.6–11)

## 2023-11-13 PROCEDURE — 6360000002 HC RX W HCPCS: Performed by: STUDENT IN AN ORGANIZED HEALTH CARE EDUCATION/TRAINING PROGRAM

## 2023-11-13 PROCEDURE — 6370000000 HC RX 637 (ALT 250 FOR IP): Performed by: STUDENT IN AN ORGANIZED HEALTH CARE EDUCATION/TRAINING PROGRAM

## 2023-11-13 PROCEDURE — 2060000000 HC ICU INTERMEDIATE R&B

## 2023-11-13 PROCEDURE — 94760 N-INVAS EAR/PLS OXIMETRY 1: CPT

## 2023-11-13 PROCEDURE — 36415 COLL VENOUS BLD VENIPUNCTURE: CPT

## 2023-11-13 PROCEDURE — 2700000000 HC OXYGEN THERAPY PER DAY

## 2023-11-13 PROCEDURE — 94640 AIRWAY INHALATION TREATMENT: CPT

## 2023-11-13 PROCEDURE — 6360000002 HC RX W HCPCS: Performed by: INTERNAL MEDICINE

## 2023-11-13 PROCEDURE — 97116 GAIT TRAINING THERAPY: CPT

## 2023-11-13 PROCEDURE — 94660 CPAP INITIATION&MGMT: CPT

## 2023-11-13 PROCEDURE — 6360000002 HC RX W HCPCS: Performed by: PHYSICIAN ASSISTANT

## 2023-11-13 PROCEDURE — 2580000003 HC RX 258: Performed by: INTERNAL MEDICINE

## 2023-11-13 PROCEDURE — 85025 COMPLETE CBC W/AUTO DIFF WBC: CPT

## 2023-11-13 PROCEDURE — 97530 THERAPEUTIC ACTIVITIES: CPT

## 2023-11-13 PROCEDURE — 6370000000 HC RX 637 (ALT 250 FOR IP): Performed by: INTERNAL MEDICINE

## 2023-11-13 PROCEDURE — 71045 X-RAY EXAM CHEST 1 VIEW: CPT

## 2023-11-13 PROCEDURE — 80048 BASIC METABOLIC PNL TOTAL CA: CPT

## 2023-11-13 PROCEDURE — 6370000000 HC RX 637 (ALT 250 FOR IP): Performed by: NURSE PRACTITIONER

## 2023-11-13 RX ORDER — ZOLPIDEM TARTRATE 5 MG/1
5 TABLET ORAL NIGHTLY PRN
Status: DISCONTINUED | OUTPATIENT
Start: 2023-11-13 | End: 2023-11-13

## 2023-11-13 RX ORDER — ZOLPIDEM TARTRATE 5 MG/1
10 TABLET ORAL NIGHTLY PRN
Status: DISCONTINUED | OUTPATIENT
Start: 2023-11-13 | End: 2023-11-16 | Stop reason: HOSPADM

## 2023-11-13 RX ORDER — FUROSEMIDE 10 MG/ML
40 INJECTION INTRAMUSCULAR; INTRAVENOUS ONCE
Status: DISCONTINUED | OUTPATIENT
Start: 2023-11-13 | End: 2023-11-14

## 2023-11-13 RX ADMIN — SODIUM CHLORIDE, PRESERVATIVE FREE 10 ML: 5 INJECTION INTRAVENOUS at 22:06

## 2023-11-13 RX ADMIN — BUDESONIDE 500 MCG: 0.5 INHALANT RESPIRATORY (INHALATION) at 21:37

## 2023-11-13 RX ADMIN — ARFORMOTEROL TARTRATE 15 MCG: 15 SOLUTION RESPIRATORY (INHALATION) at 21:37

## 2023-11-13 RX ADMIN — SERTRALINE HYDROCHLORIDE 100 MG: 50 TABLET ORAL at 22:13

## 2023-11-13 RX ADMIN — LOSARTAN POTASSIUM 50 MG: 50 TABLET, FILM COATED ORAL at 22:15

## 2023-11-13 RX ADMIN — BUDESONIDE 500 MCG: 0.5 INHALANT RESPIRATORY (INHALATION) at 07:36

## 2023-11-13 RX ADMIN — ALBUTEROL SULFATE 2.5 MG: 2.5 SOLUTION RESPIRATORY (INHALATION) at 07:36

## 2023-11-13 RX ADMIN — METHYLPREDNISOLONE SODIUM SUCCINATE 40 MG: 40 INJECTION, POWDER, FOR SOLUTION INTRAMUSCULAR; INTRAVENOUS at 19:34

## 2023-11-13 RX ADMIN — IPRATROPIUM BROMIDE AND ALBUTEROL SULFATE 1 DOSE: .5; 3 SOLUTION RESPIRATORY (INHALATION) at 16:46

## 2023-11-13 RX ADMIN — ZOLPIDEM TARTRATE 10 MG: 5 TABLET ORAL at 22:06

## 2023-11-13 RX ADMIN — GUAIFENESIN 1200 MG: 600 TABLET, EXTENDED RELEASE ORAL at 10:34

## 2023-11-13 RX ADMIN — GUAIFENESIN 1200 MG: 600 TABLET, EXTENDED RELEASE ORAL at 22:06

## 2023-11-13 RX ADMIN — ENOXAPARIN SODIUM 40 MG: 100 INJECTION SUBCUTANEOUS at 10:35

## 2023-11-13 RX ADMIN — LOSARTAN POTASSIUM 50 MG: 50 TABLET, FILM COATED ORAL at 10:34

## 2023-11-13 RX ADMIN — METHYLPREDNISOLONE SODIUM SUCCINATE 40 MG: 40 INJECTION, POWDER, FOR SOLUTION INTRAMUSCULAR; INTRAVENOUS at 10:34

## 2023-11-13 RX ADMIN — ARFORMOTEROL TARTRATE 15 MCG: 15 SOLUTION RESPIRATORY (INHALATION) at 07:36

## 2023-11-13 RX ADMIN — SODIUM CHLORIDE, PRESERVATIVE FREE 10 ML: 5 INJECTION INTRAVENOUS at 10:35

## 2023-11-13 RX ADMIN — CARVEDILOL 25 MG: 12.5 TABLET, FILM COATED ORAL at 22:13

## 2023-11-13 RX ADMIN — ALBUTEROL SULFATE 2.5 MG: 2.5 SOLUTION RESPIRATORY (INHALATION) at 21:36

## 2023-11-13 RX ADMIN — ALPRAZOLAM 1 MG: 0.5 TABLET ORAL at 17:36

## 2023-11-13 RX ADMIN — CARVEDILOL 25 MG: 12.5 TABLET, FILM COATED ORAL at 10:34

## 2023-11-13 RX ADMIN — ATORVASTATIN CALCIUM 40 MG: 40 TABLET, FILM COATED ORAL at 22:13

## 2023-11-13 RX ADMIN — ALPRAZOLAM 1 MG: 0.5 TABLET ORAL at 10:30

## 2023-11-13 RX ADMIN — AMLODIPINE BESYLATE 10 MG: 5 TABLET ORAL at 10:34

## 2023-11-13 ASSESSMENT — PAIN SCALES - GENERAL: PAINLEVEL_OUTOF10: 0

## 2023-11-14 PROBLEM — Z71.89 GOALS OF CARE, COUNSELING/DISCUSSION: Status: ACTIVE | Noted: 2023-11-14

## 2023-11-14 PROBLEM — R06.00 DYSPNEA: Status: ACTIVE | Noted: 2023-11-14

## 2023-11-14 PROBLEM — J96.02 ACUTE RESPIRATORY FAILURE WITH HYPOXIA AND HYPERCAPNIA (HCC): Status: ACTIVE | Noted: 2023-11-14

## 2023-11-14 PROBLEM — Z71.89 ADVANCED CARE PLANNING/COUNSELING DISCUSSION: Status: ACTIVE | Noted: 2023-11-14

## 2023-11-14 PROBLEM — R45.89 ANXIETY ABOUT HEALTH: Status: ACTIVE | Noted: 2023-11-14

## 2023-11-14 PROBLEM — Z71.89 DNR (DO NOT RESUSCITATE) DISCUSSION: Status: ACTIVE | Noted: 2023-11-14

## 2023-11-14 PROBLEM — F41.8 ANXIETY ABOUT HEALTH: Status: ACTIVE | Noted: 2023-11-14

## 2023-11-14 PROBLEM — Z51.5 PALLIATIVE CARE ENCOUNTER: Status: ACTIVE | Noted: 2023-11-14

## 2023-11-14 PROBLEM — J96.01 ACUTE RESPIRATORY FAILURE WITH HYPOXIA AND HYPERCAPNIA (HCC): Status: ACTIVE | Noted: 2023-11-14

## 2023-11-14 LAB
ANION GAP SERPL CALC-SCNC: 4 MMOL/L (ref 5–15)
BASOPHILS # BLD: 0 K/UL (ref 0–0.1)
BASOPHILS NFR BLD: 0 % (ref 0–1)
BUN SERPL-MCNC: 30 MG/DL (ref 6–20)
BUN/CREAT SERPL: 28 (ref 12–20)
CALCIUM SERPL-MCNC: 8.2 MG/DL (ref 8.5–10.1)
CHLORIDE SERPL-SCNC: 108 MMOL/L (ref 97–108)
CO2 SERPL-SCNC: 25 MMOL/L (ref 21–32)
CREAT SERPL-MCNC: 1.06 MG/DL (ref 0.55–1.02)
DIFFERENTIAL METHOD BLD: ABNORMAL
EOSINOPHIL # BLD: 0 K/UL (ref 0–0.4)
EOSINOPHIL NFR BLD: 0 % (ref 0–7)
ERYTHROCYTE [DISTWIDTH] IN BLOOD BY AUTOMATED COUNT: 13.4 % (ref 11.5–14.5)
GLUCOSE SERPL-MCNC: 176 MG/DL (ref 65–100)
HCT VFR BLD AUTO: 27.3 % (ref 35–47)
HGB BLD-MCNC: 9.1 G/DL (ref 11.5–16)
IMM GRANULOCYTES # BLD AUTO: 0.1 K/UL (ref 0–0.04)
IMM GRANULOCYTES NFR BLD AUTO: 2 % (ref 0–0.5)
LYMPHOCYTES # BLD: 0.5 K/UL (ref 0.8–3.5)
LYMPHOCYTES NFR BLD: 7 % (ref 12–49)
MCH RBC QN AUTO: 34.6 PG (ref 26–34)
MCHC RBC AUTO-ENTMCNC: 33.3 G/DL (ref 30–36.5)
MCV RBC AUTO: 103.8 FL (ref 80–99)
MONOCYTES # BLD: 0.4 K/UL (ref 0–1)
MONOCYTES NFR BLD: 5 % (ref 5–13)
NEUTS SEG # BLD: 6.2 K/UL (ref 1.8–8)
NEUTS SEG NFR BLD: 86 % (ref 32–75)
NRBC # BLD: 0 K/UL (ref 0–0.01)
NRBC BLD-RTO: 0 PER 100 WBC
PLATELET # BLD AUTO: 149 K/UL (ref 150–400)
PMV BLD AUTO: 9.7 FL (ref 8.9–12.9)
POTASSIUM SERPL-SCNC: 3.8 MMOL/L (ref 3.5–5.1)
RBC # BLD AUTO: 2.63 M/UL (ref 3.8–5.2)
RBC MORPH BLD: ABNORMAL
SODIUM SERPL-SCNC: 137 MMOL/L (ref 136–145)
WBC # BLD AUTO: 7.2 K/UL (ref 3.6–11)

## 2023-11-14 PROCEDURE — 2700000000 HC OXYGEN THERAPY PER DAY

## 2023-11-14 PROCEDURE — 85025 COMPLETE CBC W/AUTO DIFF WBC: CPT

## 2023-11-14 PROCEDURE — 97116 GAIT TRAINING THERAPY: CPT

## 2023-11-14 PROCEDURE — 97535 SELF CARE MNGMENT TRAINING: CPT

## 2023-11-14 PROCEDURE — 2580000003 HC RX 258: Performed by: INTERNAL MEDICINE

## 2023-11-14 PROCEDURE — 6360000002 HC RX W HCPCS: Performed by: PHYSICIAN ASSISTANT

## 2023-11-14 PROCEDURE — 97110 THERAPEUTIC EXERCISES: CPT

## 2023-11-14 PROCEDURE — 94760 N-INVAS EAR/PLS OXIMETRY 1: CPT

## 2023-11-14 PROCEDURE — 99223 1ST HOSP IP/OBS HIGH 75: CPT | Performed by: NURSE PRACTITIONER

## 2023-11-14 PROCEDURE — 36415 COLL VENOUS BLD VENIPUNCTURE: CPT

## 2023-11-14 PROCEDURE — 80048 BASIC METABOLIC PNL TOTAL CA: CPT

## 2023-11-14 PROCEDURE — 6360000002 HC RX W HCPCS: Performed by: STUDENT IN AN ORGANIZED HEALTH CARE EDUCATION/TRAINING PROGRAM

## 2023-11-14 PROCEDURE — 94640 AIRWAY INHALATION TREATMENT: CPT

## 2023-11-14 PROCEDURE — 94660 CPAP INITIATION&MGMT: CPT

## 2023-11-14 PROCEDURE — 6360000002 HC RX W HCPCS: Performed by: NURSE PRACTITIONER

## 2023-11-14 PROCEDURE — 6360000002 HC RX W HCPCS: Performed by: INTERNAL MEDICINE

## 2023-11-14 PROCEDURE — 2060000000 HC ICU INTERMEDIATE R&B

## 2023-11-14 PROCEDURE — 6370000000 HC RX 637 (ALT 250 FOR IP): Performed by: NURSE PRACTITIONER

## 2023-11-14 PROCEDURE — 94010 BREATHING CAPACITY TEST: CPT

## 2023-11-14 PROCEDURE — 6370000000 HC RX 637 (ALT 250 FOR IP): Performed by: STUDENT IN AN ORGANIZED HEALTH CARE EDUCATION/TRAINING PROGRAM

## 2023-11-14 PROCEDURE — 99497 ADVNCD CARE PLAN 30 MIN: CPT | Performed by: NURSE PRACTITIONER

## 2023-11-14 PROCEDURE — 6370000000 HC RX 637 (ALT 250 FOR IP): Performed by: INTERNAL MEDICINE

## 2023-11-14 PROCEDURE — 97530 THERAPEUTIC ACTIVITIES: CPT

## 2023-11-14 RX ORDER — LORAZEPAM 2 MG/ML
0.5 INJECTION INTRAMUSCULAR EVERY 4 HOURS PRN
Status: DISCONTINUED | OUTPATIENT
Start: 2023-11-14 | End: 2023-11-16 | Stop reason: HOSPADM

## 2023-11-14 RX ORDER — FUROSEMIDE 10 MG/ML
20 INJECTION INTRAMUSCULAR; INTRAVENOUS ONCE
Status: COMPLETED | OUTPATIENT
Start: 2023-11-14 | End: 2023-11-14

## 2023-11-14 RX ORDER — MORPHINE SULFATE 2 MG/ML
1 INJECTION, SOLUTION INTRAMUSCULAR; INTRAVENOUS
Status: DISCONTINUED | OUTPATIENT
Start: 2023-11-14 | End: 2023-11-14

## 2023-11-14 RX ORDER — MORPHINE SULFATE 2 MG/ML
1 INJECTION, SOLUTION INTRAMUSCULAR; INTRAVENOUS EVERY 30 MIN PRN
Status: DISCONTINUED | OUTPATIENT
Start: 2023-11-14 | End: 2023-11-16 | Stop reason: HOSPADM

## 2023-11-14 RX ORDER — ACETYLCYSTEINE 200 MG/ML
600 SOLUTION ORAL; RESPIRATORY (INHALATION)
Status: DISCONTINUED | OUTPATIENT
Start: 2023-11-15 | End: 2023-11-16 | Stop reason: HOSPADM

## 2023-11-14 RX ADMIN — ARFORMOTEROL TARTRATE 15 MCG: 15 SOLUTION RESPIRATORY (INHALATION) at 20:19

## 2023-11-14 RX ADMIN — GUAIFENESIN 1200 MG: 600 TABLET, EXTENDED RELEASE ORAL at 23:50

## 2023-11-14 RX ADMIN — SODIUM CHLORIDE, PRESERVATIVE FREE 10 ML: 5 INJECTION INTRAVENOUS at 11:44

## 2023-11-14 RX ADMIN — MORPHINE SULFATE 1 MG: 2 INJECTION, SOLUTION INTRAMUSCULAR; INTRAVENOUS at 18:47

## 2023-11-14 RX ADMIN — SERTRALINE HYDROCHLORIDE 100 MG: 50 TABLET ORAL at 23:50

## 2023-11-14 RX ADMIN — MORPHINE SULFATE 1 MG: 2 INJECTION, SOLUTION INTRAMUSCULAR; INTRAVENOUS at 23:44

## 2023-11-14 RX ADMIN — LORAZEPAM 0.5 MG: 2 INJECTION INTRAMUSCULAR; INTRAVENOUS at 18:47

## 2023-11-14 RX ADMIN — ALBUTEROL SULFATE 2.5 MG: 2.5 SOLUTION RESPIRATORY (INHALATION) at 20:19

## 2023-11-14 RX ADMIN — BUDESONIDE 500 MCG: 0.5 INHALANT RESPIRATORY (INHALATION) at 20:19

## 2023-11-14 RX ADMIN — FUROSEMIDE 20 MG: 10 INJECTION, SOLUTION INTRAMUSCULAR; INTRAVENOUS at 11:50

## 2023-11-14 RX ADMIN — METHYLPREDNISOLONE SODIUM SUCCINATE 40 MG: 40 INJECTION, POWDER, FOR SOLUTION INTRAMUSCULAR; INTRAVENOUS at 11:43

## 2023-11-14 RX ADMIN — ALPRAZOLAM 1 MG: 0.5 TABLET ORAL at 07:17

## 2023-11-14 RX ADMIN — SODIUM CHLORIDE, PRESERVATIVE FREE 10 ML: 5 INJECTION INTRAVENOUS at 21:00

## 2023-11-14 RX ADMIN — METHYLPREDNISOLONE SODIUM SUCCINATE 40 MG: 40 INJECTION, POWDER, FOR SOLUTION INTRAMUSCULAR; INTRAVENOUS at 03:55

## 2023-11-14 RX ADMIN — METHYLPREDNISOLONE SODIUM SUCCINATE 40 MG: 40 INJECTION, POWDER, FOR SOLUTION INTRAMUSCULAR; INTRAVENOUS at 18:48

## 2023-11-14 RX ADMIN — CARVEDILOL 25 MG: 12.5 TABLET, FILM COATED ORAL at 11:40

## 2023-11-14 RX ADMIN — ALBUTEROL SULFATE 2.5 MG: 2.5 SOLUTION RESPIRATORY (INHALATION) at 07:32

## 2023-11-14 RX ADMIN — GUAIFENESIN 1200 MG: 600 TABLET, EXTENDED RELEASE ORAL at 11:43

## 2023-11-14 RX ADMIN — CARVEDILOL 25 MG: 12.5 TABLET, FILM COATED ORAL at 23:49

## 2023-11-14 RX ADMIN — ENOXAPARIN SODIUM 40 MG: 100 INJECTION SUBCUTANEOUS at 11:42

## 2023-11-14 RX ADMIN — ZOLPIDEM TARTRATE 10 MG: 5 TABLET ORAL at 23:50

## 2023-11-14 RX ADMIN — AMLODIPINE BESYLATE 10 MG: 5 TABLET ORAL at 11:40

## 2023-11-14 RX ADMIN — ALPRAZOLAM 1 MG: 0.5 TABLET ORAL at 15:47

## 2023-11-14 RX ADMIN — LOSARTAN POTASSIUM 50 MG: 50 TABLET, FILM COATED ORAL at 11:43

## 2023-11-14 RX ADMIN — ARFORMOTEROL TARTRATE 15 MCG: 15 SOLUTION RESPIRATORY (INHALATION) at 07:32

## 2023-11-14 RX ADMIN — ATORVASTATIN CALCIUM 40 MG: 40 TABLET, FILM COATED ORAL at 23:50

## 2023-11-14 RX ADMIN — BUDESONIDE 500 MCG: 0.5 INHALANT RESPIRATORY (INHALATION) at 07:32

## 2023-11-14 ASSESSMENT — PAIN SCALES - GENERAL
PAINLEVEL_OUTOF10: 0
PAINLEVEL_OUTOF10: 6
PAINLEVEL_OUTOF10: 0

## 2023-11-14 ASSESSMENT — PAIN DESCRIPTION - LOCATION: LOCATION: OTHER (COMMENT)

## 2023-11-14 ASSESSMENT — PAIN DESCRIPTION - DESCRIPTORS: DESCRIPTORS: PRESSURE

## 2023-11-14 NOTE — CARE COORDINATION
Transition of Care Plan:    RUR: 15% Moderate   Prior Level of Functioning: Independent   Disposition: HH   Follow up appointments: On AVS   DME needed: O2 and Trilogy   Transportation at discharge: Friend   IM/IMM Medicare/ letter given: 1st IMM 11/06 2nd IMM to be given   Is patient a [place] and connected with [place]? No    If yes, was [employer] transfer form completed and VA notified? N/A  Caregiver Contact: Logan Prieto [phone]  Discharge Caregiver contacted prior to discharge? No   Care Conference needed? No   Barriers to discharge: Steroids, respiratory status, palliative consult    Pt discussed in IMCU rounds. MARIA ELENA Wednesday. Pt still on steroids with wheezing. CM updated [facility]. Updated [facility] of MARIA ELENA. CM following for MARTINA planning.    KAY Rodriguez

## 2023-11-14 NOTE — ACP (ADVANCE CARE PLANNING)
Advance Care Planning Discussion      Advanced care planning discussion occurred between Ms. Starr Villalobos and myself, Bobbi Kramer. Ms. Starr Villalobos informed me that she had completed an advanced medical directive, a copy of which can be found in her [address]. We reviewed the AMD. Ms. Starr Villalobos elected to have Ms. Skyler Raf (#[phone]) to serve as her primary medical decision maker. In the event Ms. Sheriff was unable or unwilling to serve in this role, Ms. Starr Villalobos has been given the right to a point and agent to serve in her stead, but if she does not so appoint, then Ms. Starr Villalobos has elected to have [address] [place] [address] to serve in this role. MsAlbania Cruz Harmony DREW also makes it clear, that in the setting of a terminal condition/end-stage disease, she does not want life prolonging medical interventions, no CPR, no feeding tubes, and no intubation/ventilator support. During discussion today, Ms. Starr Villalobos stated that she does not want CPR and elected to have her code status in the EMR changed to DNR/DNI.

## 2023-11-14 NOTE — WOUND CARE
Wound Care Note:     New consult placed by nurse request for sacral wound- abrasions vs pressure wound    Chart shows:  Admitted for acute respiratory failure with hypoxia  Past Medical History:   Diagnosis Date    CAD (coronary artery disease) 09/12/2019    stent D1 9/12/2019 (Maurizio)    Foot drop, left foot     GERD (gastroesophageal reflux disease)     Hypertension     Pneumonia     Pulmonary fibrosis (HCC)     HUGO (renal artery stenosis) ([address]) 07/24/2019    s/p right renal stent 7/24/2019 (Kartik Mccormack)     WBC = 7.2 on 11/14/23  Admitted from home    Assessment:   Patient is A&O x 4, communicative, continent with no assistance needed in repositioning. Bed: [facility]  Diet: Adult diet regular; low fat/low chol/high fiber/DORIE  Patient reports no pain. Bilateral buttocks and sacral skin intact and without erythema. Heels were not assessed, patient ambulating in room. 1. Gluteal cleft with some moisture associated skin damage, there are a few open areas and some erythema in gluteal cleft. Z guard paste applied. Spoke with Dr. Mandi Pablo, wound care orders obtained. Patient ambulating in room. Recommendations:    Gluteal cleft- Every 12 hours and as needed apply zinc oxide( Z guard paste/Protect- orange tube). Skin Care & Pressure Prevention:  Minimize layers of linen/pads under patient to optimize support surface. Turn/reposition approximately every 2 hours and offload heels.   Manage incontinence / promote continence   Nourishing Skin Cream to dry skin, minimize use of briefs when able    Discussed above plan with patient &  CLAUS Hill    Transition of Care: Plan to follow as needed while admitted to hospital.    PRINCE RomanoN, RN, [facility]  Certified Wound and Ostomy Nurse  office [phone]  Best way to contact me is through [address]

## 2023-11-15 LAB
ANION GAP SERPL CALC-SCNC: 6 MMOL/L (ref 5–15)
BASOPHILS # BLD: 0 K/UL (ref 0–0.1)
BASOPHILS NFR BLD: 0 % (ref 0–1)
BUN SERPL-MCNC: 36 MG/DL (ref 6–20)
BUN/CREAT SERPL: 34 (ref 12–20)
CALCIUM SERPL-MCNC: 8.6 MG/DL (ref 8.5–10.1)
CHLORIDE SERPL-SCNC: 104 MMOL/L (ref 97–108)
CO2 SERPL-SCNC: 26 MMOL/L (ref 21–32)
CREAT SERPL-MCNC: 1.06 MG/DL (ref 0.55–1.02)
DIFFERENTIAL METHOD BLD: ABNORMAL
EOSINOPHIL # BLD: 0 K/UL (ref 0–0.4)
EOSINOPHIL NFR BLD: 0 % (ref 0–7)
ERYTHROCYTE [DISTWIDTH] IN BLOOD BY AUTOMATED COUNT: 13.5 % (ref 11.5–14.5)
GLUCOSE SERPL-MCNC: 214 MG/DL (ref 65–100)
HCT VFR BLD AUTO: 27.6 % (ref 35–47)
HGB BLD-MCNC: 9.1 G/DL (ref 11.5–16)
IMM GRANULOCYTES # BLD AUTO: 0 K/UL
IMM GRANULOCYTES NFR BLD AUTO: 0 %
LYMPHOCYTES # BLD: 0.5 K/UL (ref 0.8–3.5)
LYMPHOCYTES NFR BLD: 5 % (ref 12–49)
MCH RBC QN AUTO: 33.7 PG (ref 26–34)
MCHC RBC AUTO-ENTMCNC: 33 G/DL (ref 30–36.5)
MCV RBC AUTO: 102.2 FL (ref 80–99)
MONOCYTES # BLD: 0.5 K/UL (ref 0–1)
MONOCYTES NFR BLD: 6 % (ref 5–13)
NEUTS SEG # BLD: 8 K/UL (ref 1.8–8)
NEUTS SEG NFR BLD: 89 % (ref 32–75)
NRBC # BLD: 0 K/UL (ref 0–0.01)
NRBC BLD-RTO: 0 PER 100 WBC
PLATELET # BLD AUTO: 158 K/UL (ref 150–400)
PMV BLD AUTO: 10 FL (ref 8.9–12.9)
POTASSIUM SERPL-SCNC: 4 MMOL/L (ref 3.5–5.1)
RBC # BLD AUTO: 2.7 M/UL (ref 3.8–5.2)
RBC MORPH BLD: ABNORMAL
SODIUM SERPL-SCNC: 136 MMOL/L (ref 136–145)
WBC # BLD AUTO: 9 K/UL (ref 3.6–11)

## 2023-11-15 PROCEDURE — 6360000002 HC RX W HCPCS: Performed by: STUDENT IN AN ORGANIZED HEALTH CARE EDUCATION/TRAINING PROGRAM

## 2023-11-15 PROCEDURE — 6370000000 HC RX 637 (ALT 250 FOR IP): Performed by: INTERNAL MEDICINE

## 2023-11-15 PROCEDURE — 6360000002 HC RX W HCPCS: Performed by: INTERNAL MEDICINE

## 2023-11-15 PROCEDURE — 6370000000 HC RX 637 (ALT 250 FOR IP): Performed by: STUDENT IN AN ORGANIZED HEALTH CARE EDUCATION/TRAINING PROGRAM

## 2023-11-15 PROCEDURE — 94760 N-INVAS EAR/PLS OXIMETRY 1: CPT

## 2023-11-15 PROCEDURE — 94640 AIRWAY INHALATION TREATMENT: CPT

## 2023-11-15 PROCEDURE — 6360000002 HC RX W HCPCS: Performed by: PHYSICIAN ASSISTANT

## 2023-11-15 PROCEDURE — 85025 COMPLETE CBC W/AUTO DIFF WBC: CPT

## 2023-11-15 PROCEDURE — 97530 THERAPEUTIC ACTIVITIES: CPT

## 2023-11-15 PROCEDURE — 99232 SBSQ HOSP IP/OBS MODERATE 35: CPT | Performed by: NURSE PRACTITIONER

## 2023-11-15 PROCEDURE — 6360000002 HC RX W HCPCS: Performed by: NURSE PRACTITIONER

## 2023-11-15 PROCEDURE — 2700000000 HC OXYGEN THERAPY PER DAY

## 2023-11-15 PROCEDURE — 80048 BASIC METABOLIC PNL TOTAL CA: CPT

## 2023-11-15 PROCEDURE — 1100000000 HC RM PRIVATE

## 2023-11-15 PROCEDURE — 2580000003 HC RX 258: Performed by: INTERNAL MEDICINE

## 2023-11-15 PROCEDURE — 36415 COLL VENOUS BLD VENIPUNCTURE: CPT

## 2023-11-15 PROCEDURE — 6370000000 HC RX 637 (ALT 250 FOR IP): Performed by: NURSE PRACTITIONER

## 2023-11-15 PROCEDURE — 94660 CPAP INITIATION&MGMT: CPT

## 2023-11-15 RX ADMIN — ACETYLCYSTEINE 600 MG: 200 INHALANT RESPIRATORY (INHALATION) at 23:13

## 2023-11-15 RX ADMIN — METHYLPREDNISOLONE SODIUM SUCCINATE 40 MG: 40 INJECTION, POWDER, FOR SOLUTION INTRAMUSCULAR; INTRAVENOUS at 18:51

## 2023-11-15 RX ADMIN — LORAZEPAM 0.5 MG: 2 INJECTION INTRAMUSCULAR; INTRAVENOUS at 09:59

## 2023-11-15 RX ADMIN — ALBUTEROL SULFATE 2.5 MG: 2.5 SOLUTION RESPIRATORY (INHALATION) at 07:21

## 2023-11-15 RX ADMIN — ALBUTEROL SULFATE 2.5 MG: 2.5 SOLUTION RESPIRATORY (INHALATION) at 23:13

## 2023-11-15 RX ADMIN — SODIUM CHLORIDE, PRESERVATIVE FREE 10 ML: 5 INJECTION INTRAVENOUS at 10:01

## 2023-11-15 RX ADMIN — ARFORMOTEROL TARTRATE 15 MCG: 15 SOLUTION RESPIRATORY (INHALATION) at 23:13

## 2023-11-15 RX ADMIN — LORAZEPAM 0.5 MG: 2 INJECTION INTRAMUSCULAR; INTRAVENOUS at 21:33

## 2023-11-15 RX ADMIN — METHYLPREDNISOLONE SODIUM SUCCINATE 40 MG: 40 INJECTION, POWDER, FOR SOLUTION INTRAMUSCULAR; INTRAVENOUS at 03:28

## 2023-11-15 RX ADMIN — GUAIFENESIN 1200 MG: 600 TABLET, EXTENDED RELEASE ORAL at 23:22

## 2023-11-15 RX ADMIN — SODIUM CHLORIDE, PRESERVATIVE FREE 10 ML: 5 INJECTION INTRAVENOUS at 21:34

## 2023-11-15 RX ADMIN — METHYLPREDNISOLONE SODIUM SUCCINATE 40 MG: 40 INJECTION, POWDER, FOR SOLUTION INTRAMUSCULAR; INTRAVENOUS at 09:58

## 2023-11-15 RX ADMIN — SERTRALINE HYDROCHLORIDE 100 MG: 50 TABLET ORAL at 21:33

## 2023-11-15 RX ADMIN — ARFORMOTEROL TARTRATE 15 MCG: 15 SOLUTION RESPIRATORY (INHALATION) at 07:43

## 2023-11-15 RX ADMIN — CARVEDILOL 25 MG: 12.5 TABLET, FILM COATED ORAL at 09:59

## 2023-11-15 RX ADMIN — BUDESONIDE 500 MCG: 0.5 INHALANT RESPIRATORY (INHALATION) at 23:13

## 2023-11-15 RX ADMIN — MORPHINE SULFATE 1 MG: 2 INJECTION, SOLUTION INTRAMUSCULAR; INTRAVENOUS at 09:59

## 2023-11-15 RX ADMIN — ENOXAPARIN SODIUM 40 MG: 100 INJECTION SUBCUTANEOUS at 09:58

## 2023-11-15 RX ADMIN — LOSARTAN POTASSIUM 50 MG: 50 TABLET, FILM COATED ORAL at 09:59

## 2023-11-15 RX ADMIN — LOSARTAN POTASSIUM 50 MG: 50 TABLET, FILM COATED ORAL at 00:04

## 2023-11-15 RX ADMIN — ZOLPIDEM TARTRATE 10 MG: 5 TABLET ORAL at 23:22

## 2023-11-15 RX ADMIN — MORPHINE SULFATE 1 MG: 2 INJECTION, SOLUTION INTRAMUSCULAR; INTRAVENOUS at 03:14

## 2023-11-15 RX ADMIN — ACETYLCYSTEINE 600 MG: 200 INHALANT RESPIRATORY (INHALATION) at 07:20

## 2023-11-15 RX ADMIN — BUDESONIDE 500 MCG: 0.5 INHALANT RESPIRATORY (INHALATION) at 07:43

## 2023-11-15 RX ADMIN — MORPHINE SULFATE 1 MG: 2 INJECTION, SOLUTION INTRAMUSCULAR; INTRAVENOUS at 21:33

## 2023-11-15 RX ADMIN — MORPHINE SULFATE 1 MG: 2 INJECTION, SOLUTION INTRAMUSCULAR; INTRAVENOUS at 18:51

## 2023-11-15 RX ADMIN — AMLODIPINE BESYLATE 10 MG: 5 TABLET ORAL at 09:59

## 2023-11-15 RX ADMIN — LOSARTAN POTASSIUM 50 MG: 50 TABLET, FILM COATED ORAL at 21:33

## 2023-11-15 RX ADMIN — GUAIFENESIN 1200 MG: 600 TABLET, EXTENDED RELEASE ORAL at 09:59

## 2023-11-15 RX ADMIN — ATORVASTATIN CALCIUM 40 MG: 40 TABLET, FILM COATED ORAL at 21:32

## 2023-11-15 RX ADMIN — MORPHINE SULFATE 1 MG: 2 INJECTION, SOLUTION INTRAMUSCULAR; INTRAVENOUS at 14:38

## 2023-11-15 RX ADMIN — CARVEDILOL 25 MG: 12.5 TABLET, FILM COATED ORAL at 21:33

## 2023-11-15 ASSESSMENT — PAIN DESCRIPTION - LOCATION: LOCATION: ABDOMEN;OTHER (COMMENT)

## 2023-11-15 ASSESSMENT — PAIN SCALES - GENERAL
PAINLEVEL_OUTOF10: 4
PAINLEVEL_OUTOF10: 7
PAINLEVEL_OUTOF10: 7
PAINLEVEL_OUTOF10: 0
PAINLEVEL_OUTOF10: 0
PAINLEVEL_OUTOF10: 6
PAINLEVEL_OUTOF10: 6

## 2023-11-15 ASSESSMENT — PAIN DESCRIPTION - DESCRIPTORS: DESCRIPTORS: TIGHTNESS

## 2023-11-15 NOTE — CARE COORDINATION
Transition of Care Plan:    RUR: 16% Moderate   Prior Level of Functioning: Independent   Disposition: Home with Hospice  Follow up appointments: On AVS   DME needed: Hospice will provide DME    Transportation at discharge: BLS vs friend   IM/IMM Medicare/ letter given: 1st IMM 11/06 2nd IMM 11/15  Caregiver Contact: Kevon Sharpe [phone]  Discharge Caregiver contacted prior to discharge? Yes   Care Conference needed? No   Barriers to discharge: Hospice meeting and hospice admission scheduled at pt's home    Disposition: Home with Hospice   5:38 Transport set-up for 11:00 Thursday morning. [facility] [phone]. Pt to pay by credit card in morning. [facility] will call pt in the morning to get credit card payment. $105.88  DC paperwork with DNR on hard chart. Attending updated. 24 hours of meds will be called into [facility] OP pharmacy. CM please ensure Meds to [facility] for 24 hour med supply is delivered. Attending approved/is aware. 4:30 Siena Buchanan, liaison [facility] [phone], will be here to meet with pt momentarily. CM updated Kevon Sharpe [phone], pt's executor/MPOA, at request of pt. Danielle's contact info provided to Klever Carver at request of pt and Gayle Cleary. Hospice order uploaded in [address] CM to follow to schedule transport if requested. Pt requesting wc transport with [facility] be set up tomorrow when home Hospice admission time is known. Pt will pay by credit card. Pt discussed in IMCU rounds. MARIA ELENA Thursday if Hospice is in place for admission. Pt chose [facility]. Referral and clinicals sent in [facility] to [facility]. [facility] verified they could provide trilogy for hospice care.    KAY Matta

## 2023-11-16 VITALS
BODY MASS INDEX: 25.66 KG/M2 | RESPIRATION RATE: 18 BRPM | SYSTOLIC BLOOD PRESSURE: 147 MMHG | TEMPERATURE: 97.3 F | WEIGHT: 144.84 LBS | HEART RATE: 52 BPM | HEIGHT: 63 IN | OXYGEN SATURATION: 100 % | DIASTOLIC BLOOD PRESSURE: 63 MMHG

## 2023-11-16 PROBLEM — F41.9 ANXIETY: Status: ACTIVE | Noted: 2023-11-16

## 2023-11-16 PROBLEM — Z71.1 CONCERN ABOUT END OF LIFE: Status: ACTIVE | Noted: 2023-11-16

## 2023-11-16 PROCEDURE — 6370000000 HC RX 637 (ALT 250 FOR IP): Performed by: INTERNAL MEDICINE

## 2023-11-16 PROCEDURE — 94660 CPAP INITIATION&MGMT: CPT

## 2023-11-16 PROCEDURE — 6370000000 HC RX 637 (ALT 250 FOR IP): Performed by: STUDENT IN AN ORGANIZED HEALTH CARE EDUCATION/TRAINING PROGRAM

## 2023-11-16 PROCEDURE — 6360000002 HC RX W HCPCS: Performed by: STUDENT IN AN ORGANIZED HEALTH CARE EDUCATION/TRAINING PROGRAM

## 2023-11-16 PROCEDURE — 6360000002 HC RX W HCPCS: Performed by: NURSE PRACTITIONER

## 2023-11-16 PROCEDURE — 6360000002 HC RX W HCPCS: Performed by: PHYSICIAN ASSISTANT

## 2023-11-16 PROCEDURE — 94640 AIRWAY INHALATION TREATMENT: CPT

## 2023-11-16 PROCEDURE — 6360000002 HC RX W HCPCS: Performed by: INTERNAL MEDICINE

## 2023-11-16 PROCEDURE — 2700000000 HC OXYGEN THERAPY PER DAY

## 2023-11-16 RX ORDER — OXYCODONE HYDROCHLORIDE 5 MG/1
5 TABLET ORAL EVERY 6 HOURS PRN
Qty: 12 TABLET | Refills: 0 | Status: SHIPPED | OUTPATIENT
Start: 2023-11-16 | End: 2023-11-19

## 2023-11-16 RX ORDER — PREDNISONE 20 MG/1
TABLET ORAL
Qty: 15 TABLET | Refills: 0 | Status: SHIPPED | OUTPATIENT
Start: 2023-11-16 | End: 2023-11-28

## 2023-11-16 RX ORDER — PREDNISONE 20 MG/1
TABLET ORAL
Qty: 15 TABLET | Refills: 0 | Status: SHIPPED | OUTPATIENT
Start: 2023-11-16 | End: 2023-11-16 | Stop reason: SDUPTHER

## 2023-11-16 RX ORDER — IPRATROPIUM BROMIDE AND ALBUTEROL SULFATE 2.5; .5 MG/3ML; MG/3ML
3 SOLUTION RESPIRATORY (INHALATION) EVERY 4 HOURS PRN
Qty: 360 ML | Refills: 0 | Status: SHIPPED | OUTPATIENT
Start: 2023-11-16 | End: 2023-12-16

## 2023-11-16 RX ORDER — ALPRAZOLAM 1 MG/1
1 TABLET ORAL 2 TIMES DAILY PRN
Qty: 6 TABLET | Refills: 0 | Status: SHIPPED | OUTPATIENT
Start: 2023-11-16 | End: 2023-11-19

## 2023-11-16 RX ADMIN — LORAZEPAM 0.5 MG: 2 INJECTION INTRAMUSCULAR; INTRAVENOUS at 09:11

## 2023-11-16 RX ADMIN — CARVEDILOL 25 MG: 12.5 TABLET, FILM COATED ORAL at 09:06

## 2023-11-16 RX ADMIN — AMLODIPINE BESYLATE 10 MG: 5 TABLET ORAL at 09:07

## 2023-11-16 RX ADMIN — LOSARTAN POTASSIUM 50 MG: 50 TABLET, FILM COATED ORAL at 09:07

## 2023-11-16 RX ADMIN — GUAIFENESIN 1200 MG: 600 TABLET, EXTENDED RELEASE ORAL at 09:06

## 2023-11-16 RX ADMIN — BUDESONIDE 500 MCG: 0.5 INHALANT RESPIRATORY (INHALATION) at 07:54

## 2023-11-16 RX ADMIN — MORPHINE SULFATE 1 MG: 2 INJECTION, SOLUTION INTRAMUSCULAR; INTRAVENOUS at 09:11

## 2023-11-16 RX ADMIN — ALBUTEROL SULFATE 2.5 MG: 2.5 SOLUTION RESPIRATORY (INHALATION) at 07:54

## 2023-11-16 RX ADMIN — MORPHINE SULFATE 1 MG: 2 INJECTION, SOLUTION INTRAMUSCULAR; INTRAVENOUS at 03:29

## 2023-11-16 RX ADMIN — ARFORMOTEROL TARTRATE 15 MCG: 15 SOLUTION RESPIRATORY (INHALATION) at 07:54

## 2023-11-16 RX ADMIN — ACETYLCYSTEINE 600 MG: 200 INHALANT RESPIRATORY (INHALATION) at 07:54

## 2023-11-16 RX ADMIN — MORPHINE SULFATE 1 MG: 2 INJECTION, SOLUTION INTRAMUSCULAR; INTRAVENOUS at 11:49

## 2023-11-16 RX ADMIN — METHYLPREDNISOLONE SODIUM SUCCINATE 40 MG: 40 INJECTION, POWDER, FOR SOLUTION INTRAMUSCULAR; INTRAVENOUS at 03:29

## 2023-11-16 RX ADMIN — LORAZEPAM 0.5 MG: 2 INJECTION INTRAMUSCULAR; INTRAVENOUS at 03:29

## 2023-11-16 ASSESSMENT — PAIN DESCRIPTION - ONSET: ONSET: ON-GOING

## 2023-11-16 ASSESSMENT — PAIN SCALES - GENERAL
PAINLEVEL_OUTOF10: 7
PAINLEVEL_OUTOF10: 5

## 2023-11-16 ASSESSMENT — PAIN DESCRIPTION - DESCRIPTORS: DESCRIPTORS: ACHING

## 2023-11-16 ASSESSMENT — PAIN DESCRIPTION - FREQUENCY: FREQUENCY: CONTINUOUS

## 2023-11-16 ASSESSMENT — PAIN DESCRIPTION - LOCATION: LOCATION: CHEST

## 2023-11-16 ASSESSMENT — PAIN DESCRIPTION - PAIN TYPE: TYPE: ACUTE PAIN

## 2023-11-16 ASSESSMENT — PAIN - FUNCTIONAL ASSESSMENT: PAIN_FUNCTIONAL_ASSESSMENT: ACTIVITIES ARE NOT PREVENTED

## 2023-11-16 ASSESSMENT — PAIN DESCRIPTION - ORIENTATION: ORIENTATION: ANTERIOR

## 2023-11-16 NOTE — PLAN OF CARE
Problem: Discharge Planning  Goal: Discharge to home or other facility with appropriate resources  Outcome: Progressing
Problem: Discharge Planning  Goal: Discharge to home or other facility with appropriate resources  Outcome: Progressing     Problem: Pain  Goal: Verbalizes/displays adequate comfort level or baseline comfort level  Outcome: Progressing
Problem: Discharge Planning  Goal: Discharge to home or other facility with appropriate resources  Outcome: Progressing     Problem: Pain  Goal: Verbalizes/displays adequate comfort level or baseline comfort level  Outcome: Progressing     Problem: Safety - Adult  Goal: Free from fall injury  Outcome: Progressing
Problem: Discharge Planning  Goal: Discharge to home or other facility with appropriate resources  Outcome: Progressing     Problem: Pain  Goal: Verbalizes/displays adequate comfort level or baseline comfort level  Outcome: Progressing     Problem: Safety - Adult  Goal: Free from fall injury  Outcome: Progressing
Problem: Discharge Planning  Goal: Discharge to home or other facility with appropriate resources  Outcome: Progressing     Problem: Pain  Goal: Verbalizes/displays adequate comfort level or baseline comfort level  Outcome: Progressing  Flowsheets (Taken 11/12/2023 1041)  Verbalizes/displays adequate comfort level or baseline comfort level:   Encourage patient to monitor pain and request assistance   Assess pain using appropriate pain scale     Problem: Safety - Adult  Goal: Free from fall injury  Outcome: Progressing  Flowsheets (Taken 11/12/2023 1041)  Free From Fall Injury: Instruct family/caregiver on patient safety
Problem: Discharge Planning  Goal: Discharge to home or other facility with appropriate resources  Outcome: Progressing  Flowsheets (Taken 11/9/2023 2000)  Discharge to home or other facility with appropriate resources: Identify barriers to discharge with patient and caregiver     Problem: Pain  Goal: Verbalizes/displays adequate comfort level or baseline comfort level  Outcome: Progressing     Problem: Safety - Adult  Goal: Free from fall injury  Outcome: Progressing
Problem: Occupational Therapy - Adult  Goal: By Discharge: Performs self-care activities at highest level of function for planned discharge setting. See evaluation for individualized goals. Description: FUNCTIONAL STATUS PRIOR TO ADMISSION:  Patient was ambulatory without use of DME.     HOME SUPPORT: Patient lived alone with limited local support. Pt was independent in all ADL tasks. Occupational Therapy Goals:  Initiated 11/10/2023  1. Patient will perform standing grooming routine with Supervision within 7 day(s). 2.  Patient will perform upper and lower body bathing with Supervision within 7 day(s). 3.  Patient will perform upper and lower body dressing with Supervision within 7 day(s). 4.  Patient will perform toilet transfers with Supervision  within 7 day(s). 5.  Patient will perform all aspects of toileting with Supervision within 7 day(s). 6.  Patient will participate in upper extremity therapeutic exercise/activities with Supervision for 5 minutes within 7 day(s). 7.  Patient will utilize energy conservation techniques during functional activities with verbal cues within 7 day(s). Outcome: Progressing     OCCUPATIONAL THERAPY EVALUATION      Patient: Tirso Villafuerte (33 y.o. female)  Date: 11/10/2023  Primary Diagnosis: Acute exacerbation of chronic obstructive pulmonary disease (COPD) ([facility]) [J44.1]  COPD with acute exacerbation ([facility]) [J44.1]  Acute respiratory failure with hypoxia and hypercapnia ([facility]) [J96.01, J96.02]         Precautions: Fall Risk                  ASSESSMENT :  The patient's performance of ADL/IADL tasks is limited at this time by impaired standing balance, activity tolerance, cardiopulmonary endurance, and generalized weakness s/p admission for SOB. Pt received seated in bedside chair, on 4L NC, and agreeable to participation in therapy session. She demo' significant SOB/DANIELSON with activity and conversation, though SpO2 maintaining >90%.  Educated pt on use of PLB and
Problem: Occupational Therapy - Adult  Goal: By Discharge: Performs self-care activities at highest level of function for planned discharge setting. See evaluation for individualized goals. Description: FUNCTIONAL STATUS PRIOR TO ADMISSION:  Patient was ambulatory without use of DME.     HOME SUPPORT: Patient lived alone with limited local support. Pt was independent in all ADL tasks. Occupational Therapy Goals:  Initiated 11/10/2023  1. Patient will perform standing grooming routine with Supervision within 7 day(s). 2.  Patient will perform upper and lower body bathing with Supervision within 7 day(s). 3.  Patient will perform upper and lower body dressing with Supervision within 7 day(s). 4.  Patient will perform toilet transfers with Supervision  within 7 day(s). 5.  Patient will perform all aspects of toileting with Supervision within 7 day(s). 6.  Patient will participate in upper extremity therapeutic exercise/activities with Supervision for 5 minutes within 7 day(s). 7.  Patient will utilize energy conservation techniques during functional activities with verbal cues within 7 day(s). Outcome: Progressing     OCCUPATIONAL THERAPY TREATMENT  Patient: Perla Alvarado (42 y.o. female)  Date: 11/14/2023  Primary Diagnosis: Acute exacerbation of chronic obstructive pulmonary disease (COPD) ([facility]) [J44.1]  COPD with acute exacerbation (HCC) [J44.1]  Acute respiratory failure with hypoxia and hypercapnia ([facility]) [J96.01, J96.02]       Precautions: Fall Risk (shoes (built in lift))                Chart, occupational therapy assessment, plan of care, and goals were reviewed. ASSESSMENT  Patient continues to benefit from skilled OT services and is progressing towards goals. Pt presents to OT services supine in bed with HOB elevated, amendable to session. Pt completed all bed mobility MOD IND and functional mobility with SBA for dynamic standing balance.  Pt transferred to seated on BSC, completed all
Problem: Physical Therapy - Adult  Goal: By Discharge: Performs mobility at highest level of function for planned discharge setting. See evaluation for individualized goals. Description: FUNCTIONAL STATUS PRIOR TO ADMISSION: Patient was independent and active without use of DME. and At baseline the patient was on 4 liters/min of supplemental O2 at night. Pt reported she owns a rollator, but hasn't needed to use it    HOME SUPPORT PRIOR TO ADMISSION: The patient lived alone with no local support. Physical Therapy Goals  Initiated 11/10/2023  1. Patient will move from supine to sit and sit to supine and roll side to side in bed with modified independence within 7 day(s). 2.  Patient will perform sit to stand with supervision/set-up within 7 day(s). 3.  Patient will transfer from bed to chair and chair to bed with supervision/set-up using the least restrictive device within 7 day(s). 4.  Patient will ambulate with supervision/set-up for 100 feet with the least restrictive device within 7 day(s). Outcome: Progressing   PHYSICAL THERAPY TREATMENT    Patient: Jessica Blank (49 y.o. female)  Date: 11/15/2023  Diagnosis: Acute exacerbation of chronic obstructive pulmonary disease (COPD) (HCC) [J44.1]  COPD with acute exacerbation (HCC) [J44.1]  Acute respiratory failure with hypoxia and hypercapnia (HCC) [J96.01, J96.02] Acute respiratory failure with hypoxia (HCC)      Precautions: Fall Risk (shoes (built in lift))                    ASSESSMENT:  Patient continues to benefit from skilled PT services and is progressing towards goals. Dorothy tolerated today's session fairly. She was received in chair and requested to defer longer bout of ambulation this session and instead utilize commode and return to bed. She completed transfers at Mod I and ambulation with standby assist & RW. She was on 4 liters of O2 and SpO2 was stable. Patient insistent on returning home.  She will continue to benefit from skilled
Problem: Physical Therapy - Adult  Goal: By Discharge: Performs mobility at highest level of function for planned discharge setting. See evaluation for individualized goals. Description: FUNCTIONAL STATUS PRIOR TO ADMISSION: Patient was independent and active without use of DME. and At baseline the patient was on 4 liters/min of supplemental O2 at night. Pt reported she owns a rollator, but hasn't needed to use it    HOME SUPPORT PRIOR TO ADMISSION: The patient lived alone with no local support. Physical Therapy Goals  Initiated 11/10/2023  1. Patient will move from supine to sit and sit to supine and roll side to side in bed with modified independence within 7 day(s). 2.  Patient will perform sit to stand with supervision/set-up within 7 day(s). 3.  Patient will transfer from bed to chair and chair to bed with supervision/set-up using the least restrictive device within 7 day(s). 4.  Patient will ambulate with supervision/set-up for 100 feet with the least restrictive device within 7 day(s). Outcome: Progressing   PHYSICAL THERAPY TREATMENT    Patient: Tirso Villafuerte (79 y.o. female)  Date: 11/13/2023  Diagnosis: Acute exacerbation of chronic obstructive pulmonary disease (COPD) (HCC) [J44.1]  COPD with acute exacerbation (HCC) [J44.1]  Acute respiratory failure with hypoxia and hypercapnia (HCC) [J96.01, J96.02] Acute respiratory failure with hypoxia (HCC)      Precautions: Fall Risk, Bed Alarm              use her shoes (built in lift)       ASSESSMENT:  Patient continues to benefit from skilled PT services and is progressing towards goals. Pt continues with some balance impairments. Once up amb in the hallway noted lateral sway and pt confirmed having a leg length discrepancy reporting that her shoes with built-in lift are back in her room. Returned to her room and donned her shoes (sandals with lift) and her gait was more stable.  I wrote on pt's communication board to use her shoes, but I
LUMBAR FUSION X2 SURGERIES    HYSTERECTOMY (CERVIX STATUS UNKNOWN)      IR KYPHOPLASTY LUMBAR FIRST LEVEL  6/26/2017    IR KYPHOPLASTY LUMBAR FIRST LEVEL 6/26/2017 [facility] RAD ANGIO IR    PELVIS/HIP JOINT SURGERY UNLISTED      Hip replacement 2005    LA UNLISTED PROCEDURE CARDIAC SURGERY  08/2019    cardiac stents     TONSILLECTOMY      UROLOGICAL SURGERY  2019    arterial stent in R kidney       Home Situation:  Social/Functional History  Lives With: Alone  Type of Home: House  Home Layout: One level  Home Access: Elevator  Bathroom Shower/Tub: Walk-in shower  Bathroom Toilet: Standard  Bathroom Equipment: Built-in shower seat  Bathroom Accessibility: Accessible  Home Equipment: Rollator, Oxygen  Has the patient had two or more falls in the past year or any fall with injury in the past year?: No  Receives Help From: Home health (Pt anticipates needing in katrin ecaregivers)  ADL Assistance: Independent  Homemaking Assistance: Independent  Ambulation Assistance: Independent  Transfer Assistance: Independent  Active : No    Cognitive/Behavioral Status:  Orientation  Overall Orientation Status: Within Normal Limits  Orientation Level: Oriented X4      Strength:    Strength:  Within functional limits    Tone & Sensation:   Tone: Normal  Sensation: Intact    Coordination:  Coordination: Within functional limits    Range Of Motion:  AROM: Within functional limits       Functional Mobility:  Bed Mobility:     Bed Mobility Training  Bed Mobility Training: No  Transfers:     Transfer Training  Transfer Training: Yes  Sit to Stand: Contact-guard assistance  Stand to Sit: Contact-guard assistance  Balance:               Balance  Sitting: Intact  Standing: Impaired  Standing - Static: Fair  Standing - Dynamic: Fair;Poor (1 major LOB requiring max A to prevent falling)  Ambulation/Gait Training:                       Gait  Overall Level of Assistance: Minimum assistance;Maximum assistance  Distance (ft): 20 Feet  Assistive
Verbal  Barriers to Learning: None  Education Outcome: Verbalized understanding      Es Shine, PT  Minutes: 24

## 2023-11-16 NOTE — DISCHARGE INSTRUCTIONS
Discharge Instructions       PATIENT ID: Jessica Blank  MRN: [id number]   YOB: 1945    DATE OF ADMISSION: 11/6/2023   DATE OF DISCHARGE: 11/16/2023    PRIMARY CARE PROVIDER: Amrita Miguel     ATTENDING PHYSICIAN: Brenda Ingram MD   DISCHARGING PROVIDER: Mitesh Isabel MD    To contact this individual call [phone] and ask the  to page. If unavailable ask to be transferred the Adult Hospitalist Department. DISCHARGE DIAGNOSES   Acute on chronic hypoxic respiratory failure  Acute COPD exacerbation         CONSULTATIONS: Pulmonary, palliative medicine and hospice team were involved in your care during your hospitalization. PROCEDURES/SURGERIES: * No surgery found *    PENDING TEST RESULTS:   At the time of discharge the following test results are still pending: None    FOLLOW UP APPOINTMENTS:   The hospice team would follow you and coordinate your care at home    ADDITIONAL CARE RECOMMENDATIONS:     DIET: regular diet    ACTIVITY: activity as tolerated        EQUIPMENT needed: Per hospice team      DISCHARGE MEDICATIONS:   See Medication Reconciliation Form    It is important that you take the medication exactly as they are prescribed. Keep your medication in the bottles provided by the pharmacist and keep a list of the medication names, dosages, and times to be taken in your wallet. Do not take other medications without consulting your doctor. NOTIFY YOUR PHYSICIAN FOR ANY OF THE FOLLOWING:   Fever over 101 degrees for 24 hours. Chest pain, shortness of breath, fever, chills, nausea, vomiting, diarrhea, change in mentation, falling, weakness, bleeding. Severe pain or pain not relieved by medications. Or, any other signs or symptoms that you may have questions about. DISPOSITION: Home with home hospice        Signed:    Mitesh Isabel MD  11/16/2023  8:38 AM

## 2023-11-16 NOTE — CARE COORDINATION
Transition of Care Plan:    RUR: 16%   Prior Level of Functioning: Independent    Disposition: Home with [facility] Agency:  Nicole Chacon, liaison [phone]   Per conversation with the rep;   she confirmed that they are set to admit the pt today. This cm informed her that transport was pushed back to 12pm to ensure the scripts were filled at [facility]. She reported that the nurse would arrive at 12:30pm to admit the pt. DME needed: Hospice will provide DME    Transportation at discharge: [address] 12pm \"payment secured from the pt\"    IM/IMM Medicare/ letter given: Received   Caregiver Contact: Cm Rider (Other)   [phone]   Discharge Caregiver contacted prior to discharge? Yes   Care Conference needed?  No   Barriers to discharge: Scripts needed prior to discharge

## 2023-11-17 NOTE — DISCHARGE SUMMARY
Phone: [phone]   ALPRAZolam 1 MG tablet  ipratropium 0.5 mg-albuterol 2.5 mg 0.5-2.5 (3) MG/3ML Soln nebulizer solution  oxyCODONE 5 MG immediate release tablet  predniSONE 20 MG tablet           NOTIFY YOUR PHYSICIAN FOR ANY OF THE FOLLOWING:   Fever over 101 degrees for 24 hours. Chest pain, shortness of breath, fever, chills, nausea, vomiting, diarrhea, change in mentation, falling, weakness, bleeding. Severe pain or pain not relieved by medications. Or, any other signs or symptoms that you may have questions about. DISPOSITION: Home with hospice services      PATIENT CONDITION AT DISCHARGE:     Functional status    Poor     Deconditioned    x Independent      Cognition    x Lucid     Forgetful     Dementia      Catheters/lines (plus indication)    Chambers     PICC     PEG    x None      Code status     Full code    x DNR, she signed a durable DNR form. PHYSICAL EXAMINATION AT DISCHARGE:    General : Alert and O x3, dyspneic during conversatio. HEENT: PEERL, EOMI, moist mucus membrane  Neck: supple, no JVD, no meningeal signs  Chest: On oxygen by nasal cannula, dyspneic during conversation, diminished air entry bilaterally.     CVS: S1 S2 heard, Capillary refill less than 2 seconds  Abd: soft/ Non tender, non distended, BS physiological,   Ext: no clubbing, no cyanosis, no edema, brisk 2+ DP pulses  Neuro/Psych: pleasant mood and affect, CN 2-12 grossly intact, sensory grossly within normal limit, Strength 5/5 in all extremities  Skin: warm     CHRONIC MEDICAL DIAGNOSES:  Principal Problem:    Acute respiratory failure with hypoxia (HCC)  Active Problems:    COPD with acute exacerbation ([address])    Palliative care encounter    Anxiety about health    DNR (do not resuscitate) discussion    Goals of care, counseling/discussion    Dyspnea    Acute respiratory failure with hypoxia and hypercapnia ([address])    Advanced care planning/counseling discussion    Concern about end of life    Anxiety  Resolved

## 2023-11-22 ENCOUNTER — HOSPITAL ENCOUNTER (OUTPATIENT)
Facility: HOSPITAL | Age: 78
Discharge: HOME | End: 2023-11-25
Attending: INTERNAL MEDICINE
Payer: MEDICARE

## 2023-11-22 VITALS — WEIGHT: 133 LBS | BODY MASS INDEX: 23.56 KG/M2

## 2023-11-22 DIAGNOSIS — R91.1 PULMONARY NODULE: ICD-10-CM

## 2023-11-22 LAB
GLUCOSE BLD STRIP.AUTO-MCNC: 195 MG/DL (ref 65–117)
GLUCOSE BLD STRIP.AUTO-MCNC: 204 MG/DL (ref 65–117)
SERVICE CMNT-IMP: ABNORMAL
SERVICE CMNT-IMP: ABNORMAL

## 2023-11-22 PROCEDURE — 78815 PET IMAGE W/CT SKULL-THIGH: CPT

## 2023-11-22 PROCEDURE — 3430000000 HC RX DIAGNOSTIC RADIOPHARMACEUTICAL: Performed by: INTERNAL MEDICINE

## 2023-11-22 PROCEDURE — 82962 GLUCOSE BLOOD TEST: CPT

## 2023-11-22 PROCEDURE — A9552 F18 FDG: HCPCS | Performed by: INTERNAL MEDICINE

## 2023-11-22 RX ORDER — FLUDEOXYGLUCOSE F-18 500 MCI/ML
10 INJECTION INTRAVENOUS
Status: COMPLETED | OUTPATIENT
Start: 2023-11-22 | End: 2023-11-22

## 2023-11-22 RX ADMIN — FLUDEOXYGLUCOSE F-18 10 MILLICURIE: 500 INJECTION INTRAVENOUS at 11:36

## 2023-11-24 ENCOUNTER — APPOINTMENT (OUTPATIENT)
Facility: HOSPITAL | Age: 78
End: 2023-11-24
Payer: MEDICARE

## 2023-11-24 ENCOUNTER — HOSPITAL ENCOUNTER (EMERGENCY)
Facility: HOSPITAL | Age: 78
Discharge: HOME | End: 2023-11-24
Attending: EMERGENCY MEDICINE
Payer: MEDICARE

## 2023-11-24 VITALS
SYSTOLIC BLOOD PRESSURE: 123 MMHG | TEMPERATURE: 96.8 F | RESPIRATION RATE: 27 BRPM | HEART RATE: 63 BPM | DIASTOLIC BLOOD PRESSURE: 89 MMHG | OXYGEN SATURATION: 96 %

## 2023-11-24 DIAGNOSIS — K66.8 FREE INTRAPERITONEAL AIR: Primary | ICD-10-CM

## 2023-11-24 DIAGNOSIS — R10.84 GENERALIZED ABDOMINAL PAIN: ICD-10-CM

## 2023-11-24 PROBLEM — J84.10 PULMONARY FIBROSIS (HCC): Status: ACTIVE | Noted: 2023-11-24

## 2023-11-24 PROBLEM — I31.9 PNEUMOPERICARDIUM: Status: RESOLVED | Noted: 2023-11-24 | Resolved: 2023-11-24

## 2023-11-24 PROBLEM — I31.9 PNEUMOPERICARDIUM: Status: ACTIVE | Noted: 2023-11-24

## 2023-11-24 LAB
ABO + RH BLD: NORMAL
ALBUMIN SERPL-MCNC: 2.8 G/DL (ref 3.5–5)
ALBUMIN/GLOB SERPL: 0.8 (ref 1.1–2.2)
ALP SERPL-CCNC: 91 U/L (ref 45–117)
ALT SERPL-CCNC: 24 U/L (ref 12–78)
ANION GAP SERPL CALC-SCNC: 2 MMOL/L (ref 5–15)
APPEARANCE UR: CLEAR
AST SERPL-CCNC: 17 U/L (ref 15–37)
BACTERIA URNS QL MICRO: NEGATIVE /HPF
BASOPHILS # BLD: 0 K/UL (ref 0–0.1)
BASOPHILS NFR BLD: 0 % (ref 0–1)
BILIRUB SERPL-MCNC: 0.6 MG/DL (ref 0.2–1)
BILIRUB UR QL: NEGATIVE
BLOOD GROUP ANTIBODIES SERPL: NORMAL
BUN SERPL-MCNC: 36 MG/DL (ref 6–20)
BUN/CREAT SERPL: 34 (ref 12–20)
CALCIUM SERPL-MCNC: 8.5 MG/DL (ref 8.5–10.1)
CHLORIDE SERPL-SCNC: 105 MMOL/L (ref 97–108)
CO2 SERPL-SCNC: 31 MMOL/L (ref 21–32)
COLOR UR: NORMAL
COMMENT:: NORMAL
CREAT SERPL-MCNC: 1.05 MG/DL (ref 0.55–1.02)
DIFFERENTIAL METHOD BLD: ABNORMAL
EKG ATRIAL RATE: 64 BPM
EKG DIAGNOSIS: NORMAL
EKG P AXIS: 52 DEGREES
EKG P-R INTERVAL: 128 MS
EKG Q-T INTERVAL: 378 MS
EKG QRS DURATION: 72 MS
EKG QTC CALCULATION (BAZETT): 389 MS
EKG R AXIS: 62 DEGREES
EKG T AXIS: 69 DEGREES
EKG VENTRICULAR RATE: 64 BPM
EOSINOPHIL # BLD: 0.1 K/UL (ref 0–0.4)
EOSINOPHIL NFR BLD: 1 % (ref 0–7)
EPITH CASTS URNS QL MICRO: NORMAL /LPF
ERYTHROCYTE [DISTWIDTH] IN BLOOD BY AUTOMATED COUNT: 13.9 % (ref 11.5–14.5)
GLOBULIN SER CALC-MCNC: 3.6 G/DL (ref 2–4)
GLUCOSE SERPL-MCNC: 208 MG/DL (ref 65–100)
GLUCOSE UR STRIP.AUTO-MCNC: NEGATIVE MG/DL
HCT VFR BLD AUTO: 32.3 % (ref 35–47)
HGB BLD-MCNC: 10.4 G/DL (ref 11.5–16)
HGB UR QL STRIP: NEGATIVE
HYALINE CASTS URNS QL MICRO: NORMAL /LPF (ref 0–5)
IMM GRANULOCYTES # BLD AUTO: 0.1 K/UL (ref 0–0.04)
IMM GRANULOCYTES NFR BLD AUTO: 1 % (ref 0–0.5)
KETONES UR QL STRIP.AUTO: NEGATIVE MG/DL
LEUKOCYTE ESTERASE UR QL STRIP.AUTO: NEGATIVE
LIPASE SERPL-CCNC: 21 U/L (ref 13–75)
LYMPHOCYTES # BLD: 0.3 K/UL (ref 0.8–3.5)
LYMPHOCYTES NFR BLD: 4 % (ref 12–49)
MCH RBC QN AUTO: 33.9 PG (ref 26–34)
MCHC RBC AUTO-ENTMCNC: 32.2 G/DL (ref 30–36.5)
MCV RBC AUTO: 105.2 FL (ref 80–99)
MONOCYTES # BLD: 0.3 K/UL (ref 0–1)
MONOCYTES NFR BLD: 4 % (ref 5–13)
NEUTS SEG # BLD: 6 K/UL (ref 1.8–8)
NEUTS SEG NFR BLD: 90 % (ref 32–75)
NITRITE UR QL STRIP.AUTO: NEGATIVE
NRBC # BLD: 0 K/UL (ref 0–0.01)
NRBC BLD-RTO: 0 PER 100 WBC
NT PRO BNP: 875 PG/ML
PH UR STRIP: 5.5 (ref 5–8)
PLATELET # BLD AUTO: 159 K/UL (ref 150–400)
PMV BLD AUTO: 9.6 FL (ref 8.9–12.9)
POTASSIUM SERPL-SCNC: 3.7 MMOL/L (ref 3.5–5.1)
PROT SERPL-MCNC: 6.4 G/DL (ref 6.4–8.2)
PROT UR STRIP-MCNC: NEGATIVE MG/DL
RBC # BLD AUTO: 3.07 M/UL (ref 3.8–5.2)
RBC #/AREA URNS HPF: NORMAL /HPF (ref 0–5)
RBC MORPH BLD: ABNORMAL
SODIUM SERPL-SCNC: 138 MMOL/L (ref 136–145)
SP GR UR REFRACTOMETRY: 1.01 (ref 1–1.03)
SPECIMEN EXP DATE BLD: NORMAL
SPECIMEN HOLD: NORMAL
SPECIMEN HOLD: NORMAL
TROPONIN I SERPL HS-MCNC: 11 NG/L (ref 0–51)
UROBILINOGEN UR QL STRIP.AUTO: 0.2 EU/DL (ref 0.2–1)
WBC # BLD AUTO: 6.8 K/UL (ref 3.6–11)
WBC URNS QL MICRO: NORMAL /HPF (ref 0–4)

## 2023-11-24 PROCEDURE — 83880 ASSAY OF NATRIURETIC PEPTIDE: CPT

## 2023-11-24 PROCEDURE — 83690 ASSAY OF LIPASE: CPT

## 2023-11-24 PROCEDURE — 36415 COLL VENOUS BLD VENIPUNCTURE: CPT

## 2023-11-24 PROCEDURE — 86901 BLOOD TYPING SEROLOGIC RH(D): CPT

## 2023-11-24 PROCEDURE — 85025 COMPLETE CBC W/AUTO DIFF WBC: CPT

## 2023-11-24 PROCEDURE — 96372 THER/PROPH/DIAG INJ SC/IM: CPT

## 2023-11-24 PROCEDURE — 86900 BLOOD TYPING SEROLOGIC ABO: CPT

## 2023-11-24 PROCEDURE — 93005 ELECTROCARDIOGRAM TRACING: CPT | Performed by: EMERGENCY MEDICINE

## 2023-11-24 PROCEDURE — 74177 CT ABD & PELVIS W/CONTRAST: CPT

## 2023-11-24 PROCEDURE — 96376 TX/PRO/DX INJ SAME DRUG ADON: CPT

## 2023-11-24 PROCEDURE — 2500000003 HC RX 250 WO HCPCS: Performed by: EMERGENCY MEDICINE

## 2023-11-24 PROCEDURE — 6360000002 HC RX W HCPCS: Performed by: EMERGENCY MEDICINE

## 2023-11-24 PROCEDURE — 99285 EMERGENCY DEPT VISIT HI MDM: CPT

## 2023-11-24 PROCEDURE — 96374 THER/PROPH/DIAG INJ IV PUSH: CPT

## 2023-11-24 PROCEDURE — 80053 COMPREHEN METABOLIC PANEL: CPT

## 2023-11-24 PROCEDURE — 81001 URINALYSIS AUTO W/SCOPE: CPT

## 2023-11-24 PROCEDURE — 84484 ASSAY OF TROPONIN QUANT: CPT

## 2023-11-24 PROCEDURE — 93010 ELECTROCARDIOGRAM REPORT: CPT | Performed by: INTERNAL MEDICINE

## 2023-11-24 PROCEDURE — 71045 X-RAY EXAM CHEST 1 VIEW: CPT

## 2023-11-24 PROCEDURE — 86850 RBC ANTIBODY SCREEN: CPT

## 2023-11-24 PROCEDURE — 6360000004 HC RX CONTRAST MEDICATION: Performed by: RADIOLOGY

## 2023-11-24 PROCEDURE — APPSS30 APP SPLIT SHARED TIME 16-30 MINUTES: Performed by: PHYSICIAN ASSISTANT

## 2023-11-24 RX ORDER — MORPHINE SULFATE 2 MG/ML
2 INJECTION, SOLUTION INTRAMUSCULAR; INTRAVENOUS
Status: COMPLETED | OUTPATIENT
Start: 2023-11-24 | End: 2023-11-24

## 2023-11-24 RX ORDER — SODIUM CHLORIDE 9 MG/ML
INJECTION, SOLUTION INTRAVENOUS ONCE
Status: DISCONTINUED | OUTPATIENT
Start: 2023-11-24 | End: 2023-11-24 | Stop reason: HOSPADM

## 2023-11-24 RX ORDER — MORPHINE SULFATE 2 MG/ML
2 INJECTION, SOLUTION INTRAMUSCULAR; INTRAVENOUS
Status: DISCONTINUED | OUTPATIENT
Start: 2023-11-24 | End: 2023-11-24 | Stop reason: HOSPADM

## 2023-11-24 RX ADMIN — MORPHINE SULFATE 2 MG: 2 INJECTION, SOLUTION INTRAMUSCULAR; INTRAVENOUS at 13:49

## 2023-11-24 RX ADMIN — IOPAMIDOL 100 ML: 755 INJECTION, SOLUTION INTRAVENOUS at 10:24

## 2023-11-24 RX ADMIN — MORPHINE SULFATE 2 MG: 2 INJECTION, SOLUTION INTRAMUSCULAR; INTRAVENOUS at 13:06

## 2023-11-24 RX ADMIN — MORPHINE SULFATE 2 MG: 2 INJECTION, SOLUTION INTRAMUSCULAR; INTRAVENOUS at 09:40

## 2023-11-24 RX ADMIN — MORPHINE SULFATE 2 MG: 2 INJECTION, SOLUTION INTRAMUSCULAR; INTRAVENOUS at 11:44

## 2023-11-24 ASSESSMENT — ENCOUNTER SYMPTOMS
VOMITING: 0
NAUSEA: 1
SHORTNESS OF BREATH: 0
ABDOMINAL PAIN: 1

## 2023-11-24 ASSESSMENT — PAIN DESCRIPTION - LOCATION: LOCATION: ABDOMEN

## 2023-11-24 ASSESSMENT — PAIN SCALES - GENERAL
PAINLEVEL_OUTOF10: 10
PAINLEVEL_OUTOF10: 10

## 2023-11-24 NOTE — ED PROVIDER NOTES
Prescriptions    No medications on file         (Please note that portions of this note were completed with a voice recognition program.  Efforts were made to edit the dictations but occasionally words are mis-transcribed.)    Nova Van MD (electronically signed)  Emergency Attending Physician / Physician Assistant / Nurse Practitioner              Lisy Dai MD  11/24/23 [id number]

## 2023-11-24 NOTE — ED NOTES
Transport here to take pt back to home for hospice care. Pt medicated with morphine for pain management prior to transport.         Jennifer Pollard RN  11/24/23 [id number]

## 2023-11-24 NOTE — CONSULTS
[facility]  ER consult    Admit Date: 11/24/2023  Reason for Consultation: Pneumoperitoneum    HPI:  Johanna Redding is a 70 y.o. female with a PMH of Pulmonary Fibrosis (on 4L O2 at baseline), pulmonary nodules, CAD, GERD, hypertension, pneumonia, and as noted below who we are asked to see in surgical consultation by Dr. Gerardo Cullen for pneumoperitoneum. She reports acute onset upper abdominal pain at about 5am this morning immediately following her fist sip of coffee. She endorses chronic abdominal pain & long-tern NSAID & Steroid use but does not take GERD medications. Never had acute pain like this. She phoned EMS for transport to ED. CT Chest, Abd Pelvis w IV contrast (11/24/2023): IMPRESSION:  Moderate volume pneumoperitoneum and small volume free fluid, consistent with hollow viscus perforation, suspected source gastric antrum/proximal duodenum.       Patient Active Problem List    Diagnosis Date Noted    Pulmonary fibrosis ([address]) 11/24/2023    Pneumoperitoneum 11/24/2023    Concern about end of life 11/16/2023    Anxiety 11/16/2023    Palliative care encounter 11/14/2023    Anxiety about health 11/14/2023    DNR (do not resuscitate) discussion 11/14/2023    Goals of care, counseling/discussion 11/14/2023    Dyspnea 11/14/2023    Acute respiratory failure with hypoxia and hypercapnia ([address]) 11/14/2023    Advanced care planning/counseling discussion 11/14/2023    COPD with acute exacerbation ([address]) 11/06/2023    Essential hypertension 04/19/2023    Acute respiratory failure with hypoxia ([address]) 04/19/2023    PAD (peripheral artery disease) ([address]) 04/19/2023    Bacterial pneumonia 02/22/2020    Anemia 10/15/2019    Renal artery stenosis (HCC) 08/20/2019    Flash pulmonary edema ([address]) 08/20/2019    Coronary artery disease involving native coronary artery of native heart without angina pectoris 08/20/2019    Bilateral leg edema 11/16/2018    Mixed hyperlipidemia 11/16/2018    Renovascular hypertension

## 2023-11-24 NOTE — ED TRIAGE NOTES
Patient arrived via EMS from home with abdominal pain for 2 hours radiated to right shoulder. 5 LNC at home for pulmonary fibrosis. Nausea no vomiting. Denies diarrhea.

## 2023-11-24 NOTE — DISCHARGE INSTRUCTIONS
Follow the care as per hospice. Return to the emergency department if intractable pain and unable to be managed at home.

## 2024-05-06 ENCOUNTER — TELEPHONE (OUTPATIENT)
Age: 79
End: 2024-05-06

## 2024-05-06 NOTE — PROGRESS NOTES
Hypothyroidism.  TSH is stable on current dose of levothyroxine   MD at bedside to re-evaluate patient.

## 2024-05-06 NOTE — TELEPHONE ENCOUNTER
Lipid Panel per PCP 10/20/23    Cholesterol: 130  Triglycerides: 87  HDL: 65  CHOL/HDL ratio: 2  Non-HDL chol: 65  LDL ca.6

## 2024-06-12 NOTE — Clinical Note
History and physical documented and up to date, allergies reviewed, lab results reviewed, pre-procedure education provided, patient verbalized understanding of procedure, procedural consent signed and patient is NPO. Render Note In Bullet Format When Appropriate: No Post-Care Instructions: I reviewed with the patient in detail post-care instructions. Patient is to wear sunprotection, and avoid picking at any of the treated lesions. Pt may apply Vaseline to crusted or scabbing areas. Duration Of Freeze Thaw-Cycle (Seconds): 0 Consent: The patient's consent was obtained including but not limited to risks of crusting, scabbing, blistering, scarring, darker or lighter pigmentary change, recurrence, incomplete removal and infection. Show Aperture Variable?: Yes Detail Level: Detailed

## (undated) DEVICE — COPILOT BLEEDBACK CONTROL VALVE: Brand: COPILOT

## (undated) DEVICE — RUNTHROUGH NS EXTRA FLOPPY PTCA GUIDEWIRE: Brand: RUNTHROUGH

## (undated) DEVICE — CATH BLLN ANGIO 2.75X12MM NC EUPHORIA RX

## (undated) DEVICE — KIT MFLD ISOLATN NACL CNTRST PRT TBNG SPIK W/ PRSS TRNSDUC

## (undated) DEVICE — PROCEDURE KIT FLUID MGMT CUST MAINFOLD STRL

## (undated) DEVICE — ANGIOGRAPHY KIT

## (undated) DEVICE — TUBING PRSS MON L6IN PVC M FEM CONN

## (undated) DEVICE — GUIDEWIRE VASC L260CM DIA0.035IN TIP L3MM STD EXCHG PTFE J

## (undated) DEVICE — GLIDESHEATH SLENDER STAINLESS STEEL KIT: Brand: GLIDESHEATH SLENDER

## (undated) DEVICE — SPECIAL PROCEDURE DRAPE 32" X 34": Brand: SPECIAL PROCEDURE DRAPE

## (undated) DEVICE — PACK PROCEDURE SURG HRT CATH

## (undated) DEVICE — ANGIOGRAPHIC CATHETER: Brand: IMPULSE™

## (undated) DEVICE — TUBING HYDR IRR --

## (undated) DEVICE — KIT HND CTRL 3 W STPCOCK ROT END 54IN PREM HI PRSS TBNG AT

## (undated) DEVICE — CATH GUID COR JR4.0 6FR 100CM -- LAUNCHER

## (undated) DEVICE — HI-TORQUE VERSACORE MODIFIED J GUIDE WIRE SYSTEM 145 CM: Brand: HI-TORQUE VERSACORE

## (undated) DEVICE — CUSTOM KT PTCA INFL DEV K05 00052M

## (undated) DEVICE — GUIDEWIRE VASC J 3 MM 0.035 INX210 CM FIX COR INQWIRE

## (undated) DEVICE — WASTE KIT - ST MARY: Brand: MEDLINE INDUSTRIES, INC.

## (undated) DEVICE — SPLINT WR POS F/ARTERIAL ACC -- BX/10

## (undated) DEVICE — TR BAND RADIAL ARTERY COMPRESSION DEVICE: Brand: TR BAND

## (undated) DEVICE — FORCEPS BX L240CM JAW DIA2.8MM L CAP W/ NDL MIC MESH TOOTH

## (undated) DEVICE — FLASH OSTIAL SYSTEM, DUAL BALLOON ANGIOPLASTY CATHETER, 5.0MM X 12MM: Brand: FLASH OSTIAL SYSTEM

## (undated) DEVICE — BAG TRASH WST 122 CM 183 CM 1400 CC FEMALE LUER PVC DEPOT

## (undated) DEVICE — TREK CORONARY DILATATION CATHETER 2.50 MM X 12 MM / RAPID-EXCHANGE: Brand: TREK

## (undated) DEVICE — PROVE COVER: Brand: UNBRANDED

## (undated) DEVICE — CATH 5F 100CM JL40 -- DXTERITY

## (undated) DEVICE — HEART CATH-MRMC: Brand: MEDLINE INDUSTRIES, INC.

## (undated) DEVICE — KIT MED IMAG CNTRST AGNT W/ IOPAMIDOL REUSE

## (undated) DEVICE — 3M™ TEGADERM™ TRANSPARENT FILM DRESSING FRAME STYLE, 1626W, 4 IN X 4-3/4 IN (10 CM X 12 CM), 50/CT 4CT/CASE: Brand: 3M™ TEGADERM™

## (undated) DEVICE — CATH 5F 100CM JR40 -- DXTERITY

## (undated) DEVICE — ADAPTER CLR ROT AIRLESS W/ SHERLOCK CONN M LUER

## (undated) DEVICE — CATH 5F 100CM JL35 -- DXTERITY

## (undated) DEVICE — GOWN,SIRUS,NONRNF,SETINSLV,2XL,18/CS: Brand: MEDLINE

## (undated) DEVICE — PINNACLE PRECISION ACCESS SYSTEM INTRODUCER SHEATH: Brand: PINNACLE PRECISION ACCESS SYSTEM

## (undated) DEVICE — CATH GUID COR EB35 6FR 100CM -- LAUNCHER

## (undated) DEVICE — LUER-LOK 360°: Brand: CONNECTA, LUER-LOK

## (undated) DEVICE — GLIDESHEATH SLENDER ACCESS KIT: Brand: GLIDESHEATH SLENDER